# Patient Record
Sex: MALE | Race: NATIVE HAWAIIAN OR OTHER PACIFIC ISLANDER | NOT HISPANIC OR LATINO | Employment: FULL TIME | URBAN - METROPOLITAN AREA
[De-identification: names, ages, dates, MRNs, and addresses within clinical notes are randomized per-mention and may not be internally consistent; named-entity substitution may affect disease eponyms.]

---

## 2019-03-11 ENCOUNTER — HOSPITAL ENCOUNTER (OUTPATIENT)
Facility: CLINIC | Age: 64
End: 2019-03-11

## 2019-03-11 ENCOUNTER — APPOINTMENT (OUTPATIENT)
Dept: GENERAL RADIOLOGY | Facility: CLINIC | Age: 64
DRG: 233 | End: 2019-03-11
Attending: EMERGENCY MEDICINE
Payer: COMMERCIAL

## 2019-03-11 ENCOUNTER — HOSPITAL ENCOUNTER (INPATIENT)
Facility: CLINIC | Age: 64
LOS: 13 days | Discharge: HOME | DRG: 233 | End: 2019-03-24
Attending: EMERGENCY MEDICINE | Admitting: HOSPITALIST
Payer: COMMERCIAL

## 2019-03-11 DIAGNOSIS — Z95.1 STATUS POST CORONARY ARTERY BYPASS GRAFT: ICD-10-CM

## 2019-03-11 DIAGNOSIS — I25.10 CAD IN NATIVE ARTERY: ICD-10-CM

## 2019-03-11 DIAGNOSIS — J81.1 PULMONARY EDEMA: ICD-10-CM

## 2019-03-11 DIAGNOSIS — I21.4 NSTEMI (NON-ST ELEVATED MYOCARDIAL INFARCTION) (H): ICD-10-CM

## 2019-03-11 DIAGNOSIS — Z95.1 S/P CABG (CORONARY ARTERY BYPASS GRAFT): Primary | ICD-10-CM

## 2019-03-11 PROBLEM — E11.9 TYPE 2 DIABETES MELLITUS (H): Status: ACTIVE | Noted: 2019-03-11

## 2019-03-11 LAB
ALBUMIN SERPL-MCNC: 2.7 G/DL (ref 3.4–5)
ALP SERPL-CCNC: 151 U/L (ref 40–150)
ALT SERPL W P-5'-P-CCNC: 29 U/L (ref 0–70)
ANION GAP SERPL CALCULATED.3IONS-SCNC: 10 MMOL/L (ref 3–14)
ANION GAP SERPL CALCULATED.3IONS-SCNC: 7 MMOL/L (ref 3–14)
AST SERPL W P-5'-P-CCNC: 27 U/L (ref 0–45)
BASE DEFICIT BLDA-SCNC: 7.2 MMOL/L
BASE EXCESS BLDA CALC-SCNC: 4 MMOL/L
BASOPHILS # BLD AUTO: 0 10E9/L (ref 0–0.2)
BASOPHILS NFR BLD AUTO: 0.3 %
BILIRUB SERPL-MCNC: 0.3 MG/DL (ref 0.2–1.3)
BUN SERPL-MCNC: 26 MG/DL (ref 7–30)
BUN SERPL-MCNC: 27 MG/DL (ref 7–30)
CALCIUM SERPL-MCNC: 8.4 MG/DL (ref 8.5–10.1)
CALCIUM SERPL-MCNC: 8.6 MG/DL (ref 8.5–10.1)
CHLORIDE SERPL-SCNC: 106 MMOL/L (ref 94–109)
CHLORIDE SERPL-SCNC: 107 MMOL/L (ref 94–109)
CO2 SERPL-SCNC: 23 MMOL/L (ref 20–32)
CO2 SERPL-SCNC: 25 MMOL/L (ref 20–32)
CREAT SERPL-MCNC: 1.44 MG/DL (ref 0.66–1.25)
CREAT SERPL-MCNC: 1.52 MG/DL (ref 0.66–1.25)
D DIMER PPP FEU-MCNC: 3.6 UG/ML FEU (ref 0–0.5)
DIFFERENTIAL METHOD BLD: ABNORMAL
EOSINOPHIL # BLD AUTO: 0.4 10E9/L (ref 0–0.7)
EOSINOPHIL NFR BLD AUTO: 2.4 %
ERYTHROCYTE [DISTWIDTH] IN BLOOD BY AUTOMATED COUNT: 14.4 % (ref 10–15)
GFR SERPL CREATININE-BSD FRML MDRD: 49 ML/MIN/{1.73_M2}
GFR SERPL CREATININE-BSD FRML MDRD: 52 ML/MIN/{1.73_M2}
GLUCOSE BLDC GLUCOMTR-MCNC: 252 MG/DL (ref 70–99)
GLUCOSE SERPL-MCNC: 253 MG/DL (ref 70–99)
GLUCOSE SERPL-MCNC: 350 MG/DL (ref 70–99)
HBA1C MFR BLD: 7.9 % (ref 0–5.6)
HCO3 BLD-SCNC: 23 MMOL/L (ref 21–28)
HCO3 BLD-SCNC: 29 MMOL/L (ref 21–28)
HCT VFR BLD AUTO: 31.1 % (ref 40–53)
HCT VFR BLD CALC: 33 %PCV (ref 40–53)
HGB BLD CALC-MCNC: 11.2 G/DL (ref 13.3–17.7)
HGB BLD-MCNC: 10 G/DL (ref 13.3–17.7)
IMM GRANULOCYTES # BLD: 0.1 10E9/L (ref 0–0.4)
IMM GRANULOCYTES NFR BLD: 0.4 %
LACTATE BLD-SCNC: 2.7 MMOL/L (ref 0.7–2)
LACTATE BLD-SCNC: 5 MMOL/L (ref 0.7–2)
LYMPHOCYTES # BLD AUTO: 4.1 10E9/L (ref 0.8–5.3)
LYMPHOCYTES NFR BLD AUTO: 28.3 %
MCH RBC QN AUTO: 25.3 PG (ref 26.5–33)
MCHC RBC AUTO-ENTMCNC: 32.2 G/DL (ref 31.5–36.5)
MCV RBC AUTO: 79 FL (ref 78–100)
MONOCYTES # BLD AUTO: 1.1 10E9/L (ref 0–1.3)
MONOCYTES NFR BLD AUTO: 7.3 %
NEUTROPHILS # BLD AUTO: 8.9 10E9/L (ref 1.6–8.3)
NEUTROPHILS NFR BLD AUTO: 61.3 %
NRBC # BLD AUTO: 0 10*3/UL
NRBC BLD AUTO-RTO: 0 /100
NT-PROBNP SERPL-MCNC: 2131 PG/ML (ref 0–900)
OXYHGB MFR BLD: 93 % (ref 92–100)
OXYHGB MFR BLD: 99 % (ref 92–100)
PCO2 BLD: 44 MM HG (ref 35–45)
PCO2 BLD: 69 MM HG (ref 35–45)
PH BLD: 7.13 PH (ref 7.35–7.45)
PH BLD: 7.43 PH (ref 7.35–7.45)
PLATELET # BLD AUTO: 368 10E9/L (ref 150–450)
PO2 BLD: 262 MM HG (ref 80–105)
PO2 BLD: 94 MM HG (ref 80–105)
POTASSIUM BLD-SCNC: 3.4 MMOL/L (ref 3.4–5.3)
POTASSIUM SERPL-SCNC: 3.4 MMOL/L (ref 3.4–5.3)
POTASSIUM SERPL-SCNC: 4.1 MMOL/L (ref 3.4–5.3)
PROCALCITONIN SERPL-MCNC: 0.07 NG/ML
PROT SERPL-MCNC: 8.1 G/DL (ref 6.8–8.8)
RBC # BLD AUTO: 3.96 10E12/L (ref 4.4–5.9)
SODIUM BLD-SCNC: 141 MMOL/L (ref 133–144)
SODIUM SERPL-SCNC: 139 MMOL/L (ref 133–144)
SODIUM SERPL-SCNC: 139 MMOL/L (ref 133–144)
TROPONIN I BLD-MCNC: 0.36 UG/L (ref 0–0.08)
TROPONIN I SERPL-MCNC: 2.1 UG/L (ref 0–0.04)
WBC # BLD AUTO: 14.4 10E9/L (ref 4–11)

## 2019-03-11 PROCEDURE — 96375 TX/PRO/DX INJ NEW DRUG ADDON: CPT

## 2019-03-11 PROCEDURE — 71045 X-RAY EXAM CHEST 1 VIEW: CPT

## 2019-03-11 PROCEDURE — 83605 ASSAY OF LACTIC ACID: CPT | Performed by: EMERGENCY MEDICINE

## 2019-03-11 PROCEDURE — 84484 ASSAY OF TROPONIN QUANT: CPT

## 2019-03-11 PROCEDURE — 40000275 ZZH STATISTIC RCP TIME EA 10 MIN

## 2019-03-11 PROCEDURE — 82805 BLOOD GASES W/O2 SATURATION: CPT | Performed by: EMERGENCY MEDICINE

## 2019-03-11 PROCEDURE — 40000497 ZZHCL STATISTIC SODIUM ED POCT

## 2019-03-11 PROCEDURE — 25000128 H RX IP 250 OP 636

## 2019-03-11 PROCEDURE — 40000498 ZZHCL STATISTIC POTASSIUM ED POCT

## 2019-03-11 PROCEDURE — 86901 BLOOD TYPING SEROLOGIC RH(D): CPT | Performed by: SURGERY

## 2019-03-11 PROCEDURE — 80048 BASIC METABOLIC PNL TOTAL CA: CPT | Performed by: HOSPITALIST

## 2019-03-11 PROCEDURE — 87040 BLOOD CULTURE FOR BACTERIA: CPT | Performed by: HOSPITALIST

## 2019-03-11 PROCEDURE — 85025 COMPLETE CBC W/AUTO DIFF WBC: CPT | Performed by: EMERGENCY MEDICINE

## 2019-03-11 PROCEDURE — 82805 BLOOD GASES W/O2 SATURATION: CPT | Performed by: HOSPITALIST

## 2019-03-11 PROCEDURE — 85379 FIBRIN DEGRADATION QUANT: CPT | Performed by: EMERGENCY MEDICINE

## 2019-03-11 PROCEDURE — 40000501 ZZHCL STATISTIC HEMATOCRIT ED POCT

## 2019-03-11 PROCEDURE — 99291 CRITICAL CARE FIRST HOUR: CPT | Mod: 25

## 2019-03-11 PROCEDURE — 86900 BLOOD TYPING SEROLOGIC ABO: CPT | Performed by: SURGERY

## 2019-03-11 PROCEDURE — 99223 1ST HOSP IP/OBS HIGH 75: CPT | Mod: AI | Performed by: HOSPITALIST

## 2019-03-11 PROCEDURE — 86850 RBC ANTIBODY SCREEN: CPT | Performed by: SURGERY

## 2019-03-11 PROCEDURE — 96376 TX/PRO/DX INJ SAME DRUG ADON: CPT

## 2019-03-11 PROCEDURE — 80053 COMPREHEN METABOLIC PANEL: CPT | Performed by: EMERGENCY MEDICINE

## 2019-03-11 PROCEDURE — 25000131 ZZH RX MED GY IP 250 OP 636 PS 637: Performed by: HOSPITALIST

## 2019-03-11 PROCEDURE — 84484 ASSAY OF TROPONIN QUANT: CPT | Performed by: EMERGENCY MEDICINE

## 2019-03-11 PROCEDURE — 83880 ASSAY OF NATRIURETIC PEPTIDE: CPT | Performed by: EMERGENCY MEDICINE

## 2019-03-11 PROCEDURE — 20000003 ZZH R&B ICU

## 2019-03-11 PROCEDURE — 83036 HEMOGLOBIN GLYCOSYLATED A1C: CPT | Performed by: EMERGENCY MEDICINE

## 2019-03-11 PROCEDURE — 84484 ASSAY OF TROPONIN QUANT: CPT | Performed by: HOSPITALIST

## 2019-03-11 PROCEDURE — 96365 THER/PROPH/DIAG IV INF INIT: CPT

## 2019-03-11 PROCEDURE — 36415 COLL VENOUS BLD VENIPUNCTURE: CPT | Performed by: HOSPITALIST

## 2019-03-11 PROCEDURE — 84145 PROCALCITONIN (PCT): CPT | Performed by: EMERGENCY MEDICINE

## 2019-03-11 PROCEDURE — 36600 WITHDRAWAL OF ARTERIAL BLOOD: CPT

## 2019-03-11 PROCEDURE — 25000128 H RX IP 250 OP 636: Performed by: HOSPITALIST

## 2019-03-11 PROCEDURE — 25000128 H RX IP 250 OP 636: Performed by: EMERGENCY MEDICINE

## 2019-03-11 PROCEDURE — 99291 CRITICAL CARE FIRST HOUR: CPT | Mod: 25 | Performed by: INTERNAL MEDICINE

## 2019-03-11 PROCEDURE — 93005 ELECTROCARDIOGRAM TRACING: CPT

## 2019-03-11 PROCEDURE — 83605 ASSAY OF LACTIC ACID: CPT | Performed by: HOSPITALIST

## 2019-03-11 PROCEDURE — 00000146 ZZHCL STATISTIC GLUCOSE BY METER IP

## 2019-03-11 PROCEDURE — 81001 URINALYSIS AUTO W/SCOPE: CPT | Performed by: HOSPITALIST

## 2019-03-11 PROCEDURE — 25000132 ZZH RX MED GY IP 250 OP 250 PS 637: Performed by: EMERGENCY MEDICINE

## 2019-03-11 RX ORDER — ONDANSETRON 2 MG/ML
4 INJECTION INTRAMUSCULAR; INTRAVENOUS EVERY 6 HOURS PRN
Status: DISCONTINUED | OUTPATIENT
Start: 2019-03-11 | End: 2019-03-18

## 2019-03-11 RX ORDER — DEXTROSE MONOHYDRATE 25 G/50ML
25-50 INJECTION, SOLUTION INTRAVENOUS
Status: DISCONTINUED | OUTPATIENT
Start: 2019-03-11 | End: 2019-03-11 | Stop reason: ALTCHOICE

## 2019-03-11 RX ORDER — FUROSEMIDE 10 MG/ML
40 INJECTION INTRAMUSCULAR; INTRAVENOUS
Status: DISCONTINUED | OUTPATIENT
Start: 2019-03-12 | End: 2019-03-13 | Stop reason: DRUGHIGH

## 2019-03-11 RX ORDER — ASPIRIN 300 MG/1
300 SUPPOSITORY RECTAL ONCE
Status: COMPLETED | OUTPATIENT
Start: 2019-03-11 | End: 2019-03-11

## 2019-03-11 RX ORDER — ONDANSETRON 4 MG/1
4 TABLET, ORALLY DISINTEGRATING ORAL EVERY 6 HOURS PRN
Status: DISCONTINUED | OUTPATIENT
Start: 2019-03-11 | End: 2019-03-18

## 2019-03-11 RX ORDER — FUROSEMIDE 10 MG/ML
20 INJECTION INTRAMUSCULAR; INTRAVENOUS ONCE
Status: COMPLETED | OUTPATIENT
Start: 2019-03-11 | End: 2019-03-11

## 2019-03-11 RX ORDER — NICOTINE POLACRILEX 4 MG
15-30 LOZENGE BUCCAL
Status: DISCONTINUED | OUTPATIENT
Start: 2019-03-11 | End: 2019-03-18

## 2019-03-11 RX ORDER — ASPIRIN 300 MG/1
300 SUPPOSITORY RECTAL DAILY
Status: DISCONTINUED | OUTPATIENT
Start: 2019-03-12 | End: 2019-03-12

## 2019-03-11 RX ORDER — CEFTRIAXONE 1 G/1
1 INJECTION, POWDER, FOR SOLUTION INTRAMUSCULAR; INTRAVENOUS ONCE
Status: COMPLETED | OUTPATIENT
Start: 2019-03-11 | End: 2019-03-11

## 2019-03-11 RX ORDER — SODIUM CHLORIDE 9 MG/ML
INJECTION, SOLUTION INTRAVENOUS CONTINUOUS
Status: DISCONTINUED | OUTPATIENT
Start: 2019-03-11 | End: 2019-03-18

## 2019-03-11 RX ORDER — NICOTINE POLACRILEX 4 MG
15-30 LOZENGE BUCCAL
Status: DISCONTINUED | OUTPATIENT
Start: 2019-03-11 | End: 2019-03-11 | Stop reason: ALTCHOICE

## 2019-03-11 RX ORDER — NITROGLYCERIN 20 MG/100ML
0.07-2 INJECTION INTRAVENOUS CONTINUOUS
Status: DISCONTINUED | OUTPATIENT
Start: 2019-03-11 | End: 2019-03-13 | Stop reason: ALTCHOICE

## 2019-03-11 RX ORDER — NALOXONE HYDROCHLORIDE 0.4 MG/ML
.1-.4 INJECTION, SOLUTION INTRAMUSCULAR; INTRAVENOUS; SUBCUTANEOUS
Status: DISCONTINUED | OUTPATIENT
Start: 2019-03-11 | End: 2019-03-18

## 2019-03-11 RX ORDER — DEXTROSE MONOHYDRATE 25 G/50ML
25-50 INJECTION, SOLUTION INTRAVENOUS
Status: DISCONTINUED | OUTPATIENT
Start: 2019-03-11 | End: 2019-03-18

## 2019-03-11 RX ADMIN — HEPARIN SODIUM 750 UNITS/HR: 10000 INJECTION, SOLUTION INTRAVENOUS at 18:40

## 2019-03-11 RX ADMIN — CEFTRIAXONE SODIUM 1 G: 1 INJECTION, POWDER, FOR SOLUTION INTRAMUSCULAR; INTRAVENOUS at 21:21

## 2019-03-11 RX ADMIN — Medication 3800 UNITS: at 18:42

## 2019-03-11 RX ADMIN — FUROSEMIDE 20 MG: 10 INJECTION, SOLUTION INTRAVENOUS at 21:03

## 2019-03-11 RX ADMIN — FUROSEMIDE 20 MG: 10 INJECTION, SOLUTION INTRAVENOUS at 18:42

## 2019-03-11 RX ADMIN — ASPIRIN 300 MG: 300 SUPPOSITORY RECTAL at 18:43

## 2019-03-11 RX ADMIN — INSULIN ASPART 3 UNITS: 100 INJECTION, SOLUTION INTRAVENOUS; SUBCUTANEOUS at 20:59

## 2019-03-11 RX ADMIN — NITROGLYCERIN 0.07 MCG/KG/MIN: 20 INJECTION INTRAVENOUS at 20:13

## 2019-03-11 SDOH — HEALTH STABILITY: MENTAL HEALTH: HOW OFTEN DO YOU HAVE A DRINK CONTAINING ALCOHOL?: NEVER

## 2019-03-11 ASSESSMENT — MIFFLIN-ST. JEOR: SCORE: 1430.13

## 2019-03-11 ASSESSMENT — ACTIVITIES OF DAILY LIVING (ADL): ADLS_ACUITY_SCORE: 16

## 2019-03-11 NOTE — H&P
[facility]    History and Physical - Hospitalist Service       Date of Admission:  3/11/2019    Assessment & Plan   Khang Mcelroy is a 63 year old male admitted on 3/11/2019 with acute hypoxemic and hypercapnic respiratory failure presumed secondary to NSTEMI    Acute hypoxemic/hypercapnic respiratory failure secondary to acute systolic congestive heart failure from NSTEMI  Patient with acute onset of shortness of breath after an episode of chest pain the day prior  Initial EKG was concerning for STEMI, but recurrent EKG did not show DWIGHT, but nonspecific t wave changes  Cardiology evaluated in the emergency department, recommended serial troponins, IV diuretics, nitroglycerin infusion, anticoagulation, aspirin, and full echocardiogram  He was started on heparin and given a dose of IV lasix 20 mg in the ED  Initial pH of 7.13 with respiratory acidosis  CXR with bilateral infiltrates concerning for pulmonary edema  Initial troponin of 2.1  Bedside echo in the ED is reported to me as having an estimated LVEF of 20%, and the chart review indicates he has a previous echo in care everywhere in 1/2019 that had a normal EF.  Extremities are cool, avoid additional antihypertensives at this moment other than the nitroglycerin  Plan  - admit to the ICU  - stat recheck of ABG, repeat prn after  - continue bipap for now  - continue heparin infusion, cardiac protocol  - bid lasix IV  - strict input and output  - serial troponins  - angiogram being considered in the AM  - goal electrolytes with k of 4 and mg of 2  - lipid panel in the AM  - hold BB, could precipitate shock in this critically ill patient  - cardiology consulted, seen in the ED by Dr. Osborn and Dr. Rodriguez  - nitroglycerin infusion with goal of 120 SBP  - AM labs    Lactic acidosis  5 on admission  Presumed to be from poor tissue perfusion from MI  Blood pressure 140/70 at the moment  - lasix as above  - serial lactic acids      SIRS  criteria  Likely secondary to MI, but cannot rule out infection  Denies abdominal complaints  Plan  - blood culture x 2  - UA/UC  - procalcitonin  - monitor fever curve  - 1 dose of ceftriaxone will be ordered, can be resumed if clinical picture warrants    DM2  - hold home agents  - q 4 glucose checks  - check A1c  - sliding scale insulin    HTN and HLD  - hold on home medications at this time       Diet: npo  DVT Prophylaxis: Heparin SQ  Dejesus Catheter: not present  Code Status: full code    Disposition Plan   Expected discharge: 4 - 7 days, recommended to prior living arrangement once improved.  Entered: Misha Santos DO 03/11/2019, 6:48 PM     The patient's care was discussed with the Patient and Patient's Family.    Misha Santos DO  [facility]    ______________________________________________________________________    Chief Complaint   Shortness of breath    History is obtained from the patient and patient's son Oneal    History of Present Illness   Khang Mcelroy is a 63 year old male who was brought in by EMS after having an acute attack of shortness of breath. The patient is on bipap and the English  is unavailable because of device failure, and so must of the history is obtained from the patient's son oneal, who helps translate. The patient has been working a lot washing dishes over the weekend, but had an episode of chest pain after shoveling snow on the day prior to admission. He did not seek medical attention. Today, he very suddenly had acute onset of shortness of breath and so EMS was called. Initial EKG did read as STEMI. He was placed on bipap.     When he arrived in the ED he was seen by the attending Dr. Romero. He was very somnolent and his pH was initially 7.1 by ABG. He slowly improved on bipap, and cardiology saw the patient at the bedside. Repeat EKG did not reveal any DWIGHT, but bedside echo reportedly revealed a reduction in LV function to around  20%.    His blood pressure and level of alertness slowly improved, so admission to the hospital was advised.    Review of Systems    Review of systems not obtained due to patient factors - bipap    Past Medical History    I have reviewed this patient's medical history and updated it with pertinent information if needed.   Past Medical History:   Diagnosis Date     Hyperlipidemia      Hypertension    DM2    Past Surgical History   I have reviewed this patient's surgical history and updated it with pertinent information if needed.  ESWL for stones    Social History   I have reviewed this patient's social history and updated it with pertinent information if needed.  Social History     Tobacco Use     Smoking status: Never Smoker   Substance Use Topics     Alcohol use: No     Frequency: Never     Drug use: No       Family History   I have reviewed this patient's family history and updated it with pertinent information if needed.   No family history of MI per the son    Prior to Admission Medications   None   pharmacy med rec in progress    Allergies   No Known Allergies    Physical Exam   Vital Signs: Temp: 97.9  F (36.6  C) Temp src: Rectal BP: 148/88 Pulse: 93 Heart Rate: 93 Resp: (!) 41 SpO2: 99 % O2 Device: None (Room air)    Weight: 170 lbs 0 oz    Physical Exam   Constitutional: He is oriented to person, place, and time. He appears well-developed and well-nourished. No distress.   Utilizing bipap     HENT:   Head: Normocephalic and atraumatic.   Eyes: EOM are normal. Pupils are equal, round, and reactive to light. No scleral icterus.   Neck: Normal range of motion. Neck supple.   Cardiovascular: Normal rate, regular rhythm, normal heart sounds and intact distal pulses.   S3 present. I appreciate no mumurs  Cold lower extremities       Pulmonary/Chest: He is in respiratory distress. He has rales. He exhibits no tenderness.   Increased work of breathing   Rales and rhonchi througout     Abdominal: Soft. Bowel sounds  are normal. He exhibits no distension. There is no tenderness.   Neurological: He is alert and oriented to person, place, and time.   Moving all extremities on command     Skin: Capillary refill takes 2 to 3 seconds.   Cool lower extremities. No obvious edema. DP pulses 2+ bilaterally     Psychiatric: He has a normal mood and affect. His behavior is normal. Judgment and thought content normal.         Data   Data reviewed today: I reviewed all medications, new labs and imaging results over the last 24 hours. I personally reviewed   CXR: bilateral patchy infiltrates concerning for pulmonary edema  EKG: NSR, inferior nonspecific ST changes      Recent Labs   Lab 03/11/19 1756 03/11/19 1754   WBC  --  14.4*   HGB 11.2* 10.0*   MCV  --  79   PLT  --  368    139   POTASSIUM 3.4 3.4   CHLORIDE  --  106   CO2  --  23   BUN  --  26   CR  --  1.52*   ANIONGAP  --  10   MYKE  --  8.4*   GLC  --  350*   ALBUMIN  --  2.7*   PROTTOTAL  --  8.1   BILITOTAL  --  0.3   ALKPHOS  --  151*   ALT  --  29   AST  --  27   TROPI  --  2.102*   TROPONIN  --  0.36*     Most Recent 3 CBC's:  Recent Labs   Lab Test 03/11/19 1756 03/11/19 1754   WBC  --  14.4*   HGB 11.2* 10.0*   MCV  --  79   PLT  --  368     Most Recent 3 BMP's:  Recent Labs   Lab Test 03/11/19 1756 03/11/19  1754    139   POTASSIUM 3.4 3.4   CHLORIDE  --  106   CO2  --  23   BUN  --  26   CR  --  1.52*   ANIONGAP  --  10   MYKE  --  8.4*   GLC  --  350*     Most Recent 2 LFT's:  Recent Labs   Lab Test 03/11/19  1754   AST 27   ALT 29   ALKPHOS 151*   BILITOTAL 0.3     Most Recent 3 INR's:No lab results found.  Most Recent ABG:  Recent Labs   Lab Test 03/11/19 1755   PH 7.13*   PO2 94   PCO2 69*   HCO3 23     Recent Results (from the past 24 hour(s))   Chest  XR, 1 view PORTABLE    Narrative    XR PORTABLE CHEST ONE VIEW   3/11/2019 6:05 PM     HISTORY: Respiratory distress.    COMPARISON: None.      Impression    IMPRESSION: Lungs are slightly hypoinflated.  Patchy bilateral  pulmonary opacities are present suspicious for pulmonary edema.  Superimposed infection, aspiration, or hemorrhage cannot be excluded.  Probable small bilateral pleural effusions. Heart size is at upper  limits of normal.    RICCI LEE MD

## 2019-03-11 NOTE — ED PROVIDER NOTES
History     Chief Complaint:  Chest Pain    Hx limited by the acuity of hte patient's condition    HPI   Khang Mcelroy is a 63 year old male with a history of HTN, HLD, diabetes who presents to the emergency department today for evaluation of chest pain and shortness of breath. Per the patient's son, the patient was shoveling last night when he began feeling very fatigued and with chest pain. His family wanted him to come in to the hospital then but he declined. Today his condition has been worsening, and while sitting on the couch he had sudden onset of shortness of breath, prompting the call to EMS.     Once EMS arrived, the patient was interactive but in respiratory distress. On RA, he was in the 60s. They put him on CPAP and his sats went down to 50s before tolerating and rising back to the low 70s. En route to the Emergency Department, the patient had worsening somnolence. He had an HR in the 110's, 's. 3 nasal sprays of nitroglycerin brought his BP down somewhat. Blood glucose 235. EMS 12-lead ECG revealed a STEMI.     He has had this pain once before in the past but never had it evaluated. No reported history of heart failure. No diuretics.     Allergies:  No Known Drug Allergies    Medications:    Medications reviewed. No pertinent medications.     Past Medical History:    HTN  HLD    Past Surgical History:    History reviewed. No pertinent surgical history.    Family History:    History reviewed. No pertinent family history.    Social History:  The patient was accompanied to the ED by EMS, family.  Smoking Status: Never Smoker  Smokeless Tobacco: Never Used  Alcohol Use: Positive   Marital Status:      Review of Systems   Unable to perform ROS: Acuity of condition     Physical Exam     Patient Vitals for the past 24 hrs:   BP Temp Temp src Pulse Heart Rate Resp SpO2 Height Weight   03/11/19 2245 115/70 97.9  F (36.6  C) -- 74 74 22 95 % -- --   03/11/19 2230 112/65 97.7  F (36.5  C) -- 71  "72 21 93 % -- --   03/11/19 2215 102/64 97.7  F (36.5  C) -- 71 71 22 92 % -- --   03/11/19 2200 108/63 97.7  F (36.5  C) Bladder 73 73 20 94 % -- --   03/11/19 2145 102/64 97.7  F (36.5  C) -- 75 73 21 91 % -- --   03/11/19 2130 130/83 97.7  F (36.5  C) -- 81 80 27 95 % -- --   03/11/19 2115 130/81 97.5  F (36.4  C) -- 81 82 23 100 % -- --   03/11/19 2100 148/87 97  F (36.1  C) Bladder 85 85 25 100 % -- --   03/11/19 2053 -- -- -- -- -- -- -- -- 73 kg (160 lb 15 oz)   03/11/19 2045 (!) 143/96 -- -- 88 81 18 100 % -- --   03/11/19 2030 145/83 -- -- 82 83 25 100 % -- --   03/11/19 2015 154/72 -- -- 85 84 26 100 % -- --   03/11/19 2000 152/82 96.6  F (35.9  C) Axillary 85 82 27 -- -- --   03/11/19 1945 (!) 145/91 -- -- 82 85 30 -- -- --   03/11/19 1930 150/89 -- -- 86 86 (!) 31 -- -- --   03/11/19 1915 144/82 96.3  F (35.7  C) Axillary 87 88 (!) 31 -- -- --   03/11/19 1914 -- -- -- -- 88 (!) 31 -- -- --   03/11/19 1913 -- -- -- -- 89 (!) 31 -- -- --   03/11/19 1912 (!) 160/93 -- -- -- 89 19 -- -- --   03/11/19 1858 146/90 -- -- 90 -- 18 100 % -- --   03/11/19 1847 -- 97.9  F (36.6  C) Rectal -- -- -- -- -- --   03/11/19 1845 148/88 -- -- 93 93 (!) 41 99 % -- --   03/11/19 1827 -- -- -- -- -- -- -- 1.6 m (5' 3\") --   03/11/19 1820 137/80 -- -- 95 94 (!) 34 99 % 1.575 m (5' 2\") --   03/11/19 1815 147/90 -- -- 97 93 (!) 34 99 % -- --   03/11/19 1810 151/88 -- -- 98 98 (!) 45 99 % -- --   03/11/19 1805 (!) 147/94 -- -- 100 101 (!) 36 98 % -- --   03/11/19 1800 (!) 155/97 -- -- 103 104 (!) 46 99 % -- --   03/11/19 1750 (!) 165/97 -- -- 109 112 (!) 40 94 % -- 77.1 kg (170 lb)      Physical Exam  General: Resting on the gurney, in obvious distress  Head:  The scalp, face, and head appear normal  Mouth/Throat: Mucus membranes are moist  CV:  Heart is difficult to auscultate secondary to competing lung sounds  Resp:  Lungs with crackles in all fields    Respiratory rate in the 40s.    Accessory muscle use present. Obvious " respiratory distress.   GI:  Abdomen is soft, no rigidity    No tenderness to palpation  MS:  Normal motor assessment of all extremities.    Good capillary refill noted.    bilateral lower extremity edema.   Skin:  No rash or lesions noted. No wounds.  Neuro:  Moves all extremities equally.  No apparent focal deficit.  Psych:  Somnolent    Emergency Department Course     ECG:  ECG taken at 1759, ECG read at 1800  Sinus tach w/ PACs w/ aberrant conduction  Nonspecific ST and T wave abnormality  Rate 104 bpm. UT interval 206 ms. QRS duration 104 ms. QT/QTc 348/457 ms. P-R-T axes 93 83 220.    Imaging:  Radiology findings were communicated with the patient's family who voiced understanding of the findings.    Chest  XR, 1 view PORTABLE  Lungs are slightly hypoinflated. Patchy bilateral  pulmonary opacities are present suspicious for pulmonary edema.  Superimposed infection, aspiration, or hemorrhage cannot be excluded.  Probable small bilateral pleural effusions. Heart size is at upper  limits of normal.  Reading per radiology    Laboratory:  Laboratory findings were communicated with the patient's family who voiced understanding of the findings.    ISTAT Electrolytes: HGB 11.2, hematocrit 33, , K 3.4  Blood gas arterial: pH 7.13, pCO2 69  Troponin I POCT: 0.36  Lactic acid: 5.0  CBC: WBC 14.4, HGB 10.0,   CMP: Glc 350, Creatinine 1.52, GFR 49, Ca 8.4, Albumin 2.7, AlkPhos 151, o/w WNL  Nt ProBNP: 2131  D dimer: 3.6  Heparin 10a level: Pending    Interventions:  1840 Heparin infusion 750 units/hr IV  1842 Heparin loading dose 3800 units IV  1842 Lasix 20 mg IV  1843 Aspiring 300 mg Rectally    Emergency Department Course:    1743  Patient arrives in ST1 with EMS. On CPAP.    1745  Patient transferred to ed bed.  EKG reviewed;.    1746  Put on Emergency Department BiPAP, 100%.    1747  Talked with pt wife.     1748  Portable XR ordered.     1748  61% on BiPAP.    1749  ABG ordered.    1749  68% on  BiPAP.    1750  88% on BiPAP.    1751  IV was inserted and blood was drawn for laboratory testing, results above.     1751 ISTAT Troponin and electrolytes ordered.     1753 96% on BiPAP. BP: 165/97. HR: 107. RR 38.    1756 IV was inserted and blood was drawn for laboratory testing, results above.      1758 Talked with patient's son.    1800 ECG.    1804 Portable CXR taken.     1804 I spoke with Dr. Osborn of the Cardiology service regarding patient's presentation, findings, and plan of care.     1808 Dr. Osborn from Cardiology here to see the patient.    1810 99% on BiPAP. HR: 98. RR: 31. BP: 151/88.    1813 Portable US.     1820 I spoke with Dr. Santos of the Hospitalist service regarding patient's presentation, findings, and plan of care.     1910 I personally reviewed the imaging and lab results with the patient and his family and answered all related questions prior to admission.    Impression & Plan      Medical Decision Making:  Khang Mcelroy is a 63 year old male who presents to the emergency department today for evaluation of shortness of breath, weakness, chest pain.  He had markedly increased work of breathing and hypoxia.  He was placed on BiPAP with some improvement in his symptoms.  There is concern for cardiac etiology given the EMS EKG which did show ST elevation, however our EKG had fully normalized.  Patient was discussed with interventional cardiologist who came to the bedside and examined the patient.  His chest x-ray showed pulmonary edema and we think that he likely had a cardiac event yesterday when his symptoms started and now has pulmonary edema and heart failure.  Patient did have an elevated troponin but no longer has ST elevation per cardiology recommendations will be heparinized, diuresed, given aspirin, and admitted to the ICU.  Blood pressure control with goals of a systolic blood pressure of 120 as well.  Patient was discussed with the hospitalist and will be admitted to the  ICU in critical but stabilized condition.    Diagnosis:  (J81.1) Pulmonary edema    Myocardial infarction      Disposition:   Admission    Critical Care Time for this patient, exclusive of procedures, is 90 minutes.     Scribe Disclosure:  I, Onel Zelaya, am serving as a scribe at 5:48 PM on 3/11/2019 to document services personally performed by Krystin Reddy MD based on my observations and the provider's statements to me.     EMERGENCY DEPARTMENT       Krystin Reddy MD  03/12/19 0106

## 2019-03-11 NOTE — CONSULTS
[facility]    Cardiology Consultation     Date of Admission:  3/11/2019  Date of Consult (When I saw the patient): 03/11/19    Assessment & Plan   Khang Mcelroy is a 63 year old male who was admitted on 3/11/2019.    1-acute pulmonary edema.  Patient hypoxic and hypercarbic.  Has evidence of global cardiomyopathy at this point time which will need further evaluation.  Currently does not have ischemic symptoms, no ischemic EKG changes, troponin I is very slightly elevated at 0.36.  No notable regional  wall motion normality.  Elevated NT proBNP at 2100.    2-history of resistant hypertension.  By outside records it has not been adequately controlled.  Very hypertensive on presentation here.  May be cause of cardiomyopathy but seems less likely given reported normal ejection fraction several months ago.    3-history of stage III chronic renal insufficiency, current creatinine 1.5 to is only mildly above his previous recent 1 of 1.4.    4.-Diabetes mellitus on oral medications    Recommendations-  -Admit to CCU  -IV diuretics  -Blood pressure control, prefer to start with IV nitroglycerin with goal blood pressure of around 120 systolic or less  -Serial troponins  -Anticoagulation with heparin and antiplatelet therapy with aspirin   -Full cardiac echo tomorrow  -Depending the results will need to decide whether to proceed with early catheterization or other evaluation such as MRI.      Facundo Osborn M.D.    Primary Care Physician   No primary care provider on file.    Reason for Consult   Reason for consult: I was asked by Dr. Reddy to evaluate this patient for acute congestive heart failure with pulmonary edema, possible acute ischemia.    History of Present Illness   Khang Mcelroy is a 63 year old male who presents with worsening dyspnea, hypoxia and hypercarbia, and some chest discomfort.  History is from the family who interprets for him and from the paramedics.  He has a prior history of  difficult control hypertension, diabetes, stage III chronic renal insufficiency.  He was shoveling snow yesterday and started to feel poorly weak and tired.  He developed progressive shortness of breath all night long and all day during the day today until he was finally pretty desperate and paramedics were called.  The report in addition to being in respiratory distress and having complaints of shortness of breath he initially mentioned some chest discomfort.  He was treated with sublingual nitroglycerin and on arrival in the emergency room with BiPAP support.  He was very hypertensive exceeding 180 systolic when the paramedics came.  With initial interventions of blood pressures improved partially, any chest discomfort has resolved, and he is no longer hypoxic.  Family does not report complaints of chest discomfort yesterday when he started to become ill.  The report poorly controlled blood pressures.  Initial EKG in the field was concerning for some possible hyper acute ST T changes in the anterior leads.  However EKGs repeatedly in the emergency room showed no Q waves, no ST elevation, and no significant other ST deviation.  Initial exam shows diffuse coarse crackles throughout all lung fields anterior and posterior.  BiPAP is in place and JVD is difficult to assess.  Blood pressure still remains 140 systolic.  Pulse contour does not suggest significant aortic stenosis.  I cannot actually hear heart sounds are evaluate for murmur because there is such a prominent crackles and respiratory sounds and tachypnea present.  Point-of-care troponin is slightly elevated at 0.36.  NT proBNP is elevated above 2000.  Creatinine is 1.52, with outside base lines recently around 1.4.  Glucose 350, hemoglobin 10 and white count 14.4.  Lactate is elevated at 5.0.    Emergency department machine we did a quick bedside echo.  Aortic valve was imaged adequately and there is not aortic stenosis, mitral valve was viewed with on  limited images but no apparent flail.  Left ventricular ejection fraction was probably 30% at best and may be less.  There is generally global moderately to severe hypokinesis without marked regional abnormality.  RV not well seen but not severely decreased function.    Chest x-ray shows diffuse patchy alveolar infiltrates bilaterally particularly in the lower lobes.  Radiology reports possible pleural effusions.  There is some peribronchial cuffing.    There is no family history that they are aware of of cardiovascular disease.  Review of care everywhere notes no other significant family history.        Past Medical History   I have reviewed this patient's medical history and updated it with pertinent information if needed.   Past Medical History:   Diagnosis Date     Hyperlipidemia      Hypertension      Nephrolithiasis        Past Surgical History   I have reviewed this patient's surgical history and updated it with pertinent information if needed.  Past Surgical History:   Procedure Laterality Date     LITHOTRIPSY         Prior to Admission Medications     Care everywhere notes recent medications were metformin 1000 mgDaily, atorvastatin 40 mg daily, glipizide 2.5 mg daily, hydrochlorothiazide/lisinopril 20/25 daily, fish oil 1000 mg daily, vitamin B12 1000 mcg daily, aspirin 81 mg daily    Current Facility-Administered Medications   Medication Dose Route Frequency     Current Facility-Administered Medications   Medication Last Rate     HEParin 750 Units/hr (03/11/19 1840)     Allergies   No Known Allergies    Social History    reports that  has never smoked. He does not have any smokeless tobacco history on file. He reports that he does not drink alcohol or use drugs.    Family History   No family history on file.    Review of Systems   The 10 point Review of Systems is negative other than noted in the HPI or here.  Up until recently has not complained of shortness of breath orthopnea PND.  Or chest pain.   "Unclear what his baseline weights are.  Has had pedal edema but reportedly this was gone a couple of months ago.  He currently has significant pedal edema.    Physical Exam   Vital Signs with Ranges  Temp:  [97.9  F (36.6  C)] 97.9  F (36.6  C)  Pulse:  [] 93  Heart Rate:  [] 93  Resp:  [34-46] 41  BP: (137-165)/(80-97) 148/88  SpO2:  [94 %-99 %] 99 %  Vitals:    03/11/19 1750   Weight: 77.1 kg (170 lb)     No intake/output data recorded.    Vitals: /88   Pulse 93   Temp 97.9  F (36.6  C) (Rectal)   Resp (!) 41   Ht 1.6 m (5' 3\")   Wt 77.1 kg (170 lb)   SpO2 99%   BMI 30.11 kg/m      Constitutional: Normally developed gentleman continues to be in significant respiratory distress but now oxygenating okay.  Denies chest discomfort.    Skin: There is some chronic stasis changes pretibially without ulceration or erythema.  Otherwise grossly clear    Head/Eyes/ENT: No cyanosis or pallor.  BiPAP in place.    Neck:   Supple without gross mass.  Difficult to examine due to BiPAP    Chest:   Marked diffuse crackles anteriorly and posteriorly essentially all lung fields with diminished breath sounds.  No wheezing.    Cardiac: Very distant heart sounds obscured pretty much by respiratory sounds.  No obvious murmur.  Cannot assess JVD due to his BiPAP.  Peripheral pulses are intact with normal pulse volume    Abdomen:   Somewhat obese but nondistended and nontender without bruit    Vascular: Radial brachial carotid and posterior tibial pulses -2+ cintact    Edema 1hronic pitting edema more than senior living up pretibially bilaterally    Neurological: Follows commands reasonably well.  Grossly intact upper and lower motor strength.  Extraocular motions intact.    Recent Labs   Lab 03/11/19 1754   TROPI 2.102*       Recent Labs   Lab 03/11/19 1756 03/11/19 1754   WBC  --  14.4*   HGB 11.2* 10.0*   MCV  --  79   PLT  --  368    139   POTASSIUM 3.4 3.4   CHLORIDE  --  106   CO2  --  23   BUN  --  26 "   CR  --  1.52*   GFRESTIMATED  --  49*   GFRESTBLACK  --  56*   ANIONGAP  --  10   MYKE  --  8.4*   GLC  --  350*   ALBUMIN  --  2.7*   PROTTOTAL  --  8.1   BILITOTAL  --  0.3   ALKPHOS  --  151*   ALT  --  29   AST  --  27   TROPI  --  2.102*       Imaging:  Recent Results (from the past 48 hour(s))   Chest  XR, 1 view PORTABLE    Narrative    XR PORTABLE CHEST ONE VIEW   3/11/2019 6:05 PM     HISTORY: Respiratory distress.    COMPARISON: None.      Impression    IMPRESSION: Lungs are slightly hypoinflated. Patchy bilateral  pulmonary opacities are present suspicious for pulmonary edema.  Superimposed infection, aspiration, or hemorrhage cannot be excluded.  Probable small bilateral pleural effusions. Heart size is at upper  limits of normal.    RICCI LEE MD       35 minutes of critical care time tonight.

## 2019-03-12 ENCOUNTER — APPOINTMENT (OUTPATIENT)
Dept: ULTRASOUND IMAGING | Facility: CLINIC | Age: 64
DRG: 233 | End: 2019-03-12
Attending: SURGERY
Payer: COMMERCIAL

## 2019-03-12 ENCOUNTER — APPOINTMENT (OUTPATIENT)
Dept: CARDIOLOGY | Facility: CLINIC | Age: 64
DRG: 233 | End: 2019-03-12
Attending: HOSPITALIST
Payer: COMMERCIAL

## 2019-03-12 ENCOUNTER — SURGERY (OUTPATIENT)
Age: 64
End: 2019-03-12
Payer: COMMERCIAL

## 2019-03-12 LAB
ALBUMIN SERPL-MCNC: 2.5 G/DL (ref 3.4–5)
ALBUMIN UR-MCNC: 30 MG/DL
ALBUMIN UR-MCNC: 30 MG/DL
ALP SERPL-CCNC: 115 U/L (ref 40–150)
ALT SERPL W P-5'-P-CCNC: 28 U/L (ref 0–70)
ANION GAP SERPL CALCULATED.3IONS-SCNC: 6 MMOL/L (ref 3–14)
APPEARANCE UR: ABNORMAL
APPEARANCE UR: CLEAR
AST SERPL W P-5'-P-CCNC: 57 U/L (ref 0–45)
BACTERIA #/AREA URNS HPF: ABNORMAL /HPF
BILIRUB SERPL-MCNC: 0.3 MG/DL (ref 0.2–1.3)
BILIRUB UR QL STRIP: NEGATIVE
BILIRUB UR QL STRIP: NEGATIVE
BUN SERPL-MCNC: 25 MG/DL (ref 7–30)
CALCIUM SERPL-MCNC: 8.6 MG/DL (ref 8.5–10.1)
CATH EF QUANTITATIVE: 35 %
CHLORIDE SERPL-SCNC: 111 MMOL/L (ref 94–109)
CO2 SERPL-SCNC: 27 MMOL/L (ref 20–32)
COLOR UR AUTO: YELLOW
COLOR UR AUTO: YELLOW
CREAT SERPL-MCNC: 1.55 MG/DL (ref 0.66–1.25)
GFR SERPL CREATININE-BSD FRML MDRD: 48 ML/MIN/{1.73_M2}
GLUCOSE BLDC GLUCOMTR-MCNC: 109 MG/DL (ref 70–99)
GLUCOSE BLDC GLUCOMTR-MCNC: 115 MG/DL (ref 70–99)
GLUCOSE BLDC GLUCOMTR-MCNC: 120 MG/DL (ref 70–99)
GLUCOSE BLDC GLUCOMTR-MCNC: 125 MG/DL (ref 70–99)
GLUCOSE BLDC GLUCOMTR-MCNC: 151 MG/DL (ref 70–99)
GLUCOSE BLDC GLUCOMTR-MCNC: 157 MG/DL (ref 70–99)
GLUCOSE SERPL-MCNC: 117 MG/DL (ref 70–99)
GLUCOSE UR STRIP-MCNC: NEGATIVE MG/DL
GLUCOSE UR STRIP-MCNC: NEGATIVE MG/DL
HGB UR QL STRIP: ABNORMAL
HGB UR QL STRIP: ABNORMAL
HYALINE CASTS #/AREA URNS LPF: 20 /LPF (ref 0–2)
KETONES UR STRIP-MCNC: NEGATIVE MG/DL
KETONES UR STRIP-MCNC: NEGATIVE MG/DL
LEUKOCYTE ESTERASE UR QL STRIP: ABNORMAL
LEUKOCYTE ESTERASE UR QL STRIP: ABNORMAL
LMWH PPP CHRO-ACNC: 0.2 IU/ML
LMWH PPP CHRO-ACNC: 0.38 IU/ML
MRSA DNA SPEC QL NAA+PROBE: NEGATIVE
MUCOUS THREADS #/AREA URNS LPF: PRESENT /LPF
MUCOUS THREADS #/AREA URNS LPF: PRESENT /LPF
NITRATE UR QL: NEGATIVE
NITRATE UR QL: NEGATIVE
PH UR STRIP: 5 PH (ref 5–7)
PH UR STRIP: 5.5 PH (ref 5–7)
POTASSIUM SERPL-SCNC: 3.8 MMOL/L (ref 3.4–5.3)
PROT SERPL-MCNC: 7 G/DL (ref 6.8–8.8)
RBC #/AREA URNS AUTO: 30 /HPF (ref 0–2)
RBC #/AREA URNS AUTO: 6 /HPF (ref 0–2)
SODIUM SERPL-SCNC: 144 MMOL/L (ref 133–144)
SOURCE: ABNORMAL
SOURCE: ABNORMAL
SP GR UR STRIP: 1.01 (ref 1–1.03)
SP GR UR STRIP: 1.03 (ref 1–1.03)
SPECIMEN SOURCE: NORMAL
SQUAMOUS #/AREA URNS AUTO: 2 /HPF (ref 0–1)
TROPONIN I SERPL-MCNC: 53.25 UG/L (ref 0–0.04)
TROPONIN I SERPL-MCNC: 53.86 UG/L (ref 0–0.04)
TROPONIN I SERPL-MCNC: 6.62 UG/L (ref 0–0.04)
URATE CRY #/AREA URNS HPF: ABNORMAL /HPF
UROBILINOGEN UR STRIP-MCNC: NORMAL MG/DL (ref 0–2)
UROBILINOGEN UR STRIP-MCNC: NORMAL MG/DL (ref 0–2)
WBC #/AREA URNS AUTO: 22 /HPF (ref 0–5)
WBC #/AREA URNS AUTO: 37 /HPF (ref 0–5)

## 2019-03-12 PROCEDURE — 00000146 ZZHCL STATISTIC GLUCOSE BY METER IP

## 2019-03-12 PROCEDURE — 36415 COLL VENOUS BLD VENIPUNCTURE: CPT | Performed by: INTERNAL MEDICINE

## 2019-03-12 PROCEDURE — 99233 SBSQ HOSP IP/OBS HIGH 50: CPT | Mod: 25 | Performed by: INTERNAL MEDICINE

## 2019-03-12 PROCEDURE — 81001 URINALYSIS AUTO W/SCOPE: CPT | Performed by: SURGERY

## 2019-03-12 PROCEDURE — C1894 INTRO/SHEATH, NON-LASER: HCPCS | Performed by: INTERNAL MEDICINE

## 2019-03-12 PROCEDURE — 87086 URINE CULTURE/COLONY COUNT: CPT | Performed by: HOSPITALIST

## 2019-03-12 PROCEDURE — 25000128 H RX IP 250 OP 636: Performed by: HOSPITALIST

## 2019-03-12 PROCEDURE — 84484 ASSAY OF TROPONIN QUANT: CPT | Performed by: INTERNAL MEDICINE

## 2019-03-12 PROCEDURE — 99233 SBSQ HOSP IP/OBS HIGH 50: CPT | Performed by: INTERNAL MEDICINE

## 2019-03-12 PROCEDURE — 85520 HEPARIN ASSAY: CPT | Performed by: HOSPITALIST

## 2019-03-12 PROCEDURE — 93005 ELECTROCARDIOGRAM TRACING: CPT

## 2019-03-12 PROCEDURE — 93306 TTE W/DOPPLER COMPLETE: CPT | Mod: 26 | Performed by: INTERNAL MEDICINE

## 2019-03-12 PROCEDURE — 40000264 ECHOCARDIOGRAM COMPLETE

## 2019-03-12 PROCEDURE — 36415 COLL VENOUS BLD VENIPUNCTURE: CPT | Performed by: SURGERY

## 2019-03-12 PROCEDURE — 27210794 ZZH OR GENERAL SUPPLY STERILE: Performed by: INTERNAL MEDICINE

## 2019-03-12 PROCEDURE — 99153 MOD SED SAME PHYS/QHP EA: CPT | Performed by: INTERNAL MEDICINE

## 2019-03-12 PROCEDURE — 25000132 ZZH RX MED GY IP 250 OP 250 PS 637: Performed by: INTERNAL MEDICINE

## 2019-03-12 PROCEDURE — 93458 L HRT ARTERY/VENTRICLE ANGIO: CPT | Mod: 26 | Performed by: INTERNAL MEDICINE

## 2019-03-12 PROCEDURE — 80053 COMPREHEN METABOLIC PANEL: CPT | Performed by: HOSPITALIST

## 2019-03-12 PROCEDURE — 99152 MOD SED SAME PHYS/QHP 5/>YRS: CPT | Performed by: INTERNAL MEDICINE

## 2019-03-12 PROCEDURE — B2111ZZ FLUOROSCOPY OF MULTIPLE CORONARY ARTERIES USING LOW OSMOLAR CONTRAST: ICD-10-PCS | Performed by: INTERNAL MEDICINE

## 2019-03-12 PROCEDURE — 93458 L HRT ARTERY/VENTRICLE ANGIO: CPT | Performed by: INTERNAL MEDICINE

## 2019-03-12 PROCEDURE — 25000132 ZZH RX MED GY IP 250 OP 250 PS 637: Performed by: HOSPITALIST

## 2019-03-12 PROCEDURE — 25500064 ZZH RX 255 OP 636: Performed by: HOSPITALIST

## 2019-03-12 PROCEDURE — 36415 COLL VENOUS BLD VENIPUNCTURE: CPT | Performed by: HOSPITALIST

## 2019-03-12 PROCEDURE — B2151ZZ FLUOROSCOPY OF LEFT HEART USING LOW OSMOLAR CONTRAST: ICD-10-PCS | Performed by: INTERNAL MEDICINE

## 2019-03-12 PROCEDURE — 40000275 ZZH STATISTIC RCP TIME EA 10 MIN

## 2019-03-12 PROCEDURE — 84484 ASSAY OF TROPONIN QUANT: CPT | Performed by: SURGERY

## 2019-03-12 PROCEDURE — 87641 MR-STAPH DNA AMP PROBE: CPT | Performed by: HOSPITALIST

## 2019-03-12 PROCEDURE — 25000128 H RX IP 250 OP 636: Performed by: INTERNAL MEDICINE

## 2019-03-12 PROCEDURE — 93970 EXTREMITY STUDY: CPT

## 2019-03-12 PROCEDURE — 94660 CPAP INITIATION&MGMT: CPT

## 2019-03-12 PROCEDURE — 4A023N7 MEASUREMENT OF CARDIAC SAMPLING AND PRESSURE, LEFT HEART, PERCUTANEOUS APPROACH: ICD-10-PCS | Performed by: INTERNAL MEDICINE

## 2019-03-12 PROCEDURE — 93880 EXTRACRANIAL BILAT STUDY: CPT

## 2019-03-12 PROCEDURE — 25000125 ZZHC RX 250: Performed by: INTERNAL MEDICINE

## 2019-03-12 PROCEDURE — 85520 HEPARIN ASSAY: CPT | Performed by: INTERNAL MEDICINE

## 2019-03-12 PROCEDURE — 20000003 ZZH R&B ICU

## 2019-03-12 PROCEDURE — 87640 STAPH A DNA AMP PROBE: CPT | Performed by: HOSPITALIST

## 2019-03-12 PROCEDURE — 93010 ELECTROCARDIOGRAM REPORT: CPT | Performed by: INTERNAL MEDICINE

## 2019-03-12 PROCEDURE — 84484 ASSAY OF TROPONIN QUANT: CPT | Performed by: HOSPITALIST

## 2019-03-12 RX ORDER — NALOXONE HYDROCHLORIDE 0.4 MG/ML
.2-.4 INJECTION, SOLUTION INTRAMUSCULAR; INTRAVENOUS; SUBCUTANEOUS
Status: DISCONTINUED | OUTPATIENT
Start: 2019-03-12 | End: 2019-03-12

## 2019-03-12 RX ORDER — VERAPAMIL HYDROCHLORIDE 2.5 MG/ML
INJECTION, SOLUTION INTRAVENOUS
Status: DISCONTINUED | OUTPATIENT
Start: 2019-03-12 | End: 2019-03-12 | Stop reason: HOSPADM

## 2019-03-12 RX ORDER — POTASSIUM CHLORIDE 7.45 MG/ML
10 INJECTION INTRAVENOUS
Status: DISCONTINUED | OUTPATIENT
Start: 2019-03-12 | End: 2019-03-18

## 2019-03-12 RX ORDER — ATROPINE SULFATE 0.1 MG/ML
0.5 INJECTION INTRAVENOUS EVERY 5 MIN PRN
Status: ACTIVE | OUTPATIENT
Start: 2019-03-12 | End: 2019-03-13

## 2019-03-12 RX ORDER — METOPROLOL SUCCINATE 50 MG/1
50 TABLET, EXTENDED RELEASE ORAL DAILY
Status: ON HOLD | COMMUNITY
End: 2019-03-24

## 2019-03-12 RX ORDER — MAGNESIUM SULFATE HEPTAHYDRATE 40 MG/ML
2 INJECTION, SOLUTION INTRAVENOUS DAILY PRN
Status: DISCONTINUED | OUTPATIENT
Start: 2019-03-12 | End: 2019-03-18

## 2019-03-12 RX ORDER — SODIUM CHLORIDE 9 MG/ML
INJECTION, SOLUTION INTRAVENOUS CONTINUOUS
Status: DISCONTINUED | OUTPATIENT
Start: 2019-03-12 | End: 2019-03-13 | Stop reason: ALTCHOICE

## 2019-03-12 RX ORDER — LISINOPRIL AND HYDROCHLOROTHIAZIDE 20; 25 MG/1; MG/1
1 TABLET ORAL DAILY
Status: ON HOLD | COMMUNITY
End: 2019-03-24

## 2019-03-12 RX ORDER — LOSARTAN POTASSIUM 100 MG/1
100 TABLET ORAL DAILY
Status: ON HOLD | COMMUNITY
End: 2019-03-24

## 2019-03-12 RX ORDER — FENTANYL CITRATE 50 UG/ML
INJECTION, SOLUTION INTRAMUSCULAR; INTRAVENOUS
Status: DISCONTINUED | OUTPATIENT
Start: 2019-03-12 | End: 2019-03-12 | Stop reason: HOSPADM

## 2019-03-12 RX ORDER — ATORVASTATIN CALCIUM 40 MG/1
40 TABLET, FILM COATED ORAL EVERY EVENING
Status: DISCONTINUED | OUTPATIENT
Start: 2019-03-12 | End: 2019-03-13

## 2019-03-12 RX ORDER — GLIPIZIDE 5 MG/1
5 TABLET, FILM COATED, EXTENDED RELEASE ORAL DAILY
COMMUNITY
End: 2020-03-02 | Stop reason: DRUGHIGH

## 2019-03-12 RX ORDER — FLUMAZENIL 0.1 MG/ML
0.2 INJECTION, SOLUTION INTRAVENOUS
Status: ACTIVE | OUTPATIENT
Start: 2019-03-12 | End: 2019-03-13

## 2019-03-12 RX ORDER — FENTANYL CITRATE 50 UG/ML
25-50 INJECTION, SOLUTION INTRAMUSCULAR; INTRAVENOUS
Status: ACTIVE | OUTPATIENT
Start: 2019-03-12 | End: 2019-03-13

## 2019-03-12 RX ORDER — POTASSIUM CHLORIDE 1500 MG/1
20-40 TABLET, EXTENDED RELEASE ORAL
Status: DISCONTINUED | OUTPATIENT
Start: 2019-03-12 | End: 2019-03-18

## 2019-03-12 RX ORDER — ASPIRIN 81 MG/1
81 TABLET ORAL DAILY
Status: DISCONTINUED | OUTPATIENT
Start: 2019-03-13 | End: 2019-03-13

## 2019-03-12 RX ORDER — LORAZEPAM 0.5 MG/1
0.5 TABLET ORAL
Status: DISCONTINUED | OUTPATIENT
Start: 2019-03-12 | End: 2019-03-12

## 2019-03-12 RX ORDER — POTASSIUM CHLORIDE 1.5 G/1.58G
20-40 POWDER, FOR SOLUTION ORAL
Status: DISCONTINUED | OUTPATIENT
Start: 2019-03-12 | End: 2019-03-18

## 2019-03-12 RX ORDER — HYDROCODONE BITARTRATE AND ACETAMINOPHEN 5; 325 MG/1; MG/1
1-2 TABLET ORAL EVERY 4 HOURS PRN
Status: DISCONTINUED | OUTPATIENT
Start: 2019-03-12 | End: 2019-03-18

## 2019-03-12 RX ORDER — POTASSIUM CHLORIDE 29.8 MG/ML
20 INJECTION INTRAVENOUS
Status: DISCONTINUED | OUTPATIENT
Start: 2019-03-12 | End: 2019-03-18

## 2019-03-12 RX ORDER — POTASSIUM CHLORIDE 1500 MG/1
20 TABLET, EXTENDED RELEASE ORAL
Status: DISCONTINUED | OUTPATIENT
Start: 2019-03-12 | End: 2019-03-12

## 2019-03-12 RX ORDER — NITROGLYCERIN 5 MG/ML
VIAL (ML) INTRAVENOUS
Status: DISCONTINUED | OUTPATIENT
Start: 2019-03-12 | End: 2019-03-12 | Stop reason: HOSPADM

## 2019-03-12 RX ORDER — AMLODIPINE BESYLATE 10 MG/1
5 TABLET ORAL DAILY
Status: ON HOLD | COMMUNITY
End: 2019-03-24

## 2019-03-12 RX ORDER — LORAZEPAM 2 MG/ML
0.5 INJECTION INTRAMUSCULAR
Status: DISCONTINUED | OUTPATIENT
Start: 2019-03-12 | End: 2019-03-12

## 2019-03-12 RX ORDER — SODIUM CHLORIDE 9 MG/ML
INJECTION, SOLUTION INTRAVENOUS CONTINUOUS
Status: DISCONTINUED | OUTPATIENT
Start: 2019-03-12 | End: 2019-03-12

## 2019-03-12 RX ORDER — NALOXONE HYDROCHLORIDE 0.4 MG/ML
.1-.4 INJECTION, SOLUTION INTRAMUSCULAR; INTRAVENOUS; SUBCUTANEOUS
Status: DISCONTINUED | OUTPATIENT
Start: 2019-03-12 | End: 2019-03-12

## 2019-03-12 RX ORDER — POTASSIUM CL/LIDO/0.9 % NACL 10MEQ/0.1L
10 INTRAVENOUS SOLUTION, PIGGYBACK (ML) INTRAVENOUS
Status: DISCONTINUED | OUTPATIENT
Start: 2019-03-12 | End: 2019-03-18

## 2019-03-12 RX ORDER — LIDOCAINE HYDROCHLORIDE AND EPINEPHRINE 10; 10 MG/ML; UG/ML
INJECTION, SOLUTION INFILTRATION; PERINEURAL
Status: DISCONTINUED | OUTPATIENT
Start: 2019-03-12 | End: 2019-03-12 | Stop reason: HOSPADM

## 2019-03-12 RX ORDER — ACETAMINOPHEN 325 MG/1
325-650 TABLET ORAL EVERY 4 HOURS PRN
Status: DISCONTINUED | OUTPATIENT
Start: 2019-03-12 | End: 2019-03-18

## 2019-03-12 RX ORDER — MAGNESIUM SULFATE HEPTAHYDRATE 40 MG/ML
4 INJECTION, SOLUTION INTRAVENOUS EVERY 4 HOURS PRN
Status: DISCONTINUED | OUTPATIENT
Start: 2019-03-12 | End: 2019-03-18

## 2019-03-12 RX ORDER — LIDOCAINE 40 MG/G
CREAM TOPICAL
Status: DISCONTINUED | OUTPATIENT
Start: 2019-03-12 | End: 2019-03-12

## 2019-03-12 RX ORDER — ATORVASTATIN CALCIUM 40 MG/1
40 TABLET, FILM COATED ORAL DAILY
Status: ON HOLD | COMMUNITY
End: 2019-03-24

## 2019-03-12 RX ADMIN — FENTANYL CITRATE 50 MCG: 50 INJECTION, SOLUTION INTRAMUSCULAR; INTRAVENOUS at 13:30

## 2019-03-12 RX ADMIN — INSULIN ASPART 1 UNITS: 100 INJECTION, SOLUTION INTRAVENOUS; SUBCUTANEOUS at 20:56

## 2019-03-12 RX ADMIN — MIDAZOLAM 1 MG: 1 INJECTION INTRAMUSCULAR; INTRAVENOUS at 13:30

## 2019-03-12 RX ADMIN — FENTANYL CITRATE 50 MCG: 50 INJECTION, SOLUTION INTRAMUSCULAR; INTRAVENOUS at 13:40

## 2019-03-12 RX ADMIN — ASPIRIN 300 MG: 300 SUPPOSITORY RECTAL at 08:07

## 2019-03-12 RX ADMIN — VERAPAMIL HYDROCHLORIDE 2.5 MG: 2.5 INJECTION, SOLUTION INTRAVENOUS at 13:39

## 2019-03-12 RX ADMIN — POTASSIUM CHLORIDE 20 MEQ: 1.5 POWDER, FOR SOLUTION ORAL at 18:56

## 2019-03-12 RX ADMIN — NITROGLYCERIN 200 MCG: 5 INJECTION, SOLUTION INTRAVENOUS at 13:39

## 2019-03-12 RX ADMIN — INSULIN ASPART 1 UNITS: 100 INJECTION, SOLUTION INTRAVENOUS; SUBCUTANEOUS at 00:06

## 2019-03-12 RX ADMIN — MIDAZOLAM 1 MG: 1 INJECTION INTRAMUSCULAR; INTRAVENOUS at 13:40

## 2019-03-12 RX ADMIN — ATORVASTATIN CALCIUM 40 MG: 40 TABLET, FILM COATED ORAL at 20:57

## 2019-03-12 RX ADMIN — NITROGLYCERIN 1 MCG/KG/MIN: 20 INJECTION INTRAVENOUS at 17:02

## 2019-03-12 RX ADMIN — LIDOCAINE HYDROCHLORIDE,EPINEPHRINE BITARTRATE 3 ML: 10; .01 INJECTION, SOLUTION INFILTRATION; PERINEURAL at 13:37

## 2019-03-12 RX ADMIN — HEPARIN SODIUM 750 UNITS/HR: 10000 INJECTION, SOLUTION INTRAVENOUS at 22:16

## 2019-03-12 RX ADMIN — HUMAN ALBUMIN MICROSPHERES AND PERFLUTREN 5 ML: 10; .22 INJECTION, SOLUTION INTRAVENOUS at 10:45

## 2019-03-12 RX ADMIN — FUROSEMIDE 40 MG: 10 INJECTION, SOLUTION INTRAVENOUS at 08:04

## 2019-03-12 ASSESSMENT — ACTIVITIES OF DAILY LIVING (ADL)
BATHING: 0-->INDEPENDENT
RETIRED_EATING: 0-->INDEPENDENT
ADLS_ACUITY_SCORE: 10
AMBULATION: 0-->INDEPENDENT
ADLS_ACUITY_SCORE: 16
TOILETING: 0-->INDEPENDENT
ADLS_ACUITY_SCORE: 10
TRANSFERRING: 0-->INDEPENDENT
ADLS_ACUITY_SCORE: 10
DRESS: 0-->INDEPENDENT
ADLS_ACUITY_SCORE: 10
ADLS_ACUITY_SCORE: 10
COGNITION: 0 - NO COGNITION ISSUES REPORTED
FALL_HISTORY_WITHIN_LAST_SIX_MONTHS: NO
SWALLOWING: 0-->SWALLOWS FOODS/LIQUIDS WITHOUT DIFFICULTY
RETIRED_COMMUNICATION: 0-->UNDERSTANDS/COMMUNICATES WITHOUT DIFFICULTY

## 2019-03-12 NOTE — PROGRESS NOTES
Patient arrives from ED by cart with Wu edwards at 1905. Handoff report received from grace Washburn at 1925. Cares assumed at 1930.

## 2019-03-12 NOTE — PROGRESS NOTES
Patient was placed on BiPAP 12/5 Oxygen   100 %; Alarm Volume 10. Skin protective barriers were applied.   Skin Assessment: skin intact      Patient tolerating well.  RT will continue to monitor.     Sena Garcia, ANTHONY  3/11/2019 05:50PM

## 2019-03-12 NOTE — PHARMACY-ADMISSION MEDICATION HISTORY
Admission medication history interview status for the 3/11/2019  admission is complete. See EPIC admission navigator for prior to admission medications     Medication history source reliability:Good    Actions taken by pharmacist (provider contacted, etc): Contacted [facility] pharmacy to confirm amlodipine dose.      Additional medication history information not noted on PTA med list :None    Medication reconciliation/reorder completed by provider prior to medication history? No    Time spent in this activity: 15    Prior to Admission medications    Medication Sig Last Dose Taking? Auth Provider   amLODIPine (NORVASC) 10 MG tablet Take 5 mg by mouth daily  Past Week at na Yes Unknown, Entered By History   atorvastatin (LIPITOR) 40 MG tablet Take 40 mg by mouth daily Past Week at na Yes Unknown, Entered By History   glipiZIDE (GLUCOTROL XL) 5 MG 24 hr tablet Take 5 mg by mouth daily Past Week at na Yes Unknown, Entered By History   lisinopril-hydrochlorothiazide (PRINZIDE/ZESTORETIC) 20-25 MG tablet Take 1 tablet by mouth daily Past Week at na Yes Unknown, Entered By History   losartan (COZAAR) 100 MG tablet Take 100 mg by mouth daily Past Week at na Yes Unknown, Entered By History   metFORMIN (GLUCOPHAGE) 1000 MG tablet Take 1,000 mg by mouth 2 times daily (with meals) Past Week at na Yes Unknown, Entered By History   metoprolol succinate ER (TOPROL-XL) 50 MG 24 hr tablet Take 50 mg by mouth daily Past Week at na Yes Unknown, Entered By History

## 2019-03-12 NOTE — PROGRESS NOTES
[facility]    Cardiology Progress Note     Assessment & Plan     1.  Non-ST elevation myocardial infarction  Patient had significant increase in troponin from 0.36 to 53.  He also has wall motion normalities in the anterior wall and septum consistent with myocardial infarction. Given the significant rise in troponin, wall motion abnormalities on the echocardiogram I believe he is having active NSTEMI and needs urgent cardiac catheterization due to high DOLORES risk score.  I discussed LHC /PCI with the patient and his daughter who acted as an .  I explained the risks and benefits of the procedure I told the patient and his daughter that the risk of coronary angiogram R but not limited to bleeding, contrast-induced nephropathy, stroke, emergent CABG due to failed PCI and death.  I also told her questions and the patient agreed to undergo coronary angiogram with possible PCI.  He will continue on heparin drip until the procedure.   High intensity statin.     2.  Pulmonary edema  Physical exam and CXR are consistent with pulm edema.   I will continue him on diuretics with close monitoring.  He is to receive contrast for left heart catheterization which puts him at a risk for KODAK, more so because he has baseline CKD to being with. I be cautious to avoid over diuresis. I have discussed this with the pt and his daughter as well.     3. HTN  consider NITROGLYCERIN drip short term to control BP.   Slow could be started on BB as pulm edema resolves and ACE inhi if renal function remains stable     Bbi Hernández MD  Text Page (7am - 5pm, M-F)    History/interval history    Mr Khang Mcelroy is a 63 year old male past medical history of diabetes, hyper tension, hyperlipidemia and stage III CKD, Who was brought into the hospital on 3/11/2019 by EMS for acute onset shortness of breath.  The patients symptoms started on Sunday when he was shoveling snow.  He noticed an episode of substernal chest  discomfort which is 4 out of 10 in intensity.  The pain stayed on for 3-4 hours and subsided spontaneously.  The patient did not decide to go to the ER at that point.  However this was accompanied by shortness of breath which progressed over the next 12 hours.  When patient's daughter got that from work yesterday evening she noticed her father to be very dyspneic and called 911.      The EKG performed by the EMS was read as anterior myocardial infarction and STEMI alert was activated.  In the ER patient was evaluated by my colleague Dr. Osborn.  The second EKG done in the ED which was reviewed by Dr. Osborn did not show evidence of ST elevations in the anterior leads.  Troponin was only 0.36. Cath alert was canceled.  Complex also performed bedside limited transthoracic echocardiogram which was technically difficult due to patient being uncooperative due to severe respiratory distress but overall showed moderate LV systolic with without segmental wall motion abnormalities.  Left ventricular ejection fraction was ~ 35%.  The other significant findings in the ED where elevated blood pressure with systolics in the 180s., creatinine of 1.5, proBNP of over 2000, Glucose 350, hemoglobin 10 and white count 14.4.  Lactate is elevated at 5.0.    The patient was very tachypneic and dyspneic and was put on BiPAP.  He was also started on heparin drip and was given IV Lasix.  This has led to significant improvement in his symptoms and at the time of this encounter the patient is saturating 96% on 3 L nasal cannula.  In his dyspnea.  However, troponins have been trending up 0.36 --> 2 --> 53.  He also had a formal echocardiogram done which showed wall motion normalities in the inferior and inferolateral wall.  The LV ejection fraction 35-40%.  There is grade 3/4 and evidence of pulmonary hypertension..    Physical Exam   Temp: 98.4  F (36.9  C) Temp src: Bladder BP: 124/73 Pulse: 78 Heart Rate: 76 Resp: 18 SpO2: 97 % O2 Device:  Nasal cannula Oxygen Delivery: 3 LPM  Vitals:    03/11/19 1750 03/11/19 2053   Weight: 77.1 kg (170 lb) 73 kg (160 lb 15 oz)     Vital Signs with Ranges  Temp:  [96.3  F (35.7  C)-98.6  F (37  C)] 98.4  F (36.9  C)  Pulse:  [] 78  Heart Rate:  [] 76  Resp:  [15-46] 18  BP: (102-165)/() 124/73  FiO2 (%):  [80 %] 80 %  SpO2:  [91 %-100 %] 97 %  I/O last 3 completed shifts:  In: 78.95 [I.V.:78.95]  Out: 1110 [Urine:1110]  Patient Active Problem List   Diagnosis     Type 2 diabetes mellitus (H)     NSTEMI (non-ST elevated myocardial infarction) (H)       Constitutional: Lying in bed, no distress  Eyes: Normal sclerae  ENT: Normal nose and ears  Respiratory: Saturating 90% on 3 L nasal cannula, no use of accessory muscles of respiration, bilateral crackles up to the interscapular area   Cardiovascular: Regular rate and rhythm, normal S1-S2, no murmurs, no JVD  GI: Soft nontender nondistended  Lymph/Hematologic: Deferred  Genitourinary:  deferred  Skin: No ulcers or bruises  Musculoskeletal: Normal strength bilaterally in all 4 extremities  Neurologic: Normal sensations bilaterally, cranial nerves II through XII intact  Neuropsychiatric: Alert and oriented x3    Medications     HEParin 750 Units/hr (03/12/19 0715)     nitroGLYcerin 0.9 mcg/kg/min (03/12/19 0745)     sodium chloride Stopped (03/11/19 2101)       aspirin  300 mg Rectal Daily     furosemide  40 mg Intravenous BID     insulin aspart  1-6 Units Subcutaneous Q4H       Data   Results for orders placed or performed during the hospital encounter of 03/11/19 (from the past 24 hour(s))   CBC with platelets differential   Result Value Ref Range    WBC 14.4 (H) 4.0 - 11.0 10e9/L    RBC Count 3.96 (L) 4.4 - 5.9 10e12/L    Hemoglobin 10.0 (L) 13.3 - 17.7 g/dL    Hematocrit 31.1 (L) 40.0 - 53.0 %    MCV 79 78 - 100 fl    MCH 25.3 (L) 26.5 - 33.0 pg    MCHC 32.2 31.5 - 36.5 g/dL    RDW 14.4 10.0 - 15.0 %    Platelet Count 368 150 - 450 10e9/L    Diff  Method Automated Method     % Neutrophils 61.3 %    % Lymphocytes 28.3 %    % Monocytes 7.3 %    % Eosinophils 2.4 %    % Basophils 0.3 %    % Immature Granulocytes 0.4 %    Nucleated RBCs 0 0 /100    Absolute Neutrophil 8.9 (H) 1.6 - 8.3 10e9/L    Absolute Lymphocytes 4.1 0.8 - 5.3 10e9/L    Absolute Monocytes 1.1 0.0 - 1.3 10e9/L    Absolute Eosinophils 0.4 0.0 - 0.7 10e9/L    Absolute Basophils 0.0 0.0 - 0.2 10e9/L    Abs Immature Granulocytes 0.1 0 - 0.4 10e9/L    Absolute Nucleated RBC 0.0    D dimer quantitative   Result Value Ref Range    D Dimer 3.6 (H) 0.0 - 0.50 ug/ml FEU   Comprehensive metabolic panel   Result Value Ref Range    Sodium 139 133 - 144 mmol/L    Potassium 3.4 3.4 - 5.3 mmol/L    Chloride 106 94 - 109 mmol/L    Carbon Dioxide 23 20 - 32 mmol/L    Anion Gap 10 3 - 14 mmol/L    Glucose 350 (H) 70 - 99 mg/dL    Urea Nitrogen 26 7 - 30 mg/dL    Creatinine 1.52 (H) 0.66 - 1.25 mg/dL    GFR Estimate 49 (L) >60 mL/min/[1.73_m2]    GFR Estimate If Black 56 (L) >60 mL/min/[1.73_m2]    Calcium 8.4 (L) 8.5 - 10.1 mg/dL    Bilirubin Total 0.3 0.2 - 1.3 mg/dL    Albumin 2.7 (L) 3.4 - 5.0 g/dL    Protein Total 8.1 6.8 - 8.8 g/dL    Alkaline Phosphatase 151 (H) 40 - 150 U/L    ALT 29 0 - 70 U/L    AST 27 0 - 45 U/L   Troponin I   Result Value Ref Range    Troponin I ES 2.102 (HH) 0.000 - 0.045 ug/L   Nt probnp inpatient (BNP)   Result Value Ref Range    N-Terminal Pro BNP Inpatient 2,131 (H) 0 - 900 pg/mL   Troponin POCT   Result Value Ref Range    Troponin I 0.36 (HH) 0.00 - 0.08 ug/L   Hemoglobin A1c   Result Value Ref Range    Hemoglobin A1C 7.9 (H) 0 - 5.6 %   Procalcitonin   Result Value Ref Range    Procalcitonin 0.07 ng/ml   Blood gas arterial and oxyhgb   Result Value Ref Range    pH Arterial 7.13 (LL) 7.35 - 7.45 pH    pCO2 Arterial 69 (H) 35 - 45 mm Hg    pO2 Arterial 94 80 - 105 mm Hg    Bicarbonate Arterial 23 21 - 28 mmol/L    Oxyhemoglobin Arterial 93 92 - 100 %    Base Deficit Art 7.2 mmol/L    Lactic acid whole blood   Result Value Ref Range    Lactic Acid 5.0 (HH) 0.7 - 2.0 mmol/L   ISTAT electrolytes POCT   Result Value Ref Range    Sodium 141 133 - 144 mmol/L    Potassium 3.4 3.4 - 5.3 mmol/L    Hemoglobin 11.2 (L) 13.3 - 17.7 g/dL    Hematocrit - POCT 33 (L) 40.0 - 53.0 %PCV   Chest  XR, 1 view PORTABLE    Narrative    XR PORTABLE CHEST ONE VIEW   3/11/2019 6:05 PM     HISTORY: Respiratory distress.    COMPARISON: None.      Impression    IMPRESSION: Lungs are slightly hypoinflated. Patchy bilateral  pulmonary opacities are present suspicious for pulmonary edema.  Superimposed infection, aspiration, or hemorrhage cannot be excluded.  Probable small bilateral pleural effusions. Heart size is at upper  limits of normal.    RICCI LEE MD   Glucose by meter   Result Value Ref Range    Glucose 252 (H) 70 - 99 mg/dL   Blood culture   Result Value Ref Range    Specimen Description Blood Left Arm     Special Requests Aerobic and anaerobic bottles received     Culture Micro No growth after 9 hours    Lactic acid whole blood   Result Value Ref Range    Lactic Acid 2.7 (H) 0.7 - 2.0 mmol/L   Blood culture   Result Value Ref Range    Specimen Description Blood Right Arm     Special Requests Received in aerobic bottle only     Culture Micro No growth after 9 hours    Basic metabolic panel   Result Value Ref Range    Sodium 139 133 - 144 mmol/L    Potassium 4.1 3.4 - 5.3 mmol/L    Chloride 107 94 - 109 mmol/L    Carbon Dioxide 25 20 - 32 mmol/L    Anion Gap 7 3 - 14 mmol/L    Glucose 253 (H) 70 - 99 mg/dL    Urea Nitrogen 27 7 - 30 mg/dL    Creatinine 1.44 (H) 0.66 - 1.25 mg/dL    GFR Estimate 52 (L) >60 mL/min/[1.73_m2]    GFR Estimate If Black 60 (L) >60 mL/min/[1.73_m2]    Calcium 8.6 8.5 - 10.1 mg/dL   Blood gas arterial with oxyhemoglobin   Result Value Ref Range    pH Arterial 7.43 7.35 - 7.45 pH    pCO2 Arterial 44 35 - 45 mm Hg    pO2 Arterial 262 (H) 80 - 105 mm Hg    Bicarbonate Arterial 29 (H) 21  - 28 mmol/L    Oxyhemoglobin Arterial 99 92 - 100 %    Base Excess Art 4.0 mmol/L   Glucose by meter   Result Value Ref Range    Glucose 151 (H) 70 - 99 mg/dL   Glucose by meter   Result Value Ref Range    Glucose 109 (H) 70 - 99 mg/dL   Comprehensive metabolic panel   Result Value Ref Range    Sodium 144 133 - 144 mmol/L    Potassium 3.8 3.4 - 5.3 mmol/L    Chloride 111 (H) 94 - 109 mmol/L    Carbon Dioxide 27 20 - 32 mmol/L    Anion Gap 6 3 - 14 mmol/L    Glucose 117 (H) 70 - 99 mg/dL    Urea Nitrogen 25 7 - 30 mg/dL    Creatinine 1.55 (H) 0.66 - 1.25 mg/dL    GFR Estimate 48 (L) >60 mL/min/[1.73_m2]    GFR Estimate If Black 55 (L) >60 mL/min/[1.73_m2]    Calcium 8.6 8.5 - 10.1 mg/dL    Bilirubin Total 0.3 0.2 - 1.3 mg/dL    Albumin 2.5 (L) 3.4 - 5.0 g/dL    Protein Total 7.0 6.8 - 8.8 g/dL    Alkaline Phosphatase 115 40 - 150 U/L    ALT 28 0 - 70 U/L    AST 57 (H) 0 - 45 U/L   Heparin Xa level (AM Draw)   Result Value Ref Range    Heparin 10A Level 0.20 IU/mL   Troponin I   Result Value Ref Range    Troponin I ES 53.862 (HH) 0.000 - 0.045 ug/L   Glucose by meter   Result Value Ref Range    Glucose 115 (H) 70 - 99 mg/dL   EKG 12-lead, tracing only   Result Value Ref Range    Interpretation ECG Click View Image link to view waveform and result    Echocardiogram Complete    Narrative    [id number]  [id number]  [id number]  [id number]^ANDREWS^RICCI^[facility]  Echocardiography Laboratory  [address] [place] [id number]        Name: DESTINEY HARRIS  MRN: [id number]  : 1955  Study Date: 2019 10:02 AM  Age: 63 yrs  Gender: Male  Patient Location: [facility]  Reason For Study: CHF  Ordering Physician: RICCI SPARROW  Referring Physician: RICCI SPARROW  Performed By: Rodney Rich, RDCS     BSA: 1.8 m2  Height: 63 in  Weight: 170 lb  HR: 82  BP: 124/75 mmHg  _____________________________________________________________________________  __        Procedure  Complete  Portable Echo Adult. Contrast Optison.  _____________________________________________________________________________  __        Interpretation Summary     The visual ejection fraction is estimated at 35-40%.  Left ventricular systolic function is mild to moderately reduced.  There are regional wall motion abnormalities as specified.  Right ventricular systolic pressure is elevated, consistent with mild to  moderate pulmonary hypertension.  The ascending aorta is Mildly dilated.  Moderate left pleural effusion  The study was technically difficult.  _____________________________________________________________________________  __        Left Ventricle  The left ventricle is normal in size. There is normal left ventricular wall  thickness. Diastolic Doppler findings (E/E' ratio and/or other parameters)  suggest left ventricular filling pressures are increased. The visual ejection  fraction is estimated at 35-40%. Left ventricular systolic function is mild to  moderately reduced. Grade III or advanced diastolic dysfunction. There is  inferolateral wall akinesis. Mid to basal anterolateral akinesis (with the  exception of the very basal portion of the anterolateral wall). basal inferior  akinesis. There is apical akinesis. There are regional wall motion  abnormalities as specified.     Right Ventricle  The right ventricle is normal in size and function.     Atria  The left atrium is moderately dilated. The right atrium is mildly dilated.  There is no color Doppler evidence of an atrial shunt.     Mitral Valve  There is mild (1+) mitral regurgitation.        Tricuspid Valve  There is mild (1+) tricuspid regurgitation. The right ventricular systolic  pressure is approximated at 37.7 mmHg plus the right atrial pressure. Right  ventricular systolic pressure is elevated, consistent with mild to moderate  pulmonary hypertension.     Aortic Valve  The aortic valve is trileaflet. There is mild trileaflet aortic sclerosis.  No  aortic regurgitation is present. No hemodynamically significant valvular  aortic stenosis.     Pulmonic Valve  There is no pulmonic valvular regurgitation.     Vessels  The aortic root is normal size. The ascending aorta is Mildly dilated. The  inferior vena cava is not dilated.     Pericardium  There is no pericardial effusion. Moderate left pleural effusion.        Rhythm  Sinus rhythm was noted.  _____________________________________________________________________________  __  MMode/2D Measurements & Calculations  IVSd: 1.0 cm     LVIDd: 4.8 cm  LVIDs: 4.3 cm  LVPWd: 0.88 cm  FS: 10.7 %  LV mass(C)d: 156.1 grams  LV mass(C)dI: 86.5 grams/m2  Ao root diam: 3.2 cm  LA dimension: 3.7 cm  asc Aorta Diam: 3.6 cm  LA/Ao: 1.1  LVOT diam: 2.0 cm  LVOT area: 3.0 cm2  LA Volume (BP): 76.0 ml  LA Volume Index (BP): 42.2 ml/m2  RWT: 0.37           Doppler Measurements & Calculations  MV E max freedom: 104.7 cm/sec  MV A max freedom: 75.3 cm/sec  MV E/A: 1.4  MV dec slope: 685.5 cm/sec2  MV dec time: 0.14 sec  Ao V2 max: 140.6 cm/sec  Ao max P.0 mmHg  Ao V2 mean: 98.2 cm/sec  Ao mean P.4 mmHg  Ao V2 VTI: 29.0 cm  ORA(I,D): 2.3 cm2  ORA(V,D): 2.2 cm2  LV V1 max P.3 mmHg  LV V1 max: 103.9 cm/sec  LV V1 VTI: 22.2 cm  SV(LVOT): 66.4 ml  SI(LVOT): 36.8 ml/m2  PA acc time: 0.08 sec  TR max freedom: 306.9 cm/sec  TR max P.7 mmHg  Pulm Sys Freedom: 38.6 cm/sec  Pulm Mchugh Freedom: 60.2 cm/sec  Pulm S/D: 0.64  AV Freedom Ratio (DI): 0.74  ORA Index (cm2/m2): 1.3  E/E' av.5  Lateral E/e': 15.0  Medial E/e': 30.1              _____________________________________________________________________________  __        Report approved by: Maryana Hernandez 2019 12:39 PM      Glucose by meter   Result Value Ref Range    Glucose 125 (H) 70 - 99 mg/dL     Critical care billing:   I spent 45 minutes with the patient and his daughter.  100% of my time was spent reviewing the echocardiographic films, reviewing the lab results and  going over the diagnosis and formulating a treatment plan with the pt and his daughter.  I emergently recommended the patient to undergo coronary angiogram with I emergently sent the patient for coronary angiogram with and without PCI for acute rise in his condominium is troponin.

## 2019-03-12 NOTE — ED NOTES
DATE:  3/11/2019   TIME OF RECEIPT FROM LAB:  1827  LAB TEST:  Troponin  LAB VALUE:  2.102  RESULTS GIVEN WITH READ-BACK TO (PROVIDER):  Krystin Reddy MD  TIME LAB VALUE REPORTED TO PROVIDER:   7314

## 2019-03-12 NOTE — CONSULTS
CARDIAC SURGERY CONSULT NOTE    Consult Reason: coronary artery disease    HPI: 63 M who developed chest discomfort with exertion on Sunday, which subsided with rest. He subsequently developed progressive dyspnea yesterday for which he did not seek medical attention. He was found to be severely short of breath by his daughter yesterday evening, who called EMS.    He was admitted overnight. His CXR at that time showed pulmonary edema, limited ECHO showed reduced EF, and EKG showed nonspecific ST changes. His BNP and lactate additionally were elevated.    Overnight his troponin jeannette from 0.3 to 6 to 53. He was taken for urgent cor angio earlier today, noted below, with severe multivessel disease.    He affirms the history as noted. He denies any chest pain at present. He reports his breathing is substantially improved.    He was seen with family present, and interview conducted with aid of an .      A/P: Patient is a 63 year old male with severe multivessel coronary disease, NSTEMI, reduced EF, pulmonary edema, diabetes, and chronic kidney disease.    Given his medical history and distribution of disease, I think CABG is his best treatment option.     Ideally if he remains asymptomatic and stable, would be able to allow his to recover from his NSTEMI somewhat and otherwise medically optimize him, particularly his lungs.    His LAD has quite severe diffuse disease, especially distally, but appears graftable in the mid to distal portion, which would fill his septals. His other targets appear adequate.    Will obtain vein mapping and carotids and continue to follow him closely. Anticipate CABG this admission.    Thank you for the opportunity to participate in the care of this patient.      Adarsh Sanchez MD        PMH:  Past Medical History:   Diagnosis Date     Diabetes mellitus with proliferative retinopathy (H)      Hyperlipidemia      Hypertension      Nephrolithiasis          PSH:  Past Surgical  History:   Procedure Laterality Date     LITHOTRIPSY           FH:  family history is not on file.      SH:  Denies Tobacco use      Allergies:  No Known Allergies    Home Meds:  Medications Prior to Admission   Medication Sig Dispense Refill Last Dose     amLODIPine (NORVASC) 10 MG tablet Take 5 mg by mouth daily    Past Week at na     atorvastatin (LIPITOR) 40 MG tablet Take 40 mg by mouth daily   Past Week at      glipiZIDE (GLUCOTROL XL) 5 MG 24 hr tablet Take 5 mg by mouth daily   Past Week at      lisinopril-hydrochlorothiazide (PRINZIDE/ZESTORETIC) 20-25 MG tablet Take 1 tablet by mouth daily   Past Week at      losartan (COZAAR) 100 MG tablet Take 100 mg by mouth daily   Past Week at      metFORMIN (GLUCOPHAGE) 1000 MG tablet Take 1,000 mg by mouth 2 times daily (with meals)   Past Week at      metoprolol succinate ER (TOPROL-XL) 50 MG 24 hr tablet Take 50 mg by mouth daily   Past Week at        ROS: + as noted above    Physical Exam:  Temp:  [96.3  F (35.7  C)-99  F (37.2  C)] 98.8  F (37.1  C)  Pulse:  [] 82  Heart Rate:  [] 82  Resp:  [9-46] 12  BP: (102-165)/() 130/73  FiO2 (%):  [80 %] 80 %  SpO2:  [91 %-100 %] 99 %    Gen: NAD in bed  Lungs: non-labored breathing  CV: regular rhythm, normal rate on tele  Abd: non distended  Ext: cool  Neuro: AOx3    Labs:  ABG   Recent Labs   Lab 03/11/19 2105 03/11/19  1755   PH 7.43 7.13*   PCO2 44 69*   PO2 262* 94   HCO3 29* 23     CBC  Recent Labs   Lab 03/11/19  1756 03/11/19  1754   WBC  --  14.4*   HGB 11.2* 10.0*   PLT  --  368     BMP  Recent Labs   Lab 03/12/19  0437 03/11/19  2041 03/11/19  1756 03/11/19  1754    139 141 139   POTASSIUM 3.8 4.1 3.4 3.4   CHLORIDE 111* 107  --  106   CO2 27 25  --  23   BUN 25 27  --  26   CR 1.55* 1.44*  --  1.52*   * 253*  --  350*     LFT  Recent Labs   Lab 03/12/19  0437 03/11/19  1754   AST 57* 27   ALT 28 29   ALKPHOS 115 151*   BILITOTAL 0.3 0.3   ALBUMIN 2.5* 2.7*      PancreasNo lab results found in last 7 days.    Imaging:    ECHO    Interpretation Summary     The visual ejection fraction is estimated at 35-40%.  Left ventricular systolic function is mild to moderately reduced.  There are regional wall motion abnormalities as specified.  Right ventricular systolic pressure is elevated, consistent with mild to  moderate pulmonary hypertension.  The ascending aorta is Mildly dilated.  Moderate left pleural effusion  The study was technically difficult.  _____________________________________________________________________________  __        Left Ventricle  The left ventricle is normal in size. There is normal left ventricular wall  thickness. Diastolic Doppler findings (E/E' ratio and/or other parameters)  suggest left ventricular filling pressures are increased. The visual ejection  fraction is estimated at 35-40%. Left ventricular systolic function is mild to  moderately reduced. Grade III or advanced diastolic dysfunction. There is  inferolateral wall akinesis. Mid to basal anterolateral akinesis (with the  exception of the very basal portion of the anterolateral wall). basal inferior  akinesis. There is apical akinesis. There are regional wall motion  abnormalities as specified.     Right Ventricle  The right ventricle is normal in size and function.     Atria  The left atrium is moderately dilated. The right atrium is mildly dilated.  There is no color Doppler evidence of an atrial shunt.     Mitral Valve  There is mild (1+) mitral regurgitation.        Tricuspid Valve  There is mild (1+) tricuspid regurgitation. The right ventricular systolic  pressure is approximated at 37.7 mmHg plus the right atrial pressure. Right  ventricular systolic pressure is elevated, consistent with mild to moderate  pulmonary hypertension.     Aortic Valve  The aortic valve is trileaflet. There is mild trileaflet aortic sclerosis. No  aortic regurgitation is present. No hemodynamically  significant valvular  aortic stenosis.     Pulmonic Valve  There is no pulmonic valvular regurgitation.     Vessels  The aortic root is normal size. The ascending aorta is Mildly dilated. The  inferior vena cava is not dilated.     Pericardium  There is no pericardial effusion. Moderate left pleural effusion.        Rhythm  Sinus rhythm was noted.        COR ANGIO    1. Patient with multiple sausage links like stenoses through the mid and distal right coronary artery. There is a 70-80% stenosis in the continuation of the right coronary artery just beyond the posterior descending artery.   2. Mid LAD to Dist LAD lesion is 90% stenosed. The lesion is segmental and serial. There is a long distal LAD stenosis extending all the way through the apex.   3. Prox LAD to Mid LAD lesion is 45% stenosed.   4. Prox Cx lesion is 85% stenosed.   5. The ejection fraction is calculated to be 35%. LVEDP 31mmHg.  6. Mid LM lesion is 40% stenosed.

## 2019-03-12 NOTE — PROGRESS NOTES
[facility]    Medicine Progress Note - Hospitalist Service        Date of Admission:  3/11/2019  5:46 PM    Assessment & Plan:   Khang Mcelroy is a 63 year old male admitted on 3/11/2019 with acute hypoxemic and hypercapnic respiratory failure presumed secondary to NSTEMI     Acute hypoxemic and hypercapnic respiratory failure secondary to acute systolic congestive heart failure,  NSTEMI  -Patient with acute onset of shortness of breath after an episode of chest pain the day prior  -Initial EKG was concerning for STEMI, but repeat EKG did not show DWIGHT, but nonspecific t wave changes  -Cardiology evaluated in the emergency department, recommended serial troponins, IV diuretics, nitroglycerin infusion, anticoagulation, aspirin, and full echocardiogram  -Continue heparin drip and nitroglycerin drip  -Lasix 40 mg IV twice daily  -CXR with bilateral infiltrates concerning for pulmonary edema  -Initial troponin of 2.1, now rising up to 53  -Echo from this morning pending   -Cardiology evaluation this morning pending  -Initially placed on BiPAP; doing better, weaned off BiPAP, currently on oxygen at 3 L/min via nasal cannula.    Lactic acidosis  -LA 5 on admission, improved to 2.7  -Most likely due to poor tissue perfusion from MI  -lasix as above  -Await echocardiogram to evaluate EF.     DM2  -Appears like prior to admission he is on glipizide and metformin, medications not reconciled by the pharmacy yet  -Continue sliding scale insulin for now     HTN   HLD  -Normotensive at the moment  -Resume prior to admission antihypertensives once verified by the pharmacy.    CKD stage III  -It appears like baseline creatinine is between 1.3-1.6  -Likely at baseline, monitor closely while on IV diuretics.        Diet: NPO for Medical/Clinical Reasons Except for: Meds, Ice Chips     DVT Prophylaxis: Heparin gtt  Dejesus Catheter: in place, indication: Strict 1-2 Hour I&O  Code Status: Full Code     Disposition  "Plan    Expected discharge: 2 - 3 days, recommended to prior living arrangement   Entered: Ayaan Albrecht MD 03/12/2019, 12:28 PM        The patient's care was discussed with the Bedside Nurse, Patient and Patient's Family.  Patient's daughter help with the interpretation at the bedside.    Ayaan Albrecht MD  Hospitalist Service  [facility]    ______________________________________________________________________    Interval History   Of BiPAP, doing better.  Oxygenating adequately on 3 L via nasal cannula.  Denies chest pain at the moment.  He does endorse that he had chest pain prior to presentation.  Denies abdominal pain.  No nausea or vomiting.    Data reviewed today: I reviewed all medications, new labs and imaging results over the last 24 hours. I personally reviewed the EKG tracing showing nsr.    Physical Exam   Vital signs:  Temp: 98.4  F (36.9  C) Temp src: Bladder BP: 124/73 Pulse: 78 Heart Rate: 76 Resp: 18 SpO2: 97 % O2 Device: Nasal cannula Oxygen Delivery: 3 LPM Height: 160 cm (5' 3\") Weight: 73 kg (160 lb 15 oz)  Estimated body mass index is 28.51 kg/m  as calculated from the following:    Height as of this encounter: 1.6 m (5' 3\").    Weight as of this encounter: 73 kg (160 lb 15 oz).      Wt Readings from Last 2 Encounters:   03/11/19 73 kg (160 lb 15 oz)       Gen: AAOX3, NAD  HEENT: Supple neck, moist oral mucosa, no pallor  Resp: Few crackles bilaterally, normal effort of breathing  CVS: RRR, no murmur  Abd/GI: Soft, non-tender. BS- normoactive.    Skin: Warm, dry no rashes  MSK: No joint deformities, 1+ pedal edema  Neuro- CN- intact. No focal deficits.  Spontaneously moving all 4 extremities      Data   Recent Labs   Lab 03/12/19  0437 03/11/19  2041 03/11/19  1756 03/11/19  1754   WBC  --   --   --  14.4*   HGB  --   --  11.2* 10.0*   MCV  --   --   --  79   PLT  --   --   --  368    139 141 139   POTASSIUM 3.8 4.1 3.4 3.4   CHLORIDE 111* 107  --  106   CO2 27 25 "  --  23   BUN 25 27  --  26   CR 1.55* 1.44*  --  1.52*   ANIONGAP 6 7  --  10   MYKE 8.6 8.6  --  8.4*   * 253*  --  350*   ALBUMIN 2.5*  --   --  2.7*   PROTTOTAL 7.0  --   --  8.1   BILITOTAL 0.3  --   --  0.3   ALKPHOS 115  --   --  151*   ALT 28  --   --  29   AST 57*  --   --  27   TROPI 53.862*  --   --  2.102*   TROPONIN  --   --   --  0.36*       Recent Results (from the past 24 hour(s))   Chest  XR, 1 view PORTABLE    Narrative    XR PORTABLE CHEST ONE VIEW   3/11/2019 6:05 PM     HISTORY: Respiratory distress.    COMPARISON: None.      Impression    IMPRESSION: Lungs are slightly hypoinflated. Patchy bilateral  pulmonary opacities are present suspicious for pulmonary edema.  Superimposed infection, aspiration, or hemorrhage cannot be excluded.  Probable small bilateral pleural effusions. Heart size is at upper  limits of normal.    RICCI LEE MD     Medications     HEParin 750 Units/hr (03/12/19 0715)     nitroGLYcerin 0.9 mcg/kg/min (03/12/19 0701)     sodium chloride Stopped (03/11/19 9539)       aspirin  300 mg Rectal Daily     furosemide  40 mg Intravenous BID     insulin aspart  1-6 Units Subcutaneous Q4H

## 2019-03-12 NOTE — PROGRESS NOTES
AOx4, neuro intact.  SR on heparin and nitroglycerin gtt for SBP<120.   On Bipap, overnight - plan to wean in AM.   Good UO - lasix overnight.   Plan for angio in AM.  Patient's son Tani to serve as .

## 2019-03-13 ENCOUNTER — APPOINTMENT (OUTPATIENT)
Dept: GENERAL RADIOLOGY | Facility: CLINIC | Age: 64
DRG: 233 | End: 2019-03-13
Attending: THORACIC SURGERY (CARDIOTHORACIC VASCULAR SURGERY)
Payer: COMMERCIAL

## 2019-03-13 LAB
ALBUMIN SERPL-MCNC: 2.4 G/DL (ref 3.4–5)
ALP SERPL-CCNC: 97 U/L (ref 40–150)
ALT SERPL W P-5'-P-CCNC: 25 U/L (ref 0–70)
ANION GAP SERPL CALCULATED.3IONS-SCNC: 5 MMOL/L (ref 3–14)
AST SERPL W P-5'-P-CCNC: 40 U/L (ref 0–45)
BACTERIA SPEC CULT: NO GROWTH
BACTERIA SPEC CULT: NO GROWTH
BILIRUB SERPL-MCNC: 0.3 MG/DL (ref 0.2–1.3)
BUN SERPL-MCNC: 22 MG/DL (ref 7–30)
CALCIUM SERPL-MCNC: 8.5 MG/DL (ref 8.5–10.1)
CHLORIDE SERPL-SCNC: 108 MMOL/L (ref 94–109)
CO2 SERPL-SCNC: 28 MMOL/L (ref 20–32)
CREAT SERPL-MCNC: 1.41 MG/DL (ref 0.66–1.25)
ERYTHROCYTE [DISTWIDTH] IN BLOOD BY AUTOMATED COUNT: 14.3 % (ref 10–15)
FERRITIN SERPL-MCNC: 528 NG/ML (ref 26–388)
FOLATE SERPL-MCNC: 8.2 NG/ML
GFR SERPL CREATININE-BSD FRML MDRD: 53 ML/MIN/{1.73_M2}
GLUCOSE BLDC GLUCOMTR-MCNC: 115 MG/DL (ref 70–99)
GLUCOSE BLDC GLUCOMTR-MCNC: 126 MG/DL (ref 70–99)
GLUCOSE BLDC GLUCOMTR-MCNC: 135 MG/DL (ref 70–99)
GLUCOSE BLDC GLUCOMTR-MCNC: 150 MG/DL (ref 70–99)
GLUCOSE BLDC GLUCOMTR-MCNC: 209 MG/DL (ref 70–99)
GLUCOSE BLDC GLUCOMTR-MCNC: 222 MG/DL (ref 70–99)
GLUCOSE SERPL-MCNC: 141 MG/DL (ref 70–99)
HCT VFR BLD AUTO: 24.1 % (ref 40–53)
HGB BLD-MCNC: 8 G/DL (ref 13.3–17.7)
IRON SATN MFR SERPL: 10 % (ref 15–46)
IRON SERPL-MCNC: 20 UG/DL (ref 35–180)
LMWH PPP CHRO-ACNC: 0.12 IU/ML
LMWH PPP CHRO-ACNC: <0.1 IU/ML
MCH RBC QN AUTO: 25.6 PG (ref 26.5–33)
MCHC RBC AUTO-ENTMCNC: 33.2 G/DL (ref 31.5–36.5)
MCV RBC AUTO: 77 FL (ref 78–100)
PLATELET # BLD AUTO: 295 10E9/L (ref 150–450)
POTASSIUM SERPL-SCNC: 4 MMOL/L (ref 3.4–5.3)
PROT SERPL-MCNC: 6.6 G/DL (ref 6.8–8.8)
RBC # BLD AUTO: 3.13 10E12/L (ref 4.4–5.9)
SODIUM SERPL-SCNC: 141 MMOL/L (ref 133–144)
SPECIMEN SOURCE: NORMAL
SPECIMEN SOURCE: NORMAL
TIBC SERPL-MCNC: 195 UG/DL (ref 240–430)
TRANSFERRIN SERPL-MCNC: 168 MG/DL (ref 210–360)
TROPONIN I SERPL-MCNC: 39.58 UG/L (ref 0–0.04)
VIT B12 SERPL-MCNC: 557 PG/ML (ref 193–986)
WBC # BLD AUTO: 11.4 10E9/L (ref 4–11)

## 2019-03-13 PROCEDURE — 20000003 ZZH R&B ICU

## 2019-03-13 PROCEDURE — 82728 ASSAY OF FERRITIN: CPT | Performed by: INTERNAL MEDICINE

## 2019-03-13 PROCEDURE — 25000128 H RX IP 250 OP 636: Performed by: PHYSICIAN ASSISTANT

## 2019-03-13 PROCEDURE — 25000132 ZZH RX MED GY IP 250 OP 250 PS 637: Performed by: HOSPITALIST

## 2019-03-13 PROCEDURE — 84484 ASSAY OF TROPONIN QUANT: CPT | Performed by: INTERNAL MEDICINE

## 2019-03-13 PROCEDURE — 82607 VITAMIN B-12: CPT | Performed by: INTERNAL MEDICINE

## 2019-03-13 PROCEDURE — 25000128 H RX IP 250 OP 636: Performed by: HOSPITALIST

## 2019-03-13 PROCEDURE — 25000128 H RX IP 250 OP 636

## 2019-03-13 PROCEDURE — 83550 IRON BINDING TEST: CPT | Performed by: INTERNAL MEDICINE

## 2019-03-13 PROCEDURE — 85520 HEPARIN ASSAY: CPT | Performed by: HOSPITALIST

## 2019-03-13 PROCEDURE — 71046 X-RAY EXAM CHEST 2 VIEWS: CPT

## 2019-03-13 PROCEDURE — 25800030 ZZH RX IP 258 OP 636: Performed by: PHYSICIAN ASSISTANT

## 2019-03-13 PROCEDURE — 80053 COMPREHEN METABOLIC PANEL: CPT | Performed by: INTERNAL MEDICINE

## 2019-03-13 PROCEDURE — 85027 COMPLETE CBC AUTOMATED: CPT | Performed by: INTERNAL MEDICINE

## 2019-03-13 PROCEDURE — 84466 ASSAY OF TRANSFERRIN: CPT | Performed by: INTERNAL MEDICINE

## 2019-03-13 PROCEDURE — 86850 RBC ANTIBODY SCREEN: CPT | Performed by: SURGERY

## 2019-03-13 PROCEDURE — 83540 ASSAY OF IRON: CPT | Performed by: INTERNAL MEDICINE

## 2019-03-13 PROCEDURE — 99291 CRITICAL CARE FIRST HOUR: CPT | Performed by: INTERNAL MEDICINE

## 2019-03-13 PROCEDURE — 25000132 ZZH RX MED GY IP 250 OP 250 PS 637: Performed by: INTERNAL MEDICINE

## 2019-03-13 PROCEDURE — 00000146 ZZHCL STATISTIC GLUCOSE BY METER IP

## 2019-03-13 PROCEDURE — 36415 COLL VENOUS BLD VENIPUNCTURE: CPT | Performed by: INTERNAL MEDICINE

## 2019-03-13 PROCEDURE — 85520 HEPARIN ASSAY: CPT | Performed by: INTERNAL MEDICINE

## 2019-03-13 PROCEDURE — 36415 COLL VENOUS BLD VENIPUNCTURE: CPT | Performed by: HOSPITALIST

## 2019-03-13 PROCEDURE — 82746 ASSAY OF FOLIC ACID SERUM: CPT | Performed by: INTERNAL MEDICINE

## 2019-03-13 PROCEDURE — 99233 SBSQ HOSP IP/OBS HIGH 50: CPT | Performed by: INTERNAL MEDICINE

## 2019-03-13 PROCEDURE — 25000128 H RX IP 250 OP 636: Performed by: INTERNAL MEDICINE

## 2019-03-13 RX ORDER — FERROUS GLUCONATE 324(38)MG
324 TABLET ORAL
Status: DISCONTINUED | OUTPATIENT
Start: 2019-03-13 | End: 2019-03-13

## 2019-03-13 RX ORDER — LISINOPRIL 2.5 MG/1
2.5 TABLET ORAL DAILY
Status: DISCONTINUED | OUTPATIENT
Start: 2019-03-15 | End: 2019-03-14

## 2019-03-13 RX ORDER — NITROGLYCERIN 20 MG/100ML
INJECTION INTRAVENOUS
Status: COMPLETED
Start: 2019-03-13 | End: 2019-03-13

## 2019-03-13 RX ORDER — METOPROLOL SUCCINATE 25 MG/1
25 TABLET, EXTENDED RELEASE ORAL DAILY
Status: DISCONTINUED | OUTPATIENT
Start: 2019-03-13 | End: 2019-03-14

## 2019-03-13 RX ORDER — DIPHENHYDRAMINE HYDROCHLORIDE 50 MG/ML
50 INJECTION INTRAMUSCULAR; INTRAVENOUS
Status: DISCONTINUED | OUTPATIENT
Start: 2019-03-13 | End: 2019-03-17

## 2019-03-13 RX ORDER — METHYLPREDNISOLONE SODIUM SUCCINATE 125 MG/2ML
125 INJECTION, POWDER, LYOPHILIZED, FOR SOLUTION INTRAMUSCULAR; INTRAVENOUS
Status: DISCONTINUED | OUTPATIENT
Start: 2019-03-13 | End: 2019-03-17

## 2019-03-13 RX ORDER — ATORVASTATIN CALCIUM 40 MG/1
80 TABLET, FILM COATED ORAL EVERY EVENING
Status: DISCONTINUED | OUTPATIENT
Start: 2019-03-13 | End: 2019-03-24 | Stop reason: HOSPADM

## 2019-03-13 RX ORDER — HYDRALAZINE HYDROCHLORIDE 20 MG/ML
5 INJECTION INTRAMUSCULAR; INTRAVENOUS EVERY 4 HOURS PRN
Status: DISCONTINUED | OUTPATIENT
Start: 2019-03-13 | End: 2019-03-18

## 2019-03-13 RX ORDER — ASPIRIN 81 MG/1
243 TABLET, CHEWABLE ORAL ONCE
Status: COMPLETED | OUTPATIENT
Start: 2019-03-13 | End: 2019-03-13

## 2019-03-13 RX ORDER — FUROSEMIDE 10 MG/ML
20 INJECTION INTRAMUSCULAR; INTRAVENOUS EVERY 12 HOURS
Status: DISCONTINUED | OUTPATIENT
Start: 2019-03-13 | End: 2019-03-15

## 2019-03-13 RX ADMIN — INSULIN ASPART 2 UNITS: 100 INJECTION, SOLUTION INTRAVENOUS; SUBCUTANEOUS at 13:49

## 2019-03-13 RX ADMIN — HYDRALAZINE HYDROCHLORIDE 5 MG: 20 INJECTION INTRAMUSCULAR; INTRAVENOUS at 18:22

## 2019-03-13 RX ADMIN — IRON SUCROSE 200 MG: 20 INJECTION, SOLUTION INTRAVENOUS at 16:28

## 2019-03-13 RX ADMIN — NITROGLYCERIN 1.5 MCG/KG/MIN: 20 INJECTION INTRAVENOUS at 12:30

## 2019-03-13 RX ADMIN — HYDRALAZINE HYDROCHLORIDE 5 MG: 20 INJECTION INTRAMUSCULAR; INTRAVENOUS at 22:38

## 2019-03-13 RX ADMIN — Medication 1900 UNITS: at 09:56

## 2019-03-13 RX ADMIN — ASPIRIN 81 MG 243 MG: 81 TABLET ORAL at 11:18

## 2019-03-13 RX ADMIN — INSULIN ASPART 2 UNITS: 100 INJECTION, SOLUTION INTRAVENOUS; SUBCUTANEOUS at 20:31

## 2019-03-13 RX ADMIN — NITROGLYCERIN 1.8 MCG/KG/MIN: 20 INJECTION INTRAVENOUS at 01:08

## 2019-03-13 RX ADMIN — NITROGLYCERIN 2 MCG/KG/MIN: 20 INJECTION INTRAVENOUS at 06:24

## 2019-03-13 RX ADMIN — METOPROLOL SUCCINATE 25 MG: 25 TABLET, EXTENDED RELEASE ORAL at 09:58

## 2019-03-13 RX ADMIN — Medication 3000 UNITS: at 17:46

## 2019-03-13 RX ADMIN — ATORVASTATIN CALCIUM 80 MG: 80 TABLET, FILM COATED ORAL at 20:31

## 2019-03-13 RX ADMIN — FUROSEMIDE 20 MG: 10 INJECTION, SOLUTION INTRAVENOUS at 10:02

## 2019-03-13 RX ADMIN — ASPIRIN 81 MG: 81 TABLET, COATED ORAL at 09:58

## 2019-03-13 RX ADMIN — FUROSEMIDE 20 MG: 10 INJECTION, SOLUTION INTRAVENOUS at 21:52

## 2019-03-13 RX ADMIN — INSULIN ASPART 1 UNITS: 100 INJECTION, SOLUTION INTRAVENOUS; SUBCUTANEOUS at 08:26

## 2019-03-13 ASSESSMENT — ACTIVITIES OF DAILY LIVING (ADL)
ADLS_ACUITY_SCORE: 10

## 2019-03-13 ASSESSMENT — MIFFLIN-ST. JEOR: SCORE: 1423.13

## 2019-03-13 NOTE — PROGRESS NOTES
[facility]    Medicine Progress Note - Hospitalist Service        Date of Admission:  3/11/2019  5:46 PM    Assessment & Plan:   Khang Mcelroy is a 63 year old male admitted on 3/11/2019 with acute hypoxemic and hypercapnic respiratory failure presumed secondary to NSTEMI     Acute hypoxemic and hypercapnic respiratory failure secondary to acute systolic congestive heart failure,  NSTEMI  Multi--vessel coronary artery disease  -Patient with acute onset of shortness of breath after an episode of chest pain the day prior  -Initial EKG was concerning for STEMI, but repeat EKG did not show DWIGHT, but nonspecific t wave changes  -CXR with bilateral infiltrates concerning for pulmonary edema  -Initial troponin of 2.1, peaked at 53  -Patient was evaluated by cardiology yesterday, underwent coronary angiogram which revealed three-vessel coronary artery disease   -CT surgery evaluating the patient for potential CABG, timing undetermined at this time.  -Echocardiogram revealed an EF of 35-40%, with multiple wall motion abnormalities.  -Lasix 20 mg IV twice daily  -Continue aspirin 325 mg daily  -Lipitor 80 mg daily  -Metoprolol 25 mg twice a day.  -Continues on heparin drip  -Oxygenation stable at 3 L/min via nasal cannula.    Lactic acidosis  -LA 5 on admission, improved to 2.7  -Most likely due to poor tissue perfusion from MI  -lasix as above     DM2  -Hemoglobin A1c 7.9  -prior to admission on glipizide and metformin  -Hold glipizide and metformin at this time  -Continue sliding scale insulin for now     HTN   HLD  -Prior to admission on amlodipine, lisinopril/hydrochlorothiazide, losartan  -Hold prior to admission antihypertensives next on-currently on low-dose metoprolol and lisinopril  -Statin as above    CKD stage III  -baseline creatinine is between 1.3-1.6  -at baseline, monitor closely while on IV diuretics.    Acute on chronic anemia:  -Baseline hemoglobin per care everywhere appears to be  "between 9-10.5  -Presenting hemoglobin was 11.2, dropped to 8.0 this morning  -No obvious bleeding  -Check iron studies, recheck CBC in the morning        Diet: Advance Diet as Tolerated: Regular Diet Adult  Room Service     DVT Prophylaxis: Heparin gtt  Dejesus Catheter: in place, indication: Strict 1-2 Hour I&O  Code Status: Full Code     Disposition Plan    Expected discharge: 3-6 days pending decision about the timing of CABG  Entered: Ayaan Albrecht MD 03/13/2019, 10:56 AM        The patient's care was discussed with the Bedside Nurse, Patient and Patient's Family.  Patient's daughter help with the interpretation at the bedside.    Ayaan Albrecht MD  Hospitalist Service  [facility]    ______________________________________________________________________    Interval History   Oxygenation stable.  Denies chest pain or dyspnea.  Underwent coronary angiogram yesterday which revealed three-vessel coronary artery disease.  Being worked up for potential CABG.    Data reviewed today: I reviewed all medications, new labs and imaging results over the last 24 hours. I personally reviewed the EKG tracing showing nsr.    Physical Exam   Vital signs:  Temp: 98.4  F (36.9  C) Temp src: Oral BP: 134/73 Pulse: 79 Heart Rate: 81 Resp: 21 SpO2: 100 % O2 Device: Nasal cannula Oxygen Delivery: 5 LPM Height: 160 cm (5' 3\") Weight: 72.3 kg (159 lb 6.3 oz)  Estimated body mass index is 28.24 kg/m  as calculated from the following:    Height as of this encounter: 1.6 m (5' 3\").    Weight as of this encounter: 72.3 kg (159 lb 6.3 oz).      Wt Readings from Last 2 Encounters:   03/13/19 72.3 kg (159 lb 6.3 oz)       Gen: AAOX3, NAD  HEENT: Supple neck, moist oral mucosa, no pallor  Resp: Few crackles bilaterally, normal effort of breathing  CVS: RRR, no murmur  Abd/GI: Soft, non-tender. BS- normoactive.    Skin: Warm, dry no rashes  MSK: No joint deformities, 1+ pedal edema  Neuro- CN- intact. No focal deficits. " Spontaneously moving all 4 extremities      Data   Recent Labs   Lab 03/13/19  0532 03/12/19  1837 03/12/19  0437 03/11/19  2041 03/11/19  1756 03/11/19  1754   WBC 11.4*  --   --   --   --  14.4*   HGB 8.0*  --   --   --  11.2* 10.0*   MCV 77*  --   --   --   --  79     --   --   --   --  368     --  144 139 141 139   POTASSIUM 4.0  --  3.8 4.1 3.4 3.4   CHLORIDE 108  --  111* 107  --  106   CO2 28  --  27 25  --  23   BUN 22  --  25 27  --  26   CR 1.41*  --  1.55* 1.44*  --  1.52*   ANIONGAP 5  --  6 7  --  10   MYKE 8.5  --  8.6 8.6  --  8.4*   *  --  117* 253*  --  350*   ALBUMIN 2.4*  --  2.5*  --   --  2.7*   PROTTOTAL 6.6*  --  7.0  --   --  8.1   BILITOTAL 0.3  --  0.3  --   --  0.3   ALKPHOS 97  --  115  --   --  151*   ALT 25  --  28  --   --  29   AST 40  --  57*  --   --  27   TROPI  --  53.250* 53.862* 6.616*  --  2.102*   TROPONIN  --   --   --   --   --  0.36*       Recent Results (from the past 24 hour(s))   US Lower Extremity Venous Mapping Bilateral    Narrative    ULTRASOUND BILATERAL LOWER EXTREMITY VENOUS MAPPING March 12, 2019 at  2027 hours    HISTORY: 63-year-old patient with history of coronary arterial disease  with preoperative evaluation prior to coronary artery bypass graft.    COMPARISON: None.    FINDINGS: The right greater saphenous vein ranges from 2.5 to 6.5 mm  throughout the thigh and 2.3 to 3.2 mm at and below the level of the  knee. The left greater saphenous vein ranges from 2.2 to 3.8 mm  throughout the thigh and 2 to 2.9 mm at and below the level of the  knee.      Impression    IMPRESSION: Bilateral greater saphenous vein measurements as  described.    SABRA OSORIO MD   US Carotid Bilateral    Narrative    BILATERAL CAROTID ULTRASOUND   3/12/2019 8:27 PM     HISTORY:  pre op    COMPARISON: None.    RIGHT CAROTID FINDINGS:  There is no significant calcified and  noncalcified atherosclerotic plaque in the carotid bifurcation.   Right ICA PSV:  79   cm/sec.  Right ICA EDV:  32 cm/sec.  Right ICA/CCA PSV Ratio:  These indicate less than 50% diameter stenosis of the right ICA.    Right Vertebral: Antegrade flow.   Right ECA: Antegrade flow.     LEFT CAROTID FINDINGS:  There is no significant calcified and  noncalcified atherosclerotic plaque in the carotid bifurcation.   Left ICA PSV:  118  cm/sec.  Left ICA EDV:  37 cm/sec.  Left ICA/CCA PSV Ratio:  These indicate less than 50% diameter stenosis of the left ICA.    Left Vertebral: Antegrade flow.   Left ECA: Antegrade flow.     Causes of Decreased Accuracy:   None.       Impression    IMPRESSION:    1. Less than 50% diameter stenosis of the right ICA relative to the  distal ICA diameter.  2. Less than 50% diameter stenosis of the left ICA relative to the  distal ICA diameter.      Consensus Panel Gray-Scale And Doppler Us Criteria For Diagnosis of  ICA Stenosis (Radiology 11/2003)    Normal   ICA PSV < 125 cm/sec  Plaque Estimate None  ICA/CCA PSV Ratio < 2.0  ICA EDV < 40 cm/sec    < 50%   ICA PSV < 125 cm/sec  Plaque Estimate < 50%  ICA/CCA PSV Ratio < 2.0  ICA EDV < 40 cm/sec    50- 69%  ICA -230 cm/sec  Plaque Estimate > or = 50%  ICA/CCA PSV Ratio 2.0-4.0  ICA EDV  cm/sec     > or = 70%, less than near occlusion  ICA PSV > 230 cm/sec  Plaque Estimate > or = 50%  ICA/CCA Ratio > 4.0  ICA EDV > 100 cm/sec       TASNEEM TRACY MD     Medications     HEParin 700 Units/hr (03/13/19 0956)     sodium chloride 20 mL/hr at 03/12/19 1915       [START ON 3/14/2019] aspirin  325 mg Oral Daily     atorvastatin  80 mg Oral QPM     furosemide  20 mg Intravenous Q12H     insulin aspart  1-6 Units Subcutaneous Q4H     [START ON 3/15/2019] lisinopril  2.5 mg Oral Daily     metoprolol succinate ER  25 mg Oral Daily     sodium chloride (PF)  3 mL Intracatheter Q8H

## 2019-03-13 NOTE — PROGRESS NOTES
Dr. Grissom paged - patient is at maximum dosage for NItrogtt at 2.0 and is just meeting parameters now for SBP <120. No PRN meds available.   Plan to watch patient now as he is not symptomatic -- will page cardiology if it becomes an issue.

## 2019-03-13 NOTE — PROGRESS NOTES
[facility]    Cardiology Progress Note     Assessment & Plan     1.  Non-ST elevation myocardial infarction  Patient had significant increase in troponin from 0.36 to 53.  Left heart catheterization with coronary angiogram yesterday showed significant three-vessel coronary artery disease.  A cardiovascular surgery consult was put in and recommend are awaiting their recommendations.    He will continue on heparin drip until the procedure.   High intensity statin.   Metoprolol 25 every day    2.  Pulmonary edema  Physical exam and CXR are consistent with pulm edema.   LVEDP of >30 on cath   I will continue him on diuretics with close monitoring.  He received 65 ml of contrast for left heart catheterization which puts him at a risk for KODAK, more so because he has baseline CKD to being with. I be cautious to avoid over diuresis.     3. HTN  D/c NITROGLYCERIN drip. Start BB today.  ACE inhi if renal function remains stable    CATH 3/12/2019:  1. Patient with multiple sausage links like stenoses through the mid and distal right coronary artery. There is a 70-80% stenosis in the continuation of the right coronary artery just beyond the posterior descending artery.   2. Mid LAD to Dist LAD lesion is 90% stenosed. The lesion is segmental and serial. There is a long distal LAD stenosis extending all the way through the apex.   3. Prox LAD to Mid LAD lesion is 45% stenosed.   4. Prox Cx lesion is 85% stenosed.   5. The ejection fraction is calculated to be 35%. LVEDP 31mmHg.  6. Mid LM lesion is 40% stenosed.    Bib Hernández MD  Text Page (7am - 5pm, M-F)    History/interval history  Underwent cardiac catheterization yesterday evening.  He was found to have significant three-vessel coronary artery disease pending cardiac vascular surgery evaluation.  No complaints.  Catheterization site looks normal without evidence of bruise or hematoma distal pulses 2+.    Physical Exam   Temp: 98.4  F (36.9  C) Temp src:  Oral BP: 120/72 Pulse: 81 Heart Rate: 82 Resp: 19 SpO2: 98 % O2 Device: Nasal cannula Oxygen Delivery: 5 LPM  Vitals:    03/11/19 1750 03/11/19 2053 03/13/19 0325   Weight: 77.1 kg (170 lb) 73 kg (160 lb 15 oz) 72.3 kg (159 lb 6.3 oz)     Vital Signs with Ranges  Temp:  [97.5  F (36.4  C)-99.1  F (37.3  C)] 98.4  F (36.9  C)  Pulse:  [71-90] 81  Heart Rate:  [72-89] 82  Resp:  [9-31] 19  BP: (110-144)/() 120/72  SpO2:  [93 %-100 %] 98 %  I/O last 3 completed shifts:  In: 1154.51 [I.V.:1154.51]  Out: 2040 [Urine:2040]  Patient Active Problem List   Diagnosis     Type 2 diabetes mellitus (H)     NSTEMI (non-ST elevated myocardial infarction) (H)       Constitutional: Lying in bed, no distress  Eyes: Normal sclerae  ENT: Normal nose and ears  Respiratory: Saturating 90% on 3 L nasal cannula, no use of accessory muscles of respiration, bilateral crackles up to the interscapular area   Cardiovascular: Regular rate and rhythm, normal S1-S2, no murmurs, no JVD  GI: Soft nontender nondistended  Lymph/Hematologic: Deferred  Genitourinary:  deferred  Skin: No ulcers or bruises  Musculoskeletal: Normal strength bilaterally in all 4 extremities  Neurologic: Normal sensations bilaterally, cranial nerves II through XII intact  Neuropsychiatric: Alert and oriented x3    Medications     HEParin 650 Units/hr (03/12/19 2232)     nitroGLYcerin 2 mcg/kg/min (03/13/19 0624)     sodium chloride       sodium chloride 20 mL/hr at 03/12/19 1915       aspirin  81 mg Oral Daily     atorvastatin  40 mg Oral QPM     furosemide  40 mg Intravenous BID     heparin Loading Dose  1,900 Units Intravenous Once     insulin aspart  1-6 Units Subcutaneous Q4H     sodium chloride (PF)  3 mL Intracatheter Q8H       Data   Results for orders placed or performed during the hospital encounter of 03/11/19 (from the past 24 hour(s))   EKG 12-lead, tracing only   Result Value Ref Range    Interpretation ECG Click View Image link to view waveform and result     Echocardiogram Complete    Narrative    [id number]  [id number]  [id number]  [id number]^ANDREWS^RICCI^NOREEN           [facility]  Echocardiography Laboratory  [address] [place] [address]        Name: DESTINEY HARRIS  MRN: [id number]  : 1955  Study Date: 2019 10:02 AM  Age: 63 yrs  Gender: Male  Patient Location: [facility]  Reason For Study: CHF  Ordering Physician: RICCI SPARROW  Referring Physician: RICCI SPARROW  Performed By: Rodney Pacheco RDCS     BSA: 1.8 m2  Height: 63 in  Weight: 170 lb  HR: 82  BP: 124/75 mmHg  _____________________________________________________________________________  __        Procedure  Complete Portable Echo Adult. Contrast Optison.  _____________________________________________________________________________  __        Interpretation Summary     The visual ejection fraction is estimated at 35-40%.  Left ventricular systolic function is mild to moderately reduced.  There are regional wall motion abnormalities as specified.  Right ventricular systolic pressure is elevated, consistent with mild to  moderate pulmonary hypertension.  The ascending aorta is Mildly dilated.  Moderate left pleural effusion  The study was technically difficult.  _____________________________________________________________________________  __        Left Ventricle  The left ventricle is normal in size. There is normal left ventricular wall  thickness. Diastolic Doppler findings (E/E' ratio and/or other parameters)  suggest left ventricular filling pressures are increased. The visual ejection  fraction is estimated at 35-40%. Left ventricular systolic function is mild to  moderately reduced. Grade III or advanced diastolic dysfunction. There is  inferolateral wall akinesis. Mid to basal anterolateral akinesis (with the  exception of the very basal portion of the anterolateral wall). basal inferior  akinesis. There is apical akinesis. There are regional wall  motion  abnormalities as specified.     Right Ventricle  The right ventricle is normal in size and function.     Atria  The left atrium is moderately dilated. The right atrium is mildly dilated.  There is no color Doppler evidence of an atrial shunt.     Mitral Valve  There is mild (1+) mitral regurgitation.        Tricuspid Valve  There is mild (1+) tricuspid regurgitation. The right ventricular systolic  pressure is approximated at 37.7 mmHg plus the right atrial pressure. Right  ventricular systolic pressure is elevated, consistent with mild to moderate  pulmonary hypertension.     Aortic Valve  The aortic valve is trileaflet. There is mild trileaflet aortic sclerosis. No  aortic regurgitation is present. No hemodynamically significant valvular  aortic stenosis.     Pulmonic Valve  There is no pulmonic valvular regurgitation.     Vessels  The aortic root is normal size. The ascending aorta is Mildly dilated. The  inferior vena cava is not dilated.     Pericardium  There is no pericardial effusion. Moderate left pleural effusion.        Rhythm  Sinus rhythm was noted.  _____________________________________________________________________________  __  MMode/2D Measurements & Calculations  IVSd: 1.0 cm     LVIDd: 4.8 cm  LVIDs: 4.3 cm  LVPWd: 0.88 cm  FS: 10.7 %  LV mass(C)d: 156.1 grams  LV mass(C)dI: 86.5 grams/m2  Ao root diam: 3.2 cm  LA dimension: 3.7 cm  asc Aorta Diam: 3.6 cm  LA/Ao: 1.1  LVOT diam: 2.0 cm  LVOT area: 3.0 cm2  LA Volume (BP): 76.0 ml  LA Volume Index (BP): 42.2 ml/m2  RWT: 0.37           Doppler Measurements & Calculations  MV E max anuj: 104.7 cm/sec  MV A max anuj: 75.3 cm/sec  MV E/A: 1.4  MV dec slope: 685.5 cm/sec2  MV dec time: 0.14 sec  Ao V2 max: 140.6 cm/sec  Ao max P.0 mmHg  Ao V2 mean: 98.2 cm/sec  Ao mean P.4 mmHg  Ao V2 VTI: 29.0 cm  ORA(I,D): 2.3 cm2  ORA(V,D): 2.2 cm2  LV V1 max P.3 mmHg  LV V1 max: 103.9 cm/sec  LV V1 VTI: 22.2 cm  SV(LVOT): 66.4 ml  SI(LVOT): 36.8  ml/m2  PA acc time: 0.08 sec  TR max freedom: 306.9 cm/sec  TR max P.7 mmHg  Pulm Sys Freedom: 38.6 cm/sec  Pulm Mchugh Freedom: 60.2 cm/sec  Pulm S/D: 0.64  AV Freedom Ratio (DI): 0.74  ORA Index (cm2/m2): 1.3  E/E' av.5  Lateral E/e': 15.0  Medial E/e': 30.1              _____________________________________________________________________________  __        Report approved by: Maryana Hernandez 2019 12:39 PM      Methicillin Resist/Sens S. aureus PCR   Result Value Ref Range    Specimen Description Nares     Methicillin Resist/Sens S. aureus PCR Negative NEG^Negative   Glucose by meter   Result Value Ref Range    Glucose 125 (H) 70 - 99 mg/dL   Cardiac Catheterization   Result Value Ref Range    Cath EF Quantitative 35 %    Narrative    1. Patient with multiple sausage links like stenoses through the mid and   distal right coronary artery. There is a 70-80% stenosis in the   continuation of the right coronary artery just beyond the posterior   descending artery.   2. Mid LAD to Dist LAD lesion is 90% stenosed. The lesion is segmental and   serial. There is a long distal LAD stenosis extending all the way through   the apex.   3. Prox LAD to Mid LAD lesion is 45% stenosed.   4. Prox Cx lesion is 85% stenosed.   5. The ejection fraction is calculated to be 35%. LVEDP 31mmHg.  6. Mid LM lesion is 40% stenosed.   Glucose by meter   Result Value Ref Range    Glucose 120 (H) 70 - 99 mg/dL   Cardiovascular Surgery IP Consult: Patient to be seen: Routine - within 24 hours; Multi Vessel Disease; Consultant may enter orders: Yes; Requesting provider? Attending physician; Name: Bib Hernández Matthew Richard, MD     3/12/2019  5:28 PM  CARDIAC SURGERY CONSULT NOTE    Consult Reason: coronary artery disease    HPI: 63 M who developed chest discomfort with exertion on ,   which subsided with rest. He subsequently developed progressive   dyspnea yesterday for which he did not seek medical  attention. He   was found to be severely short of breath by his daughter   yesterday evening, who called EMS.    He was admitted overnight. His CXR at that time showed pulmonary   edema, limited ECHO showed reduced EF, and EKG showed nonspecific   ST changes. His BNP and lactate additionally were elevated.    Overnight his troponin jeannette from 0.3 to 6 to 53. He was taken for   urgent cor angio earlier today, noted below, with severe   multivessel disease.    He affirms the history as noted. He denies any chest pain at   present. He reports his breathing is substantially improved.    He was seen with family present, and interview conducted with aid   of an .      A/P: Patient is a 63 year old male with severe multivessel   coronary disease, NSTEMI, reduced EF, pulmonary edema, diabetes,   and chronic kidney disease.    Given his medical history and distribution of disease, I think   CABG is his best treatment option.     Ideally if he remains asymptomatic and stable, would be able to   allow his to recover from his NSTEMI somewhat and otherwise   medically optimize him, particularly his lungs.    His LAD has quite severe diffuse disease, especially distally,   but appears graftable in the mid to distal portion, which would   fill his septals. His other targets appear adequate.    Will obtain vein mapping and carotids and continue to follow him   closely. Anticipate CABG this admission.    Thank you for the opportunity to participate in the care of this   patient.      Adarsh Sanchez MD        PMH:  Past Medical History:   Diagnosis Date     Diabetes mellitus with proliferative retinopathy (H)      Hyperlipidemia      Hypertension      Nephrolithiasis          PSH:  Past Surgical History:   Procedure Laterality Date     LITHOTRIPSY           FH:  family history is not on file.      SH:  Denies Tobacco use      Allergies:  No Known Allergies    Home Meds:  Medications Prior to Admission   Medication Sig  Dispense Refill Last Dose     amLODIPine (NORVASC) 10 MG tablet Take 5 mg by mouth daily      Past Week at na     atorvastatin (LIPITOR) 40 MG tablet Take 40 mg by mouth daily     Past Week at      glipiZIDE (GLUCOTROL XL) 5 MG 24 hr tablet Take 5 mg by mouth   daily   Past Week at      lisinopril-hydrochlorothiazide (PRINZIDE/ZESTORETIC) 20-25 MG   tablet Take 1 tablet by mouth daily   Past Week at      losartan (COZAAR) 100 MG tablet Take 100 mg by mouth daily     Past Week at      metFORMIN (GLUCOPHAGE) 1000 MG tablet Take 1,000 mg by mouth 2   times daily (with meals)   Past Week at      metoprolol succinate ER (TOPROL-XL) 50 MG 24 hr tablet Take 50   mg by mouth daily   Past Week at        ROS: + as noted above    Physical Exam:  Temp:  [96.3  F (35.7  C)-99  F (37.2  C)] 98.8  F (37.1  C)  Pulse:  [] 82  Heart Rate:  [] 82  Resp:  [9-46] 12  BP: (102-165)/() 130/73  FiO2 (%):  [80 %] 80 %  SpO2:  [91 %-100 %] 99 %    Gen: NAD in bed  Lungs: non-labored breathing  CV: regular rhythm, normal rate on tele  Abd: non distended  Ext: cool  Neuro: AOx3    Labs:  ABG   Recent Labs   Lab 03/11/19 2105 03/11/19  1755   PH 7.43 7.13*   PCO2 44 69*   PO2 262* 94   HCO3 29* 23     CBC  Recent Labs   Lab 03/11/19 1756 03/11/19  1754   WBC  --  14.4*   HGB 11.2* 10.0*   PLT  --  368     BMP  Recent Labs   Lab 03/12/19 0437 03/11/19  2041 03/11/19  1756 03/11/19  1754    139 141 139   POTASSIUM 3.8 4.1 3.4 3.4   CHLORIDE 111* 107  --  106   CO2 27 25  --  23   BUN 25 27  --  26   CR 1.55* 1.44*  --  1.52*   * 253*  --  350*     LFT  Recent Labs   Lab 03/12/19 0437 03/11/19  1754   AST 57* 27   ALT 28 29   ALKPHOS 115 151*   BILITOTAL 0.3 0.3   ALBUMIN 2.5* 2.7*     PancreasNo lab results found in last 7 days.    Imaging:    ECHO    Interpretation Summary     The visual ejection fraction is estimated at 35-40%.  Left ventricular systolic function is mild to moderately  reduced.  There are regional wall motion abnormalities as specified.  Right ventricular systolic pressure is elevated, consistent with   mild to  moderate pulmonary hypertension.  The ascending aorta is Mildly dilated.  Moderate left pleural effusion  The study was technically difficult.  __________________________________________________________________  ___________  __        Left Ventricle  The left ventricle is normal in size. There is normal left   ventricular wall  thickness. Diastolic Doppler findings (E/E' ratio and/or other   parameters)  suggest left ventricular filling pressures are increased. The   visual ejection  fraction is estimated at 35-40%. Left ventricular systolic   function is mild to  moderately reduced. Grade III or advanced diastolic dysfunction.   There is  inferolateral wall akinesis. Mid to basal anterolateral akinesis   (with the  exception of the very basal portion of the anterolateral wall).   basal inferior  akinesis. There is apical akinesis. There are regional wall   motion  abnormalities as specified.     Right Ventricle  The right ventricle is normal in size and function.     Atria  The left atrium is moderately dilated. The right atrium is mildly   dilated.  There is no color Doppler evidence of an atrial shunt.     Mitral Valve  There is mild (1+) mitral regurgitation.        Tricuspid Valve  There is mild (1+) tricuspid regurgitation. The right ventricular   systolic  pressure is approximated at 37.7 mmHg plus the right atrial   pressure. Right  ventricular systolic pressure is elevated, consistent with mild   to moderate  pulmonary hypertension.     Aortic Valve  The aortic valve is trileaflet. There is mild trileaflet aortic   sclerosis. No  aortic regurgitation is present. No hemodynamically significant   valvular  aortic stenosis.     Pulmonic Valve  There is no pulmonic valvular regurgitation.     Vessels  The aortic root is normal size. The ascending aorta is Mildly    dilated. The  inferior vena cava is not dilated.     Pericardium  There is no pericardial effusion. Moderate left pleural effusion.        Rhythm  Sinus rhythm was noted.        COR ANGIO    1. Patient with multiple sausage links like stenoses through the   mid and distal right coronary artery. There is a 70-80% stenosis   in the continuation of the right coronary artery just beyond the   posterior descending artery.   2. Mid LAD to Dist LAD lesion is 90% stenosed. The lesion is   segmental and serial. There is a long distal LAD stenosis   extending all the way through the apex.   3. Prox LAD to Mid LAD lesion is 45% stenosed.   4. Prox Cx lesion is 85% stenosed.   5. The ejection fraction is calculated to be 35%. LVEDP 31mmHg.  6. Mid LM lesion is 40% stenosed.                   Troponin I   Result Value Ref Range    Troponin I ES 53.250 (HH) 0.000 - 0.045 ug/L   US Lower Extremity Venous Mapping Bilateral    Narrative    ULTRASOUND BILATERAL LOWER EXTREMITY VENOUS MAPPING March 12, 2019 at  2027 hours    HISTORY: 63-year-old patient with history of coronary arterial disease  with preoperative evaluation prior to coronary artery bypass graft.    COMPARISON: None.    FINDINGS: The right greater saphenous vein ranges from 2.5 to 6.5 mm  throughout the thigh and 2.3 to 3.2 mm at and below the level of the  knee. The left greater saphenous vein ranges from 2.2 to 3.8 mm  throughout the thigh and 2 to 2.9 mm at and below the level of the  knee.      Impression    IMPRESSION: Bilateral greater saphenous vein measurements as  described.    SABRA OSORIO MD   US Carotid Bilateral    Narrative    BILATERAL CAROTID ULTRASOUND   3/12/2019 8:27 PM     HISTORY:  pre op    COMPARISON: None.    RIGHT CAROTID FINDINGS:  There is no significant calcified and  noncalcified atherosclerotic plaque in the carotid bifurcation.   Right ICA PSV:  79  cm/sec.  Right ICA EDV:  32 cm/sec.  Right ICA/CCA PSV Ratio:  These indicate less  than 50% diameter stenosis of the right ICA.    Right Vertebral: Antegrade flow.   Right ECA: Antegrade flow.     LEFT CAROTID FINDINGS:  There is no significant calcified and  noncalcified atherosclerotic plaque in the carotid bifurcation.   Left ICA PSV:  118  cm/sec.  Left ICA EDV:  37 cm/sec.  Left ICA/CCA PSV Ratio:  These indicate less than 50% diameter stenosis of the left ICA.    Left Vertebral: Antegrade flow.   Left ECA: Antegrade flow.     Causes of Decreased Accuracy:   None.       Impression    IMPRESSION:    1. Less than 50% diameter stenosis of the right ICA relative to the  distal ICA diameter.  2. Less than 50% diameter stenosis of the left ICA relative to the  distal ICA diameter.      Consensus Panel Gray-Scale And Doppler Us Criteria For Diagnosis of  ICA Stenosis (Radiology 11/2003)    Normal   ICA PSV < 125 cm/sec  Plaque Estimate None  ICA/CCA PSV Ratio < 2.0  ICA EDV < 40 cm/sec    < 50%   ICA PSV < 125 cm/sec  Plaque Estimate < 50%  ICA/CCA PSV Ratio < 2.0  ICA EDV < 40 cm/sec    50- 69%  ICA -230 cm/sec  Plaque Estimate > or = 50%  ICA/CCA PSV Ratio 2.0-4.0  ICA EDV  cm/sec     > or = 70%, less than near occlusion  ICA PSV > 230 cm/sec  Plaque Estimate > or = 50%  ICA/CCA Ratio > 4.0  ICA EDV > 100 cm/sec       TASNEEM TRACY MD   Glucose by meter   Result Value Ref Range    Glucose 157 (H) 70 - 99 mg/dL   UA with Microscopic reflex to Culture   Result Value Ref Range    Color Urine Yellow     Appearance Urine Clear     Glucose Urine Negative NEG^Negative mg/dL    Bilirubin Urine Negative NEG^Negative    Ketones Urine Negative NEG^Negative mg/dL    Specific Gravity Urine 1.033 1.003 - 1.035    Blood Urine Small (A) NEG^Negative    pH Urine 5.5 5.0 - 7.0 pH    Protein Albumin Urine 30 (A) NEG^Negative mg/dL    Urobilinogen mg/dL Normal 0.0 - 2.0 mg/dL    Nitrite Urine Negative NEG^Negative    Leukocyte Esterase Urine Large (A) NEG^Negative    Source Catheterized Urine     WBC Urine  37 (H) 0 - 5 /HPF    RBC Urine 6 (H) 0 - 2 /HPF    Bacteria Urine Few (A) NEG^Negative /HPF    Mucous Urine Present (A) NEG^Negative /LPF    Uric Acid Crystals Few (A) NEG^Negative /HPF   Heparin Xa level (AM Draw)   Result Value Ref Range    Heparin 10A Level 0.38 IU/mL   Glucose by meter   Result Value Ref Range    Glucose 135 (H) 70 - 99 mg/dL   Glucose by meter   Result Value Ref Range    Glucose 115 (H) 70 - 99 mg/dL   Comprehensive metabolic panel   Result Value Ref Range    Sodium 141 133 - 144 mmol/L    Potassium 4.0 3.4 - 5.3 mmol/L    Chloride 108 94 - 109 mmol/L    Carbon Dioxide 28 20 - 32 mmol/L    Anion Gap 5 3 - 14 mmol/L    Glucose 141 (H) 70 - 99 mg/dL    Urea Nitrogen 22 7 - 30 mg/dL    Creatinine 1.41 (H) 0.66 - 1.25 mg/dL    GFR Estimate 53 (L) >60 mL/min/[1.73_m2]    GFR Estimate If Black 62 >60 mL/min/[1.73_m2]    Calcium 8.5 8.5 - 10.1 mg/dL    Bilirubin Total 0.3 0.2 - 1.3 mg/dL    Albumin 2.4 (L) 3.4 - 5.0 g/dL    Protein Total 6.6 (L) 6.8 - 8.8 g/dL    Alkaline Phosphatase 97 40 - 150 U/L    ALT 25 0 - 70 U/L    AST 40 0 - 45 U/L   CBC with platelets   Result Value Ref Range    WBC 11.4 (H) 4.0 - 11.0 10e9/L    RBC Count 3.13 (L) 4.4 - 5.9 10e12/L    Hemoglobin 8.0 (L) 13.3 - 17.7 g/dL    Hematocrit 24.1 (L) 40.0 - 53.0 %    MCV 77 (L) 78 - 100 fl    MCH 25.6 (L) 26.5 - 33.0 pg    MCHC 33.2 31.5 - 36.5 g/dL    RDW 14.3 10.0 - 15.0 %    Platelet Count 295 150 - 450 10e9/L   Heparin Xa level (AM Draw)   Result Value Ref Range    Heparin 10A Level 0.12 IU/mL   Glucose by meter   Result Value Ref Range    Glucose 150 (H) 70 - 99 mg/dL     Critical care billing:   I spent 35 minutes with the patient and his daughter.  100% of my time was spent reviewing the cath films, reviewing the lab results and going over the diagnosis and formulating a treatment plan with the pt and his daughter.

## 2019-03-13 NOTE — PLAN OF CARE
[phone]  Neuro: Understands some English.   here [phone]. CV surg, Cardiologist and Hospitalist here. Wife present with above physicians. Neurochecks intact.  CV: no chest pain today.  Discussed with Dr Hernández re blood pressure.  Given Metoprolol, Weaned off NTG gtt. Shown CXR. No further Lasix today due to dye yesterday. SB/P >160-170 I: Dr Hernández notified and Hydralazine given.  E: SB/P decreased to 150-140's.  Lungs: Crackles ~ 1/2 up bilaterally.  Using IS frequently to 1000cc.  Loose cough.  GI Ate well  U/O: Diuresed post Lasix.

## 2019-03-13 NOTE — PROGRESS NOTES
ICU Multi-Disciplinary Note  Patient condition reviewed and discussed while on multidisciplinary rounds today. Mr. Mcelroy is a 63 year old gentleman admitted with acute hypoxic and hypercarbic respiratory failure, likely secondary to NSTEMI. He underwent cardiac cath yesterday and was found to have multivessel disease. CV surgery was consulted and surgery planned for later this week or next Monday. He is currently hemodynamically stable and oxygenating at 100% on 5L nasal cannula. No interventions were implemented by the ICU team.   The Critical Care service will continue to follow peripherally while the patient is within the ICU. We are readily available should issues arise. Please feel free to contact us for critical care issues with which we may be of assistance. For all other concerns, please contact primary service first.   DINO Casillas

## 2019-03-13 NOTE — PROGRESS NOTES
CV Surgery    Patient seen and examined with  present. Increase  mg, added ferrous gluconate as Hgb dropped 3 grams in past 2 days, uncertain source? Down 5 kg below pre-op weight, on lasix. Will optimize more, surgery later this week vs. Monday. Will obtain trop to continue close monitoring as previous 2 were elevated.     Will continue to follow.    Emmy Lindo PA-C  CV Surgery

## 2019-03-13 NOTE — PLAN OF CARE
"AOx4 on nitro&heparin gtt awaiting CAB \"sometime this week\" per Dr. Sanchez's note. No c/o SOB or chest pain overnight. SBP>120 on maximum nitroglycerine. Hospitalist to defer to cardiology -- cardiology does not want to change course of treatment at this time and verbalizes to RN that SBP in the 140's is acceptable. Patient wondering if he will go to surgery today - nothing scheduled as of yet.  "

## 2019-03-13 NOTE — PLAN OF CARE
Remains on Heparin gtt  Nitroglycerin gtt to keep SBP below 120  Denies pain  A & O x4  Echo done at the bedside  Troponin 53.8 Cardiologist was notified - pt went to the cath lab, multi vessel disease - Cardiovascular Surgery was consulted    TR band on left wrist - keeps oozing so slowly removing the air   Replaced Potassium   Family at the bedside

## 2019-03-14 ENCOUNTER — ANESTHESIA EVENT (OUTPATIENT)
Dept: INTENSIVE CARE | Facility: CLINIC | Age: 64
DRG: 233 | End: 2019-03-14
Payer: COMMERCIAL

## 2019-03-14 ENCOUNTER — ANESTHESIA (OUTPATIENT)
Dept: INTENSIVE CARE | Facility: CLINIC | Age: 64
DRG: 233 | End: 2019-03-14
Payer: COMMERCIAL

## 2019-03-14 LAB
ABO + RH BLD: NORMAL
ABO + RH BLD: NORMAL
ALBUMIN SERPL-MCNC: 2.5 G/DL (ref 3.4–5)
ALP SERPL-CCNC: 101 U/L (ref 40–150)
ALT SERPL W P-5'-P-CCNC: 25 U/L (ref 0–70)
ANION GAP SERPL CALCULATED.3IONS-SCNC: 6 MMOL/L (ref 3–14)
AST SERPL W P-5'-P-CCNC: 31 U/L (ref 0–45)
BILIRUB SERPL-MCNC: 0.4 MG/DL (ref 0.2–1.3)
BLD GP AB SCN SERPL QL: NORMAL
BLOOD BANK CMNT PATIENT-IMP: NORMAL
BLOOD BANK CMNT PATIENT-IMP: NORMAL
BUN SERPL-MCNC: 18 MG/DL (ref 7–30)
CALCIUM SERPL-MCNC: 8.8 MG/DL (ref 8.5–10.1)
CHLORIDE SERPL-SCNC: 105 MMOL/L (ref 94–109)
CO2 SERPL-SCNC: 27 MMOL/L (ref 20–32)
CREAT SERPL-MCNC: 1.25 MG/DL (ref 0.66–1.25)
ERYTHROCYTE [DISTWIDTH] IN BLOOD BY AUTOMATED COUNT: 14.2 % (ref 10–15)
GFR SERPL CREATININE-BSD FRML MDRD: 62 ML/MIN/{1.73_M2}
GLUCOSE BLDC GLUCOMTR-MCNC: 103 MG/DL (ref 70–99)
GLUCOSE BLDC GLUCOMTR-MCNC: 122 MG/DL (ref 70–99)
GLUCOSE BLDC GLUCOMTR-MCNC: 151 MG/DL (ref 70–99)
GLUCOSE BLDC GLUCOMTR-MCNC: 162 MG/DL (ref 70–99)
GLUCOSE BLDC GLUCOMTR-MCNC: 241 MG/DL (ref 70–99)
GLUCOSE BLDC GLUCOMTR-MCNC: 255 MG/DL (ref 70–99)
GLUCOSE SERPL-MCNC: 109 MG/DL (ref 70–99)
HCT VFR BLD AUTO: 27.1 % (ref 40–53)
HGB BLD-MCNC: 9 G/DL (ref 13.3–17.7)
LACTATE BLD-SCNC: 1.6 MMOL/L (ref 0.7–2)
LMWH PPP CHRO-ACNC: 0.14 IU/ML
LMWH PPP CHRO-ACNC: 0.19 IU/ML
LMWH PPP CHRO-ACNC: 0.28 IU/ML
LMWH PPP CHRO-ACNC: 0.4 IU/ML
MAGNESIUM SERPL-MCNC: 1.8 MG/DL (ref 1.6–2.3)
MCH RBC QN AUTO: 25.4 PG (ref 26.5–33)
MCHC RBC AUTO-ENTMCNC: 33.2 G/DL (ref 31.5–36.5)
MCV RBC AUTO: 76 FL (ref 78–100)
PLATELET # BLD AUTO: 311 10E9/L (ref 150–450)
POTASSIUM SERPL-SCNC: 3.3 MMOL/L (ref 3.4–5.3)
POTASSIUM SERPL-SCNC: 4 MMOL/L (ref 3.4–5.3)
PROT SERPL-MCNC: 7.4 G/DL (ref 6.8–8.8)
RBC # BLD AUTO: 3.55 10E12/L (ref 4.4–5.9)
SODIUM SERPL-SCNC: 138 MMOL/L (ref 133–144)
SPECIMEN EXP DATE BLD: NORMAL
WBC # BLD AUTO: 12.9 10E9/L (ref 4–11)

## 2019-03-14 PROCEDURE — 85027 COMPLETE CBC AUTOMATED: CPT | Performed by: INTERNAL MEDICINE

## 2019-03-14 PROCEDURE — 25800030 ZZH RX IP 258 OP 636: Performed by: PHYSICIAN ASSISTANT

## 2019-03-14 PROCEDURE — 25000131 ZZH RX MED GY IP 250 OP 636 PS 637: Performed by: INTERNAL MEDICINE

## 2019-03-14 PROCEDURE — 25000128 H RX IP 250 OP 636: Performed by: HOSPITALIST

## 2019-03-14 PROCEDURE — 85520 HEPARIN ASSAY: CPT | Performed by: INTERNAL MEDICINE

## 2019-03-14 PROCEDURE — 99232 SBSQ HOSP IP/OBS MODERATE 35: CPT | Performed by: INTERNAL MEDICINE

## 2019-03-14 PROCEDURE — 25000132 ZZH RX MED GY IP 250 OP 250 PS 637: Performed by: INTERNAL MEDICINE

## 2019-03-14 PROCEDURE — 21000001 ZZH R&B HEART CARE

## 2019-03-14 PROCEDURE — 25000128 H RX IP 250 OP 636: Performed by: INTERNAL MEDICINE

## 2019-03-14 PROCEDURE — 83605 ASSAY OF LACTIC ACID: CPT | Performed by: INTERNAL MEDICINE

## 2019-03-14 PROCEDURE — 84132 ASSAY OF SERUM POTASSIUM: CPT | Performed by: INTERNAL MEDICINE

## 2019-03-14 PROCEDURE — 40000257 ZZH STATISTIC CONSULT NO CHARGE VASC ACCESS

## 2019-03-14 PROCEDURE — 36415 COLL VENOUS BLD VENIPUNCTURE: CPT | Performed by: INTERNAL MEDICINE

## 2019-03-14 PROCEDURE — 00000146 ZZHCL STATISTIC GLUCOSE BY METER IP

## 2019-03-14 PROCEDURE — 80053 COMPREHEN METABOLIC PANEL: CPT | Performed by: INTERNAL MEDICINE

## 2019-03-14 PROCEDURE — 25000132 ZZH RX MED GY IP 250 OP 250 PS 637: Performed by: PHYSICIAN ASSISTANT

## 2019-03-14 PROCEDURE — 99291 CRITICAL CARE FIRST HOUR: CPT | Performed by: INTERNAL MEDICINE

## 2019-03-14 PROCEDURE — 40000671 ZZH STATISTIC ANESTHESIA CASE

## 2019-03-14 PROCEDURE — 85520 HEPARIN ASSAY: CPT | Performed by: HOSPITALIST

## 2019-03-14 PROCEDURE — 36415 COLL VENOUS BLD VENIPUNCTURE: CPT | Performed by: HOSPITALIST

## 2019-03-14 PROCEDURE — 37000011 ZZH ANESTHESIA WARD SERVICE: Performed by: NURSE ANESTHETIST, CERTIFIED REGISTERED

## 2019-03-14 PROCEDURE — 83735 ASSAY OF MAGNESIUM: CPT | Performed by: INTERNAL MEDICINE

## 2019-03-14 PROCEDURE — 25000128 H RX IP 250 OP 636: Performed by: PHYSICIAN ASSISTANT

## 2019-03-14 RX ORDER — POTASSIUM CL/LIDO/0.9 % NACL 10MEQ/0.1L
10 INTRAVENOUS SOLUTION, PIGGYBACK (ML) INTRAVENOUS
Status: DISCONTINUED | OUTPATIENT
Start: 2019-03-14 | End: 2019-03-14

## 2019-03-14 RX ORDER — NICOTINE POLACRILEX 4 MG
15-30 LOZENGE BUCCAL
Status: DISCONTINUED | OUTPATIENT
Start: 2019-03-14 | End: 2019-03-14

## 2019-03-14 RX ORDER — MAGNESIUM SULFATE HEPTAHYDRATE 40 MG/ML
2 INJECTION, SOLUTION INTRAVENOUS DAILY PRN
Status: DISCONTINUED | OUTPATIENT
Start: 2019-03-14 | End: 2019-03-14

## 2019-03-14 RX ORDER — POTASSIUM CHLORIDE 7.45 MG/ML
10 INJECTION INTRAVENOUS
Status: DISCONTINUED | OUTPATIENT
Start: 2019-03-14 | End: 2019-03-14

## 2019-03-14 RX ORDER — DEXTROSE MONOHYDRATE 25 G/50ML
25-50 INJECTION, SOLUTION INTRAVENOUS
Status: DISCONTINUED | OUTPATIENT
Start: 2019-03-14 | End: 2019-03-14

## 2019-03-14 RX ORDER — HYDRALAZINE HYDROCHLORIDE 10 MG/1
10 TABLET, FILM COATED ORAL 3 TIMES DAILY PRN
Status: DISCONTINUED | OUTPATIENT
Start: 2019-03-14 | End: 2019-03-18

## 2019-03-14 RX ORDER — POTASSIUM CHLORIDE 29.8 MG/ML
20 INJECTION INTRAVENOUS
Status: DISCONTINUED | OUTPATIENT
Start: 2019-03-14 | End: 2019-03-14

## 2019-03-14 RX ORDER — METOLAZONE 2.5 MG/1
2.5 TABLET ORAL ONCE
Status: COMPLETED | OUTPATIENT
Start: 2019-03-14 | End: 2019-03-14

## 2019-03-14 RX ORDER — MAGNESIUM SULFATE HEPTAHYDRATE 40 MG/ML
4 INJECTION, SOLUTION INTRAVENOUS EVERY 4 HOURS PRN
Status: DISCONTINUED | OUTPATIENT
Start: 2019-03-14 | End: 2019-03-14

## 2019-03-14 RX ORDER — LISINOPRIL 5 MG/1
5 TABLET ORAL DAILY
Status: DISCONTINUED | OUTPATIENT
Start: 2019-03-15 | End: 2019-03-18

## 2019-03-14 RX ORDER — POTASSIUM CHLORIDE 1500 MG/1
20-40 TABLET, EXTENDED RELEASE ORAL
Status: DISCONTINUED | OUTPATIENT
Start: 2019-03-14 | End: 2019-03-14

## 2019-03-14 RX ORDER — METOPROLOL SUCCINATE 50 MG/1
50 TABLET, EXTENDED RELEASE ORAL DAILY
Status: DISCONTINUED | OUTPATIENT
Start: 2019-03-15 | End: 2019-03-18

## 2019-03-14 RX ORDER — POTASSIUM CHLORIDE 1.5 G/1.58G
20-40 POWDER, FOR SOLUTION ORAL
Status: DISCONTINUED | OUTPATIENT
Start: 2019-03-14 | End: 2019-03-14

## 2019-03-14 RX ADMIN — HEPARIN SODIUM 900 UNITS/HR: 10000 INJECTION, SOLUTION INTRAVENOUS at 03:34

## 2019-03-14 RX ADMIN — ATORVASTATIN CALCIUM 80 MG: 80 TABLET, FILM COATED ORAL at 21:19

## 2019-03-14 RX ADMIN — METOLAZONE 2.5 MG: 2.5 TABLET ORAL at 11:53

## 2019-03-14 RX ADMIN — INSULIN GLARGINE 6 UNITS: 100 INJECTION, SOLUTION SUBCUTANEOUS at 21:27

## 2019-03-14 RX ADMIN — HYDRALAZINE HYDROCHLORIDE 5 MG: 20 INJECTION INTRAMUSCULAR; INTRAVENOUS at 06:06

## 2019-03-14 RX ADMIN — FUROSEMIDE 20 MG: 10 INJECTION, SOLUTION INTRAVENOUS at 10:51

## 2019-03-14 RX ADMIN — INSULIN ASPART 3 UNITS: 100 INJECTION, SOLUTION INTRAVENOUS; SUBCUTANEOUS at 11:57

## 2019-03-14 RX ADMIN — IRON SUCROSE 200 MG: 20 INJECTION, SOLUTION INTRAVENOUS at 13:07

## 2019-03-14 RX ADMIN — POTASSIUM CHLORIDE 20 MEQ: 1.5 POWDER, FOR SOLUTION ORAL at 10:47

## 2019-03-14 RX ADMIN — MAGNESIUM SULFATE HEPTAHYDRATE 2 G: 40 INJECTION, SOLUTION INTRAVENOUS at 13:47

## 2019-03-14 RX ADMIN — INSULIN ASPART 1 UNITS: 100 INJECTION, SOLUTION INTRAVENOUS; SUBCUTANEOUS at 00:04

## 2019-03-14 RX ADMIN — ASPIRIN 325 MG: 325 TABLET, DELAYED RELEASE ORAL at 08:54

## 2019-03-14 RX ADMIN — METOPROLOL SUCCINATE 25 MG: 25 TABLET, EXTENDED RELEASE ORAL at 08:55

## 2019-03-14 RX ADMIN — POTASSIUM CHLORIDE 40 MEQ: 1.5 POWDER, FOR SOLUTION ORAL at 08:53

## 2019-03-14 RX ADMIN — Medication 1900 UNITS: at 09:23

## 2019-03-14 RX ADMIN — FUROSEMIDE 20 MG: 10 INJECTION, SOLUTION INTRAVENOUS at 21:19

## 2019-03-14 ASSESSMENT — MIFFLIN-ST. JEOR: SCORE: 1425.13

## 2019-03-14 ASSESSMENT — ACTIVITIES OF DAILY LIVING (ADL)
ADLS_ACUITY_SCORE: 10

## 2019-03-14 NOTE — PROGRESS NOTES
[facility]    Medicine Progress Note - Hospitalist Service        Date of Admission:  3/11/2019  5:46 PM    Assessment & Plan:   Khang Mcelroy is a 63 year old male admitted on 3/11/2019 with acute hypoxemic and hypercapnic respiratory failure presumed secondary to NSTEMI     Acute hypoxemic and hypercapnic respiratory failure secondary to acute systolic congestive heart failure,  NSTEMI  Multi-vessel coronary artery disease  -Patient with acute onset of shortness of breath after an episode of chest pain the day prior  -Initial EKG was concerning for STEMI, but repeat EKG did not show DWIGHT, but nonspecific t wave changes  -CXR with bilateral infiltrates concerning for pulmonary edema  -Initial troponin of 2.1, peaked at 53  -coronary angiogram revealed three-vessel coronary artery disease   -Echocardiogram revealed an EF of 35-40%, with multiple wall motion abnormalities.  -switch Lasix to p.o.  -CT surgery following, planned for CABG on 3/18  -Continue aspirin 325 mg daily  -Lipitor 80 mg daily  -Metoprolol XL 25 mg daily  -Continues on heparin drip  -Oxygenating much better, now on room air.    Lactic acidosis  -LA 5 on admission, improved to 2.7  -Most likely due to poor tissue perfusion from MI     DM2  -Hemoglobin A1c 7.9  -prior to admission on glipizide and metformin  -Hold glipizide and metformin at this time  -High intensity sliding scale-Add Lantus 6 units at bedtime     HTN   HLD  -Prior to admission on amlodipine, lisinopril/hydrochlorothiazide, losartan  -Hold prior to admission antihypertensives   -currently on low-dose metoprolol and lisinopril  -Statin as above    CKD stage III  -baseline creatinine is between 1.3-1.6  -at baseline, monitor closely while on IV diuretics.    Acute on chronic anemia:  -Baseline hemoglobin per care everywhere appears to be between 9-10.5  -Presenting hemoglobin was 11.2, dropped to 8.0 yesterday morning, back up again at 9.0 today  -No obvious  "bleeding  -Iron studies with mixed picture, started on iron replacement    Diet: Advance Diet as Tolerated: Regular Diet Adult  Room Service     DVT Prophylaxis: Heparin gtt  Dejesus Catheter: in place, indication: Strict 1-2 Hour I&O  Code Status: Full Code     Disposition Plan    Expected discharge: 6-8 days pending CABG on 3/18  Entered: Ayaan Albrecht MD 03/14/2019, 9:45 AM        The patient's care was discussed with the Bedside Nurse, Patient and Patient's Family.  Patient's daughter help with the interpretation at the bedside.    Ayaan Albrecht MD  Hospitalist Service  [facility]    ______________________________________________________________________    Interval History   Off oxygen on room air.  Denies chest pain or dyspnea.  Renal function stable.  Denies hematochezia or melena.    Data reviewed today: I reviewed all medications, new labs and imaging results over the last 24 hours. I personally reviewed the EKG tracing showing nsr.    Physical Exam   Vital signs:  Temp: 98.5  F (36.9  C) Temp src: Oral BP: (!) 165/93 Pulse: 82 Heart Rate: 82 Resp: 16 SpO2: 98 % O2 Device: None (Room air) Oxygen Delivery: 3 LPM Height: 160 cm (5' 3\") Weight: 72.5 kg (159 lb 13.3 oz)  Estimated body mass index is 28.31 kg/m  as calculated from the following:    Height as of this encounter: 1.6 m (5' 3\").    Weight as of this encounter: 72.5 kg (159 lb 13.3 oz).      Wt Readings from Last 2 Encounters:   03/14/19 72.5 kg (159 lb 13.3 oz)       Gen: AAOX3, NAD  HEENT:  no pallor  Resp: Few crackles bilaterally, normal effort of breathing  CVS: RRR, no murmur  Abd/GI: Soft, non-tender. BS- normoactive.    Skin: Warm, dry no rashes  MSK: No joint deformities, trace pedal edema  Neuro- CN- intact. No focal deficits.      Data   Recent Labs   Lab 03/14/19  0623 03/13/19  0532 03/12/19  1837 03/12/19  0437  03/11/19  1756 03/11/19  1754   WBC 12.9* 11.4*  --   --   --   --  14.4*   HGB 9.0* 8.0*  --   --   --  " 11.2* 10.0*   MCV 76* 77*  --   --   --   --  79    295  --   --   --   --  368    141  --  144   < > 141 139   POTASSIUM 3.3* 4.0  --  3.8   < > 3.4 3.4   CHLORIDE 105 108  --  111*   < >  --  106   CO2 27 28  --  27   < >  --  23   BUN 18 22  --  25   < >  --  26   CR 1.25 1.41*  --  1.55*   < >  --  1.52*   ANIONGAP 6 5  --  6   < >  --  10   MYKE 8.8 8.5  --  8.6   < >  --  8.4*   * 141*  --  117*   < >  --  350*   ALBUMIN 2.5* 2.4*  --  2.5*  --   --  2.7*   PROTTOTAL 7.4 6.6*  --  7.0  --   --  8.1   BILITOTAL 0.4 0.3  --  0.3  --   --  0.3   ALKPHOS 101 97  --  115  --   --  151*   ALT 25 25  --  28  --   --  29   AST 31 40  --  57*  --   --  27   TROPI  --  39.579* 53.250* 53.862*   < >  --  2.102*   TROPONIN  --   --   --   --   --   --  0.36*    < > = values in this interval not displayed.       Recent Results (from the past 24 hour(s))   XR Chest 2 Views    Narrative    CHEST TWO VIEWS   3/13/2019 12:37 PM     HISTORY: Pulmonary edema    COMPARISON: 3/11/2019      Impression    IMPRESSION: Patchy bilateral groundglass opacities have slightly  improved compared to the prior exam in the left lung, but are similar  in the right lung. Superimposed basilar opacities likely represent  atelectasis. Small bilateral pleural effusions are present, likely  similar compared to the prior exam. Heart size is unchanged.    RICCI LEE MD     Medications     HEParin 1,050 Units/hr (03/14/19 0922)     sodium chloride 20 mL/hr at 03/12/19 1915       aspirin  325 mg Oral Daily     atorvastatin  80 mg Oral QPM     furosemide  20 mg Intravenous Q12H     insulin aspart  1-6 Units Subcutaneous Q4H     iron sucrose (VENOFER) intermittent infusion (200 mg)  200 mg Intravenous Q24H     [START ON 3/15/2019] lisinopril  2.5 mg Oral Daily     metoprolol succinate ER  25 mg Oral Daily     sodium chloride (PF)  3 mL Intracatheter Q8H

## 2019-03-14 NOTE — PLAN OF CARE
Late entry:  [id number]  Neuro: intact.   here [id number].    Lungs: IS uses independently frequently to 1250cc.   CV: no chest pain, SB/P > 150 X 1.  Dejesus Cath out at 0830.  Voided at 1400.  K+ replaced before Lasix given.  IV tender with bolus meds.  I: Iron to be given.  Flying [employer] attempted X 3, IV team X 1. Anesthesia started IV.  Report called to [facility].  Transferred with nurse to [facility].

## 2019-03-14 NOTE — PLAN OF CARE
A/O, can make needs known.  VSS--hydralazine given x 1 for BP >150.  Hep gtt continues at 900 units.  Blood sugars covered per sliding scale insulin.  Urine output 2000+ overnight.  Denies pain.  Tele is SR.  Plan is for 3-vessel CAB on Monday.

## 2019-03-14 NOTE — PROGRESS NOTES
[facility]    Cardiology Progress Note     Assessment & Plan     1.  Non-ST elevation myocardial infarction  Patient had significant increase in troponin from 0.36 to 53.  Left heart catheterization with coronary angiogram yesterday showed significant three-vessel coronary artery disease.  A cardiovascular surgery consult was put in and the pt is scheduled to undergo CABG.   He will continue on heparin drip until the procedure.   High intensity statin.   Metoprolol uptitrated to 50 today.    2.  Ischemic cardiomyopathy ejection fraction of less than 30%  Physical exam and CXR are consistent with pulm edema.   LVEDP of >30 on cath   Continue low-dose Lasix.  Also continue beta-blocker and ACE inhibitor.    3. HTN  D/c NITROGLYCERIN drip.  On beta-blocker and ACE inhibitor  Improved    CATH 3/12/2019:  1. Patient with multiple sausage links like stenoses through the mid and distal right coronary artery. There is a 70-80% stenosis in the continuation of the right coronary artery just beyond the posterior descending artery.   2. Mid LAD to Dist LAD lesion is 90% stenosed. The lesion is segmental and serial. There is a long distal LAD stenosis extending all the way through the apex.   3. Prox LAD to Mid LAD lesion is 45% stenosed.   4. Prox Cx lesion is 85% stenosed.   5. The ejection fraction is calculated to be 35%. LVEDP 31mmHg.  6. Mid LM lesion is 40% stenosed.    Bib Hernández MD  Text Page (7am - 5pm, M-F)    History/interval history  Underwent cardiac catheterization and was found to have significant three-vessel coronary artery disease pending cardiac vascular surgery evaluation.  No complaints.  Catheterization site looks normal without evidence of bruise or hematoma distal pulses 2+.    Physical Exam   Temp: 98.5  F (36.9  C) Temp src: Oral BP: (!) 138/95 Pulse: 80 Heart Rate: 76 Resp: 17 SpO2: 97 % O2 Device: None (Room air)    Vitals:    03/11/19 2053 03/13/19 0325 03/14/19 0420   Weight:  73 kg (160 lb 15 oz) 72.3 kg (159 lb 6.3 oz) 72.5 kg (159 lb 13.3 oz)     Vital Signs with Ranges  Temp:  [98.5  F (36.9  C)] 98.5  F (36.9  C)  Pulse:  [70-93] 80  Heart Rate:  [69-92] 76  Resp:  [11-32] 17  BP: (135-171)/(66-95) 138/95  SpO2:  [91 %-99 %] 97 %  I/O last 3 completed shifts:  In: 1334.95 [P.O.:900; I.V.:434.95]  Out: 3025 [Urine:3025]  Patient Active Problem List   Diagnosis     Type 2 diabetes mellitus (H)     NSTEMI (non-ST elevated myocardial infarction) (H)       Constitutional: Lying in bed, no distress  Eyes: Normal sclerae  ENT: Normal nose and ears  Respiratory: Saturating 90% on 3 L nasal cannula, no use of accessory muscles of respiration, bilateral crackles up to the interscapular area   Cardiovascular: Regular rate and rhythm, normal S1-S2, no murmurs, no JVD  GI: Soft nontender nondistended  Lymph/Hematologic: Deferred  Genitourinary:  deferred  Skin: No ulcers or bruises  Musculoskeletal: Normal strength bilaterally in all 4 extremities  Neurologic: Normal sensations bilaterally, cranial nerves II through XII intact  Neuropsychiatric: Alert and oriented x3    Medications     HEParin 1,000 Units/hr (03/14/19 1637)     sodium chloride 20 mL/hr at 03/12/19 1915       aspirin  325 mg Oral Daily     atorvastatin  80 mg Oral QPM     furosemide  20 mg Intravenous Q12H     insulin aspart  1-10 Units Subcutaneous TID AC     insulin aspart  1-7 Units Subcutaneous At Bedtime     insulin glargine  6 Units Subcutaneous At Bedtime     iron sucrose (VENOFER) intermittent infusion (200 mg)  200 mg Intravenous Q24H     [START ON 3/15/2019] lisinopril  2.5 mg Oral Daily     metoprolol succinate ER  25 mg Oral Daily     sodium chloride (PF)  3 mL Intracatheter Q8H       Data   Results for orders placed or performed during the hospital encounter of 03/11/19 (from the past 24 hour(s))   Glucose by meter   Result Value Ref Range    Glucose 222 (H) 70 - 99 mg/dL   Glucose by meter   Result Value Ref Range     Glucose 151 (H) 70 - 99 mg/dL   Heparin Xa level (AM Draw)   Result Value Ref Range    Heparin 10A Level 0.28 IU/mL   Glucose by meter   Result Value Ref Range    Glucose 103 (H) 70 - 99 mg/dL   Comprehensive metabolic panel   Result Value Ref Range    Sodium 138 133 - 144 mmol/L    Potassium 3.3 (L) 3.4 - 5.3 mmol/L    Chloride 105 94 - 109 mmol/L    Carbon Dioxide 27 20 - 32 mmol/L    Anion Gap 6 3 - 14 mmol/L    Glucose 109 (H) 70 - 99 mg/dL    Urea Nitrogen 18 7 - 30 mg/dL    Creatinine 1.25 0.66 - 1.25 mg/dL    GFR Estimate 62 >60 mL/min/[1.73_m2]    GFR Estimate If Black 71 >60 mL/min/[1.73_m2]    Calcium 8.8 8.5 - 10.1 mg/dL    Bilirubin Total 0.4 0.2 - 1.3 mg/dL    Albumin 2.5 (L) 3.4 - 5.0 g/dL    Protein Total 7.4 6.8 - 8.8 g/dL    Alkaline Phosphatase 101 40 - 150 U/L    ALT 25 0 - 70 U/L    AST 31 0 - 45 U/L   CBC with platelets   Result Value Ref Range    WBC 12.9 (H) 4.0 - 11.0 10e9/L    RBC Count 3.55 (L) 4.4 - 5.9 10e12/L    Hemoglobin 9.0 (L) 13.3 - 17.7 g/dL    Hematocrit 27.1 (L) 40.0 - 53.0 %    MCV 76 (L) 78 - 100 fl    MCH 25.4 (L) 26.5 - 33.0 pg    MCHC 33.2 31.5 - 36.5 g/dL    RDW 14.2 10.0 - 15.0 %    Platelet Count 311 150 - 450 10e9/L   Heparin Xa level (AM Draw)   Result Value Ref Range    Heparin 10A Level 0.14 IU/mL   Magnesium   Result Value Ref Range    Magnesium 1.8 1.6 - 2.3 mg/dL   Glucose by meter   Result Value Ref Range    Glucose 122 (H) 70 - 99 mg/dL   Glucose by meter   Result Value Ref Range    Glucose 255 (H) 70 - 99 mg/dL   Heparin Xa level (AM Draw)   Result Value Ref Range    Heparin 10A Level 0.40 IU/mL   Potassium   Result Value Ref Range    Potassium 4.0 3.4 - 5.3 mmol/L   Lactic acid level STAT for sepsis protocol   Result Value Ref Range    Lactate for Sepsis Protocol 1.6 0.7 - 2.0 mmol/L   Glucose by meter   Result Value Ref Range    Glucose 162 (H) 70 - 99 mg/dL     Critical care billing:   I spent 35 minutes with the patient and his daughter.  100% of my time was  spent reviewing the labs, and going over the diagnosis and formulating a treatment plan with the pt and his daughter.

## 2019-03-14 NOTE — PLAN OF CARE
ICU transfer, arrived around 1430. VSS on RA. Denies pain/SOB. Heparin gtt @ 10cc/hr. Hep 10A recheck at 2230. K+ and Mag replaced. Oleg removed this AM, voiding spont in adequate amounts. BG covered per sliding scale. PRN Hydralazine available. Up SBA, bedrest with bathroom privileges. Pt speaks minimal english.  scheduled for the AM. Plan for 3-vessel CABG on Monday at 0720.  scheduled at 0500 on Monday. Continue to monitor.

## 2019-03-14 NOTE — PROGRESS NOTES
CV Surgery    Patient seen and examined with  present. Encouraged ambulation, cardiac rehab ordered, remove Dejesus today, consume inc protein for diet. Hydralazine prn available for HTN. Will plan surgery 3/18/19 at 0720.    Emmy Lindo PA-C  CV Surgery

## 2019-03-14 NOTE — PROGRESS NOTES
Requested to start 2nd IV site because of incompatible medications.  Pt. Has very limited peripheral IV access.  Attempted Rt. Medial brachial v--blood return pulsing so removed (arterial puncture).  Pressure held to site 5 min.and pressure dressing applied.  Nurse notified.  Anesthesia call to start PIV.

## 2019-03-15 ENCOUNTER — APPOINTMENT (OUTPATIENT)
Dept: OCCUPATIONAL THERAPY | Facility: CLINIC | Age: 64
DRG: 233 | End: 2019-03-15
Attending: PHYSICIAN ASSISTANT
Payer: COMMERCIAL

## 2019-03-15 LAB
ANION GAP SERPL CALCULATED.3IONS-SCNC: 6 MMOL/L (ref 3–14)
BUN SERPL-MCNC: 21 MG/DL (ref 7–30)
CALCIUM SERPL-MCNC: 8.6 MG/DL (ref 8.5–10.1)
CHLORIDE SERPL-SCNC: 100 MMOL/L (ref 94–109)
CO2 SERPL-SCNC: 30 MMOL/L (ref 20–32)
CREAT SERPL-MCNC: 1.53 MG/DL (ref 0.66–1.25)
ERYTHROCYTE [DISTWIDTH] IN BLOOD BY AUTOMATED COUNT: 14.2 % (ref 10–15)
GFR SERPL CREATININE-BSD FRML MDRD: 48 ML/MIN/{1.73_M2}
GLUCOSE BLDC GLUCOMTR-MCNC: 106 MG/DL (ref 70–99)
GLUCOSE BLDC GLUCOMTR-MCNC: 146 MG/DL (ref 70–99)
GLUCOSE BLDC GLUCOMTR-MCNC: 229 MG/DL (ref 70–99)
GLUCOSE BLDC GLUCOMTR-MCNC: 83 MG/DL (ref 70–99)
GLUCOSE SERPL-MCNC: 79 MG/DL (ref 70–99)
HCT VFR BLD AUTO: 28.5 % (ref 40–53)
HGB BLD-MCNC: 9.5 G/DL (ref 13.3–17.7)
LMWH PPP CHRO-ACNC: 0.17 IU/ML
MCH RBC QN AUTO: 25.3 PG (ref 26.5–33)
MCHC RBC AUTO-ENTMCNC: 33.3 G/DL (ref 31.5–36.5)
MCV RBC AUTO: 76 FL (ref 78–100)
MRSA DNA SPEC QL NAA+PROBE: NEGATIVE
PLATELET # BLD AUTO: 344 10E9/L (ref 150–450)
POTASSIUM SERPL-SCNC: 3.4 MMOL/L (ref 3.4–5.3)
RBC # BLD AUTO: 3.75 10E12/L (ref 4.4–5.9)
SODIUM SERPL-SCNC: 136 MMOL/L (ref 133–144)
SPECIMEN SOURCE: NORMAL
WBC # BLD AUTO: 12.5 10E9/L (ref 4–11)

## 2019-03-15 PROCEDURE — 36415 COLL VENOUS BLD VENIPUNCTURE: CPT | Performed by: HOSPITALIST

## 2019-03-15 PROCEDURE — 00000146 ZZHCL STATISTIC GLUCOSE BY METER IP

## 2019-03-15 PROCEDURE — 25000128 H RX IP 250 OP 636: Performed by: PHYSICIAN ASSISTANT

## 2019-03-15 PROCEDURE — 25000132 ZZH RX MED GY IP 250 OP 250 PS 637: Performed by: INTERNAL MEDICINE

## 2019-03-15 PROCEDURE — 25800030 ZZH RX IP 258 OP 636: Performed by: PHYSICIAN ASSISTANT

## 2019-03-15 PROCEDURE — 87640 STAPH A DNA AMP PROBE: CPT | Performed by: THORACIC SURGERY (CARDIOTHORACIC VASCULAR SURGERY)

## 2019-03-15 PROCEDURE — 97166 OT EVAL MOD COMPLEX 45 MIN: CPT | Mod: GO | Performed by: OCCUPATIONAL THERAPIST

## 2019-03-15 PROCEDURE — 97110 THERAPEUTIC EXERCISES: CPT | Mod: GO | Performed by: OCCUPATIONAL THERAPIST

## 2019-03-15 PROCEDURE — 25000132 ZZH RX MED GY IP 250 OP 250 PS 637: Performed by: PHYSICIAN ASSISTANT

## 2019-03-15 PROCEDURE — 85520 HEPARIN ASSAY: CPT | Performed by: HOSPITALIST

## 2019-03-15 PROCEDURE — 99233 SBSQ HOSP IP/OBS HIGH 50: CPT | Performed by: INTERNAL MEDICINE

## 2019-03-15 PROCEDURE — 85027 COMPLETE CBC AUTOMATED: CPT | Performed by: INTERNAL MEDICINE

## 2019-03-15 PROCEDURE — 99232 SBSQ HOSP IP/OBS MODERATE 35: CPT | Performed by: INTERNAL MEDICINE

## 2019-03-15 PROCEDURE — 25000128 H RX IP 250 OP 636: Performed by: HOSPITALIST

## 2019-03-15 PROCEDURE — 21000001 ZZH R&B HEART CARE

## 2019-03-15 PROCEDURE — 25000131 ZZH RX MED GY IP 250 OP 636 PS 637: Performed by: INTERNAL MEDICINE

## 2019-03-15 PROCEDURE — 87641 MR-STAPH DNA AMP PROBE: CPT | Performed by: THORACIC SURGERY (CARDIOTHORACIC VASCULAR SURGERY)

## 2019-03-15 PROCEDURE — 97535 SELF CARE MNGMENT TRAINING: CPT | Mod: GO | Performed by: OCCUPATIONAL THERAPIST

## 2019-03-15 PROCEDURE — 36415 COLL VENOUS BLD VENIPUNCTURE: CPT | Performed by: INTERNAL MEDICINE

## 2019-03-15 PROCEDURE — 97530 THERAPEUTIC ACTIVITIES: CPT | Mod: GO | Performed by: OCCUPATIONAL THERAPIST

## 2019-03-15 PROCEDURE — 80048 BASIC METABOLIC PNL TOTAL CA: CPT | Performed by: INTERNAL MEDICINE

## 2019-03-15 RX ORDER — FUROSEMIDE 40 MG
40 TABLET ORAL DAILY
Status: DISCONTINUED | OUTPATIENT
Start: 2019-03-15 | End: 2019-03-18

## 2019-03-15 RX ADMIN — METOPROLOL SUCCINATE 50 MG: 50 TABLET, EXTENDED RELEASE ORAL at 08:39

## 2019-03-15 RX ADMIN — FUROSEMIDE 40 MG: 40 TABLET ORAL at 13:02

## 2019-03-15 RX ADMIN — ASPIRIN 325 MG: 325 TABLET, DELAYED RELEASE ORAL at 08:39

## 2019-03-15 RX ADMIN — ATORVASTATIN CALCIUM 80 MG: 80 TABLET, FILM COATED ORAL at 20:51

## 2019-03-15 RX ADMIN — INSULIN GLARGINE 4 UNITS: 100 INJECTION, SOLUTION SUBCUTANEOUS at 22:10

## 2019-03-15 RX ADMIN — POTASSIUM CHLORIDE 20 MEQ: 1500 TABLET, EXTENDED RELEASE ORAL at 08:43

## 2019-03-15 RX ADMIN — HEPARIN SODIUM 1000 UNITS/HR: 10000 INJECTION, SOLUTION INTRAVENOUS at 05:46

## 2019-03-15 RX ADMIN — IRON SUCROSE 200 MG: 20 INJECTION, SOLUTION INTRAVENOUS at 13:02

## 2019-03-15 RX ADMIN — LISINOPRIL 5 MG: 5 TABLET ORAL at 08:39

## 2019-03-15 ASSESSMENT — ACTIVITIES OF DAILY LIVING (ADL): ADLS_ACUITY_SCORE: 10

## 2019-03-15 ASSESSMENT — MIFFLIN-ST. JEOR: SCORE: 1397.31

## 2019-03-15 NOTE — PROGRESS NOTES
[facility]    Medicine Progress Note - Hospitalist Service        Date of Admission:  3/11/2019  5:46 PM    Assessment & Plan:   Khang Mcelroy is a 63 year old male admitted on 3/11/2019 with acute hypoxemic and hypercapnic respiratory failure presumed secondary to NSTEMI     Acute hypoxemic and hypercapnic respiratory failure secondary to acute systolic congestive heart failure,  NSTEMI  Multi-vessel coronary artery disease  -Patient with acute onset of shortness of breath after an episode of chest pain the day prior  -Initial EKG was concerning for STEMI, but repeat EKG did not show DWIGHT, but nonspecific t wave changes  -CXR with bilateral infiltrates concerning for pulmonary edema  -Initial troponin of 2.1, peaked at 53  -coronary angiogram revealed three-vessel coronary artery disease   -Echocardiogram revealed an EF of 35-40%, with multiple wall motion abnormalities.  -switch Lasix to p.o.  -CT surgery following, plan for CABG on 3/18  -Continue aspirin 325 mg daily  -Lipitor 80 mg daily  -Metoprolol XL 25 mg daily  -Continues on heparin drip  -Oxygenating well on room air    Lactic acidosis  -LA 5 on admission, improved to 2.7  -Most likely due to poor tissue perfusion from MI     DM2  -Hemoglobin A1c 7.9  -prior to admission on glipizide and metformin  -Hold glipizide and metformin at this time  -High intensity sliding scale  -Decrease Lantus to 4 units at bedtime.     HTN   HLD  -Prior to admission on amlodipine, lisinopril/hydrochlorothiazide, losartan  -Hold prior to admission antihypertensives   -currently on low-dose metoprolol and lisinopril  -Statin as above    CKD stage III  -baseline creatinine is between 1.3-1.6  -at baseline, monitor closely while on IV diuretics.    Acute on chronic anemia:  -Baseline hemoglobin per care everywhere appears to be between 9-10.5  -Presenting hemoglobin was 11.2, dropped to 8.0 on 3/13 morning, back up again at 9.5 today  -No obvious  "bleeding  -Iron studies with mixed picture, started on iron replacement    Diet: Advance Diet as Tolerated: Regular Diet Adult  Room Service  Snacks/Supplements Pediatric: Beneprotein; Between Meals     DVT Prophylaxis: Heparin gtt  Dejesus Catheter: not present  Code Status: Full Code     Disposition Plan    Expected discharge: 6-8 days pending CABG on 3/18  Entered: Ayaan Albrecht MD 03/15/2019, 10:42 AM        The patient's care was discussed with the Bedside nurse and the patient.  Evaluated the presence of Laotian speaking .    Ayaan Albrecht MD  Hospitalist Service  [facility]    ______________________________________________________________________    Interval History   Denies chest pain or dyspnea.  Renal function marginally worse.    Data reviewed today: I reviewed all medications, new labs and imaging results over the last 24 hours. I personally reviewed the EKG tracing showing nsr.    Physical Exam   Vital signs:  Temp: 98.1  F (36.7  C) Temp src: Oral BP: 138/79 Pulse: 63 Heart Rate: 63 Resp: 10 SpO2: 96 % O2 Device: None (Room air) Oxygen Delivery: 3 LPM Height: 160 cm (5' 3\") Weight: 69.7 kg (153 lb 11.2 oz)  Estimated body mass index is 27.23 kg/m  as calculated from the following:    Height as of this encounter: 1.6 m (5' 3\").    Weight as of this encounter: 69.7 kg (153 lb 11.2 oz).      Wt Readings from Last 2 Encounters:   03/15/19 69.7 kg (153 lb 11.2 oz)       Gen: AAOX3, NAD  HEENT:  no pallor  Resp: Few crackles bilaterally, normal effort of breathing  CVS: RRR, no murmur  Abd/GI: Soft, non-tender. BS- normoactive.    Skin: Warm, dry no rashes  MSK: No joint deformities, trace pedal edema  Neuro- CN- intact. No focal deficits.      Data   Recent Labs   Lab 03/15/19  0544 03/14/19  1448 03/14/19  0623 03/13/19  0532 03/12/19  1837 03/12/19  0437  03/11/19  1754   WBC 12.5*  --  12.9* 11.4*  --   --   --  14.4*   HGB 9.5*  --  9.0* 8.0*  --   --    < > 10.0*   MCV " 76*  --  76* 77*  --   --   --  79     --  311 295  --   --   --  368     --  138 141  --  144   < > 139   POTASSIUM 3.4 4.0 3.3* 4.0  --  3.8   < > 3.4   CHLORIDE 100  --  105 108  --  111*   < > 106   CO2 30  --  27 28  --  27   < > 23   BUN 21  --  18 22  --  25   < > 26   CR 1.53*  --  1.25 1.41*  --  1.55*   < > 1.52*   ANIONGAP 6  --  6 5  --  6   < > 10   MYKE 8.6  --  8.8 8.5  --  8.6   < > 8.4*   GLC 79  --  109* 141*  --  117*   < > 350*   ALBUMIN  --   --  2.5* 2.4*  --  2.5*  --  2.7*   PROTTOTAL  --   --  7.4 6.6*  --  7.0  --  8.1   BILITOTAL  --   --  0.4 0.3  --  0.3  --  0.3   ALKPHOS  --   --  101 97  --  115  --  151*   ALT  --   --  25 25  --  28  --  29   AST  --   --  31 40  --  57*  --  27   TROPI  --   --   --  39.579* 53.250* 53.862*   < > 2.102*   TROPONIN  --   --   --   --   --   --   --  0.36*    < > = values in this interval not displayed.       No results found for this or any previous visit (from the past 24 hour(s)).  Medications     HEParin 1,000 Units/hr (03/15/19 0844)     sodium chloride 20 mL/hr at 03/15/19 0844       aspirin  325 mg Oral Daily     atorvastatin  80 mg Oral QPM     furosemide  40 mg Oral Daily     insulin aspart  1-10 Units Subcutaneous TID AC     insulin aspart  1-7 Units Subcutaneous At Bedtime     insulin glargine  6 Units Subcutaneous At Bedtime     iron sucrose (VENOFER) intermittent infusion (200 mg)  200 mg Intravenous Q24H     lisinopril  5 mg Oral Daily     metoprolol succinate ER  50 mg Oral Daily     sodium chloride (PF)  3 mL Intracatheter Q8H

## 2019-03-15 NOTE — PLAN OF CARE
A&OX4, VSS, on room air, denies SOB. Tele- SR with 1 degree AVB, BBB. On  regular diet. Turns and repositions self in bed, voiding on the  urinal at bedside. Up with SBA. Heparin infusing at 10 ml/hr, NS at 20 ml/hr. Left radial site CMS intact.  and 106, lantus and novolog given.

## 2019-03-15 NOTE — PROGRESS NOTES
[facility]  Cardiology Progress Note    Date of Service (when I saw the patient): 03/15/2019       Assessment & Plan   Khang Mcelroy is a 63 year old male who was admitted on 3/11/2019 with shortness of breath.   He carries a PMH significant for hypertension, hyperlipidemia, and type 2 diabetes.     1.  : NSTEMI  -Troponin peaked at 53  - Angiogram demonstrated 3- vessel disease. Consulted by CV surgery with plans for CABG 3/18/19.   - Continue on Aspirin, lasix, lisinopril, metoprolol, and statin.  - Heparin gtt per CVS  - Blood pressure stable  - Creatinine mildly elevated 1.53  - Tele NSR with 1st degree AVB  - Hgb 9.5, plt 344  - denies chest pain      2.  : Ischemic cardiomyopathy  - Echo demonstrated EF 35-40%, elevated RVSP, mod PH, mod left pleural effusion   - BB, ACE, and diureitcs    3.  : Hypertenison  -Well controlled    4. Hyperlipidemia   - , on high intensity statin    MICHELE Carvajal CNP  Text Page  (M-F, 7:30 am - 4:00 pm)    Interval History   Visit completed with the assistance of . Denies cardiac complaints, no chest pain, shortness of breath, palpitations, presyncope, syncope, PND, orthopnea, or LE edema. Anticipated CABG monday.     Physical Exam   Temp: 98.1  F (36.7  C) Temp src: Oral BP: 138/79 Pulse: 63 Heart Rate: 63 Resp: 10 SpO2: 96 % O2 Device: None (Room air)    Vitals:    03/13/19 0325 03/14/19 0420 03/15/19 0455   Weight: 72.3 kg (159 lb 6.3 oz) 72.5 kg (159 lb 13.3 oz) 69.7 kg (153 lb 11.2 oz)     Vital Signs with Ranges  Temp:  [97.9  F (36.6  C)-98.4  F (36.9  C)] 98.1  F (36.7  C)  Pulse:  [63-93] 63  Heart Rate:  [63-92] 63  Resp:  [10-32] 10  BP: (136-165)/(66-95) 138/79  SpO2:  [92 %-99 %] 96 %  I/O last 3 completed shifts:  In: 1375.95 [P.O.:900; I.V.:475.95]  Out: 2025 [Urine:2025]    GEN:  In general, this is a well nourished male in no acute distress  HEENT:  Pupils equal, round. Sclerae nonicteric. Clear oropharynx. Mucous  membranes moist.  NECK: Supple, no masses appreciated. Trachea midline. No JVD   C/V:  Regular rate and rhythm, no murmur, rub or gallop. No S3 or RV heave.   RESP: Respirations are unlabored. No use of accessory muscles. Clear to auscultation bilaterally without wheezing, rales, or rhonchi.  GI: Abdomen soft, nontender, nondistended. No HSM appreciated.   EXTREM: No LE edema. No cyanosis or clubbing.  NEURO: Alert and oriented, cooperative. No obvious focal deficits.   PSYCH: Normal affect.  SKIN: Warm and dry. No rashes or petechiae appreciated.       Medications     HEParin 1,000 Units/hr (03/15/19 0844)     sodium chloride 20 mL/hr at 03/15/19 0844       aspirin  325 mg Oral Daily     atorvastatin  80 mg Oral QPM     furosemide  20 mg Intravenous Q12H     insulin aspart  1-10 Units Subcutaneous TID AC     insulin aspart  1-7 Units Subcutaneous At Bedtime     insulin glargine  6 Units Subcutaneous At Bedtime     iron sucrose (VENOFER) intermittent infusion (200 mg)  200 mg Intravenous Q24H     lisinopril  5 mg Oral Daily     metoprolol succinate ER  50 mg Oral Daily     sodium chloride (PF)  3 mL Intracatheter Q8H       Data   Reviewed       Mary Medeiros, MICHELE CNP 3/15/2019

## 2019-03-15 NOTE — PLAN OF CARE
Discharge Planner OT   Patient plan for discharge: N/A, pt to have CABG on Monday  Current status: Eval completed and treatment initiated. Pt lives in a house with his wife, adult daughter and 2 grand kids 14 and 6 yo grandchildren. 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 Pt admitted due SOB, found to have a NSTEMI, s/p angio, indicating need for CABG, scheduled for Monday, March, 18. Spoke with Melba, CV surgery PA/NP and would like OT/CR to see pt. Pt educated in sternal precautions and practiced during session, following sternal precautions requires MIN A for supine to sit, sit <> stand with CGA/SBA, ambulates with CGA approx 225 ft with unsteadiness but no LOB and no signs of SOB, etc and CV response stable, see vital sign flow sheet for details. Pt requires SBA for ADL's.  Barriers to return to prior living situation: unsteadiness, planned CABG scheduled for Monday.   Recommendations for discharge: Will make recs after surgery  Rationale for recommendations: cont'd OT/CR for monitored progressive exercise and risk factor education and modification.        Entered by: Millie Sanchez 03/15/2019 11:24 AM

## 2019-03-15 NOTE — PROGRESS NOTES
03/15/19 1041   Quick Adds   Type of Visit Initial Occupational Therapy Evaluation   Living Environment   Lives With spouse;child(araceli), adult;grandchild(araceli)   Living Arrangements house   Home Accessibility stairs to enter home;stairs within home   Number of Stairs, Main Entrance 3   Transportation Anticipated car, drives self   Living Environment Comment Pt lives w/ his wife in a home w/ 3 DWIGHT. Pt has stairs to the basement where laundry is located (pt usually does not need to access). Pt's daughter and 2 grandchildren (ages 5 and 14) also live w/ pt. Pt has a walk-in shower w/ 2 grab bars.    Self-Care   Equipment Currently Used at Home grab bar, toilet;grab bar, tub/shower   Activity/Exercise/Self-Care Comment walk in shower with grab bar   Functional Level   Ambulation 0-->independent   Transferring 0-->independent   Toileting 0-->independent   Bathing 0-->independent   Dressing 0-->independent   Eating 0-->independent   Communication 0-->understands/communicates without difficulty   Swallowing 0-->swallows foods/liquids without difficulty   Cognition 0 - no cognition issues reported   Fall history within last six months no   Which of the above functional risks had a recent onset or change? none   Prior Functional Level Comment Pt works 3 days/week as a  in [place].        Present yes   General Information   Onset of Illness/Injury or Date of Surgery - Date 03/11/19   Referring Physician Emmy Lindo PA-C   Patient/Family Goals Statement not stated   Additional Occupational Profile Info/Pertinent History of Current Problem Pt was admitted on 3/11/2019 with shortness of breath. Found to have NSTEMI. underwent coronary angiogram which revealed three-vessel coronary artery disease. Plan for pt to have CABG on Monday, 3/18/19   Precautions/Limitations fall precautions   Cognitive Status Examination   Orientation orientation to person, place and time   Level of Consciousness alert    Follows Commands (Cognition) WNL   Memory intact   Sensory Examination   Sensory Quick Adds No deficits were identified   Pain Assessment   Patient Currently in Pain No   Range of Motion (ROM)   ROM Comment LUE shoulder flexion impaired to ~ 100 degrees.    Bed Mobility Skill: Supine to Sit   Level of [place]: Supine/Sit minimum assist (75% patients effort)   Transfer Skill: Bed to Chair/Chair to Bed   Level of [place]: Bed to Chair contact guard   Transfer Skill: Sit to Stand   Level of [place]: Sit/Stand contact guard   Lower Body Dressing   Level of [place]: Dress Lower Body contact guard   Eating/Self Feeding   Level of [place]: Eating independent   Instrumental Activities of Daily Living (IADL)   Previous Responsibilities work;driving;finances;meal prep;housekeeping;laundry;shopping;yardwork;medication management   Activities of Daily Living Analysis   Impairments Contributing to Impaired Activities of Daily Living balance impaired  (MI precautions)   General Therapy Interventions   Planned Therapy Interventions ADL retraining;strengthening;transfer training;home program guidelines;progressive activity/exercise;risk factor education   Clinical Impression   Criteria for Skilled Therapeutic Interventions Met yes, treatment indicated   OT Diagnosis decreased ADL and functional mobility   Influenced by the following impairments impaired balance, MI precautions.   Assessment of Occupational Performance 3-5 Performance Deficits   Identified Performance Deficits impaired I with toilet and shower transfer, dressing, driving, etc   Clinical Decision Making (Complexity) Moderate complexity   Therapy Frequency daily   Predicted Duration of Therapy Intervention (days/wks) 3 days   Anticipated Discharge Disposition Home with Assist;Home with Outpatient Therapy  (pending progress following CABG surgery on Mond)   Risks and Benefits of Treatment have been explained. Yes   Patient, Family & other  "staff in agreement with plan of care Yes   [facility] TM \"6 Clicks\"   2016, Trustees of [facility], under license to [employer].  All rights reserved.   6 Clicks Short Forms Daily Activity Inpatient Short Form   [facility]  \"6 Clicks\" Daily Activity Inpatient Short Form   1. Putting on and taking off regular lower body clothing? 3 - A Little   2. Bathing (including washing, rinsing, drying)? 3 - A Little   3. Toileting, which includes using toilet, bedpan or urinal? 3 - A Little   4. Putting on and taking off regular upper body clothing? 4 - None   5. Taking care of personal grooming such as brushing teeth? 4 - None   6. Eating meals? 4 - None   Daily Activity Raw Score (Score out of 24.Lower scores equate to lower levels of function) 21   Total Evaluation Time   Total Evaluation Time (Minutes) 10     "

## 2019-03-15 NOTE — PROGRESS NOTES
12 hour RN.  Transfers with SBA.  A/Ox4.  VSS.   Tele SR with 1st deg AVB.  Denies pain.  LS dim.  Denies SOB.  on RA.  Continues on IV heparin.  Plan to have CABG on Monday.  K+ replaced today. Recheck in am.  Pt was transitioned to po Lasix today.  Pt voiding w/o difficulty.      Heart Failure Care Pathway  GOALS TO BE MET BEFORE DISCHARGE:    1. Decrease congestion and/or edema with diuretic therapy to achieve near      optimal volume status.            Dyspnea improved:  Yes            Edema improved:     Yes        Net I/O and Weights since admission:          02/13 2300 - 03/15 2259  In: 3961.66 [P.O.:1480; I.V.:2481.66]  Out: 8850 [Urine:8850]  Net: -4888.34            Vitals:    03/11/19 1750 03/11/19 2053 03/13/19 0325 03/14/19 0420   Weight: 77.1 kg (170 lb) 73 kg (160 lb 15 oz) 72.3 kg (159 lb 6.3 oz) 72.5 kg (159 lb 13.3 oz)    03/15/19 0455   Weight: 69.7 kg (153 lb 11.2 oz)       2.  O2 sats > 92% on RA or at prior home O2 therapy level.          Current oxygenation status:       SpO2: 97 %         O2 Device: None (Room air),            Able to wean O2 this shift to keep sats > 92%:  RA       Does patient use Home O2? No    3.  Tolerates ambulation and mobility near baseline:  No        How many times did the patient ambulate with nursing staff this shift? 1    Please review the Heart Failure Care Pathway for additional HF goal outcomes.    Bari Underwood RN  3/15/2019

## 2019-03-16 ENCOUNTER — APPOINTMENT (OUTPATIENT)
Dept: OCCUPATIONAL THERAPY | Facility: CLINIC | Age: 64
DRG: 233 | End: 2019-03-16
Payer: COMMERCIAL

## 2019-03-16 LAB
ANION GAP SERPL CALCULATED.3IONS-SCNC: 4 MMOL/L (ref 3–14)
ANION GAP SERPL CALCULATED.3IONS-SCNC: 7 MMOL/L (ref 3–14)
BUN SERPL-MCNC: 24 MG/DL (ref 7–30)
BUN SERPL-MCNC: 25 MG/DL (ref 7–30)
CALCIUM SERPL-MCNC: 8.4 MG/DL (ref 8.5–10.1)
CALCIUM SERPL-MCNC: 9.3 MG/DL (ref 8.5–10.1)
CHLORIDE SERPL-SCNC: 100 MMOL/L (ref 94–109)
CHLORIDE SERPL-SCNC: 101 MMOL/L (ref 94–109)
CO2 SERPL-SCNC: 28 MMOL/L (ref 20–32)
CO2 SERPL-SCNC: 30 MMOL/L (ref 20–32)
CREAT SERPL-MCNC: 1.45 MG/DL (ref 0.66–1.25)
CREAT SERPL-MCNC: 1.49 MG/DL (ref 0.66–1.25)
GFR SERPL CREATININE-BSD FRML MDRD: 50 ML/MIN/{1.73_M2}
GFR SERPL CREATININE-BSD FRML MDRD: 52 ML/MIN/{1.73_M2}
GLUCOSE BLDC GLUCOMTR-MCNC: 104 MG/DL (ref 70–99)
GLUCOSE BLDC GLUCOMTR-MCNC: 108 MG/DL (ref 70–99)
GLUCOSE BLDC GLUCOMTR-MCNC: 162 MG/DL (ref 70–99)
GLUCOSE BLDC GLUCOMTR-MCNC: 92 MG/DL (ref 70–99)
GLUCOSE SERPL-MCNC: 118 MG/DL (ref 70–99)
GLUCOSE SERPL-MCNC: 97 MG/DL (ref 70–99)
LMWH PPP CHRO-ACNC: 0.19 IU/ML
POTASSIUM SERPL-SCNC: 3.9 MMOL/L (ref 3.4–5.3)
POTASSIUM SERPL-SCNC: 4.4 MMOL/L (ref 3.4–5.3)
SODIUM SERPL-SCNC: 135 MMOL/L (ref 133–144)
SODIUM SERPL-SCNC: 135 MMOL/L (ref 133–144)

## 2019-03-16 PROCEDURE — 99232 SBSQ HOSP IP/OBS MODERATE 35: CPT | Performed by: INTERNAL MEDICINE

## 2019-03-16 PROCEDURE — 97530 THERAPEUTIC ACTIVITIES: CPT | Mod: GO | Performed by: OCCUPATIONAL THERAPIST

## 2019-03-16 PROCEDURE — 86850 RBC ANTIBODY SCREEN: CPT | Performed by: INTERNAL MEDICINE

## 2019-03-16 PROCEDURE — 00000146 ZZHCL STATISTIC GLUCOSE BY METER IP

## 2019-03-16 PROCEDURE — 80048 BASIC METABOLIC PNL TOTAL CA: CPT | Performed by: HOSPITALIST

## 2019-03-16 PROCEDURE — 25000131 ZZH RX MED GY IP 250 OP 636 PS 637: Performed by: INTERNAL MEDICINE

## 2019-03-16 PROCEDURE — 25000128 H RX IP 250 OP 636: Performed by: HOSPITALIST

## 2019-03-16 PROCEDURE — 21000001 ZZH R&B HEART CARE

## 2019-03-16 PROCEDURE — 36415 COLL VENOUS BLD VENIPUNCTURE: CPT | Performed by: HOSPITALIST

## 2019-03-16 PROCEDURE — 86923 COMPATIBILITY TEST ELECTRIC: CPT | Performed by: INTERNAL MEDICINE

## 2019-03-16 PROCEDURE — 36415 COLL VENOUS BLD VENIPUNCTURE: CPT | Performed by: INTERNAL MEDICINE

## 2019-03-16 PROCEDURE — 25000132 ZZH RX MED GY IP 250 OP 250 PS 637: Performed by: PHYSICIAN ASSISTANT

## 2019-03-16 PROCEDURE — 25000132 ZZH RX MED GY IP 250 OP 250 PS 637: Performed by: INTERNAL MEDICINE

## 2019-03-16 PROCEDURE — 85520 HEPARIN ASSAY: CPT | Performed by: HOSPITALIST

## 2019-03-16 PROCEDURE — 86901 BLOOD TYPING SEROLOGIC RH(D): CPT | Performed by: INTERNAL MEDICINE

## 2019-03-16 PROCEDURE — 97110 THERAPEUTIC EXERCISES: CPT | Mod: GO | Performed by: OCCUPATIONAL THERAPIST

## 2019-03-16 PROCEDURE — 25800030 ZZH RX IP 258 OP 636: Performed by: INTERNAL MEDICINE

## 2019-03-16 PROCEDURE — 99232 SBSQ HOSP IP/OBS MODERATE 35: CPT | Performed by: HOSPITALIST

## 2019-03-16 PROCEDURE — 86900 BLOOD TYPING SEROLOGIC ABO: CPT | Performed by: INTERNAL MEDICINE

## 2019-03-16 RX ADMIN — ATORVASTATIN CALCIUM 80 MG: 80 TABLET, FILM COATED ORAL at 19:59

## 2019-03-16 RX ADMIN — LISINOPRIL 5 MG: 5 TABLET ORAL at 08:56

## 2019-03-16 RX ADMIN — METOPROLOL SUCCINATE 50 MG: 50 TABLET, EXTENDED RELEASE ORAL at 08:56

## 2019-03-16 RX ADMIN — HEPARIN SODIUM 1000 UNITS/HR: 10000 INJECTION, SOLUTION INTRAVENOUS at 07:50

## 2019-03-16 RX ADMIN — INSULIN GLARGINE 4 UNITS: 100 INJECTION, SOLUTION SUBCUTANEOUS at 22:32

## 2019-03-16 RX ADMIN — SODIUM CHLORIDE: 9 INJECTION, SOLUTION INTRAVENOUS at 07:50

## 2019-03-16 RX ADMIN — FUROSEMIDE 40 MG: 40 TABLET ORAL at 08:56

## 2019-03-16 RX ADMIN — POTASSIUM CHLORIDE 20 MEQ: 1500 TABLET, EXTENDED RELEASE ORAL at 06:55

## 2019-03-16 RX ADMIN — ASPIRIN 325 MG: 325 TABLET, DELAYED RELEASE ORAL at 08:56

## 2019-03-16 ASSESSMENT — ACTIVITIES OF DAILY LIVING (ADL)
ADLS_ACUITY_SCORE: 10

## 2019-03-16 ASSESSMENT — MIFFLIN-ST. JEOR: SCORE: 1392.77

## 2019-03-16 NOTE — PLAN OF CARE
Discharge Planner OT   Patient plan for discharge: home  Current status: pt practicing sternal precautions, sit to supine following precautions with MIN A, sit <> stand with SBA and ambulates with SBA/CGA, noted improved balance today, walked aprpox 6.5 mins no c/o SOB, etc. BP blunted, see vital sign flow sheet for details, sit to supine with SBA/CGA and cues for tech.   Barriers to return to prior living situation: CABG surgery scheduled for 3/18  Recommendations for discharge: pending progress following surgery on Monday.   Rationale for recommendations: daily CR/OT for monitored progressive exercise and risk factor education and modification.        Entered by: Millie Sanchez 03/16/2019 10:53 AM

## 2019-03-16 NOTE — PLAN OF CARE
Pt able to make needs known. Education on medications given.  this am although pt speaks some english- communication was enhanced, questions answered.  Short visit from family. Pt indep. Cont to monitor.

## 2019-03-16 NOTE — PROGRESS NOTES
"  Cardiology Progress Note          Assessment and Plan:   63 year-old man originally from [facility]. He presented to ER for SOB on 3/11/2019. Diagnosed to have NSTEMI. Echo EF 35-40%. Angiography showed multivessel CAD. Waiting for CABG on Monday next week. No pain or SOB on current medications.    History of diabetes. Never smoked.  No lipid profile on record.    Ordered lipid profile for tomorrow.  Continue current medications.  Narendra Skelton MD  Cardiology   [phone]              Diagnosis:     Patient Active Problem List   Diagnosis     Type 2 diabetes mellitus (H)     NSTEMI (non-ST elevated myocardial infarction) (H)                Physical Exam:   Blood pressure 142/80, pulse 66, temperature 98.2  F (36.8  C), temperature source Oral, resp. rate 16, height 1.6 m (5' 3\"), weight 69.3 kg (152 lb 11.2 oz), SpO2 98 %.  Wt Readings from Last 4 Encounters:   03/16/19 69.3 kg (152 lb 11.2 oz)      I/O last 3 completed shifts:  In: 860 [P.O.:720; I.V.:140]  Out: 1175 [Urine:1175]    Constitutional: Alert, no apparent distress,    Lungs: No crackles or wheezing,    Cardiovascular: Regular rate and rhythm, normal S1 and S2, and no murmur,    Lymphm node  Neck  ENT  Neurologic  Abdomen: No enlargement  No jugular vein extension or carotid bruit  No apparent abnormality  No focal deficit  Normal bowel sounds, soft, no distension, no tender   Skin: No rashes, no cyanosis   Extremity: No edema          Medications:     Current Facility-Administered Medications:      acetaminophen (TYLENOL) tablet 325-650 mg, 325-650 mg, Oral, Q4H PRN, Remi Rodriguez MD     aspirin (ASA) EC tablet 325 mg, 325 mg, Oral, Daily, Emmy Lindo PA-C, 325 mg at 03/16/19 0856     atorvastatin (LIPITOR) tablet 80 mg, 80 mg, Oral, QPM, Bib Hernández MD, 80 mg at 03/15/19 2051     glucose gel 15-30 g, 15-30 g, Oral, Q15 Min PRN **OR** dextrose 50 % injection 25-50 mL, 25-50 mL, Intravenous, Q15 Min PRN **OR** glucagon injection 1 mg, 1 mg, " Subcutaneous, Q15 Min PRN, Misha Santos DO     diphenhydrAMINE (BENADRYL) injection 50 mg, 50 mg, Intravenous, Once PRN, Emmy Lindo PA-C     EPINEPHrine (ADRENALIN) kit 0.3 mg, 0.3 mg, Intramuscular, Q3 Min PRN, Emmy Lindo PA-C     famotidine (PEPCID) injection 20 mg, 20 mg, Intravenous, Once PRN, Emmy Lindo PA-C     furosemide (LASIX) tablet 40 mg, 40 mg, Oral, Daily, Ayaan Albrecht MD, 40 mg at 03/16/19 0856     heparin infusion 25,000 units in 0.45% NaCl 250 mL, 1,000 Units/hr, Intravenous, Continuous, Alexandria Kaye, [facility], Last Rate: 10 mL/hr at 03/16/19 0750, 1,000 Units/hr at 03/16/19 0750     HOLD:  Metformin and metformin containing medications if patient received IV contrast with acute kidney injury or severe chronic kidney disease (stage IV or stage V; i.e., eGFR less than 30), , Does not apply, HOLD, Remi Rodriguez MD     hydrALAZINE (APRESOLINE) injection 5 mg, 5 mg, Intravenous, Q4H PRN, Bib Hernández MD, 5 mg at 03/14/19 0606     hydrALAZINE (APRESOLINE) tablet 10 mg, 10 mg, Oral, TID PRN, Emmy Lindo PA-C     HYDROcodone-acetaminophen (NORCO) 5-325 MG per tablet 1-2 tablet, 1-2 tablet, Oral, Q4H PRN, Remi Rodriguez MD     insulin aspart (NovoLOG) inj (RAPID ACTING), 1-10 Units, Subcutaneous, TID AC, Ayaan Albrecht MD, 4 Units at 03/15/19 1815     insulin aspart (NovoLOG) inj (RAPID ACTING), 1-7 Units, Subcutaneous, At Bedtime, Ayaan Albrecht MD, 2 Units at 03/14/19 2124     insulin glargine (LANTUS PEN) injection 4 Units, 4 Units, Subcutaneous, At Bedtime, Ayaan Albrecht MD, 4 Units at 03/15/19 2210     lidocaine 1 % 1 mL, 1 mL, Other, Q1H PRN, Remi Rodriguez MD     lisinopril (PRINIVIL/ZESTRIL) tablet 5 mg, 5 mg, Oral, Daily, Bib Hernández MD, 5 mg at 03/16/19 0856     magnesium sulfate 2 g in water intermittent infusion, 2 g, Intravenous, Daily PRN, Ayaan Albrecht MD, 2 g at 03/14/19 1347     magnesium sulfate  4 g in 100 mL sterile water (premade), 4 g, Intravenous, Q4H PRN, Ayaan Albrecht MD     methylPREDNISolone sodium succinate (solu-MEDROL) injection 125 mg, 125 mg, Intravenous, Once PRN, Emmy Lindo PA-C     metoprolol succinate ER (TOPROL-XL) 24 hr tablet 50 mg, 50 mg, Oral, Daily, Bib Hernández MD, 50 mg at 03/16/19 0856     naloxone (NARCAN) injection 0.1-0.4 mg, 0.1-0.4 mg, Intravenous, Q2 Min PRN, Misha Santos,      ondansetron (ZOFRAN-ODT) ODT tab 4 mg, 4 mg, Oral, Q6H PRN **OR** ondansetron (ZOFRAN) injection 4 mg, 4 mg, Intravenous, Q6H PRN, Misha Santos,      potassium chloride (KLOR-CON) Packet 20-40 mEq, 20-40 mEq, Oral or Feeding Tube, Q2H PRN, Ayaan Albrecht MD, 20 mEq at 03/14/19 1047     potassium chloride 10 mEq in 100 mL intermittent infusion with 10 mg lidocaine, 10 mEq, Intravenous, Q1H PRN, Ayaan Albrecht MD     potassium chloride 10 mEq in 100 mL sterile water intermittent infusion (premix), 10 mEq, Intravenous, Q1H PRN, Ayaan Albrecht MD     potassium chloride 20 mEq in 50 mL intermittent infusion, 20 mEq, Intravenous, Q1H PRN, Ayaan Albrecht MD     potassium chloride ER (K-DUR/KLOR-CON M) CR tablet 20-40 mEq, 20-40 mEq, Oral, Q2H PRN, Ayaan Albrecht MD, 20 mEq at 03/16/19 0655     sodium chloride (PF) 0.9% PF flush 3 mL, 3 mL, Intracatheter, Q1H PRN, Remi Rodriguez MD     sodium chloride (PF) 0.9% PF flush 3 mL, 3 mL, Intracatheter, Q8H, Remi Rodriguez MD, 3 mL at 03/14/19 0607     sodium chloride 0.9% infusion, , Intravenous, Continuous, Ayaan Albrecht MD, Last Rate: 20 mL/hr at 03/16/19 0750           Lab results:        Recent Labs   Lab Test 03/16/19  0547 03/15/19  0544 03/14/19  1448 03/14/19  0623    136  --  138   POTASSIUM 3.9 3.4 4.0 3.3*   CHLORIDE 101 100  --  105   MYKE 8.4* 8.6  --  8.8   CO2 30 30  --  27   BUN 24 21  --  18   CR 1.49* 1.53*  --  1.25   GLC 97 79  --  109*     Recent Labs   Lab  Test 03/15/19  0544 03/14/19  0623 03/13/19  0532   HGB 9.5* 9.0* 8.0*   WBC 12.5* 12.9* 11.4*    311 295     No lab results found.  Recent Labs   Lab Test 03/13/19  0532 03/12/19  1837 03/12/19  0437  03/11/19  1754   TROPONIN  --   --   --   --  0.36*   TROPI 39.579* 53.250* 53.862*   < > 2.102*    < > = values in this interval not displayed.

## 2019-03-16 NOTE — PROGRESS NOTES
[facility]    Medicine Progress Note - Hospitalist Service       Date of Admission:  3/11/2019  Assessment & Plan    Khang Mcelroy is a 63 year old male admitted on 3/11/2019 with acute hypoxemic and hypercapnic respiratory failure presumed secondary to a non-ST-elevation myocardial infarction. He was found to have multivessel coronary artery disease and is scheduled for bypass surgery on 3/18.     Acute hypoxic and hypercapnic respiratory failure secondary to acute systolic congestive heart failure  Non-ST-elevation myocardial infarction   Multi-vessel coronary artery disease  Ischemic cardiomyopathy   -Patient with acute onset of shortness of breath after an episode of chest pain the day prior  -Initial EKG was concerning for STEMI, but repeat EKG did not show DWIGHT, but nonspecific t wave changes  -CXR with bilateral infiltrates concerning for pulmonary edema  -Initial troponin of 2.1, peaked at 53  -coronary angiogram revealed three-vessel coronary artery disease   -Echocardiogram revealed an EF of 35-40%, with multiple wall motion abnormalities.  -switch Lasix to p.o.  -CT surgery following, plan for CABG on 3/18  -Continue aspirin 325 mg daily  -Lipitor 80 mg daily  -Metoprolol XL 25 mg daily  -Continues on heparin drip  -Oxygenating well on room air  3/16-stable, coronary artery bypass grafting 3/18     Lactic acidosis  LA 5 on admission  resolved     Type 2 diabetes mellitus   -Hemoglobin A1c 7.9  -prior to admission on glipizide and metformin  -Hold glipizide and metformin at this time  -High intensity sliding scale  -Decrease Lantus to 4 units at bedtime.  3/16- glucometers 97 and 92 this morning- monitor today     Hypertension   Hyperlipidemia   -Prior to admission on amlodipine, lisinopril/hydrochlorothiazide, losartan  -Hold prior to admission antihypertensives   -currently on low-dose metoprolol and lisinopril  -Statin as above     Stage 3 chronic kidney disease   -baseline  creatinine is between 1.3-1.6  -at baseline, monitor closely while on IV diuretics.  3/16- creatinine 1.5     Acute on chronic anemia  -Baseline hemoglobin per care everywhere appears to be between 9-10.5  -Presenting hemoglobin was 11.2, dropped to 8.0 on 3/13 morning, back up again at 9.5 today  -No obvious bleeding  -Iron studies with mixed picture, started on iron replacement    Diet: Advance Diet as Tolerated: Regular Diet Adult  Room Service  Snacks/Supplements Pediatric: Beneprotein; Between Meals    DVT Prophylaxis: Pneumatic Compression Devices  Dejesus Catheter: not present  Code Status: Full Code      Disposition Plan   Expected discharge: 4 - 7 days, recommended to prior living arrangement once when stable postop.  Entered: Oliver Motley MD 03/16/2019, 12:37 PM       The patient's care was discussed with the Patient.    Oliver Motley MD  Hospitalist Service  [facility]    ______________________________________________________________________    Interval History   No pain or complaints     Data reviewed today: I reviewed all medications, new labs and imaging results over the last 24 hours. I personally reviewed no images or EKG's today.    Physical Exam   Vital Signs: Temp: 98.2  F (36.8  C) Temp src: Oral BP: 142/80(post ex OT.CR) Pulse: 66 Heart Rate: 68 Resp: 16 SpO2: 98 % O2 Device: None (Room air)    Weight: 152 lbs 11.2 oz  Constitutional: awake, alert, cooperative, no apparent distress, and appears stated age  Respiratory: No increased work of breathing, good air exchange, clear to auscultation bilaterally, no crackles or wheezing  Cardiovascular:  regular rate and rhythm, normal S1 and S2, no S3 or S4, and no murmur noted  GI: No scars, normal bowel sounds, soft, non-distended, non-tender  Skin: no jaundice  Neurologic: Awake, alert, oriented to name, place and time.  Cranial nerves II-XII are grossly intact.  Motor is 5 out of 5 bilaterally  Neuropsychiatric:  General: normal, calm and normal eye contact    Data   Recent Labs   Lab 03/16/19  0547 03/15/19  0544 03/14/19  1448 03/14/19  0623 03/13/19  0532 03/12/19  1837 03/12/19  0437  03/11/19  1754   WBC  --  12.5*  --  12.9* 11.4*  --   --   --  14.4*   HGB  --  9.5*  --  9.0* 8.0*  --   --    < > 10.0*   MCV  --  76*  --  76* 77*  --   --   --  79   PLT  --  344  --  311 295  --   --   --  368    136  --  138 141  --  144   < > 139   POTASSIUM 3.9 3.4 4.0 3.3* 4.0  --  3.8   < > 3.4   CHLORIDE 101 100  --  105 108  --  111*   < > 106   CO2 30 30  --  27 28  --  27   < > 23   BUN 24 21  --  18 22  --  25   < > 26   CR 1.49* 1.53*  --  1.25 1.41*  --  1.55*   < > 1.52*   ANIONGAP 4 6  --  6 5  --  6   < > 10   MYKE 8.4* 8.6  --  8.8 8.5  --  8.6   < > 8.4*   GLC 97 79  --  109* 141*  --  117*   < > 350*   ALBUMIN  --   --   --  2.5* 2.4*  --  2.5*  --  2.7*   PROTTOTAL  --   --   --  7.4 6.6*  --  7.0  --  8.1   BILITOTAL  --   --   --  0.4 0.3  --  0.3  --  0.3   ALKPHOS  --   --   --  101 97  --  115  --  151*   ALT  --   --   --  25 25  --  28  --  29   AST  --   --   --  31 40  --  57*  --  27   TROPI  --   --   --   --  39.579* 53.250* 53.862*   < > 2.102*   TROPONIN  --   --   --   --   --   --   --   --  0.36*    < > = values in this interval not displayed.     No results found for this or any previous visit (from the past 24 hour(s)).

## 2019-03-16 NOTE — PLAN OF CARE
Vitals stable through night. Rested comfortably with no complaints of pain. Up to bedside to use urinal. Heparin and KVO infusing. Awaiting CABG Monday.

## 2019-03-17 ENCOUNTER — APPOINTMENT (OUTPATIENT)
Dept: OCCUPATIONAL THERAPY | Facility: CLINIC | Age: 64
DRG: 233 | End: 2019-03-17
Payer: COMMERCIAL

## 2019-03-17 LAB
ABO + RH BLD: NORMAL
ABO + RH BLD: NORMAL
BACTERIA SPEC CULT: NO GROWTH
BACTERIA SPEC CULT: NO GROWTH
BLD GP AB SCN SERPL QL: NORMAL
BLD PROD TYP BPU: NORMAL
BLOOD BANK CMNT PATIENT-IMP: NORMAL
GLUCOSE BLDC GLUCOMTR-MCNC: 139 MG/DL (ref 70–99)
GLUCOSE BLDC GLUCOMTR-MCNC: 239 MG/DL (ref 70–99)
GLUCOSE BLDC GLUCOMTR-MCNC: 90 MG/DL (ref 70–99)
GLUCOSE BLDC GLUCOMTR-MCNC: 95 MG/DL (ref 70–99)
GLUCOSE BLDC GLUCOMTR-MCNC: 96 MG/DL (ref 70–99)
LMWH PPP CHRO-ACNC: 0.21 IU/ML
Lab: NORMAL
Lab: NORMAL
MAGNESIUM SERPL-MCNC: 2.1 MG/DL (ref 1.6–2.3)
NUM BPU REQUESTED: 4
SPECIMEN EXP DATE BLD: NORMAL
SPECIMEN SOURCE: NORMAL
SPECIMEN SOURCE: NORMAL

## 2019-03-17 PROCEDURE — 25000132 ZZH RX MED GY IP 250 OP 250 PS 637: Performed by: PHYSICIAN ASSISTANT

## 2019-03-17 PROCEDURE — 25000132 ZZH RX MED GY IP 250 OP 250 PS 637: Performed by: INTERNAL MEDICINE

## 2019-03-17 PROCEDURE — 99232 SBSQ HOSP IP/OBS MODERATE 35: CPT | Performed by: HOSPITALIST

## 2019-03-17 PROCEDURE — 25000128 H RX IP 250 OP 636

## 2019-03-17 PROCEDURE — 00000146 ZZHCL STATISTIC GLUCOSE BY METER IP

## 2019-03-17 PROCEDURE — 85520 HEPARIN ASSAY: CPT | Performed by: HOSPITALIST

## 2019-03-17 PROCEDURE — 25000131 ZZH RX MED GY IP 250 OP 636 PS 637: Performed by: HOSPITALIST

## 2019-03-17 PROCEDURE — 21000001 ZZH R&B HEART CARE

## 2019-03-17 PROCEDURE — 83735 ASSAY OF MAGNESIUM: CPT | Performed by: HOSPITALIST

## 2019-03-17 PROCEDURE — 99232 SBSQ HOSP IP/OBS MODERATE 35: CPT | Performed by: INTERNAL MEDICINE

## 2019-03-17 PROCEDURE — 25000125 ZZHC RX 250: Performed by: THORACIC SURGERY (CARDIOTHORACIC VASCULAR SURGERY)

## 2019-03-17 PROCEDURE — 97110 THERAPEUTIC EXERCISES: CPT | Mod: GO | Performed by: OCCUPATIONAL THERAPIST

## 2019-03-17 PROCEDURE — 36415 COLL VENOUS BLD VENIPUNCTURE: CPT | Performed by: HOSPITALIST

## 2019-03-17 RX ORDER — CEFAZOLIN SODIUM 2 G/100ML
2 INJECTION, SOLUTION INTRAVENOUS
Status: DISCONTINUED | OUTPATIENT
Start: 2019-03-18 | End: 2019-03-18 | Stop reason: HOSPADM

## 2019-03-17 RX ORDER — MUPIROCIN 20 MG/G
OINTMENT TOPICAL 2 TIMES DAILY
Status: DISCONTINUED | OUTPATIENT
Start: 2019-03-17 | End: 2019-03-18 | Stop reason: HOSPADM

## 2019-03-17 RX ORDER — CEFAZOLIN SODIUM 1 G/3ML
1 INJECTION, POWDER, FOR SOLUTION INTRAMUSCULAR; INTRAVENOUS SEE ADMIN INSTRUCTIONS
Status: DISCONTINUED | OUTPATIENT
Start: 2019-03-18 | End: 2019-03-18 | Stop reason: HOSPADM

## 2019-03-17 RX ORDER — METOPROLOL TARTRATE 25 MG/1
25 TABLET, FILM COATED ORAL
Status: DISCONTINUED | OUTPATIENT
Start: 2019-03-17 | End: 2019-03-17 | Stop reason: CLARIF

## 2019-03-17 RX ADMIN — ATORVASTATIN CALCIUM 80 MG: 80 TABLET, FILM COATED ORAL at 20:29

## 2019-03-17 RX ADMIN — MUPIROCIN: 20 OINTMENT TOPICAL at 22:44

## 2019-03-17 RX ADMIN — HEPARIN SODIUM 1000 UNITS/HR: 10000 INJECTION, SOLUTION INTRAVENOUS at 11:10

## 2019-03-17 RX ADMIN — FUROSEMIDE 40 MG: 40 TABLET ORAL at 09:32

## 2019-03-17 RX ADMIN — INSULIN GLARGINE 3 UNITS: 100 INJECTION, SOLUTION SUBCUTANEOUS at 21:55

## 2019-03-17 RX ADMIN — ASPIRIN 325 MG: 325 TABLET, DELAYED RELEASE ORAL at 09:32

## 2019-03-17 RX ADMIN — LISINOPRIL 5 MG: 5 TABLET ORAL at 09:32

## 2019-03-17 RX ADMIN — METOPROLOL SUCCINATE 50 MG: 50 TABLET, EXTENDED RELEASE ORAL at 09:32

## 2019-03-17 ASSESSMENT — ACTIVITIES OF DAILY LIVING (ADL)
ADLS_ACUITY_SCORE: 10

## 2019-03-17 ASSESSMENT — MIFFLIN-ST. JEOR: SCORE: 1375.99

## 2019-03-17 NOTE — PLAN OF CARE
Discharge Planner OT   Patient plan for discharge: having CABG tomorrow  Current status: sit<> stand with SBA, ambulated with SBA approx 7 mins with no reports of adverse symptoms and BP slightly hypertensive prior to walk, and blunted after walk, pt reports taking meds prior to walk. Pt educated in CAD risk factors, pt will require further ed.   Barriers to return to prior living situation: pending CABG surgery tomorrow.   Recommendations for discharge: anticipate home with OP CR at [facility] after surgery will make further recs then.   Rationale for recommendations: daily therapy after CV surgery to increase ADL and functional mobility and monitored progressive exercise.        Entered by: Millie Sanchez 03/17/2019 10:08 AM

## 2019-03-17 NOTE — PLAN OF CARE
Pt is A&O. Denies pain. Tele is SR/SB.  present for AM assessment. Fine crackles noted in lung bases. Pt using IS. Pt will have CABG tomorrow. Aware of pre-op scrub tonight. Spouse at bedside and will stay the night. Pt reports he watched educational videos. Reviewed pt handouts with pt and spouse. Heparin infusing. Pt reports he feels good about surgery tomorrow.

## 2019-03-17 NOTE — PROGRESS NOTES
CV Surgery Progress Note     Visited with patient and wife. No  present but they verbalized that they have no further questions at this time. Plan for surgery tomorrow, 3/18 at 0720.    ELIU Andrews, CCRN-CSC  Pager: [phone]

## 2019-03-17 NOTE — PROGRESS NOTES
[facility]    Medicine Progress Note - Hospitalist Service       Date of Admission:  3/11/2019  Assessment & Plan    Khang Mcelroy is a 63 year old male admitted on 3/11/2019 with acute hypoxemic and hypercapnic respiratory failure presumed secondary to a non-ST-elevation myocardial infarction. He was found to have multivessel coronary artery disease and is scheduled for bypass surgery on 3/18.     Acute hypoxic and hypercapnic respiratory failure secondary to acute systolic congestive heart failure  Non-ST-elevation myocardial infarction   Multi-vessel coronary artery disease  Ischemic cardiomyopathy   -Patient with acute onset of shortness of breath after an episode of chest pain the day prior  -Initial EKG was concerning for STEMI, but repeat EKG did not show DWIGHT, but nonspecific t wave changes  -CXR with bilateral infiltrates concerning for pulmonary edema  -Initial troponin of 2.1, peaked at 53  -coronary angiogram revealed three-vessel coronary artery disease   -Echocardiogram revealed an EF of 35-40%, with multiple wall motion abnormalities.  -switch Lasix to p.o.  -CT surgery following, plan for CABG on 3/18  -Continue aspirin 325 mg daily  -Lipitor 80 mg daily  -Metoprolol XL 25 mg daily  -Continues on heparin drip  -Oxygenating well on room air  3/16,3/17-stable, coronary artery bypass grafting 3/18     Lactic acidosis  LA 5 on admission  resolved     Type 2 diabetes mellitus   -Hemoglobin A1c 7.9  -prior to admission on glipizide and metformin  -Hold glipizide and metformin at this time  -High intensity sliding scale  -Decrease Lantus to 4 units at bedtime.  3/16- glucometers 97 and 92 this morning- monitor today  3/17-still <100 0200-1124H- decrease Lantus to 3 units     Hypertension   Hyperlipidemia   -Prior to admission on amlodipine, lisinopril/hydrochlorothiazide, losartan  -Hold prior to admission antihypertensives   -currently on low-dose metoprolol and lisinopril  -Statin as  above     Stage 3 chronic kidney disease   -baseline creatinine is between 1.3-1.6  -at baseline, monitor closely while on IV diuretics.  3/16- creatinine 1.5     Acute on chronic anemia  -Baseline hemoglobin per care everywhere appears to be between 9-10.5  -Presenting hemoglobin was 11.2, dropped to 8.0 on 3/13 morning, back up again at 9.5 today  -No obvious bleeding  -Iron studies with mixed picture, started on iron replacement    Diet: Advance Diet as Tolerated: Regular Diet Adult  Room Service  Snacks/Supplements Pediatric: Beneprotein; Between Meals    DVT Prophylaxis: Pneumatic Compression Devices  Dejesus Catheter: not present  Code Status: Full Code      Disposition Plan   Expected discharge: 4 - 7 days, recommended to prior living arrangement once when stable postop.  Entered: Oliver Motley MD 03/17/2019, 11:49 AM       The patient's care was discussed with the Patient.    Oliver Motley MD  Hospitalist Service  [facility]    ______________________________________________________________________    Interval History   No pain or complaints     Data reviewed today: I reviewed all medications, new labs and imaging results over the last 24 hours. I personally reviewed no images or EKG's today.    Physical Exam   Vital Signs: Temp: 98.2  F (36.8  C) Temp src: Oral BP: 145/80 Pulse: 57 Heart Rate: 53 Resp: 16 SpO2: 98 % O2 Device: None (Room air)    Weight: 149 lbs 0 oz  Constitutional: awake, alert, cooperative, no apparent distress, and appears stated age  Respiratory: No increased work of breathing, good air exchange, clear to auscultation bilaterally, no crackles or wheezing  Cardiovascular:  regular rate and rhythm, normal S1 and S2, no S3 or S4, and no murmur noted  GI: No scars, normal bowel sounds, soft, non-distended, non-tender  Skin: no jaundice  Neurologic: Awake, alert, oriented to name, place and time.  Cranial nerves II-XII are grossly intact.  Motor is 5 out of 5  bilaterally  Neuropsychiatric: General: normal, calm and normal eye contact    Data   Recent Labs   Lab 03/16/19  1414 03/16/19  0547 03/15/19  0544  03/14/19  0623 03/13/19  0532 03/12/19  1837 03/12/19  0437  03/11/19  1754   WBC  --   --  12.5*  --  12.9* 11.4*  --   --   --  14.4*   HGB  --   --  9.5*  --  9.0* 8.0*  --   --    < > 10.0*   MCV  --   --  76*  --  76* 77*  --   --   --  79   PLT  --   --  344  --  311 295  --   --   --  368    135 136  --  138 141  --  144   < > 139   POTASSIUM 4.4 3.9 3.4   < > 3.3* 4.0  --  3.8   < > 3.4   CHLORIDE 100 101 100  --  105 108  --  111*   < > 106   CO2 28 30 30  --  27 28  --  27   < > 23   BUN 25 24 21  --  18 22  --  25   < > 26   CR 1.45* 1.49* 1.53*  --  1.25 1.41*  --  1.55*   < > 1.52*   ANIONGAP 7 4 6  --  6 5  --  6   < > 10   MYKE 9.3 8.4* 8.6  --  8.8 8.5  --  8.6   < > 8.4*   * 97 79  --  109* 141*  --  117*   < > 350*   ALBUMIN  --   --   --   --  2.5* 2.4*  --  2.5*  --  2.7*   PROTTOTAL  --   --   --   --  7.4 6.6*  --  7.0  --  8.1   BILITOTAL  --   --   --   --  0.4 0.3  --  0.3  --  0.3   ALKPHOS  --   --   --   --  101 97  --  115  --  151*   ALT  --   --   --   --  25 25  --  28  --  29   AST  --   --   --   --  31 40  --  57*  --  27   TROPI  --   --   --   --   --  39.579* 53.250* 53.862*   < > 2.102*   TROPONIN  --   --   --   --   --   --   --   --   --  0.36*    < > = values in this interval not displayed.     No results found for this or any previous visit (from the past 24 hour(s)).

## 2019-03-17 NOTE — PLAN OF CARE
Pt is alert/oriented x4, denies pain/discomfort. VSS on RA with tele SR with 1st degree AVB. Pt up independently in room. Hep gtt running at 10 ml/hr, plan for surgery on Monday.

## 2019-03-17 NOTE — PLAN OF CARE
Pt is A&O. Denies CP. Heparin infusing. No ssi required before dinner. Up independently on unit. Will have CABG on Monday 3/18.

## 2019-03-17 NOTE — PROGRESS NOTES
"  Cardiology Progress Note          Assessment and Plan:   No complaints, vitals stable.    63 year-old man originally from [facility]. He presented to ER for SOB on 3/11/2019. Diagnosed to have NSTEMI. Echo EF 35-40%. Angiography showed multivessel CAD. Waiting for CABG on Monday next week. No pain or SOB on current medications.     History of diabetes. Never smoked.  No lipid profile on record. Ordered lipid profile.  Continue current medications.    Narendra Skelton MD  Cardiology   [phone]            Diagnosis:     Patient Active Problem List   Diagnosis     Type 2 diabetes mellitus (H)     NSTEMI (non-ST elevated myocardial infarction) (H)                Physical Exam:   Blood pressure 137/75, pulse 57, temperature 98.2  F (36.8  C), temperature source Oral, resp. rate 16, height 1.6 m (5' 3\"), weight 67.6 kg (149 lb), SpO2 99 %.  Wt Readings from Last 4 Encounters:   03/17/19 67.6 kg (149 lb)      I/O last 3 completed shifts:  In: -   Out: 2450 [Urine:2450]    Constitutional: Alert, no apparent distress,    Lungs: No crackles or wheezing,    Cardiovascular: Regular rate and rhythm, normal S1 and S2, and no murmur,    Lymphm node  Neck  ENT  Neurologic  Abdomen: No enlargement  No jugular vein extension or carotid bruit  No apparent abnormality  No focal deficit  Normal bowel sounds, soft, no distension, no tender   Skin: No rashes, no cyanosis   Extremity: No edema          Medications:     Current Facility-Administered Medications:      acetaminophen (TYLENOL) tablet 325-650 mg, 325-650 mg, Oral, Q4H PRN, Remi Rodriguez MD     aspirin (ASA) EC tablet 325 mg, 325 mg, Oral, Daily, Emmy Lindo PA-C, 325 mg at 03/16/19 0856     atorvastatin (LIPITOR) tablet 80 mg, 80 mg, Oral, QPM, Bib Hernández MD, 80 mg at 03/16/19 1959     glucose gel 15-30 g, 15-30 g, Oral, Q15 Min PRN **OR** dextrose 50 % injection 25-50 mL, 25-50 mL, Intravenous, Q15 Min PRN **OR** glucagon injection 1 mg, 1 mg, Subcutaneous, Q15 " Min PRN, Misha Santos,      furosemide (LASIX) tablet 40 mg, 40 mg, Oral, Daily, Ayaan Albrecht MD, 40 mg at 03/16/19 0856     heparin infusion 25,000 units in 0.45% NaCl 250 mL, 1,000 Units/hr, Intravenous, Continuous, Alexandria Kaye [facility], Last Rate: 10 mL/hr at 03/16/19 1959, 1,000 Units/hr at 03/16/19 1959     HOLD:  Metformin and metformin containing medications if patient received IV contrast with acute kidney injury or severe chronic kidney disease (stage IV or stage V; i.e., eGFR less than 30), , Does not apply, HOLD, Remi Rodriguez MD     hydrALAZINE (APRESOLINE) injection 5 mg, 5 mg, Intravenous, Q4H PRN, Bib Hernández MD, 5 mg at 03/14/19 0606     hydrALAZINE (APRESOLINE) tablet 10 mg, 10 mg, Oral, TID PRN, Emmy Lindo PA-C     HYDROcodone-acetaminophen (NORCO) 5-325 MG per tablet 1-2 tablet, 1-2 tablet, Oral, Q4H PRN, Remi Rodriguez MD     insulin aspart (NovoLOG) inj (RAPID ACTING), 1-10 Units, Subcutaneous, TID AC, Ayaan Albrecht MD, 4 Units at 03/15/19 1815     insulin aspart (NovoLOG) inj (RAPID ACTING), 1-7 Units, Subcutaneous, At Bedtime, Ayaan Albrecht MD, 2 Units at 03/14/19 2124     insulin glargine (LANTUS PEN) injection 4 Units, 4 Units, Subcutaneous, At Bedtime, Ayaan Albrecht MD, 4 Units at 03/16/19 2232     lidocaine 1 % 1 mL, 1 mL, Other, Q1H PRN, Remi Rodriguez MD     lisinopril (PRINIVIL/ZESTRIL) tablet 5 mg, 5 mg, Oral, Daily, Bib Hernández MD, 5 mg at 03/16/19 0856     magnesium sulfate 2 g in water intermittent infusion, 2 g, Intravenous, Daily PRN, Ayaan Albrecht MD, 2 g at 03/14/19 1347     magnesium sulfate 4 g in 100 mL sterile water (premade), 4 g, Intravenous, Q4H PRN, Ayaan Albrecht MD     metoprolol succinate ER (TOPROL-XL) 24 hr tablet 50 mg, 50 mg, Oral, Daily, Bib Hernández MD, 50 mg at 03/16/19 0856     naloxone (NARCAN) injection 0.1-0.4 mg, 0.1-0.4 mg, Intravenous, Q2 Min PRN, Misha Santos  Sharad,      ondansetron (ZOFRAN-ODT) ODT tab 4 mg, 4 mg, Oral, Q6H PRN **OR** ondansetron (ZOFRAN) injection 4 mg, 4 mg, Intravenous, Q6H PRN, Misha Santos DO     potassium chloride (KLOR-CON) Packet 20-40 mEq, 20-40 mEq, Oral or Feeding Tube, Q2H PRN, Ayaan Albrecht MD, 20 mEq at 03/14/19 1047     potassium chloride 10 mEq in 100 mL intermittent infusion with 10 mg lidocaine, 10 mEq, Intravenous, Q1H PRN, Ayaan Albrecht MD     potassium chloride 10 mEq in 100 mL sterile water intermittent infusion (premix), 10 mEq, Intravenous, Q1H PRN, Ayaan Albrecht MD     potassium chloride 20 mEq in 50 mL intermittent infusion, 20 mEq, Intravenous, Q1H PRN, Ayaan Albrecht MD     potassium chloride ER (K-DUR/KLOR-CON M) CR tablet 20-40 mEq, 20-40 mEq, Oral, Q2H PRN, Ayaan Albrecht MD, 20 mEq at 03/16/19 0655     sodium chloride (PF) 0.9% PF flush 3 mL, 3 mL, Intracatheter, Q1H PRN, Remi Rodriguez MD     sodium chloride (PF) 0.9% PF flush 3 mL, 3 mL, Intracatheter, Q8H, Remi Rodriguez MD, 3 mL at 03/16/19 1959     sodium chloride 0.9% infusion, , Intravenous, Continuous, Ayaan Albrecht MD, Stopped at 03/17/19 0545           Lab results:        Recent Labs   Lab Test 03/16/19  1414 03/16/19  0547 03/15/19  0544    135 136   POTASSIUM 4.4 3.9 3.4   CHLORIDE 100 101 100   MYKE 9.3 8.4* 8.6   CO2 28 30 30   BUN 25 24 21   CR 1.45* 1.49* 1.53*   * 97 79     Recent Labs   Lab Test 03/15/19  0544 03/14/19  0623 03/13/19  0532   HGB 9.5* 9.0* 8.0*   WBC 12.5* 12.9* 11.4*    311 295     No lab results found.  Recent Labs   Lab Test 03/13/19  0532 03/12/19  1837 03/12/19  0437  03/11/19  1754   TROPONIN  --   --   --   --  0.36*   TROPI 39.579* 53.250* 53.862*   < > 2.102*    < > = values in this interval not displayed.

## 2019-03-18 ENCOUNTER — APPOINTMENT (OUTPATIENT)
Dept: GENERAL RADIOLOGY | Facility: CLINIC | Age: 64
DRG: 233 | End: 2019-03-18
Attending: STUDENT IN AN ORGANIZED HEALTH CARE EDUCATION/TRAINING PROGRAM
Payer: COMMERCIAL

## 2019-03-18 ENCOUNTER — ANESTHESIA EVENT (OUTPATIENT)
Dept: SURGERY | Facility: CLINIC | Age: 64
DRG: 233 | End: 2019-03-18
Payer: COMMERCIAL

## 2019-03-18 ENCOUNTER — ANESTHESIA (OUTPATIENT)
Dept: SURGERY | Facility: CLINIC | Age: 64
DRG: 233 | End: 2019-03-18
Payer: COMMERCIAL

## 2019-03-18 PROBLEM — I25.10 CAD IN NATIVE ARTERY: Status: ACTIVE | Noted: 2019-03-18

## 2019-03-18 LAB
ALBUMIN SERPL-MCNC: 2.3 G/DL (ref 3.4–5)
ALP SERPL-CCNC: 80 U/L (ref 40–150)
ALT SERPL W P-5'-P-CCNC: 35 U/L (ref 0–70)
ANION GAP SERPL CALCULATED.3IONS-SCNC: 12 MMOL/L (ref 3–14)
ANION GAP SERPL CALCULATED.3IONS-SCNC: 9 MMOL/L (ref 3–14)
APTT PPP: 45 SEC (ref 22–37)
APTT PPP: 49 SEC (ref 22–37)
AST SERPL W P-5'-P-CCNC: 47 U/L (ref 0–45)
BASE DEFICIT BLDA-SCNC: 2.2 MMOL/L
BASE DEFICIT BLDA-SCNC: 3.6 MMOL/L
BASE DEFICIT BLDV-SCNC: 2.8 MMOL/L
BILIRUB SERPL-MCNC: 0.5 MG/DL (ref 0.2–1.3)
BLD PROD TYP BPU: NORMAL
BLD PROD TYP BPU: NORMAL
BLD UNIT ID BPU: 0
BLD UNIT ID BPU: 0
BLOOD PRODUCT CODE: NORMAL
BLOOD PRODUCT CODE: NORMAL
BPU ID: NORMAL
BPU ID: NORMAL
BUN SERPL-MCNC: 21 MG/DL (ref 7–30)
BUN SERPL-MCNC: 21 MG/DL (ref 7–30)
CA-I BLD-MCNC: 4.5 MG/DL (ref 4.4–5.2)
CA-I BLD-SCNC: 4.1 MG/DL (ref 4.4–5.2)
CA-I BLD-SCNC: 4.2 MG/DL (ref 4.4–5.2)
CA-I BLD-SCNC: 4.7 MG/DL (ref 4.4–5.2)
CA-I BLD-SCNC: 5 MG/DL (ref 4.4–5.2)
CALCIUM SERPL-MCNC: 8.2 MG/DL (ref 8.5–10.1)
CALCIUM SERPL-MCNC: 8.7 MG/DL (ref 8.5–10.1)
CHLORIDE SERPL-SCNC: 108 MMOL/L (ref 94–109)
CHLORIDE SERPL-SCNC: 108 MMOL/L (ref 94–109)
CO2 BLD-SCNC: 25 MMOL/L (ref 21–28)
CO2 BLD-SCNC: 26 MMOL/L (ref 21–28)
CO2 BLD-SCNC: 28 MMOL/L (ref 21–28)
CO2 BLD-SCNC: 31 MMOL/L (ref 21–28)
CO2 BLDCOV-SCNC: 26 MMOL/L (ref 21–28)
CO2 SERPL-SCNC: 20 MMOL/L (ref 20–32)
CO2 SERPL-SCNC: 22 MMOL/L (ref 20–32)
CPB APPLIED: ABNORMAL
CREAT SERPL-MCNC: 1.31 MG/DL (ref 0.66–1.25)
CREAT SERPL-MCNC: 1.32 MG/DL (ref 0.66–1.25)
ERYTHROCYTE [DISTWIDTH] IN BLOOD BY AUTOMATED COUNT: 14.4 % (ref 10–15)
ERYTHROCYTE [DISTWIDTH] IN BLOOD BY AUTOMATED COUNT: 14.6 % (ref 10–15)
ERYTHROCYTE [DISTWIDTH] IN BLOOD BY AUTOMATED COUNT: 14.7 % (ref 10–15)
FIBRINOGEN PPP-MCNC: 503 MG/DL (ref 200–420)
GFR SERPL CREATININE-BSD FRML MDRD: 58 ML/MIN/{1.73_M2}
GFR SERPL CREATININE-BSD FRML MDRD: 58 ML/MIN/{1.73_M2}
GLUCOSE BLDC GLUCOMTR-MCNC: 101 MG/DL (ref 70–99)
GLUCOSE BLDC GLUCOMTR-MCNC: 119 MG/DL (ref 70–99)
GLUCOSE BLDC GLUCOMTR-MCNC: 129 MG/DL (ref 70–99)
GLUCOSE BLDC GLUCOMTR-MCNC: 135 MG/DL (ref 70–99)
GLUCOSE BLDC GLUCOMTR-MCNC: 143 MG/DL (ref 70–99)
GLUCOSE BLDC GLUCOMTR-MCNC: 143 MG/DL (ref 70–99)
GLUCOSE BLDC GLUCOMTR-MCNC: 146 MG/DL (ref 70–99)
GLUCOSE BLDC GLUCOMTR-MCNC: 157 MG/DL (ref 70–99)
GLUCOSE BLDC GLUCOMTR-MCNC: 169 MG/DL (ref 70–99)
GLUCOSE BLDC GLUCOMTR-MCNC: 173 MG/DL (ref 70–99)
GLUCOSE BLDC GLUCOMTR-MCNC: 174 MG/DL (ref 70–99)
GLUCOSE BLDC GLUCOMTR-MCNC: 180 MG/DL (ref 70–99)
GLUCOSE BLDC GLUCOMTR-MCNC: 181 MG/DL (ref 70–99)
GLUCOSE BLDC GLUCOMTR-MCNC: 92 MG/DL (ref 70–99)
GLUCOSE SERPL-MCNC: 174 MG/DL (ref 70–99)
GLUCOSE SERPL-MCNC: 175 MG/DL (ref 70–99)
HCO3 BLD-SCNC: 21 MMOL/L (ref 21–28)
HCO3 BLD-SCNC: 23 MMOL/L (ref 21–28)
HCO3 BLDV-SCNC: 23 MMOL/L (ref 21–28)
HCT VFR BLD AUTO: 20.5 % (ref 40–53)
HCT VFR BLD AUTO: 26.3 % (ref 40–53)
HCT VFR BLD AUTO: 29.8 % (ref 40–53)
HCT VFR BLD CALC: 21 %PCV (ref 40–53)
HCT VFR BLD CALC: 22 %PCV (ref 40–53)
HCT VFR BLD CALC: 22 %PCV (ref 40–53)
HCT VFR BLD CALC: 23 %PCV (ref 40–53)
HCT VFR BLD CALC: 24 %PCV (ref 40–53)
HCT VFR BLD CALC: 24 %PCV (ref 40–53)
HCT VFR BLD CALC: 26 %PCV (ref 40–53)
HGB BLD CALC-MCNC: 7.1 G/DL (ref 13.3–17.7)
HGB BLD CALC-MCNC: 7.5 G/DL (ref 13.3–17.7)
HGB BLD CALC-MCNC: 7.5 G/DL (ref 13.3–17.7)
HGB BLD CALC-MCNC: 7.8 G/DL (ref 13.3–17.7)
HGB BLD CALC-MCNC: 8.2 G/DL (ref 13.3–17.7)
HGB BLD CALC-MCNC: 8.2 G/DL (ref 13.3–17.7)
HGB BLD CALC-MCNC: 8.8 G/DL (ref 13.3–17.7)
HGB BLD-MCNC: 6.8 G/DL (ref 13.3–17.7)
HGB BLD-MCNC: 8.8 G/DL (ref 13.3–17.7)
HGB BLD-MCNC: 9.8 G/DL (ref 13.3–17.7)
INR PPP: 1.54 (ref 0.86–1.14)
INR PPP: 1.79 (ref 0.86–1.14)
LACTATE BLD-SCNC: 1.8 MMOL/L (ref 0.7–2.1)
LACTATE BLD-SCNC: 2.3 MMOL/L (ref 0.7–2)
LMWH PPP CHRO-ACNC: 0.18 IU/ML
MAGNESIUM SERPL-MCNC: 2.8 MG/DL (ref 1.6–2.3)
MAGNESIUM SERPL-MCNC: 3.1 MG/DL (ref 1.6–2.3)
MCH RBC QN AUTO: 25.1 PG (ref 26.5–33)
MCH RBC QN AUTO: 25.7 PG (ref 26.5–33)
MCH RBC QN AUTO: 25.7 PG (ref 26.5–33)
MCHC RBC AUTO-ENTMCNC: 32.9 G/DL (ref 31.5–36.5)
MCHC RBC AUTO-ENTMCNC: 33.2 G/DL (ref 31.5–36.5)
MCHC RBC AUTO-ENTMCNC: 33.5 G/DL (ref 31.5–36.5)
MCV RBC AUTO: 76 FL (ref 78–100)
MCV RBC AUTO: 77 FL (ref 78–100)
MCV RBC AUTO: 77 FL (ref 78–100)
OXYHGB MFR BLD: 97 % (ref 92–100)
OXYHGB MFR BLD: 99 % (ref 92–100)
OXYHGB MFR BLDV: 70 %
PCO2 BLD: 38 MM HG (ref 35–45)
PCO2 BLD: 38 MM HG (ref 35–45)
PCO2 BLD: 44 MM HG (ref 35–45)
PCO2 BLD: 47 MM HG (ref 35–45)
PCO2 BLD: 47 MM HG (ref 35–45)
PCO2 BLD: 49 MM HG (ref 35–45)
PCO2 BLD: 49 MM HG (ref 35–45)
PCO2 BLD: 53 MM HG (ref 35–45)
PCO2 BLDV: 43 MM HG (ref 40–50)
PCO2 BLDV: 44 MM HG (ref 40–50)
PH BLD: 7.34 PH (ref 7.35–7.45)
PH BLD: 7.34 PH (ref 7.35–7.45)
PH BLD: 7.35 PH (ref 7.35–7.45)
PH BLD: 7.36 PH (ref 7.35–7.45)
PH BLD: 7.38 PH (ref 7.35–7.45)
PH BLD: 7.46 PH (ref 7.35–7.45)
PH BLDV: 7.33 PH (ref 7.32–7.43)
PH BLDV: 7.39 PH (ref 7.32–7.43)
PHOSPHATE SERPL-MCNC: 3.7 MG/DL (ref 2.5–4.5)
PLATELET # BLD AUTO: 208 10E9/L (ref 150–450)
PLATELET # BLD AUTO: 226 10E9/L (ref 150–450)
PLATELET # BLD AUTO: 405 10E9/L (ref 150–450)
PO2 BLD: 235 MM HG (ref 80–105)
PO2 BLD: 313 MM HG (ref 80–105)
PO2 BLD: 387 MM HG (ref 80–105)
PO2 BLD: 426 MM HG (ref 80–105)
PO2 BLD: 433 MM HG (ref 80–105)
PO2 BLD: 492 MM HG (ref 80–105)
PO2 BLD: 508 MM HG (ref 80–105)
PO2 BLD: 99 MM HG (ref 80–105)
PO2 BLDV: 42 MM HG (ref 25–47)
PO2 BLDV: 44 MM HG (ref 25–47)
POTASSIUM BLD-SCNC: 3.7 MMOL/L (ref 3.4–5.3)
POTASSIUM BLD-SCNC: 3.8 MMOL/L (ref 3.4–5.3)
POTASSIUM BLD-SCNC: 4.5 MMOL/L (ref 3.4–5.3)
POTASSIUM BLD-SCNC: 4.6 MMOL/L (ref 3.4–5.3)
POTASSIUM BLD-SCNC: 4.8 MMOL/L (ref 3.4–5.3)
POTASSIUM BLD-SCNC: 5.2 MMOL/L (ref 3.4–5.3)
POTASSIUM BLD-SCNC: 5.5 MMOL/L (ref 3.4–5.3)
POTASSIUM SERPL-SCNC: 4.9 MMOL/L (ref 3.4–5.3)
POTASSIUM SERPL-SCNC: 5.2 MMOL/L (ref 3.4–5.3)
PROT SERPL-MCNC: 5.7 G/DL (ref 6.8–8.8)
RBC # BLD AUTO: 2.65 10E12/L (ref 4.4–5.9)
RBC # BLD AUTO: 3.42 10E12/L (ref 4.4–5.9)
RBC # BLD AUTO: 3.9 10E12/L (ref 4.4–5.9)
SAO2 % BLDA FROM PO2: 100 % (ref 92–100)
SAO2 % BLDV FROM PO2: 77 %
SODIUM BLD-SCNC: 136 MMOL/L (ref 133–144)
SODIUM BLD-SCNC: 138 MMOL/L (ref 133–144)
SODIUM BLD-SCNC: 139 MMOL/L (ref 133–144)
SODIUM BLD-SCNC: 139 MMOL/L (ref 133–144)
SODIUM SERPL-SCNC: 139 MMOL/L (ref 133–144)
SODIUM SERPL-SCNC: 140 MMOL/L (ref 133–144)
TRANSFUSION STATUS PATIENT QL: NORMAL
WBC # BLD AUTO: 11.9 10E9/L (ref 4–11)
WBC # BLD AUTO: 23 10E9/L (ref 4–11)
WBC # BLD AUTO: 28 10E9/L (ref 4–11)

## 2019-03-18 PROCEDURE — 36000073 ZZH SURGERY LEVEL 6 1ST 30 MIN: Performed by: THORACIC SURGERY (CARDIOTHORACIC VASCULAR SURGERY)

## 2019-03-18 PROCEDURE — 25000565 ZZH ISOFLURANE, EA 15 MIN: Performed by: THORACIC SURGERY (CARDIOTHORACIC VASCULAR SURGERY)

## 2019-03-18 PROCEDURE — P9041 ALBUMIN (HUMAN),5%, 50ML: HCPCS | Performed by: THORACIC SURGERY (CARDIOTHORACIC VASCULAR SURGERY)

## 2019-03-18 PROCEDURE — 06BQ4ZZ EXCISION OF LEFT SAPHENOUS VEIN, PERCUTANEOUS ENDOSCOPIC APPROACH: ICD-10-PCS | Performed by: THORACIC SURGERY (CARDIOTHORACIC VASCULAR SURGERY)

## 2019-03-18 PROCEDURE — 25800030 ZZH RX IP 258 OP 636: Performed by: ANESTHESIOLOGY

## 2019-03-18 PROCEDURE — 25000132 ZZH RX MED GY IP 250 OP 250 PS 637: Performed by: INTERNAL MEDICINE

## 2019-03-18 PROCEDURE — 80048 BASIC METABOLIC PNL TOTAL CA: CPT | Performed by: HOSPITALIST

## 2019-03-18 PROCEDURE — 85027 COMPLETE CBC AUTOMATED: CPT | Performed by: HOSPITALIST

## 2019-03-18 PROCEDURE — 25000301 ZZH OR RX SURGIFLO W/THROMBIN KIT 2ML 1991 OPNP: Performed by: THORACIC SURGERY (CARDIOTHORACIC VASCULAR SURGERY)

## 2019-03-18 PROCEDURE — 85347 COAGULATION TIME ACTIVATED: CPT

## 2019-03-18 PROCEDURE — 5A1221Z PERFORMANCE OF CARDIAC OUTPUT, CONTINUOUS: ICD-10-PCS | Performed by: THORACIC SURGERY (CARDIOTHORACIC VASCULAR SURGERY)

## 2019-03-18 PROCEDURE — 82805 BLOOD GASES W/O2 SATURATION: CPT | Performed by: STUDENT IN AN ORGANIZED HEALTH CARE EDUCATION/TRAINING PROGRAM

## 2019-03-18 PROCEDURE — 25800030 ZZH RX IP 258 OP 636: Performed by: THORACIC SURGERY (CARDIOTHORACIC VASCULAR SURGERY)

## 2019-03-18 PROCEDURE — 37000009 ZZH ANESTHESIA TECHNICAL FEE, EACH ADDTL 15 MIN: Performed by: THORACIC SURGERY (CARDIOTHORACIC VASCULAR SURGERY)

## 2019-03-18 PROCEDURE — 83605 ASSAY OF LACTIC ACID: CPT | Performed by: THORACIC SURGERY (CARDIOTHORACIC VASCULAR SURGERY)

## 2019-03-18 PROCEDURE — 85520 HEPARIN ASSAY: CPT | Performed by: HOSPITALIST

## 2019-03-18 PROCEDURE — 84132 ASSAY OF SERUM POTASSIUM: CPT

## 2019-03-18 PROCEDURE — 82330 ASSAY OF CALCIUM: CPT

## 2019-03-18 PROCEDURE — 40000986 XR CHEST PORT 1 VW

## 2019-03-18 PROCEDURE — 83735 ASSAY OF MAGNESIUM: CPT | Performed by: HOSPITALIST

## 2019-03-18 PROCEDURE — 25000125 ZZHC RX 250: Performed by: HOSPITALIST

## 2019-03-18 PROCEDURE — 25000128 H RX IP 250 OP 636: Performed by: NURSE ANESTHETIST, CERTIFIED REGISTERED

## 2019-03-18 PROCEDURE — 40000171 ZZH STATISTIC PRE-PROCEDURE ASSESSMENT III: Performed by: THORACIC SURGERY (CARDIOTHORACIC VASCULAR SURGERY)

## 2019-03-18 PROCEDURE — 40000275 ZZH STATISTIC RCP TIME EA 10 MIN

## 2019-03-18 PROCEDURE — 25000125 ZZHC RX 250: Performed by: INTERNAL MEDICINE

## 2019-03-18 PROCEDURE — 85610 PROTHROMBIN TIME: CPT | Performed by: STUDENT IN AN ORGANIZED HEALTH CARE EDUCATION/TRAINING PROGRAM

## 2019-03-18 PROCEDURE — 021209W BYPASS CORONARY ARTERY, THREE ARTERIES FROM AORTA WITH AUTOLOGOUS VENOUS TISSUE, OPEN APPROACH: ICD-10-PCS | Performed by: THORACIC SURGERY (CARDIOTHORACIC VASCULAR SURGERY)

## 2019-03-18 PROCEDURE — 25000132 ZZH RX MED GY IP 250 OP 250 PS 637: Performed by: THORACIC SURGERY (CARDIOTHORACIC VASCULAR SURGERY)

## 2019-03-18 PROCEDURE — 20000003 ZZH R&B ICU

## 2019-03-18 PROCEDURE — 00000146 ZZHCL STATISTIC GLUCOSE BY METER IP

## 2019-03-18 PROCEDURE — 85730 THROMBOPLASTIN TIME PARTIAL: CPT | Performed by: STUDENT IN AN ORGANIZED HEALTH CARE EDUCATION/TRAINING PROGRAM

## 2019-03-18 PROCEDURE — 25000132 ZZH RX MED GY IP 250 OP 250 PS 637: Performed by: STUDENT IN AN ORGANIZED HEALTH CARE EDUCATION/TRAINING PROGRAM

## 2019-03-18 PROCEDURE — 25000125 ZZHC RX 250: Performed by: NURSE ANESTHETIST, CERTIFIED REGISTERED

## 2019-03-18 PROCEDURE — 99291 CRITICAL CARE FIRST HOUR: CPT | Performed by: NURSE PRACTITIONER

## 2019-03-18 PROCEDURE — 85730 THROMBOPLASTIN TIME PARTIAL: CPT | Performed by: HOSPITALIST

## 2019-03-18 PROCEDURE — 25000128 H RX IP 250 OP 636: Performed by: THORACIC SURGERY (CARDIOTHORACIC VASCULAR SURGERY)

## 2019-03-18 PROCEDURE — 27210794 ZZH OR GENERAL SUPPLY STERILE: Performed by: THORACIC SURGERY (CARDIOTHORACIC VASCULAR SURGERY)

## 2019-03-18 PROCEDURE — 93010 ELECTROCARDIOGRAM REPORT: CPT | Performed by: INTERNAL MEDICINE

## 2019-03-18 PROCEDURE — 25800030 ZZH RX IP 258 OP 636: Performed by: NURSE ANESTHETIST, CERTIFIED REGISTERED

## 2019-03-18 PROCEDURE — 36415 COLL VENOUS BLD VENIPUNCTURE: CPT | Performed by: HOSPITALIST

## 2019-03-18 PROCEDURE — 25800030 ZZH RX IP 258 OP 636: Performed by: HOSPITALIST

## 2019-03-18 PROCEDURE — 82330 ASSAY OF CALCIUM: CPT | Performed by: THORACIC SURGERY (CARDIOTHORACIC VASCULAR SURGERY)

## 2019-03-18 PROCEDURE — 83735 ASSAY OF MAGNESIUM: CPT | Performed by: STUDENT IN AN ORGANIZED HEALTH CARE EDUCATION/TRAINING PROGRAM

## 2019-03-18 PROCEDURE — 36000075 ZZH SURGERY LEVEL 6 EA 15 ADDTL MIN: Performed by: THORACIC SURGERY (CARDIOTHORACIC VASCULAR SURGERY)

## 2019-03-18 PROCEDURE — 85384 FIBRINOGEN ACTIVITY: CPT | Performed by: HOSPITALIST

## 2019-03-18 PROCEDURE — C1758 CATHETER, URETERAL: HCPCS | Performed by: THORACIC SURGERY (CARDIOTHORACIC VASCULAR SURGERY)

## 2019-03-18 PROCEDURE — 25000125 ZZHC RX 250: Performed by: STUDENT IN AN ORGANIZED HEALTH CARE EDUCATION/TRAINING PROGRAM

## 2019-03-18 PROCEDURE — P9041 ALBUMIN (HUMAN),5%, 50ML: HCPCS | Performed by: STUDENT IN AN ORGANIZED HEALTH CARE EDUCATION/TRAINING PROGRAM

## 2019-03-18 PROCEDURE — 41000022 ZZH PER-PERFUSION, SH ONLY,  1ST 30 MIN: Performed by: THORACIC SURGERY (CARDIOTHORACIC VASCULAR SURGERY)

## 2019-03-18 PROCEDURE — P9016 RBC LEUKOCYTES REDUCED: HCPCS | Performed by: INTERNAL MEDICINE

## 2019-03-18 PROCEDURE — 41000023 ZZH PERA-PERFUSION, SH ONLY,  EACH ADDTL 15 MIN: Performed by: THORACIC SURGERY (CARDIOTHORACIC VASCULAR SURGERY)

## 2019-03-18 PROCEDURE — 82803 BLOOD GASES ANY COMBINATION: CPT

## 2019-03-18 PROCEDURE — 83605 ASSAY OF LACTIC ACID: CPT

## 2019-03-18 PROCEDURE — C9113 INJ PANTOPRAZOLE SODIUM, VIA: HCPCS | Performed by: STUDENT IN AN ORGANIZED HEALTH CARE EDUCATION/TRAINING PROGRAM

## 2019-03-18 PROCEDURE — 25000128 H RX IP 250 OP 636: Performed by: STUDENT IN AN ORGANIZED HEALTH CARE EDUCATION/TRAINING PROGRAM

## 2019-03-18 PROCEDURE — 84295 ASSAY OF SERUM SODIUM: CPT

## 2019-03-18 PROCEDURE — 25000131 ZZH RX MED GY IP 250 OP 636 PS 637: Performed by: THORACIC SURGERY (CARDIOTHORACIC VASCULAR SURGERY)

## 2019-03-18 PROCEDURE — 25000125 ZZHC RX 250: Performed by: THORACIC SURGERY (CARDIOTHORACIC VASCULAR SURGERY)

## 2019-03-18 PROCEDURE — 93005 ELECTROCARDIOGRAM TRACING: CPT

## 2019-03-18 PROCEDURE — 99207 ZZC NO CHARGE LOS: CPT | Performed by: INTERNAL MEDICINE

## 2019-03-18 PROCEDURE — 02100Z9 BYPASS CORONARY ARTERY, ONE ARTERY FROM LEFT INTERNAL MAMMARY, OPEN APPROACH: ICD-10-PCS | Performed by: THORACIC SURGERY (CARDIOTHORACIC VASCULAR SURGERY)

## 2019-03-18 PROCEDURE — 25000128 H RX IP 250 OP 636

## 2019-03-18 PROCEDURE — 84100 ASSAY OF PHOSPHORUS: CPT | Performed by: STUDENT IN AN ORGANIZED HEALTH CARE EDUCATION/TRAINING PROGRAM

## 2019-03-18 PROCEDURE — 80053 COMPREHEN METABOLIC PANEL: CPT | Performed by: STUDENT IN AN ORGANIZED HEALTH CARE EDUCATION/TRAINING PROGRAM

## 2019-03-18 PROCEDURE — 85027 COMPLETE CBC AUTOMATED: CPT | Performed by: STUDENT IN AN ORGANIZED HEALTH CARE EDUCATION/TRAINING PROGRAM

## 2019-03-18 PROCEDURE — 25000128 H RX IP 250 OP 636: Performed by: HOSPITALIST

## 2019-03-18 PROCEDURE — 25000128 H RX IP 250 OP 636: Performed by: ANESTHESIOLOGY

## 2019-03-18 PROCEDURE — 85014 HEMATOCRIT: CPT

## 2019-03-18 PROCEDURE — P9041 ALBUMIN (HUMAN),5%, 50ML: HCPCS

## 2019-03-18 PROCEDURE — 37000008 ZZH ANESTHESIA TECHNICAL FEE, 1ST 30 MIN: Performed by: THORACIC SURGERY (CARDIOTHORACIC VASCULAR SURGERY)

## 2019-03-18 PROCEDURE — 85610 PROTHROMBIN TIME: CPT | Performed by: HOSPITALIST

## 2019-03-18 RX ORDER — SODIUM CHLORIDE, SODIUM LACTATE, POTASSIUM CHLORIDE, CALCIUM CHLORIDE 600; 310; 30; 20 MG/100ML; MG/100ML; MG/100ML; MG/100ML
INJECTION, SOLUTION INTRAVENOUS CONTINUOUS
Status: DISCONTINUED | OUTPATIENT
Start: 2019-03-18 | End: 2019-03-18

## 2019-03-18 RX ORDER — FENTANYL CITRATE 50 UG/ML
50-100 INJECTION, SOLUTION INTRAMUSCULAR; INTRAVENOUS
Status: DISCONTINUED | OUTPATIENT
Start: 2019-03-18 | End: 2019-03-19

## 2019-03-18 RX ORDER — GABAPENTIN 300 MG/1
300 CAPSULE ORAL 2 TIMES DAILY
Status: DISCONTINUED | OUTPATIENT
Start: 2019-03-18 | End: 2019-03-19

## 2019-03-18 RX ORDER — OXYCODONE HYDROCHLORIDE 5 MG/1
5-10 TABLET ORAL
Status: DISCONTINUED | OUTPATIENT
Start: 2019-03-18 | End: 2019-03-24 | Stop reason: HOSPADM

## 2019-03-18 RX ORDER — PROPOFOL 10 MG/ML
INJECTION, EMULSION INTRAVENOUS PRN
Status: DISCONTINUED | OUTPATIENT
Start: 2019-03-18 | End: 2019-03-18

## 2019-03-18 RX ORDER — DEXTROSE MONOHYDRATE, SODIUM CHLORIDE, AND POTASSIUM CHLORIDE 50; 1.49; 4.5 G/1000ML; G/1000ML; G/1000ML
INJECTION, SOLUTION INTRAVENOUS CONTINUOUS
Status: DISCONTINUED | OUTPATIENT
Start: 2019-03-18 | End: 2019-03-18

## 2019-03-18 RX ORDER — PROTAMINE SULFATE 10 MG/ML
INJECTION, SOLUTION INTRAVENOUS PRN
Status: DISCONTINUED | OUTPATIENT
Start: 2019-03-18 | End: 2019-03-18

## 2019-03-18 RX ORDER — LIDOCAINE HYDROCHLORIDE 20 MG/ML
INJECTION, SOLUTION INFILTRATION; PERINEURAL PRN
Status: DISCONTINUED | OUTPATIENT
Start: 2019-03-18 | End: 2019-03-18

## 2019-03-18 RX ORDER — POTASSIUM CL/LIDO/0.9 % NACL 10MEQ/0.1L
10 INTRAVENOUS SOLUTION, PIGGYBACK (ML) INTRAVENOUS
Status: DISCONTINUED | OUTPATIENT
Start: 2019-03-18 | End: 2019-03-24 | Stop reason: HOSPADM

## 2019-03-18 RX ORDER — POTASSIUM CHLORIDE 1500 MG/1
20-40 TABLET, EXTENDED RELEASE ORAL
Status: DISCONTINUED | OUTPATIENT
Start: 2019-03-18 | End: 2019-03-24 | Stop reason: HOSPADM

## 2019-03-18 RX ORDER — POTASSIUM CHLORIDE 29.8 MG/ML
20 INJECTION INTRAVENOUS
Status: DISCONTINUED | OUTPATIENT
Start: 2019-03-18 | End: 2019-03-24 | Stop reason: HOSPADM

## 2019-03-18 RX ORDER — MEPERIDINE HYDROCHLORIDE 25 MG/ML
12.5-25 INJECTION INTRAMUSCULAR; INTRAVENOUS; SUBCUTANEOUS
Status: DISCONTINUED | OUTPATIENT
Start: 2019-03-18 | End: 2019-03-19

## 2019-03-18 RX ORDER — PROPOFOL 10 MG/ML
INJECTION, EMULSION INTRAVENOUS CONTINUOUS PRN
Status: DISCONTINUED | OUTPATIENT
Start: 2019-03-18 | End: 2019-03-18

## 2019-03-18 RX ORDER — ALBUMIN, HUMAN INJ 5% 5 %
SOLUTION INTRAVENOUS
Status: COMPLETED
Start: 2019-03-18 | End: 2019-03-18

## 2019-03-18 RX ORDER — CEFAZOLIN SODIUM 1 G/3ML
INJECTION, POWDER, FOR SOLUTION INTRAMUSCULAR; INTRAVENOUS PRN
Status: DISCONTINUED | OUTPATIENT
Start: 2019-03-18 | End: 2019-03-18

## 2019-03-18 RX ORDER — MUPIROCIN 20 MG/G
0.5 OINTMENT TOPICAL 2 TIMES DAILY
Status: DISCONTINUED | OUTPATIENT
Start: 2019-03-18 | End: 2019-03-19

## 2019-03-18 RX ORDER — HEPARIN SODIUM 5000 [USP'U]/.5ML
5000 INJECTION, SOLUTION INTRAVENOUS; SUBCUTANEOUS EVERY 8 HOURS
Status: DISCONTINUED | OUTPATIENT
Start: 2019-03-19 | End: 2019-03-24 | Stop reason: HOSPADM

## 2019-03-18 RX ORDER — POTASSIUM CHLORIDE 1.5 G/1.58G
20-40 POWDER, FOR SOLUTION ORAL
Status: DISCONTINUED | OUTPATIENT
Start: 2019-03-18 | End: 2019-03-24 | Stop reason: HOSPADM

## 2019-03-18 RX ORDER — METOPROLOL TARTRATE 25 MG/1
25 TABLET, FILM COATED ORAL EVERY 12 HOURS
Status: DISCONTINUED | OUTPATIENT
Start: 2019-03-19 | End: 2019-03-24 | Stop reason: HOSPADM

## 2019-03-18 RX ORDER — NITROGLYCERIN 10 MG/100ML
INJECTION INTRAVENOUS PRN
Status: DISCONTINUED | OUTPATIENT
Start: 2019-03-18 | End: 2019-03-18

## 2019-03-18 RX ORDER — ACETAMINOPHEN 325 MG/1
650 TABLET ORAL EVERY 4 HOURS PRN
Status: DISCONTINUED | OUTPATIENT
Start: 2019-03-21 | End: 2019-03-24 | Stop reason: HOSPADM

## 2019-03-18 RX ORDER — VECURONIUM BROMIDE 1 MG/ML
INJECTION, POWDER, LYOPHILIZED, FOR SOLUTION INTRAVENOUS PRN
Status: DISCONTINUED | OUTPATIENT
Start: 2019-03-18 | End: 2019-03-18

## 2019-03-18 RX ORDER — MAGNESIUM HYDROXIDE 1200 MG/15ML
LIQUID ORAL PRN
Status: DISCONTINUED | OUTPATIENT
Start: 2019-03-18 | End: 2019-03-18 | Stop reason: HOSPADM

## 2019-03-18 RX ORDER — LIDOCAINE 4 G/G
1 PATCH TOPICAL
Status: DISCONTINUED | OUTPATIENT
Start: 2019-03-19 | End: 2019-03-24 | Stop reason: HOSPADM

## 2019-03-18 RX ORDER — AMOXICILLIN 250 MG
2 CAPSULE ORAL 2 TIMES DAILY
Status: DISCONTINUED | OUTPATIENT
Start: 2019-03-18 | End: 2019-03-24 | Stop reason: HOSPADM

## 2019-03-18 RX ORDER — ALBUMIN, HUMAN INJ 5% 5 %
500 SOLUTION INTRAVENOUS ONCE
Status: COMPLETED | OUTPATIENT
Start: 2019-03-18 | End: 2019-03-18

## 2019-03-18 RX ORDER — ONDANSETRON 2 MG/ML
4 INJECTION INTRAMUSCULAR; INTRAVENOUS EVERY 6 HOURS PRN
Status: DISCONTINUED | OUTPATIENT
Start: 2019-03-18 | End: 2019-03-24 | Stop reason: HOSPADM

## 2019-03-18 RX ORDER — CEFAZOLIN SODIUM 1 G/3ML
1 INJECTION, POWDER, FOR SOLUTION INTRAMUSCULAR; INTRAVENOUS EVERY 8 HOURS
Status: COMPLETED | OUTPATIENT
Start: 2019-03-18 | End: 2019-03-19

## 2019-03-18 RX ORDER — FENTANYL CITRATE 50 UG/ML
INJECTION, SOLUTION INTRAMUSCULAR; INTRAVENOUS PRN
Status: DISCONTINUED | OUTPATIENT
Start: 2019-03-18 | End: 2019-03-18

## 2019-03-18 RX ORDER — ASPIRIN 81 MG/1
81 TABLET ORAL DAILY
Status: DISCONTINUED | OUTPATIENT
Start: 2019-03-19 | End: 2019-03-19

## 2019-03-18 RX ORDER — MAGNESIUM SULFATE HEPTAHYDRATE 40 MG/ML
4 INJECTION, SOLUTION INTRAVENOUS EVERY 4 HOURS PRN
Status: DISCONTINUED | OUTPATIENT
Start: 2019-03-18 | End: 2019-03-24 | Stop reason: HOSPADM

## 2019-03-18 RX ORDER — FENTANYL CITRATE 50 UG/ML
25-50 INJECTION, SOLUTION INTRAMUSCULAR; INTRAVENOUS
Status: COMPLETED | OUTPATIENT
Start: 2019-03-18 | End: 2019-03-18

## 2019-03-18 RX ORDER — LIDOCAINE 40 MG/G
CREAM TOPICAL
Status: DISCONTINUED | OUTPATIENT
Start: 2019-03-18 | End: 2019-03-24 | Stop reason: HOSPADM

## 2019-03-18 RX ORDER — ALBUMIN, HUMAN INJ 5% 5 %
500-1000 SOLUTION INTRAVENOUS
Status: COMPLETED | OUTPATIENT
Start: 2019-03-18 | End: 2019-03-18

## 2019-03-18 RX ORDER — POTASSIUM CHLORIDE 7.45 MG/ML
10 INJECTION INTRAVENOUS
Status: DISCONTINUED | OUTPATIENT
Start: 2019-03-18 | End: 2019-03-24 | Stop reason: HOSPADM

## 2019-03-18 RX ORDER — SODIUM CHLORIDE, SODIUM LACTATE, POTASSIUM CHLORIDE, CALCIUM CHLORIDE 600; 310; 30; 20 MG/100ML; MG/100ML; MG/100ML; MG/100ML
INJECTION, SOLUTION INTRAVENOUS CONTINUOUS PRN
Status: DISCONTINUED | OUTPATIENT
Start: 2019-03-18 | End: 2019-03-18

## 2019-03-18 RX ORDER — MAGNESIUM SULFATE HEPTAHYDRATE 40 MG/ML
2 INJECTION, SOLUTION INTRAVENOUS DAILY PRN
Status: DISCONTINUED | OUTPATIENT
Start: 2019-03-18 | End: 2019-03-24 | Stop reason: HOSPADM

## 2019-03-18 RX ORDER — SODIUM CHLORIDE, SODIUM LACTATE, POTASSIUM CHLORIDE, CALCIUM CHLORIDE 600; 310; 30; 20 MG/100ML; MG/100ML; MG/100ML; MG/100ML
INJECTION, SOLUTION INTRAVENOUS CONTINUOUS
Status: ACTIVE | OUTPATIENT
Start: 2019-03-18 | End: 2019-03-21

## 2019-03-18 RX ORDER — HEPARIN SODIUM 1000 [USP'U]/ML
INJECTION, SOLUTION INTRAVENOUS; SUBCUTANEOUS PRN
Status: DISCONTINUED | OUTPATIENT
Start: 2019-03-18 | End: 2019-03-18

## 2019-03-18 RX ORDER — SODIUM CHLORIDE 9 MG/ML
INJECTION, SOLUTION INTRAVENOUS CONTINUOUS PRN
Status: DISCONTINUED | OUTPATIENT
Start: 2019-03-18 | End: 2019-03-18

## 2019-03-18 RX ORDER — AMOXICILLIN 250 MG
1 CAPSULE ORAL 2 TIMES DAILY
Status: DISCONTINUED | OUTPATIENT
Start: 2019-03-18 | End: 2019-03-24 | Stop reason: HOSPADM

## 2019-03-18 RX ORDER — NITROGLYCERIN 20 MG/100ML
.07-2 INJECTION INTRAVENOUS CONTINUOUS
Status: DISCONTINUED | OUTPATIENT
Start: 2019-03-18 | End: 2019-03-19

## 2019-03-18 RX ORDER — EPHEDRINE SULFATE 50 MG/ML
INJECTION, SOLUTION INTRAMUSCULAR; INTRAVENOUS; SUBCUTANEOUS PRN
Status: DISCONTINUED | OUTPATIENT
Start: 2019-03-18 | End: 2019-03-18

## 2019-03-18 RX ORDER — METHOCARBAMOL 750 MG/1
750 TABLET, FILM COATED ORAL 4 TIMES DAILY PRN
Status: DISCONTINUED | OUTPATIENT
Start: 2019-03-18 | End: 2019-03-24 | Stop reason: HOSPADM

## 2019-03-18 RX ORDER — ACETAMINOPHEN 325 MG/1
975 TABLET ORAL EVERY 8 HOURS
Status: COMPLETED | OUTPATIENT
Start: 2019-03-18 | End: 2019-03-21

## 2019-03-18 RX ORDER — ONDANSETRON 4 MG/1
4 TABLET, ORALLY DISINTEGRATING ORAL EVERY 6 HOURS PRN
Status: DISCONTINUED | OUTPATIENT
Start: 2019-03-18 | End: 2019-03-24 | Stop reason: HOSPADM

## 2019-03-18 RX ORDER — PROPOFOL 10 MG/ML
5-75 INJECTION, EMULSION INTRAVENOUS CONTINUOUS
Status: DISCONTINUED | OUTPATIENT
Start: 2019-03-18 | End: 2019-03-19

## 2019-03-18 RX ORDER — HYDRALAZINE HYDROCHLORIDE 20 MG/ML
10 INJECTION INTRAMUSCULAR; INTRAVENOUS EVERY 30 MIN PRN
Status: DISCONTINUED | OUTPATIENT
Start: 2019-03-18 | End: 2019-03-24 | Stop reason: HOSPADM

## 2019-03-18 RX ORDER — NALOXONE HYDROCHLORIDE 0.4 MG/ML
.1-.4 INJECTION, SOLUTION INTRAMUSCULAR; INTRAVENOUS; SUBCUTANEOUS
Status: DISCONTINUED | OUTPATIENT
Start: 2019-03-18 | End: 2019-03-24 | Stop reason: HOSPADM

## 2019-03-18 RX ORDER — NICOTINE POLACRILEX 4 MG
15-30 LOZENGE BUCCAL
Status: DISCONTINUED | OUTPATIENT
Start: 2019-03-18 | End: 2019-03-20

## 2019-03-18 RX ORDER — DEXTROSE MONOHYDRATE 25 G/50ML
25-50 INJECTION, SOLUTION INTRAVENOUS
Status: DISCONTINUED | OUTPATIENT
Start: 2019-03-18 | End: 2019-03-20

## 2019-03-18 RX ADMIN — LIDOCAINE HYDROCHLORIDE 1 ML: 10 INJECTION, SOLUTION EPIDURAL; INFILTRATION; INTRACAUDAL; PERINEURAL at 06:34

## 2019-03-18 RX ADMIN — PHENYLEPHRINE HYDROCHLORIDE 50 MCG: 10 INJECTION, SOLUTION INTRAMUSCULAR; INTRAVENOUS; SUBCUTANEOUS at 09:30

## 2019-03-18 RX ADMIN — MIDAZOLAM HYDROCHLORIDE 4 MG: 1 INJECTION, SOLUTION INTRAMUSCULAR; INTRAVENOUS at 07:36

## 2019-03-18 RX ADMIN — FENTANYL CITRATE 100 MCG: 50 INJECTION, SOLUTION INTRAMUSCULAR; INTRAVENOUS at 10:11

## 2019-03-18 RX ADMIN — FENTANYL CITRATE 50 MCG: 50 INJECTION, SOLUTION INTRAMUSCULAR; INTRAVENOUS at 13:54

## 2019-03-18 RX ADMIN — MUPIROCIN 0.5 G: 20 OINTMENT TOPICAL at 22:29

## 2019-03-18 RX ADMIN — RANITIDINE 150 MG: 150 TABLET ORAL at 05:34

## 2019-03-18 RX ADMIN — VECURONIUM BROMIDE 3 MG: 1 INJECTION, POWDER, LYOPHILIZED, FOR SOLUTION INTRAVENOUS at 11:05

## 2019-03-18 RX ADMIN — SENNOSIDES AND DOCUSATE SODIUM 2 TABLET: 8.6; 5 TABLET ORAL at 21:52

## 2019-03-18 RX ADMIN — Medication 5 MG: at 07:46

## 2019-03-18 RX ADMIN — VECURONIUM BROMIDE 5 MG: 1 INJECTION, POWDER, LYOPHILIZED, FOR SOLUTION INTRAVENOUS at 08:30

## 2019-03-18 RX ADMIN — ATORVASTATIN CALCIUM 80 MG: 80 TABLET, FILM COATED ORAL at 21:52

## 2019-03-18 RX ADMIN — NITROGLYCERIN 20 MCG: 10 INJECTION INTRAVENOUS at 10:14

## 2019-03-18 RX ADMIN — ALBUMIN HUMAN 500 ML: 0.05 INJECTION, SOLUTION INTRAVENOUS at 18:12

## 2019-03-18 RX ADMIN — HEPARIN SODIUM 27000 UNITS: 1000 INJECTION, SOLUTION INTRAVENOUS; SUBCUTANEOUS at 10:04

## 2019-03-18 RX ADMIN — Medication 5 MG: at 08:40

## 2019-03-18 RX ADMIN — PROPOFOL 20 MG: 10 INJECTION, EMULSION INTRAVENOUS at 13:11

## 2019-03-18 RX ADMIN — PHENYLEPHRINE HYDROCHLORIDE 50 MCG: 10 INJECTION, SOLUTION INTRAMUSCULAR; INTRAVENOUS; SUBCUTANEOUS at 13:47

## 2019-03-18 RX ADMIN — PHENYLEPHRINE HYDROCHLORIDE 100 MCG: 10 INJECTION, SOLUTION INTRAMUSCULAR; INTRAVENOUS; SUBCUTANEOUS at 10:33

## 2019-03-18 RX ADMIN — Medication 5 MG: at 07:44

## 2019-03-18 RX ADMIN — Medication 5 MG: at 09:45

## 2019-03-18 RX ADMIN — EPINEPHRINE 0.03 MCG/KG/MIN: 1 INJECTION INTRAMUSCULAR; INTRAVENOUS; SUBCUTANEOUS at 12:37

## 2019-03-18 RX ADMIN — FENTANYL CITRATE 50 MCG: 50 INJECTION, SOLUTION INTRAMUSCULAR; INTRAVENOUS at 10:14

## 2019-03-18 RX ADMIN — SODIUM CHLORIDE, POTASSIUM CHLORIDE, SODIUM LACTATE AND CALCIUM CHLORIDE: 600; 310; 30; 20 INJECTION, SOLUTION INTRAVENOUS at 06:15

## 2019-03-18 RX ADMIN — FENTANYL CITRATE 50 MCG: 50 INJECTION, SOLUTION INTRAMUSCULAR; INTRAVENOUS at 20:05

## 2019-03-18 RX ADMIN — SODIUM CHLORIDE: 9 INJECTION, SOLUTION INTRAVENOUS at 07:30

## 2019-03-18 RX ADMIN — Medication 5 MG: at 10:34

## 2019-03-18 RX ADMIN — PHENYLEPHRINE HYDROCHLORIDE 100 MCG: 10 INJECTION, SOLUTION INTRAMUSCULAR; INTRAVENOUS; SUBCUTANEOUS at 13:49

## 2019-03-18 RX ADMIN — MIDAZOLAM HYDROCHLORIDE 2 MG: 1 INJECTION, SOLUTION INTRAMUSCULAR; INTRAVENOUS at 13:10

## 2019-03-18 RX ADMIN — PHENYLEPHRINE HYDROCHLORIDE 100 MCG: 10 INJECTION, SOLUTION INTRAMUSCULAR; INTRAVENOUS; SUBCUTANEOUS at 10:31

## 2019-03-18 RX ADMIN — CEFAZOLIN 2 G: 1 INJECTION, POWDER, FOR SOLUTION INTRAMUSCULAR; INTRAVENOUS at 08:00

## 2019-03-18 RX ADMIN — NITROGLYCERIN 20 MCG: 10 INJECTION INTRAVENOUS at 10:16

## 2019-03-18 RX ADMIN — PHENYLEPHRINE HYDROCHLORIDE 100 MCG: 10 INJECTION, SOLUTION INTRAMUSCULAR; INTRAVENOUS; SUBCUTANEOUS at 12:56

## 2019-03-18 RX ADMIN — PHENYLEPHRINE HYDROCHLORIDE 0.25 MCG/KG/MIN: 10 INJECTION, SOLUTION INTRAMUSCULAR; INTRAVENOUS; SUBCUTANEOUS at 07:44

## 2019-03-18 RX ADMIN — METOPROLOL SUCCINATE 50 MG: 50 TABLET, EXTENDED RELEASE ORAL at 05:34

## 2019-03-18 RX ADMIN — PROPOFOL 50 MG: 10 INJECTION, EMULSION INTRAVENOUS at 07:36

## 2019-03-18 RX ADMIN — SODIUM CHLORIDE 1.5 UNITS/HR: 9 INJECTION, SOLUTION INTRAVENOUS at 14:21

## 2019-03-18 RX ADMIN — SODIUM CHLORIDE, POTASSIUM CHLORIDE, SODIUM LACTATE AND CALCIUM CHLORIDE: 600; 310; 30; 20 INJECTION, SOLUTION INTRAVENOUS at 15:00

## 2019-03-18 RX ADMIN — MIDAZOLAM HYDROCHLORIDE 2 MG: 1 INJECTION, SOLUTION INTRAMUSCULAR; INTRAVENOUS at 12:41

## 2019-03-18 RX ADMIN — ALBUMIN HUMAN 12.5 G: 0.05 INJECTION, SOLUTION INTRAVENOUS at 14:09

## 2019-03-18 RX ADMIN — VECURONIUM BROMIDE 5 MG: 1 INJECTION, POWDER, LYOPHILIZED, FOR SOLUTION INTRAVENOUS at 10:00

## 2019-03-18 RX ADMIN — CEFAZOLIN 1 G: 1 INJECTION, POWDER, FOR SOLUTION INTRAMUSCULAR; INTRAVENOUS at 13:53

## 2019-03-18 RX ADMIN — PROTAMINE SULFATE 25 MG: 10 INJECTION, SOLUTION INTRAVENOUS at 13:21

## 2019-03-18 RX ADMIN — PROTAMINE SULFATE 250 MG: 10 INJECTION, SOLUTION INTRAVENOUS at 12:58

## 2019-03-18 RX ADMIN — AMINOCAPROIC ACID 5 G/HR: 250 INJECTION, SOLUTION INTRAVENOUS at 07:49

## 2019-03-18 RX ADMIN — ACETAMINOPHEN 975 MG: 325 TABLET, FILM COATED ORAL at 21:51

## 2019-03-18 RX ADMIN — FENTANYL CITRATE 100 MCG: 50 INJECTION, SOLUTION INTRAMUSCULAR; INTRAVENOUS at 12:57

## 2019-03-18 RX ADMIN — CEFAZOLIN 1 G: 1 INJECTION, POWDER, FOR SOLUTION INTRAMUSCULAR; INTRAVENOUS at 21:52

## 2019-03-18 RX ADMIN — GABAPENTIN 300 MG: 300 CAPSULE ORAL at 21:52

## 2019-03-18 RX ADMIN — PROPOFOL 50 MCG/KG/MIN: 10 INJECTION, EMULSION INTRAVENOUS at 13:11

## 2019-03-18 RX ADMIN — SODIUM CHLORIDE, POTASSIUM CHLORIDE, SODIUM LACTATE AND CALCIUM CHLORIDE: 600; 310; 30; 20 INJECTION, SOLUTION INTRAVENOUS at 07:32

## 2019-03-18 RX ADMIN — FENTANYL CITRATE 100 MCG: 50 INJECTION, SOLUTION INTRAMUSCULAR; INTRAVENOUS at 13:30

## 2019-03-18 RX ADMIN — MIDAZOLAM HYDROCHLORIDE 2 MG: 1 INJECTION, SOLUTION INTRAMUSCULAR; INTRAVENOUS at 07:29

## 2019-03-18 RX ADMIN — PHENYLEPHRINE HYDROCHLORIDE 100 MCG: 10 INJECTION, SOLUTION INTRAMUSCULAR; INTRAVENOUS; SUBCUTANEOUS at 10:04

## 2019-03-18 RX ADMIN — PHENYLEPHRINE HYDROCHLORIDE 50 MCG: 10 INJECTION, SOLUTION INTRAMUSCULAR; INTRAVENOUS; SUBCUTANEOUS at 10:34

## 2019-03-18 RX ADMIN — PHENYLEPHRINE HYDROCHLORIDE 100 MCG: 10 INJECTION, SOLUTION INTRAMUSCULAR; INTRAVENOUS; SUBCUTANEOUS at 07:46

## 2019-03-18 RX ADMIN — CEFAZOLIN 1 G: 1 INJECTION, POWDER, FOR SOLUTION INTRAMUSCULAR; INTRAVENOUS at 10:00

## 2019-03-18 RX ADMIN — SODIUM CHLORIDE: 9 INJECTION, SOLUTION INTRAVENOUS at 10:35

## 2019-03-18 RX ADMIN — DEXMEDETOMIDINE HYDROCHLORIDE 8 MCG: 100 INJECTION, SOLUTION INTRAVENOUS at 10:16

## 2019-03-18 RX ADMIN — NITROGLYCERIN 20 MCG: 10 INJECTION INTRAVENOUS at 13:31

## 2019-03-18 RX ADMIN — SODIUM CHLORIDE, POTASSIUM CHLORIDE, SODIUM LACTATE AND CALCIUM CHLORIDE: 600; 310; 30; 20 INJECTION, SOLUTION INTRAVENOUS at 12:18

## 2019-03-18 RX ADMIN — PANTOPRAZOLE SODIUM 40 MG: 40 INJECTION, POWDER, FOR SOLUTION INTRAVENOUS at 15:17

## 2019-03-18 RX ADMIN — LIDOCAINE HYDROCHLORIDE 60 MG: 20 INJECTION, SOLUTION INFILTRATION; PERINEURAL at 07:36

## 2019-03-18 RX ADMIN — FENTANYL CITRATE 100 MCG: 50 INJECTION, SOLUTION INTRAMUSCULAR; INTRAVENOUS at 07:00

## 2019-03-18 RX ADMIN — FENTANYL CITRATE 50 MCG: 50 INJECTION, SOLUTION INTRAMUSCULAR; INTRAVENOUS at 08:53

## 2019-03-18 RX ADMIN — FENTANYL CITRATE 50 MCG: 50 INJECTION, SOLUTION INTRAMUSCULAR; INTRAVENOUS at 17:36

## 2019-03-18 RX ADMIN — ALBUMIN HUMAN 500 ML: 0.05 INJECTION, SOLUTION INTRAVENOUS at 20:06

## 2019-03-18 RX ADMIN — CEFAZOLIN 1 G: 1 INJECTION, POWDER, FOR SOLUTION INTRAMUSCULAR; INTRAVENOUS at 12:02

## 2019-03-18 RX ADMIN — FENTANYL CITRATE 50 MCG: 50 INJECTION, SOLUTION INTRAMUSCULAR; INTRAVENOUS at 08:55

## 2019-03-18 RX ADMIN — HEPARIN SODIUM 5000 UNITS: 1000 INJECTION, SOLUTION INTRAVENOUS; SUBCUTANEOUS at 10:18

## 2019-03-18 RX ADMIN — MIDAZOLAM HYDROCHLORIDE 2 MG: 1 INJECTION, SOLUTION INTRAMUSCULAR; INTRAVENOUS at 07:00

## 2019-03-18 RX ADMIN — FENTANYL CITRATE 250 MCG: 50 INJECTION, SOLUTION INTRAMUSCULAR; INTRAVENOUS at 07:36

## 2019-03-18 RX ADMIN — ROCURONIUM BROMIDE 50 MG: 10 INJECTION INTRAVENOUS at 07:36

## 2019-03-18 ASSESSMENT — ACTIVITIES OF DAILY LIVING (ADL)
ADLS_ACUITY_SCORE: 11
ADLS_ACUITY_SCORE: 10

## 2019-03-18 ASSESSMENT — LIFESTYLE VARIABLES: TOBACCO_USE: 0

## 2019-03-18 ASSESSMENT — ENCOUNTER SYMPTOMS: SEIZURES: 0

## 2019-03-18 ASSESSMENT — COPD QUESTIONNAIRES: COPD: 0

## 2019-03-18 ASSESSMENT — MIFFLIN-ST. JEOR: SCORE: 1371.45

## 2019-03-18 NOTE — OP NOTE
Date of Service: 3/18/2019    Preoperative Diagnosis:   1) Severe multivessel coronary artery disease with recent NSTEMI  2) Diabetes mellitus  3) Chronic kidney disease  4) Congestive heart failure with reduced ejection fraction  5) Hypertension  6) Hyperlipidemia    Postoperative Diagnosis:   Same    Operation: Coronary artery bypass grafting x4, LIMA-LAD, SVG-DIAG, SVG-OM, SVG-PDA; endoscopic harvest left greater saphenous vein     Surgeon: Adarsh Sanchez MD    Assistant: Joseph Lubin MD; YOLY Rodas      Indication: 63 year old male who presented with dyspnea last week. Was noted to have newly reduced EF and rising troponin, up to 50. Cor angio did not reveal an acute lesion, but did show severe multivessel coronary artery disease. He was aggressively managed medically and allowed to recover from his MI. Following discussion of risks and benefits, he has elected to proceed with surgery.     Findings: Sternum of good quality. LIMA 1.75mm and good flow. Saphenous vein mostly 3mm and thin, but adequate for conduit. Large area of epicardial infarct along the inferior wall. PDA 1.75mm and adequate target. EF pre bypass ~30-35%. OM 2mm and good target. DIAG 1.75mm and adequate target. LAD grafted just proximal to the heavy diffuse disease distally. 1.75mm at this area and good target.     Procedure: The patient was brought to the operating room and placed supine on the OR table. Appropriate monitoring lines were placed and general endotracheal anesthesia established. The patient was then sterilely prepped and draped in the standard fashion. A time out was performed to confirm patient identity, procedure to be performed, and the administration of pre operative antibiotics.    A midline sternotomy was performed. Peristernal hemostasis was achieved and the pericardium opened. The left pleural space was entered and the left internal thoracic artery was dissected off the chest wall from the level of the subclavian  vein to the xiphoid process. Simultaneously, the greater saphenous vein was harvested from the left lower extremity.    The patient was systemically heparinized and the internal thoracic artery divided. Hemostasis of the chest wall was confirmed. The pericardium was tented on sutures and the vein inspected and noted to be adequate for conduit. After confirmation of adequate ACT, the distal ascending aorta and right atrial appendage were cannulated. Additionally a retrograde cardioplegia catheter was inserted into the coronary sinus and an antegrade catheter/root vent secured in the mid ascending aorta. Cardiopulmonary bypass was established.    The aorta was cross clamped and the heart arrested with antegrade cardioplegia. Additional retrograde was given to ensure good position, and additional doses of cardioplegia were supplied every 15 minutes for the remainder of cross clamp.    The posterior descending coronary artery was identified and dissected out over approximately 1 cm and opened for approximately 7 mm. The vein was brought to the field and the distal anastomosis created in end to side fashion with running 7-0 Prolene. Cardioplegia was flushed down the graft and the anastomosis noted to be hemostatic.    The obtuse marginal coronary artery was identified and dissected out over approximately 1 cm and opened for approximately 7 mm. The vein was brought to the field and the distal anastomosis created in end to side fashion with running 7-0 Prolene. Cardioplegia was flushed down the graft and the anastomosis noted to be hemostatic.    The diagonal branch of the left anterior descending coronary artery was identified and dissected out over approximately 1 cm and opened for approximately 7 mm. The vein was brought to the field and the distal anastomosis created in end to side fashion with running 7-0 Prolene. Cardioplegia was flushed down the graft and the anastomosis noted to be hemostatic.    The left anterior  descending coronary artery was identified and dissected out over approximately 1 cm and opened for approximately 7 mm. The internal thoracic artery was brought to the field and the distal anastomosis created in end to side fashion with running 7-0 Prolene. Native flow was allowed down the graft and the anastomosis noted to be hemostatic.    Three separate punch aortotomies were created on the ascending aorta, and the proximal anastomoses created in end to side fashion with running 6-0 Prolene. The hot shot was administered in a retrograde fashion and the aortic cross clamp removed. Total period of aortic cross clamp was 117 minutes. The vein grafts were de aired and the retrograde catheter removed. The distal anastomoses were inspected and noted to be hemostatic. No pacing wires were placed as the patient entered a sinus rhythm    Ventilation was resumed and the patient weaned from cardiopulmonary bypass without difficulty. Total period of cardiopulmonary bypass was 138 minutes. Protamine was administered and the patient decannulated after the pump volume was infused.      A 32F angled chest tube was placed in the left pleural space, as well as a 32F mediastinal chest tube, both of which were brought out through separate subcostal stab incisions. The sternum was then reapproximated with interrupted single steel wires and the presternal tissues closed in multiple layers of absorbable suture. Sterile dressings were applied. All counts were reported as correct.    The patient was then taken to the intensive care unit in critical but stable condition.        Adarsh Sanchez MD

## 2019-03-18 NOTE — PLAN OF CARE
Neuro: pt slow to wake 1830 now awake and able to follow commands  Pulm: failied first wean attempt  CV: weaning gtts as tolerated       Rhona Henry, RN

## 2019-03-18 NOTE — CONSULTS
CARDIAC SURGERY NUTRITION CONSULT    Received standing order to assess and educate patient.    Note that patient was also due for a 7 day Length of Stay assessment today.  He was previously on a Regular diet with BeneProtein BID btw meals.  He was eating % when nurses recorded on the nursing flow sheet.    Will follow and complete assessment once patient is extubated and/or is transferred to medical unit.    Patient will receive nutrition education during the Outpatient Cardiac Rehab Program (nutrition classes/dietitian counseling).    Tana Plasencia RD, LD,CNSC

## 2019-03-18 NOTE — PROGRESS NOTES
CVTS Staff Note    63 M with NSTEMI last week. Plan for CABG today.    At time of consult, as well as today, discussed risks and benefits of surgery with aid of an . Risks include, but are not limited to, bleeding, infection, poor healing, prolonged recovery, incomplete revascularization, MI, heart failure, other end organ dysfunction, stroke, and death.      He denies having any further questions, and agrees to proceed.        Adarsh Sanchez MD

## 2019-03-18 NOTE — ANESTHESIA POSTPROCEDURE EVALUATION
Patient: Khang Mcelroy    Procedure(s):  CORONARY BYPASS GRAFTING X 4 - LIMA TO LAD, SV TO DIAGONAL, PDA, AND OM; ON PUMP WITH NOA; ENDOVEIN HARVEST LEFT LEG    Diagnosis:CORONARY ARTERY DISEASE  Diagnosis Additional Information: No value filed.    Anesthesia Type:  General, ETT    Note:  Anesthesia Post Evaluation    Patient location during evaluation: ICU  Patient participation: Unable to evaluate secondary to administered sedation  Level of consciousness: obtunded/minimal responses  Pain management: unable to assess  Airway patency: patent  Cardiovascular status: hemodynamically stable  Respiratory status: ETT  Hydration status: balanced  PONV: unable to assess     Anesthetic complications: None    Comments: Report to ICU NP given        Last vitals:  Vitals:    03/18/19 1510 03/18/19 1520 03/18/19 1530   BP:      Pulse:      Resp: 15 15 15   Temp:  36.1  C (97  F) 36.2  C (97.2  F)   SpO2: 100% 100% 100%         Electronically Signed By: Blayne Leblanc MD  March 18, 2019  3:35 PM

## 2019-03-18 NOTE — CONSULTS
[facility]  History and Physical/ Consult  Critical Care Service  Date of Admission:  3/11/2019  Date of Service (when I saw the patient): 03/18/19  Assessment & Plan   Khang Mcelroy is a 63 year old male with PMH DM2 and CKD who presents to the ICU after a CABG x4 (LIMA TO LAD, SV TO DIAGONAL, PDA, AND OM).    Neuro:  Dx: Post-operative pain and sedation needs  - Propofol infusion to achieve RASS goal 0 to -1, dexmedetomidine ordered if needed  - Fentanyl, oxycodone, and robaxin available prn  - Scheduled acetaminophen, lidocaine patch, and gapabentin    CV:  Dx: CAD x/p CABG x4  Dx: HTN  Dx: Shock, vasoplegia versus other; likely vasoplegia in the setting of bypass  - Wean vasopressors and inotropes as able per CV surgery: currently on epinephrine and phenylephrine  - Volume resuscitation with 5% albumin now, transfuse 1 unit PRBCs if post-op hgb returns <8.0  - Continue ASA and statin  - No problems coming off pump, no epicardial pacing wires placed    Resp:  Dx: Post-operative ventilator management  - Assist control 500/16/+5/50%  - Wean FiO2 to achieve SpO2 >92%  - SBT when awake, extubate this afternoon    GI/Nutrition:  Dx: Nutrition  - NPO until extubated    Renal:  Dx: CKD III stable, baseline creatinine 1.2-1.5  Dx: Hyperkalemia  - Monitor function and electrolytes as needed with replacement per ICU protocols.   - Avoid nephrotoxic agents such as NSAID, IV contrast unless specifically required  - Adjust medications as needed for renal clearance  - Dejesus for strict I/O in post-operative period    ID:  Dx: No acute issues  - Complete westley-operative antibiotics (cefazolin)  - Monitor fevers and leukocytosis consistent with stress response from procedure, no cultures for fevers in POD 1-2    Endocrine:  Dx: DM2  Dx: Stress induced hyperglycemia  - POC glucose every 2 hours for the first 4 hours and then 4 hours for 48 hours, goal to maintain less than 150  - Insulin drip to maintain blood  glucose <150    Heme:  Dx: Acute blood loss on chronic anemia  Dx: Thrombocytopenia related to bypass  - Monitor chest tube output  - Transfuse for hemoglobin <7 (but transfuse now if post-op hgb returns <8 per Dr. Sanchez)    MSK:  Dx: Sternotomy  - Sternal precautions per protocol    General cares:  DVT Prophylaxis: Defer to primary service  GI Prophylaxis: PPI per primary service  Restraints: Restraints for medical healing needed: NO  Family update by me today: No  Current lines are required for patient management  Access:  - Right internal jugular  - Arterial line  - ETT  - OG  - Dejesus  - Chest tube x2    Karen SINA Burns    Time Spent on this Encounter   Billing: I spent 35 minutes bedside and on the inpatient unit today managing the critical care of Khang Mcelroy in relation to the issues listed in this note.    Code Status   Full Code    Primary Care Physician   [facility], operated by [facility]    Chief Complaint   Post-op CABG  History is obtained from the chart. The patient is unable to participate in interview secondary to intubation and sedation.     History of Present Illness   Khang Mcelroy is a 63 year old male with PMH DM2 and CKD who presents to the ICU after a CABG x4 (LIMA TO LAD, SV TO DIAGONAL, PDA, AND OM).    He was an easy intubation with glidescope, although his cords were anterior. The procedure was uncomplicated. CPB was 138 minutes. Cross clamp time was 117 minutes. He received 2.9 L crystalloid, 500 mL 5% albumin,  430 mL cell saver (last hemoglobin was before this was given), and no other product. EBL was 1430 mL and  mL. Anesthesiologist reported his heart function appeared similar post-op as it did pre-op (LVEF 35-40%).    He arrived to the ICU on low dose epinephrine and phenylephrine. He last received a dose of NMB about 11:00 am and had 3/4 twitches leaving the OR. He is currently sedated with propofol.    He was initially admitted on 3/11/19 with acute hypoxic and  hypercarbic respiratory failure secondary to NSTEMI. His troponin jeannette from 2 to 53 prompting cardiac cath on 3/12/19. Cath showed multivessel disease. CV surgery was consulted and CABG planned for today.     Past Medical History    PMH is obtained from the chart. The patient is unable to participate in interview secondary to intubation and sedation.  HTN  HLD  DM2  CKD    Past Surgical History   Surgical history is obtained from the chart. The patient is unable to participate in interview secondary to intubation and sedation.  ESWL for stones    Prior to Admission Medications   Prior to Admission Medications   Prescriptions Last Dose Informant Patient Reported? Taking?   amLODIPine (NORVASC) 10 MG tablet Past Week at na  Yes Yes   Sig: Take 5 mg by mouth daily    atorvastatin (LIPITOR) 40 MG tablet Past Week at na  Yes Yes   Sig: Take 40 mg by mouth daily   glipiZIDE (GLUCOTROL XL) 5 MG 24 hr tablet Past Week at na  Yes Yes   Sig: Take 5 mg by mouth daily   lisinopril-hydrochlorothiazide (PRINZIDE/ZESTORETIC) 20-25 MG tablet Past Week at na  Yes Yes   Sig: Take 1 tablet by mouth daily   losartan (COZAAR) 100 MG tablet Past Week at na  Yes Yes   Sig: Take 100 mg by mouth daily   metFORMIN (GLUCOPHAGE) 1000 MG tablet Past Week at na  Yes Yes   Sig: Take 1,000 mg by mouth 2 times daily (with meals)   metoprolol succinate ER (TOPROL-XL) 50 MG 24 hr tablet Past Week at na  Yes Yes   Sig: Take 50 mg by mouth daily      Facility-Administered Medications: None     Allergies   No Known Allergies    Social History   Social history is obtained from the chart. The patient is unable to participate in interview secondary to intubation and sedation.  Never smoker, does not drink alcohol or use illicit drugs    Family History   Family history is obtained from the chart. The patient is unable to participate in interview secondary to intubation and sedation.  No family history of MI     Review of Systems   Unable to perform complete  review of systems secondary to intubation and sedation.    Physical Exam   Temp: 97.3  F (36.3  C) Temp src: Bladder Temp  Min: 97.3  F (36.3  C)  Max: 97.7  F (36.5  C) BP: 161/76 Pulse: 68 Heart Rate: 77 Resp: 16 SpO2: 100 % O2 Device: Mechanical Ventilator    Vital Signs with Ranges  Temp:  [97.3  F (36.3  C)-97.7  F (36.5  C)] 97.3  F (36.3  C)  Pulse:  [68] 68  Heart Rate:  [63-77] 77  Resp:  [12-21] 16  BP: (151-161)/(76-83) 161/76  MAP:  [61 mmHg-70 mmHg] 70 mmHg  Arterial Line BP: ()/(46-53) 111/53  FiO2 (%):  [50 %-60 %] 50 %  SpO2:  [98 %-100 %] 100 %  148 lbs 0 oz    GEN: Sedated  EYES: No icterus  HEENT:  Normocephalic, trachea midline, ETT secure  CV: RRR, no gallops, rubs, or murmurs; pedal pulses 1+; no pedal edema  PULM/CHEST: Clear breath sounds bilaterally without rhonchi, crackles or wheeze, symmetric chest rise  GI: Bowel sounds hypoactive, abdomen soft and non-tender, no masses  : Dejesus catheter in place, urine yellow and clear  NEURO: Unable to assess due to sedation and NMB. Pupils 3 mm.  SKIN: No rashes noted. Sternotomy incision covered with dressing, clean/dry/intact. Chest tube insertion sites without drainage.    Imaging personally reviewed: CXR without pneumothorax or large pleural effusion    Data   Results for orders placed or performed during the hospital encounter of 03/11/19 (from the past 24 hour(s))   Glucose by meter   Result Value Ref Range    Glucose 139 (H) 70 - 99 mg/dL   Glucose by meter   Result Value Ref Range    Glucose 239 (H) 70 - 99 mg/dL   Glucose by meter   Result Value Ref Range    Glucose 92 70 - 99 mg/dL   CBC with platelets   Result Value Ref Range    WBC 11.9 (H) 4.0 - 11.0 10e9/L    RBC Count 3.90 (L) 4.4 - 5.9 10e12/L    Hemoglobin 9.8 (L) 13.3 - 17.7 g/dL    Hematocrit 29.8 (L) 40.0 - 53.0 %    MCV 76 (L) 78 - 100 fl    MCH 25.1 (L) 26.5 - 33.0 pg    MCHC 32.9 31.5 - 36.5 g/dL    RDW 14.4 10.0 - 15.0 %    Platelet Count 405 150 - 450 10e9/L   Heparin  Xa (10a) Level   Result Value Ref Range    Heparin 10A Level 0.18 IU/mL   Glucose by meter   Result Value Ref Range    Glucose 101 (H) 70 - 99 mg/dL   Glucose by meter   Result Value Ref Range    Glucose 146 (H) 70 - 99 mg/dL   Glucose by meter   Result Value Ref Range    Glucose 143 (H) 70 - 99 mg/dL   Glucose by meter   Result Value Ref Range    Glucose 174 (H) 70 - 99 mg/dL   Glucose by meter   Result Value Ref Range    Glucose 173 (H) 70 - 99 mg/dL   Basic metabolic panel   Result Value Ref Range    Sodium 139 133 - 144 mmol/L    Potassium 5.2 3.4 - 5.3 mmol/L    Chloride 108 94 - 109 mmol/L    Carbon Dioxide 22 20 - 32 mmol/L    Anion Gap 9 3 - 14 mmol/L    Glucose 175 (H) 70 - 99 mg/dL    Urea Nitrogen 21 7 - 30 mg/dL    Creatinine 1.32 (H) 0.66 - 1.25 mg/dL    GFR Estimate 58 (L) >60 mL/min/[1.73_m2]    GFR Estimate If Black 67 >60 mL/min/[1.73_m2]    Calcium 8.7 8.5 - 10.1 mg/dL   CBC with platelets   Result Value Ref Range    WBC 23.0 (H) 4.0 - 11.0 10e9/L    RBC Count 2.65 (L) 4.4 - 5.9 10e12/L    Hemoglobin 6.8 (LL) 13.3 - 17.7 g/dL    Hematocrit 20.5 (L) 40.0 - 53.0 %    MCV 77 (L) 78 - 100 fl    MCH 25.7 (L) 26.5 - 33.0 pg    MCHC 33.2 31.5 - 36.5 g/dL    RDW 14.6 10.0 - 15.0 %    Platelet Count 208 150 - 450 10e9/L   Fibrinogen activity   Result Value Ref Range    Fibrinogen 503 (H) 200 - 420 mg/dL   INR   Result Value Ref Range    INR 1.79 (H) 0.86 - 1.14   Magnesium   Result Value Ref Range    Magnesium 3.1 (H) 1.6 - 2.3 mg/dL   Partial thromboplastin time   Result Value Ref Range    PTT 49 (H) 22 - 37 sec   Glucose by meter   Result Value Ref Range    Glucose 181 (H) 70 - 99 mg/dL

## 2019-03-18 NOTE — ANESTHESIA PROCEDURE NOTES
ARTERIAL LINE PROCEDURE NOTE:   Pre-Procedure  Performed by Blayne Leblanc MD  Location: pre-op      Pre-Anesthestic Checklist: patient identified, IV checked, site marked, risks and benefits discussed, informed consent, monitors and equipment checked and pre-op evaluation    Timeout  Correct Patient: Yes   Correct Procedure: Yes   Correct Site: Yes   Correct Laterality: N/A   Correct Position: Yes   Site Marked: N/A   .   Procedure Documentation  Procedure: arterial line    Supine  Insertion Site:radial, left.Skin infiltrated with 2 mL of 1% lidocaine. Injection technique: Seldinger Technique and Musa's test completed  .  .  Patient Prep;all elements of maximal sterile barrier technique followed, mask, hat, sterile gown, sterile gloves, draped, hand hygiene, chlorhexidine gluconate and isopropyl alcohol    Assessment/Narrative    Catheter: 20 gauge, 1.75 in/4.5 cm quick cath (integral wire)      Tegaderm dressing used.    Arterial waveform: Yes IBP within 10% of NIBP: Yes    Comments:  Pt tolerated procedure well with light sedation   No complications

## 2019-03-18 NOTE — PLAN OF CARE
VSS, rested well through night with no complaints. Pre op shower and scrub completed. Awaiting to go to pre op.

## 2019-03-18 NOTE — PROGRESS NOTES
Patient was not seen by cardiology team today.  He was taken to the OR for CABG early this morning.    Cardiology will sign off.  Please page us if there are any postoperative issues we can assist with.  I will request follow-up with the admitting cardiologist, Dr. Bib Hernández, in 6-8 weeks.    LAKHWINDER Hernandez MD [facility]  Cardiology

## 2019-03-18 NOTE — BRIEF OP NOTE
[facility]    Brief Operative Note    Pre-operative diagnosis: CORONARY ARTERY DISEASE  Post-operative diagnosis * No post-op diagnosis entered *  Procedure: Procedure(s):  CORONARY BYPASS GRAFTING X 4 - LIMA TO LAD, SV TO DIAGONAL, PDA, AND OM; ON PUMP WITH NOA; ENDOVEIN HARVEST LEFT LEG  Surgeon: Surgeon(s) and Role:     * Adarsh Sanchez MD - Primary     * Terri Beasley PA-C - Assisting     * Joseph Lubin MD - Fellow - Assisting  Anesthesia: General   Estimated blood loss: 500 ml  Drains: Chest tubes  Specimens: * No specimens in log *  Findings:   see op note.  Complications: None.  Implants: None.

## 2019-03-18 NOTE — ANESTHESIA PROCEDURE NOTES
CENTRAL LINE INSERTION PROCEDURE NOTE:   Pre-Procedure  Performed by Blayne Leblanc MD  Location: pre-op      Pre-Anesthestic Checklist: patient identified, IV checked, site marked, risks and benefits discussed, informed consent, monitors and equipment checked and pre-op evaluation    Timeout  Correct Patient: Yes   Correct Procedure: Yes   Correct Site: Yes   Correct Laterality: N/A   Correct Position: Yes   Site Marked: N/A   .   Procedure Documentation   Procedure: central line, new line and elective  Position: Trendelenburg  Patient Prep;all elements of maximal sterile barrier technique followed, mask, hat, sterile gown, sterile gloves, draped, hand hygiene, chlorhexidine gluconate and isopropyl alcohol      Insertion Site:internal jugular, right  Using U/S with sterile probe cover and sterile gel, vein evaluated for patency/adequacy of cortis insertion is adequate, and using realtime U/S imaging the david was punctured, and needle was observed entering vein on U/S. A permanent image is entered into the patient's record.   Skin infiltrated with 2 mL of 1% lidocaine.  Catheter: PA Catheter, 9 Nigerian, 10 cm (sheath introducer)  Assessment/Narrative    Catheter: 9 Fr, 2-lumen MAC     Secured by suture  Tegaderm and Biopatch dressing used.  blood aspirated from all lumens  All lumens flushed: Yes  Verification method: Ultrasound  Comments:  Pt tolerated procedure well with light sedation   No complications

## 2019-03-18 NOTE — ANESTHESIA PREPROCEDURE EVALUATION
Anesthesia Pre-Procedure Evaluation    Patient: Khang Mcelroy   MRN: [id number] : 1955          Preoperative Diagnosis: CORONARY ARTERY DISEASE    Procedure(s):  CORONARY BYPASS GRAFTING  (ON PUMP, NO NOA, NO BALLOON) CARDIOLOGIST:  DR SPRINGER ([facility])(LANGUAGE:  ALHAJI)    Past Medical History:   Diagnosis Date     Chronic renal insufficiency      Diabetes mellitus with proliferative retinopathy (H)      Hyperlipidemia      Hypertension      Nephrolithiasis      NSTEMI (non-ST elevated myocardial infarction) (H)      Past Surgical History:   Procedure Laterality Date     CV CORONARY ANGIOGRAM N/A 3/12/2019    Procedure: Coronary Angiogram;  Surgeon: Remi Rodriguez MD;  Location:  HEART CARDIAC CATH LAB     CV HEART CATHETERIZATION WITH POSSIBLE INTERVENTION N/A 3/12/2019    Procedure: Heart Catheterization with possible Intervention;  Surgeon: Remi Rodriguez MD;  Location: [facility] CARDIAC CATH LAB     CV LEFT VENTRICULAR ANGIOGRAM N/A 3/12/2019    Procedure: Left Ventricular Angiogram;  Surgeon: Remi Rodriguez MD;  Location: [facility] CARDIAC CATH LAB     LITHOTRIPSY         Anesthesia Evaluation     . Pt has had prior anesthetic.     No history of anesthetic complications          ROS/MED HX    ENT/Pulmonary:      (-) tobacco use, COPD and sleep apnea   Neurologic:      (-) seizures and CVA   Cardiovascular:     (+) Dyslipidemia, hypertension--CAD, -past MI,-. : . CHF . . :. . pulmonary hypertension, Previous cardiac testing Echodate:3/2019results:The visual ejection fraction is estimated at 35-40%.  Left ventricular systolic function is mild to moderately reduced.  There are regional wall motion abnormalities as specified.  Right ventricular systolic pressure is elevated, consistent with mild to  moderate pulmonary hypertension.  The ascending aorta is Mildly dilated.  Moderate left pleural effusion  The study was technically difficult.date: results: date: results:Cath date: 3/2019  results:1. Patient with multiple sausage links like stenoses through the mid and distal right coronary artery. There is a 70-80% stenosis in the continuation of the right coronary artery just beyond the posterior descending artery.   2. Mid LAD to Dist LAD lesion is 90% stenosed. The lesion is segmental and serial. There is a long distal LAD stenosis extending all the way through the apex.   3. Prox LAD to Mid LAD lesion is 45% stenosed.   4. Prox Cx lesion is 85% stenosed.   5. The ejection fraction is calculated to be 35%. LVEDP 31mmHg.  6. Mid LM lesion is 40% stenosed.          METS/Exercise Tolerance:  >4 METS   Hematologic:     (+) Anemia, -      Musculoskeletal:         GI/Hepatic:        (-) GERD and liver disease   Renal/Genitourinary:     (+) chronic renal disease, type: CRI, Nephrolithiasis ,       Endo:     (+) type II DM .      Psychiatric:         Infectious Disease:         Malignancy:         Other:                          Physical Exam  Normal systems: cardiovascular, pulmonary and dental    Airway   Mallampati: II  TM distance: >3 FB  Neck ROM: full    Dental     Cardiovascular       Pulmonary             Lab Results   Component Value Date    WBC 11.9 (H) 03/18/2019    HGB 9.8 (L) 03/18/2019    HCT 29.8 (L) 03/18/2019     03/18/2019     03/16/2019    POTASSIUM 4.4 03/16/2019    CHLORIDE 100 03/16/2019    CO2 28 03/16/2019    BUN 25 03/16/2019    CR 1.45 (H) 03/16/2019     (H) 03/16/2019    MYKE 9.3 03/16/2019    MAG 2.1 03/17/2019    ALBUMIN 2.5 (L) 03/14/2019    PROTTOTAL 7.4 03/14/2019    ALT 25 03/14/2019    AST 31 03/14/2019    ALKPHOS 101 03/14/2019    BILITOTAL 0.4 03/14/2019       Preop Vitals  BP Readings from Last 3 Encounters:   03/18/19 161/76    Pulse Readings from Last 3 Encounters:   03/18/19 68      Resp Readings from Last 3 Encounters:   03/18/19 12    SpO2 Readings from Last 3 Encounters:   03/18/19 99%      Temp Readings from Last 1 Encounters:   03/18/19 36.5  " C (97.7  F) (Temporal)    Ht Readings from Last 1 Encounters:   03/11/19 1.6 m (5' 3\")      Wt Readings from Last 1 Encounters:   03/18/19 67.1 kg (148 lb)    Estimated body mass index is 26.22 kg/m  as calculated from the following:    Height as of this encounter: 1.6 m (5' 3\").    Weight as of this encounter: 67.1 kg (148 lb).       Anesthesia Plan      History & Physical Review  History and physical reviewed and following examination; no interval change.    ASA Status:  4 .    NPO Status:  > 8 hours    Plan for General and ETT with Intravenous induction. Maintenance will be Balanced.      Additional equipment: Videolaryngoscope, 2nd IV, Arterial Line, Central Line, CVP, PA Catheter and NOA      Postoperative Care      Consents  Anesthetic plan, risks, benefits and alternatives discussed with:  Patient and Spouse.  Use of blood products discussed: Yes.   Use of blood products discussed with Patient.  Consented to blood products.  .                 Blayne Leblanc MD  "

## 2019-03-19 ENCOUNTER — APPOINTMENT (OUTPATIENT)
Dept: OCCUPATIONAL THERAPY | Facility: CLINIC | Age: 64
DRG: 233 | End: 2019-03-19
Attending: STUDENT IN AN ORGANIZED HEALTH CARE EDUCATION/TRAINING PROGRAM
Payer: COMMERCIAL

## 2019-03-19 ENCOUNTER — APPOINTMENT (OUTPATIENT)
Dept: GENERAL RADIOLOGY | Facility: CLINIC | Age: 64
DRG: 233 | End: 2019-03-19
Attending: STUDENT IN AN ORGANIZED HEALTH CARE EDUCATION/TRAINING PROGRAM
Payer: COMMERCIAL

## 2019-03-19 LAB
ALBUMIN SERPL-MCNC: 3.4 G/DL (ref 3.4–5)
ALP SERPL-CCNC: 63 U/L (ref 40–150)
ALT SERPL W P-5'-P-CCNC: 27 U/L (ref 0–70)
ANION GAP SERPL CALCULATED.3IONS-SCNC: 10 MMOL/L (ref 3–14)
AST SERPL W P-5'-P-CCNC: 50 U/L (ref 0–45)
BILIRUB SERPL-MCNC: 0.4 MG/DL (ref 0.2–1.3)
BLD PROD TYP BPU: NORMAL
BLD UNIT ID BPU: 0
BLOOD PRODUCT CODE: NORMAL
BPU ID: NORMAL
BUN SERPL-MCNC: 25 MG/DL (ref 7–30)
CA-I BLD-MCNC: 4.6 MG/DL (ref 4.4–5.2)
CALCIUM SERPL-MCNC: 8.4 MG/DL (ref 8.5–10.1)
CHLORIDE SERPL-SCNC: 109 MMOL/L (ref 94–109)
CO2 SERPL-SCNC: 23 MMOL/L (ref 20–32)
CREAT SERPL-MCNC: 1.72 MG/DL (ref 0.66–1.25)
ERYTHROCYTE [DISTWIDTH] IN BLOOD BY AUTOMATED COUNT: 14.9 % (ref 10–15)
GFR SERPL CREATININE-BSD FRML MDRD: 42 ML/MIN/{1.73_M2}
GLUCOSE BLDC GLUCOMTR-MCNC: 101 MG/DL (ref 70–99)
GLUCOSE BLDC GLUCOMTR-MCNC: 120 MG/DL (ref 70–99)
GLUCOSE BLDC GLUCOMTR-MCNC: 121 MG/DL (ref 70–99)
GLUCOSE BLDC GLUCOMTR-MCNC: 125 MG/DL (ref 70–99)
GLUCOSE BLDC GLUCOMTR-MCNC: 129 MG/DL (ref 70–99)
GLUCOSE BLDC GLUCOMTR-MCNC: 134 MG/DL (ref 70–99)
GLUCOSE BLDC GLUCOMTR-MCNC: 143 MG/DL (ref 70–99)
GLUCOSE BLDC GLUCOMTR-MCNC: 144 MG/DL (ref 70–99)
GLUCOSE BLDC GLUCOMTR-MCNC: 152 MG/DL (ref 70–99)
GLUCOSE BLDC GLUCOMTR-MCNC: 154 MG/DL (ref 70–99)
GLUCOSE BLDC GLUCOMTR-MCNC: 174 MG/DL (ref 70–99)
GLUCOSE BLDC GLUCOMTR-MCNC: 176 MG/DL (ref 70–99)
GLUCOSE SERPL-MCNC: 137 MG/DL (ref 70–99)
HCT VFR BLD AUTO: 22.3 % (ref 40–53)
HGB BLD-MCNC: 7.3 G/DL (ref 13.3–17.7)
KCT BLD-ACNC: 156 SEC (ref 75–150)
KCT BLD-ACNC: 180 SEC (ref 75–150)
KCT BLD-ACNC: 436 SEC (ref 75–150)
KCT BLD-ACNC: 513 SEC (ref 75–150)
KCT BLD-ACNC: 599 SEC (ref 75–150)
KCT BLD-ACNC: 611 SEC (ref 75–150)
KCT BLD-ACNC: 619 SEC (ref 75–150)
MAGNESIUM SERPL-MCNC: 2.6 MG/DL (ref 1.6–2.3)
MCH RBC QN AUTO: 25.2 PG (ref 26.5–33)
MCHC RBC AUTO-ENTMCNC: 32.7 G/DL (ref 31.5–36.5)
MCV RBC AUTO: 77 FL (ref 78–100)
PHOSPHATE SERPL-MCNC: 3.7 MG/DL (ref 2.5–4.5)
PLATELET # BLD AUTO: 202 10E9/L (ref 150–450)
POTASSIUM SERPL-SCNC: 4.9 MMOL/L (ref 3.4–5.3)
PROT SERPL-MCNC: 6.3 G/DL (ref 6.8–8.8)
RBC # BLD AUTO: 2.9 10E12/L (ref 4.4–5.9)
SODIUM SERPL-SCNC: 142 MMOL/L (ref 133–144)
TRANSFUSION STATUS PATIENT QL: NORMAL
WBC # BLD AUTO: 18.1 10E9/L (ref 4–11)

## 2019-03-19 PROCEDURE — 82330 ASSAY OF CALCIUM: CPT | Performed by: STUDENT IN AN ORGANIZED HEALTH CARE EDUCATION/TRAINING PROGRAM

## 2019-03-19 PROCEDURE — 80053 COMPREHEN METABOLIC PANEL: CPT | Performed by: STUDENT IN AN ORGANIZED HEALTH CARE EDUCATION/TRAINING PROGRAM

## 2019-03-19 PROCEDURE — 85027 COMPLETE CBC AUTOMATED: CPT | Performed by: STUDENT IN AN ORGANIZED HEALTH CARE EDUCATION/TRAINING PROGRAM

## 2019-03-19 PROCEDURE — 00000146 ZZHCL STATISTIC GLUCOSE BY METER IP

## 2019-03-19 PROCEDURE — 99232 SBSQ HOSP IP/OBS MODERATE 35: CPT | Performed by: HOSPITALIST

## 2019-03-19 PROCEDURE — 84100 ASSAY OF PHOSPHORUS: CPT | Performed by: STUDENT IN AN ORGANIZED HEALTH CARE EDUCATION/TRAINING PROGRAM

## 2019-03-19 PROCEDURE — 97166 OT EVAL MOD COMPLEX 45 MIN: CPT | Mod: GO | Performed by: OCCUPATIONAL THERAPIST

## 2019-03-19 PROCEDURE — 97110 THERAPEUTIC EXERCISES: CPT | Mod: GO | Performed by: OCCUPATIONAL THERAPIST

## 2019-03-19 PROCEDURE — 97168 OT RE-EVAL EST PLAN CARE: CPT | Mod: GO | Performed by: OCCUPATIONAL THERAPIST

## 2019-03-19 PROCEDURE — 83735 ASSAY OF MAGNESIUM: CPT | Performed by: STUDENT IN AN ORGANIZED HEALTH CARE EDUCATION/TRAINING PROGRAM

## 2019-03-19 PROCEDURE — 25000132 ZZH RX MED GY IP 250 OP 250 PS 637: Performed by: INTERNAL MEDICINE

## 2019-03-19 PROCEDURE — 97535 SELF CARE MNGMENT TRAINING: CPT | Mod: GO | Performed by: OCCUPATIONAL THERAPIST

## 2019-03-19 PROCEDURE — 25800030 ZZH RX IP 258 OP 636: Performed by: STUDENT IN AN ORGANIZED HEALTH CARE EDUCATION/TRAINING PROGRAM

## 2019-03-19 PROCEDURE — 97530 THERAPEUTIC ACTIVITIES: CPT | Mod: GO | Performed by: OCCUPATIONAL THERAPIST

## 2019-03-19 PROCEDURE — 94799 UNLISTED PULMONARY SVC/PX: CPT

## 2019-03-19 PROCEDURE — 25000128 H RX IP 250 OP 636: Performed by: STUDENT IN AN ORGANIZED HEALTH CARE EDUCATION/TRAINING PROGRAM

## 2019-03-19 PROCEDURE — 12000000 ZZH R&B MED SURG/OB

## 2019-03-19 PROCEDURE — C9113 INJ PANTOPRAZOLE SODIUM, VIA: HCPCS | Performed by: STUDENT IN AN ORGANIZED HEALTH CARE EDUCATION/TRAINING PROGRAM

## 2019-03-19 PROCEDURE — 71045 X-RAY EXAM CHEST 1 VIEW: CPT

## 2019-03-19 PROCEDURE — 25000132 ZZH RX MED GY IP 250 OP 250 PS 637: Performed by: STUDENT IN AN ORGANIZED HEALTH CARE EDUCATION/TRAINING PROGRAM

## 2019-03-19 PROCEDURE — P9016 RBC LEUKOCYTES REDUCED: HCPCS | Performed by: INTERNAL MEDICINE

## 2019-03-19 PROCEDURE — 93005 ELECTROCARDIOGRAM TRACING: CPT

## 2019-03-19 PROCEDURE — 93010 ELECTROCARDIOGRAM REPORT: CPT | Performed by: INTERNAL MEDICINE

## 2019-03-19 PROCEDURE — 40000809 ZZH STATISTIC NO DOCUMENTATION TO SUPPORT CHARGE

## 2019-03-19 PROCEDURE — 25000131 ZZH RX MED GY IP 250 OP 636 PS 637: Performed by: STUDENT IN AN ORGANIZED HEALTH CARE EDUCATION/TRAINING PROGRAM

## 2019-03-19 RX ORDER — GABAPENTIN 300 MG/1
300 CAPSULE ORAL 3 TIMES DAILY PRN
Status: DISCONTINUED | OUTPATIENT
Start: 2019-03-19 | End: 2019-03-24 | Stop reason: HOSPADM

## 2019-03-19 RX ORDER — HYDROMORPHONE HYDROCHLORIDE 1 MG/ML
.3-.5 INJECTION, SOLUTION INTRAMUSCULAR; INTRAVENOUS; SUBCUTANEOUS
Status: DISCONTINUED | OUTPATIENT
Start: 2019-03-19 | End: 2019-03-24 | Stop reason: HOSPADM

## 2019-03-19 RX ORDER — PANTOPRAZOLE SODIUM 40 MG/1
40 TABLET, DELAYED RELEASE ORAL
Status: DISCONTINUED | OUTPATIENT
Start: 2019-03-20 | End: 2019-03-24 | Stop reason: HOSPADM

## 2019-03-19 RX ADMIN — OXYCODONE HYDROCHLORIDE 5 MG: 5 TABLET ORAL at 00:17

## 2019-03-19 RX ADMIN — ACETAMINOPHEN 975 MG: 325 TABLET, FILM COATED ORAL at 06:06

## 2019-03-19 RX ADMIN — METOPROLOL TARTRATE 25 MG: 25 TABLET ORAL at 08:20

## 2019-03-19 RX ADMIN — METOPROLOL TARTRATE 25 MG: 25 TABLET ORAL at 20:43

## 2019-03-19 RX ADMIN — HEPARIN SODIUM 5000 UNITS: 5000 INJECTION, SOLUTION INTRAVENOUS; SUBCUTANEOUS at 11:56

## 2019-03-19 RX ADMIN — LIDOCAINE 1 PATCH: 560 PATCH PERCUTANEOUS; TOPICAL; TRANSDERMAL at 08:21

## 2019-03-19 RX ADMIN — OXYCODONE HYDROCHLORIDE 5 MG: 5 TABLET ORAL at 20:48

## 2019-03-19 RX ADMIN — SENNOSIDES AND DOCUSATE SODIUM 1 TABLET: 8.6; 5 TABLET ORAL at 08:34

## 2019-03-19 RX ADMIN — HEPARIN SODIUM 5000 UNITS: 5000 INJECTION, SOLUTION INTRAVENOUS; SUBCUTANEOUS at 20:43

## 2019-03-19 RX ADMIN — SODIUM CHLORIDE 1 UNITS/HR: 9 INJECTION, SOLUTION INTRAVENOUS at 14:30

## 2019-03-19 RX ADMIN — GABAPENTIN 300 MG: 300 CAPSULE ORAL at 08:21

## 2019-03-19 RX ADMIN — CEFAZOLIN 1 G: 1 INJECTION, POWDER, FOR SOLUTION INTRAMUSCULAR; INTRAVENOUS at 14:36

## 2019-03-19 RX ADMIN — ASPIRIN 81 MG: 81 TABLET, COATED ORAL at 08:21

## 2019-03-19 RX ADMIN — ACETAMINOPHEN 975 MG: 325 TABLET, FILM COATED ORAL at 14:32

## 2019-03-19 RX ADMIN — ATORVASTATIN CALCIUM 80 MG: 80 TABLET, FILM COATED ORAL at 20:43

## 2019-03-19 RX ADMIN — PANTOPRAZOLE SODIUM 40 MG: 40 INJECTION, POWDER, FOR SOLUTION INTRAVENOUS at 08:21

## 2019-03-19 RX ADMIN — METHOCARBAMOL TABLETS 750 MG: 750 TABLET, COATED ORAL at 09:48

## 2019-03-19 RX ADMIN — SODIUM CHLORIDE 1 UNITS/HR: 9 INJECTION, SOLUTION INTRAVENOUS at 14:10

## 2019-03-19 RX ADMIN — ONDANSETRON 4 MG: 2 INJECTION INTRAMUSCULAR; INTRAVENOUS at 02:29

## 2019-03-19 RX ADMIN — ACETAMINOPHEN 975 MG: 325 TABLET, FILM COATED ORAL at 22:20

## 2019-03-19 RX ADMIN — OXYCODONE HYDROCHLORIDE 5 MG: 5 TABLET ORAL at 08:51

## 2019-03-19 RX ADMIN — SENNOSIDES AND DOCUSATE SODIUM 2 TABLET: 8.6; 5 TABLET ORAL at 20:43

## 2019-03-19 RX ADMIN — CEFAZOLIN 1 G: 1 INJECTION, POWDER, FOR SOLUTION INTRAMUSCULAR; INTRAVENOUS at 06:06

## 2019-03-19 ASSESSMENT — ACTIVITIES OF DAILY LIVING (ADL)
PREVIOUS_RESPONSIBILITIES: WORK;DRIVING;FINANCES;MEDICATION MANAGEMENT;MEAL PREP;HOUSEKEEPING;LAUNDRY;SHOPPING
ADLS_ACUITY_SCORE: 10
ADLS_ACUITY_SCORE: 10
ADLS_ACUITY_SCORE: 12
ADLS_ACUITY_SCORE: 10
ADLS_ACUITY_SCORE: 10
ADLS_ACUITY_SCORE: 12

## 2019-03-19 ASSESSMENT — MIFFLIN-ST. JEOR
SCORE: 1399.13
SCORE: 1404.06

## 2019-03-19 NOTE — PROGRESS NOTES
Khang Mcelroy is a 63 year old male with PMH DM2 and CKD who presented to the ICU yesterday after a CABG x4.    He was extubated per pathway last night, vasopressors were weaned off. He is currently hemodynamically stable without the support of vasoactives and oxygenating adequately on 2L nasal cannula. He's receiving 1 unit PRBCs for a hemoglobin of 7.3. Hospital medicine has assumed care this morning, intensivist team will sign off. Please call if critical care issues arise.    Karen Burns, AMBERP

## 2019-03-19 NOTE — PLAN OF CARE
"Discharge Planner OT   Patient plan for discharge: home  Current status: Re eval completed and treatment initiated. Pt s/p CABGx4. Supine to sit with MIN/MOD A, mAx A to scoot toward EOB. CGA to sit at EOB for safety, sit <> stand and transfer to Chair with CGA x1-2 slowly with cues for tech and sternal precautions. Pt feeling dizzy, completed 1 seated CVC LE ex but very fatigued, BP stable seated at EOB.   Barriers to return to prior living situation: decreased activity tolerance, strength, sternal precautions. Balance.   Recommendations for discharge: anticipate home with wife A with LE ADL\"s, dressing, tub transfer, driving, etc and OP CR.  Rationale for recommendations: OP CR for monitored progressive exercise and risk factor education and modification. Pending progress may require TCU.       Entered by: Millie Sanchez 03/19/2019 10:54 AM       "

## 2019-03-19 NOTE — PLAN OF CARE
Patient is alert and appears oriented, however he does not speak English so difficult to assess. Extubated to bipap at 2000 last evening. Lung sounds occasionally coarse with wheezing, good cough effort. Off and on bipap throughout night. Bowel sounds hypoactive, zofran given for nausea x1. Urine output borderline low. Chest tubes with minimal output. Family updated bedside.

## 2019-03-19 NOTE — PROGRESS NOTES
[facility]    Medicine Progress Note - Hospitalist Service       Date of Admission:  3/11/2019  Assessment & Plan    Khang Mcelroy is a 63 year old male admitted on 3/11/2019 with acute hypoxemic and hypercapnic respiratory failure presumed secondary to a non-ST-elevation myocardial infarction. He was found to have multivessel coronary artery disease and is postoperative day # 1 coronary artery bypass grafting.     Acute hypoxic and hypercapnic respiratory failure secondary to acute systolic congestive heart failure  Non-ST-elevation myocardial infarction   Multi-vessel coronary artery disease  Ischemic cardiomyopathy   Patient with acute onset of shortness of breath after an episode of chest pain the day prior to admission  Initial EKG was concerning for STEMI, but repeat EKG did not show DWIGHT, but nonspecific t wave changes  CXR with bilateral infiltrates concerning for pulmonary edema  Initial troponin of 2.1, peaked at 53  Coronary angiogram revealed three-vessel coronary artery disease   Echocardiogram revealed an EF of 35-40%, with multiple wall motion abnormalities.  3/19-Postoperative day # 1 Coronary artery bypass grafting x4, LIMA-LAD, SVG-DIAG, SVG-OM, SVG-PDA; endoscopic harvest left greater saphenous vein, management per CT surgery    Type 2 diabetes mellitus   Hemoglobin A1c 7.9  Prior to admission on glipizide and metformin- held  Currently on intravenous insulin infusion- will likely change to subcutaneous today. This morning glucometer was 129     Hypertension   Hyperlipidemia   Prior to admission on amlodipine, lisinopril/hydrochlorothiazide, losartan  Currently on atorvastatin and metoprolol     Stage 3 chronic kidney disease   baseline creatinine is between 1.3-1.6  Today creatinine is up to 1.7 with K 4.9- continue to monitor   Urine output yesterday was 1.14L    Acute on chronic anemia  Baseline hemoglobin per care everywhere appears to be between 9-10.5. He received a  course of iron sucrose 3/13-15.  This morning hemoglobin is 7.3g and 1 unit of packed red blood cells was ordered.  Continue to monitor.    Diet: Room Service  Snacks/Supplements Pediatric: Beneprotein; Between Meals  NPO for Medical/Clinical Reasons Except for: No Exceptions  Advance Diet as Tolerated: Clear Liquid Diet; Regular Diet Adult    DVT Prophylaxis: Pneumatic Compression Devices  Dejesus Catheter: in place, indication: Strict 1-2 Hour I&O, Strict 1-2 Hour I&O  Code Status: Full Code      Disposition Plan   Expected discharge: 4 - 7 days, recommended to prior living arrangement once when stable postop.  Entered: Oliver Motley MD 03/19/2019, 8:19 AM     The patient's care was discussed with the Patient.    Oliver Motley MD  Hospitalist Service  [facility]    ______________________________________________________________________    Interval History   The patient is complaining of incisional pain    Data reviewed today: I reviewed all medications, new labs and imaging results over the last 24 hours. I personally reviewed no images or EKG's today.    Physical Exam   Vital Signs: Temp: 99.7  F (37.6  C) Temp src: Bladder BP: 125/70 Pulse: 77 Heart Rate: 77 Resp: 21 SpO2: 100 % O2 Device: Nasal cannula Oxygen Delivery: 2 LPM  Weight: 154 lbs 1.62 oz  Constitutional: awake, alert, cooperative, no apparent distress, and appears stated age  Respiratory: No increased work of breathing, good air exchange, clear to auscultation bilaterally, no crackles or wheezing  Cardiovascular:  regular rate and rhythm, normal S1 and S2, no S3 or S4, and no murmur noted  GI: No scars, normal bowel sounds, soft, non-distended, non-tender  Skin: no jaundice  Neurologic: Awake, alert, oriented to name, place and time.  Cranial nerves II-XII are grossly intact.  Motor is 5 out of 5 bilaterally  Neuropsychiatric: General: normal, calm and normal eye contact    Data   Recent Labs   Lab 03/19/19  1820  03/18/19  1413 03/18/19  1312  03/13/19  0532 03/12/19  1837   WBC 18.1* 28.0* 23.0*   < > 11.4*  --    HGB 7.3* 8.8* 6.8*   < > 8.0*  --    MCV 77* 77* 77*   < > 77*  --     226 208   < > 295  --    INR  --  1.54* 1.79*  --   --   --     140 139   < > 141  --    POTASSIUM 4.9 4.9 5.2   < > 4.0  --    CHLORIDE 109 108 108   < > 108  --    CO2 23 20 22   < > 28  --    BUN 25 21 21   < > 22  --    CR 1.72* 1.31* 1.32*   < > 1.41*  --    ANIONGAP 10 12 9   < > 5  --    MYKE 8.4* 8.2* 8.7   < > 8.5  --    * 174* 175*   < > 141*  --    ALBUMIN 3.4 2.3*  --    < > 2.4*  --    PROTTOTAL 6.3* 5.7*  --    < > 6.6*  --    BILITOTAL 0.4 0.5  --    < > 0.3  --    ALKPHOS 63 80  --    < > 97  --    ALT 27 35  --    < > 25  --    AST 50* 47*  --    < > 40  --    TROPI  --   --   --   --  39.579* 53.250*    < > = values in this interval not displayed.     Recent Results (from the past 24 hour(s))   XR Chest Port 1 View    Narrative    CHEST ONE VIEW PORTABLE   3/18/2019 2:40 PM     HISTORY:  Coronary bypass surgery.    COMPARISON: 3/13/2019      Impression    IMPRESSION: Endotracheal tube is within 1 cm of the nick and should  be pulled back about 2 cm. Central venous catheter with the tip in the  proximal right pulmonary artery. Nasogastric tube in the stomach. Left  chest tube and mediastinal tube, no pneumothorax. Sternal wires.    RICCI WALLS MD

## 2019-03-19 NOTE — PLAN OF CARE
Pt  Per plan of care, pain control better with robaxin.  Report called to derick [facility] RN pt ready to transfer    Rhona Henry RN

## 2019-03-19 NOTE — PROGRESS NOTES
SPIRITUAL HEALTH SERVICES Progress Note  [facility] ICU    Attempted visit per LOS. No  present. Not able to assess at this time.     SH will continue to follow for further assessment.      Claus Taylor  Chaplain Resident

## 2019-03-19 NOTE — PROGRESS NOTES
03/19/19 1012   Quick Adds   Type of Visit Occupational Therapy Re-evaluation   Living Environment   Lives With spouse;child(araceli), adult;grandchild(araceli)   Living Arrangements house   Home Accessibility stairs to enter home   Number of Stairs, Main Entrance 3   Self-Care   Equipment Currently Used at Home grab bar, toilet;grab bar, tub/shower   Functional Level   Ambulation 0-->independent   Transferring 0-->independent   Toileting 0-->independent   Bathing 0-->independent   Dressing 0-->independent   Eating 0-->independent   Communication 0-->understands/communicates without difficulty   Swallowing 0-->swallows foods/liquids without difficulty   Cognition 0 - no cognition issues reported   General Information   Onset of Illness/Injury or Date of Surgery - Date 03/11/19   Referring Physician Joseph Lubin MD   Patient/Family Goals Statement home   Additional Occupational Profile Info/Pertinent History of Current Problem s/p CABGx4   Precautions/Limitations fall precautions;oxygen therapy device and L/min;sternal precautions  (3 L O2)   Heart Disease Risk Factors High blood pressure;Lack of physical activity;Dislipidemia;Gender;Age   Cognitive Status Examination   Orientation orientation to person, place and time   Level of Consciousness alert   Follows Commands (Cognition) WNL   Sensory Examination   Sensory Quick Adds No deficits were identified   Pain Assessment   Patient Currently in Pain Yes, see Vital Sign flowsheet  (9/10 sternal pain)   Range of Motion (ROM)   ROM Comment B shoulder to approx 90 degrees flexion due to pain   Strength   Strength Comments B UE MMT NT due to sternal precautions   Bed Mobility Skill: Supine to Sit   Level of [place]: Supine/Sit moderate assist (50% patients effort)   Transfer Skill: Bed to Chair/Chair to Bed   Level of [place]: Bed to Chair contact guard   Transfer Skill: Sit to Stand   Level of [place]: Sit/Stand minimum assist (75% patients effort)   Upper Body  "Dressing   Level of [place]: Dress Upper Body moderate assist (50% patients effort)   Lower Body Dressing   Level of [place]: Dress Lower Body maximum assist (25% patients effort)   Instrumental Activities of Daily Living (IADL)   Previous Responsibilities work;driving;finances;medication management;meal prep;housekeeping;laundry;shopping   Activities of Daily Living Analysis   Impairments Contributing to Impaired Activities of Daily Living balance impaired;pain;post surgical precautions;strength decreased  (decreased activity tolerance)   General Therapy Interventions   Planned Therapy Interventions ADL retraining;cognition;transfer training;home program guidelines;progressive activity/exercise;risk factor education   Clinical Impression   Criteria for Skilled Therapeutic Interventions Met yes, treatment indicated   OT Diagnosis decreased ADLs and functional mobility   Influenced by the following impairments impaired balance, activity tolerance, strength, pain and sternal precautions   Assessment of Occupational Performance 3-5 Performance Deficits   Identified Performance Deficits impaired I with dressing, toilet and shower transfer, work, driving.   Clinical Decision Making (Complexity) Moderate complexity   Therapy Frequency 2 times/day   Predicted Duration of Therapy Intervention (days/wks) 5 days   Anticipated Discharge Disposition Home with Assist;Home with Outpatient Therapy;Transitional Care Facility   Risks and Benefits of Treatment have been explained. Yes   Patient, Family & other staff in agreement with plan of care Yes   [facility] AM-PAC  \"6 Clicks\" Daily Activity Inpatient Short Form   1. Putting on and taking off regular lower body clothing? 2 - A Lot   2. Bathing (including washing, rinsing, drying)? 2 - A Lot   3. Toileting, which includes using toilet, bedpan or urinal? 2 - A Lot   4. Putting on and taking off regular upper body clothing? 2 - A Lot   5. Taking care of personal " grooming such as brushing teeth? 3 - A Little   6. Eating meals? 3 - A Little   Daily Activity Raw Score (Score out of 24.Lower scores equate to lower levels of function) 14   Total Evaluation Time   Total Evaluation Time (Minutes) 10

## 2019-03-19 NOTE — PROGRESS NOTES
[facility]  Cardiovascular and Thoracic Surgery Daily Note          Assessment and Plan:   POD#1 s/p Coronary artery bypass grafting x4, LIMA-LAD, SVG-DIAG, SVG-OM, SVG-PDA; endoscopic harvest left greater saphenous vein Dr. Lawrence Wright  -CVS: HR: 70-80. SBP: 120-130s. ASA, BB, statin. Weaned off gtts. Chest tubes to suction. Pacer wires capped. Pre op EF 35-40%. CORE consult placed.  -Resp: Extubated within protocol. Saturating well on 2L. Continue to encourage IS, cough, deep breathing, ambulation  -Neuro:  Grossly intact. Pain controlled-- better controlled with robaxin  -Renal: good UO, baseline Cr is around 1.5. Below preoperative weight. Will hold diuretics today.   Creatinine   Date Value Ref Range Status   2019 1.72 (H) 0.66 - 1.25 mg/dL Final   ]  -GI: Tolerating clears. Will advance as tolerated Continue bowel regimen  -:  Dejesus in place d/t limited mobility and need for accurate I&Os.   -Endo: pre op a1c:7.9. Hx of DM II. PTA metformin on hold. Continue Insulin gtt 48hrs, Consult placed to Hospitalist for glucose management  -FEN: replete lytes as needed, ADAT, Na: 142. K: 4.9  Orders Placed This Encounter      Advance Diet as Tolerated: Clear Liquid Diet; Regular Diet Adult    -ID: Temp (24hrs), Av.8  F (37.1  C), Min:97  F (36.1  C), Max:99.9  F (37.7  C)   Completing perioperative abx.   WBC   Date Value Ref Range Status   2019 18.1 (H) 4.0 - 11.0 10e9/L Final   ]  -Heme: plt: 202. Acute blood loss anemia and thrombocytopenia.   Hemoglobin   Date Value Ref Range Status   2019 7.3 (L) 13.3 - 17.7 g/dL Final   ],   -Proph: PCD, ASA, BB, statin, PPI, sub q heparin  -Dispo: Transfer to [facility]. Initiate therapies. Continue to encourage IS, cough, deep breathing, ambulation          Interval History:   Patient seen with . Extubated within protocol. Saturating well on 2L. Poor pain control this am-- better with robaxin. Tolerating clears.           "Medications:       acetaminophen  975 mg Oral Q8H     [START ON 3/20/2019] aspirin  325 mg Oral Daily     atorvastatin  80 mg Oral QPM     ceFAZolin  1 g Intravenous Q8H     heparin  5,000 Units Subcutaneous Q8H     insulin aspart   Subcutaneous TID w/meals     lidocaine  1 patch Transdermal Q24H     lidocaine   Transdermal Q8H     lidocaine   Transdermal Q24h     metoprolol tartrate  25 mg Oral Q12H    Or     metoprolol  25 mg Per NG tube Q12H     [START ON 3/20/2019] pantoprazole  40 mg Oral QAM AC     senna-docusate  1 tablet Oral BID    Or     senna-docusate  2 tablet Oral BID     sodium chloride (PF)  3 mL Intracatheter Q8H     [START ON 3/21/2019] acetaminophen, IV fluid REPLACEMENT ONLY, glucose **OR** dextrose **OR** glucagon, gabapentin, HOLD MEDICATION, hydrALAZINE, HYDROmorphone, lidocaine 4%, lidocaine (buffered or not buffered), lidocaine (buffered or not buffered), magnesium sulfate, magnesium sulfate, methocarbamol, naloxone, ondansetron **OR** ondansetron, oxyCODONE, potassium chloride, potassium chloride with lidocaine, potassium chloride, potassium chloride, potassium chloride, sodium chloride (PF), sodium phosphate, sodium phosphate, sodium phosphate, sodium phosphate          Physical Exam:   Vitals were reviewed  Blood pressure 127/79, pulse 71, temperature 98.2  F (36.8  C), resp. rate 11, height 1.6 m (5' 3\"), weight 69.9 kg (154 lb 1.6 oz), SpO2 100 %.  Rhythm: NSR    Lungs: diminished bases    Cardiovascular: RRR normal s1 and s2    Abdomen: soft NTND    Extremeties: minimal edema    Incision: CDI    CT: to suction    Weight:   Vitals:    03/15/19 0455 03/16/19 0550 03/17/19 0314 03/18/19 0536   Weight: 69.7 kg (153 lb 11.2 oz) 69.3 kg (152 lb 11.2 oz) 67.6 kg (149 lb) 67.1 kg (148 lb)    03/19/19 0600   Weight: 69.9 kg (154 lb 1.6 oz)            Data:   Labs:   Lab Results   Component Value Date    WBC 18.1 03/19/2019     Lab Results   Component Value Date    RBC 2.90 03/19/2019     Lab " Results   Component Value Date    HGB 7.3 03/19/2019     Lab Results   Component Value Date    HCT 22.3 03/19/2019     No components found for: MCT  Lab Results   Component Value Date    MCV 77 03/19/2019     Lab Results   Component Value Date    MCH 25.2 03/19/2019     Lab Results   Component Value Date    MCHC 32.7 03/19/2019     Lab Results   Component Value Date    RDW 14.9 03/19/2019     Lab Results   Component Value Date     03/19/2019       Last Basic Metabolic Panel:  Lab Results   Component Value Date     03/19/2019      Lab Results   Component Value Date    POTASSIUM 4.9 03/19/2019     Lab Results   Component Value Date    CHLORIDE 109 03/19/2019     Lab Results   Component Value Date    MYKE 8.4 03/19/2019     Lab Results   Component Value Date    CO2 23 03/19/2019     Lab Results   Component Value Date    BUN 25 03/19/2019     Lab Results   Component Value Date    CR 1.72 03/19/2019     Lab Results   Component Value Date     03/19/2019       CXR: 3/19/19  Final read pending. Clinical read-- stable post operative changes.    Terri Beasley PA-C  CV Surgery  Pager #[phone]

## 2019-03-20 ENCOUNTER — APPOINTMENT (OUTPATIENT)
Dept: OCCUPATIONAL THERAPY | Facility: CLINIC | Age: 64
DRG: 233 | End: 2019-03-20
Payer: COMMERCIAL

## 2019-03-20 ENCOUNTER — APPOINTMENT (OUTPATIENT)
Dept: GENERAL RADIOLOGY | Facility: CLINIC | Age: 64
DRG: 233 | End: 2019-03-20
Attending: HOSPITALIST
Payer: COMMERCIAL

## 2019-03-20 LAB
ANION GAP SERPL CALCULATED.3IONS-SCNC: 9 MMOL/L (ref 3–14)
BASOPHILS # BLD AUTO: 0 10E9/L (ref 0–0.2)
BASOPHILS NFR BLD AUTO: 0.1 %
BUN SERPL-MCNC: 34 MG/DL (ref 7–30)
CALCIUM SERPL-MCNC: 8.5 MG/DL (ref 8.5–10.1)
CHLORIDE SERPL-SCNC: 106 MMOL/L (ref 94–109)
CO2 SERPL-SCNC: 25 MMOL/L (ref 20–32)
CREAT SERPL-MCNC: 2.23 MG/DL (ref 0.66–1.25)
DIFFERENTIAL METHOD BLD: ABNORMAL
EOSINOPHIL # BLD AUTO: 0.1 10E9/L (ref 0–0.7)
EOSINOPHIL NFR BLD AUTO: 0.3 %
ERYTHROCYTE [DISTWIDTH] IN BLOOD BY AUTOMATED COUNT: 15.9 % (ref 10–15)
GFR SERPL CREATININE-BSD FRML MDRD: 31 ML/MIN/{1.73_M2}
GLUCOSE BLDC GLUCOMTR-MCNC: 100 MG/DL (ref 70–99)
GLUCOSE BLDC GLUCOMTR-MCNC: 112 MG/DL (ref 70–99)
GLUCOSE BLDC GLUCOMTR-MCNC: 115 MG/DL (ref 70–99)
GLUCOSE BLDC GLUCOMTR-MCNC: 119 MG/DL (ref 70–99)
GLUCOSE BLDC GLUCOMTR-MCNC: 120 MG/DL (ref 70–99)
GLUCOSE BLDC GLUCOMTR-MCNC: 126 MG/DL (ref 70–99)
GLUCOSE BLDC GLUCOMTR-MCNC: 135 MG/DL (ref 70–99)
GLUCOSE BLDC GLUCOMTR-MCNC: 140 MG/DL (ref 70–99)
GLUCOSE BLDC GLUCOMTR-MCNC: 142 MG/DL (ref 70–99)
GLUCOSE BLDC GLUCOMTR-MCNC: 168 MG/DL (ref 70–99)
GLUCOSE BLDC GLUCOMTR-MCNC: 208 MG/DL (ref 70–99)
GLUCOSE SERPL-MCNC: 125 MG/DL (ref 70–99)
HCT VFR BLD AUTO: 26.7 % (ref 40–53)
HGB BLD-MCNC: 8.5 G/DL (ref 13.3–17.7)
IMM GRANULOCYTES # BLD: 0.1 10E9/L (ref 0–0.4)
IMM GRANULOCYTES NFR BLD: 0.3 %
INTERPRETATION ECG - MUSE: NORMAL
LACTATE BLD-SCNC: 1.2 MMOL/L (ref 0.7–2)
LYMPHOCYTES # BLD AUTO: 1.1 10E9/L (ref 0.8–5.3)
LYMPHOCYTES NFR BLD AUTO: 6.5 %
MAGNESIUM SERPL-MCNC: 2.8 MG/DL (ref 1.6–2.3)
MCH RBC QN AUTO: 25.1 PG (ref 26.5–33)
MCHC RBC AUTO-ENTMCNC: 31.8 G/DL (ref 31.5–36.5)
MCV RBC AUTO: 79 FL (ref 78–100)
MONOCYTES # BLD AUTO: 2 10E9/L (ref 0–1.3)
MONOCYTES NFR BLD AUTO: 11.3 %
NEUTROPHILS # BLD AUTO: 14.2 10E9/L (ref 1.6–8.3)
NEUTROPHILS NFR BLD AUTO: 81.5 %
NRBC # BLD AUTO: 0 10*3/UL
NRBC BLD AUTO-RTO: 0 /100
PLATELET # BLD AUTO: 195 10E9/L (ref 150–450)
POTASSIUM SERPL-SCNC: 4.7 MMOL/L (ref 3.4–5.3)
RBC # BLD AUTO: 3.38 10E12/L (ref 4.4–5.9)
SODIUM SERPL-SCNC: 140 MMOL/L (ref 133–144)
WBC # BLD AUTO: 17.5 10E9/L (ref 4–11)

## 2019-03-20 PROCEDURE — 25800030 ZZH RX IP 258 OP 636: Performed by: PHYSICIAN ASSISTANT

## 2019-03-20 PROCEDURE — 25000132 ZZH RX MED GY IP 250 OP 250 PS 637: Performed by: PHYSICIAN ASSISTANT

## 2019-03-20 PROCEDURE — 12000000 ZZH R&B MED SURG/OB

## 2019-03-20 PROCEDURE — 80048 BASIC METABOLIC PNL TOTAL CA: CPT | Performed by: PHYSICIAN ASSISTANT

## 2019-03-20 PROCEDURE — 83605 ASSAY OF LACTIC ACID: CPT | Performed by: THORACIC SURGERY (CARDIOTHORACIC VASCULAR SURGERY)

## 2019-03-20 PROCEDURE — 97530 THERAPEUTIC ACTIVITIES: CPT | Mod: GO

## 2019-03-20 PROCEDURE — 25800030 ZZH RX IP 258 OP 636: Performed by: STUDENT IN AN ORGANIZED HEALTH CARE EDUCATION/TRAINING PROGRAM

## 2019-03-20 PROCEDURE — 25000132 ZZH RX MED GY IP 250 OP 250 PS 637: Performed by: STUDENT IN AN ORGANIZED HEALTH CARE EDUCATION/TRAINING PROGRAM

## 2019-03-20 PROCEDURE — 36415 COLL VENOUS BLD VENIPUNCTURE: CPT | Performed by: THORACIC SURGERY (CARDIOTHORACIC VASCULAR SURGERY)

## 2019-03-20 PROCEDURE — 25000128 H RX IP 250 OP 636: Performed by: STUDENT IN AN ORGANIZED HEALTH CARE EDUCATION/TRAINING PROGRAM

## 2019-03-20 PROCEDURE — 85025 COMPLETE CBC W/AUTO DIFF WBC: CPT | Performed by: PHYSICIAN ASSISTANT

## 2019-03-20 PROCEDURE — 74019 RADEX ABDOMEN 2 VIEWS: CPT

## 2019-03-20 PROCEDURE — 36415 COLL VENOUS BLD VENIPUNCTURE: CPT | Performed by: PHYSICIAN ASSISTANT

## 2019-03-20 PROCEDURE — 25000131 ZZH RX MED GY IP 250 OP 636 PS 637: Performed by: STUDENT IN AN ORGANIZED HEALTH CARE EDUCATION/TRAINING PROGRAM

## 2019-03-20 PROCEDURE — 99232 SBSQ HOSP IP/OBS MODERATE 35: CPT | Performed by: HOSPITALIST

## 2019-03-20 PROCEDURE — 25000131 ZZH RX MED GY IP 250 OP 636 PS 637: Performed by: HOSPITALIST

## 2019-03-20 PROCEDURE — 97110 THERAPEUTIC EXERCISES: CPT | Mod: GO

## 2019-03-20 PROCEDURE — 00000146 ZZHCL STATISTIC GLUCOSE BY METER IP

## 2019-03-20 PROCEDURE — 83735 ASSAY OF MAGNESIUM: CPT | Performed by: PHYSICIAN ASSISTANT

## 2019-03-20 PROCEDURE — 25000132 ZZH RX MED GY IP 250 OP 250 PS 637: Performed by: INTERNAL MEDICINE

## 2019-03-20 RX ORDER — NICOTINE POLACRILEX 4 MG
15-30 LOZENGE BUCCAL
Status: DISCONTINUED | OUTPATIENT
Start: 2019-03-20 | End: 2019-03-24 | Stop reason: HOSPADM

## 2019-03-20 RX ORDER — DEXTROSE MONOHYDRATE 25 G/50ML
25-50 INJECTION, SOLUTION INTRAVENOUS
Status: DISCONTINUED | OUTPATIENT
Start: 2019-03-20 | End: 2019-03-24 | Stop reason: HOSPADM

## 2019-03-20 RX ORDER — POLYETHYLENE GLYCOL 3350 17 G/17G
17 POWDER, FOR SOLUTION ORAL DAILY
Status: DISCONTINUED | OUTPATIENT
Start: 2019-03-20 | End: 2019-03-24 | Stop reason: HOSPADM

## 2019-03-20 RX ADMIN — METOPROLOL TARTRATE 25 MG: 25 TABLET ORAL at 21:13

## 2019-03-20 RX ADMIN — OXYCODONE HYDROCHLORIDE 5 MG: 5 TABLET ORAL at 06:29

## 2019-03-20 RX ADMIN — ATORVASTATIN CALCIUM 80 MG: 80 TABLET, FILM COATED ORAL at 21:12

## 2019-03-20 RX ADMIN — PANTOPRAZOLE SODIUM 40 MG: 40 TABLET, DELAYED RELEASE ORAL at 06:31

## 2019-03-20 RX ADMIN — ASPIRIN 325 MG: 325 TABLET, DELAYED RELEASE ORAL at 09:25

## 2019-03-20 RX ADMIN — SENNOSIDES AND DOCUSATE SODIUM 2 TABLET: 8.6; 5 TABLET ORAL at 09:25

## 2019-03-20 RX ADMIN — SENNOSIDES AND DOCUSATE SODIUM 2 TABLET: 8.6; 5 TABLET ORAL at 21:12

## 2019-03-20 RX ADMIN — SODIUM CHLORIDE, POTASSIUM CHLORIDE, SODIUM LACTATE AND CALCIUM CHLORIDE: 600; 310; 30; 20 INJECTION, SOLUTION INTRAVENOUS at 22:52

## 2019-03-20 RX ADMIN — ACETAMINOPHEN 975 MG: 325 TABLET, FILM COATED ORAL at 06:29

## 2019-03-20 RX ADMIN — POLYETHYLENE GLYCOL 3350 17 G: 17 POWDER, FOR SOLUTION ORAL at 16:15

## 2019-03-20 RX ADMIN — INSULIN GLARGINE 3 UNITS: 100 INJECTION, SOLUTION SUBCUTANEOUS at 22:52

## 2019-03-20 RX ADMIN — LIDOCAINE 1 PATCH: 560 PATCH PERCUTANEOUS; TOPICAL; TRANSDERMAL at 09:25

## 2019-03-20 RX ADMIN — SODIUM CHLORIDE 0.5 UNITS/HR: 9 INJECTION, SOLUTION INTRAVENOUS at 10:24

## 2019-03-20 RX ADMIN — ACETAMINOPHEN 975 MG: 325 TABLET, FILM COATED ORAL at 21:13

## 2019-03-20 RX ADMIN — ONDANSETRON 4 MG: 2 INJECTION INTRAMUSCULAR; INTRAVENOUS at 16:43

## 2019-03-20 RX ADMIN — HEPARIN SODIUM 5000 UNITS: 5000 INJECTION, SOLUTION INTRAVENOUS; SUBCUTANEOUS at 04:42

## 2019-03-20 RX ADMIN — OXYCODONE HYDROCHLORIDE 5 MG: 5 TABLET ORAL at 10:20

## 2019-03-20 RX ADMIN — METOPROLOL TARTRATE 25 MG: 25 TABLET ORAL at 09:25

## 2019-03-20 RX ADMIN — ACETAMINOPHEN 975 MG: 325 TABLET, FILM COATED ORAL at 12:54

## 2019-03-20 RX ADMIN — SODIUM CHLORIDE 0.5 UNITS/HR: 9 INJECTION, SOLUTION INTRAVENOUS at 14:26

## 2019-03-20 RX ADMIN — HEPARIN SODIUM 5000 UNITS: 5000 INJECTION, SOLUTION INTRAVENOUS; SUBCUTANEOUS at 21:14

## 2019-03-20 RX ADMIN — HEPARIN SODIUM 5000 UNITS: 5000 INJECTION, SOLUTION INTRAVENOUS; SUBCUTANEOUS at 12:54

## 2019-03-20 ASSESSMENT — ACTIVITIES OF DAILY LIVING (ADL)
ADLS_ACUITY_SCORE: 12

## 2019-03-20 ASSESSMENT — MIFFLIN-ST. JEOR: SCORE: 1412.73

## 2019-03-20 NOTE — PLAN OF CARE
Pt A&O. VSS on 2L NC. Insulin drip discontinued, now on sliding scale with meals and at bedtime. Tele: NSR. Sternal incision and LLE incisions with liquid adhesive CDI and VIOLA. Up with 1 and walker.

## 2019-03-20 NOTE — PROVIDER NOTIFICATION
Whalen discontinued at 1100, urine amount was 175cc.  No void since whalen discontinued, pt bladder scanned for 84cc.  Terri FREDERICK paged to update her.

## 2019-03-20 NOTE — PROVIDER NOTIFICATION
Hospitalist paged and asked to assess the insulin gtt and possible transition to insulin pens.  Awaiting a return response.

## 2019-03-20 NOTE — PLAN OF CARE
Pt is A&O, limited english. AVSS, sinus rhythm. Sats 99% on 2 liters. Pain control with oxycodone and tylenol. Chest tube patent. No air leak. Borderline urine output. Insulin gtt infusing.

## 2019-03-20 NOTE — CONSULTS
[facility] evaluation referral received from Melba David CNP. Khang is not currently established in the [facility].     Note that Khang was admitted for SOB and CP, NSTEMI w/ ischemic CM and HFrEF. Now S/P CABG and wt increasing. Was on hydrochlorothiazide PTA, not currently on diuretic as inpt. He had been receiving IV lasix pre-op. Note worsening renal fxn on today's BMP.     [facility] RNs will continue to follow progress and arrange for follow-up with a [facility] KEVIN once a discharge date/disposition is probable.     We look forward to seeing Khang in the clinic.   Please call with questions.     Angelina Oropeza RN, BSN  [facility] RN Care Coordinator  [facility]  [phone]    [facility]: Cardiomyopathy, Optimization, Rehabilitation, Education   The [facility] is a heart failure specialty clinic within the [facility] where you will work with your cardiologist, nurse practitioners, physician assistants and registered nurses who specialize in heart failure care. They are dedicated to helping patients with heart failure to carefully adjust medications, receive education, and learn who and when to call if symptoms develop. They specialize in helping you better understand your condition, slow the progression of your disease, improve the length and quality of your life, help you detect future heart problems before they become life threatening, and avoid hospitalizations.

## 2019-03-20 NOTE — PLAN OF CARE
Pt A&O. VSS on 2L NC. Tele: NSR. Pt on insulin drip with blood sugar checks every 2 hours; alg 1, 0.5u. Incisions CDI/VIOLA. CT in place to -20 suction. No pacer wires. CMS/Neuros intact. Pericardial rub. Dejesus in place and patent; good urine output. BS hypo, no flatus noted. Pain managed with scheduled tylenol. Up with assist of 2.

## 2019-03-20 NOTE — PROGRESS NOTES
[facility]  Cardiovascular and Thoracic Surgery Daily Note          Assessment and Plan:   POD#2 s/p Coronary artery bypass grafting x4, LIMA-LAD, SVG-DIAG, SVG-OM, SVG-PDA; endoscopic harvest left greater saphenous vein Dr. Lawrence Wright  -CVS: HR: 60s-70s. SBP: 100s-110s. ASA, BB, statin. Chest tubes too much to pull. Pre op EF 35-40%. CORE consult placed. Repeated abdominal x-ray for free peritoneal air from surgery. Stable.   -Resp: Extubated within protocol. Saturating well on 2L. Continue to wean as able. Continue to encourage IS, cough, deep breathing, ambulation  -Neuro:  Grossly intact. Pain controlled.   -Renal: good UOP. Below preoperative weight. ASIA with CKD Baseline Cr around 1.3-1.6. Cr: 2.23 today. Continue to monitor and hold diuretics.   -GI:  Tolerating diet. No BM. Continue bowel regimen. miralax added  -:  Dejesus removed today. Due to void  -Endo: pre op A1c: 7.9. Hx of DM II. PTA metformin on hold. Insulin gtt for 48 hours then transition to subcutaneous today per Hospitalist following for glycemic control  -FEN: replace electrolytes as needed. Na: 140. K: 4.7  Orders Placed This Encounter      Moderate Consistent CHO Diet    -ID: Temp (24hrs), Av.1  F (36.7  C), Min:97.7  F (36.5  C), Max:98.3  F (36.8  C)  WBC: 17.5. Leukocytosis likely related to stress from surgery, will continue to monitor.   -Heme: Hgb: 8.5. Plt: 195. Acute blood loss anemia and thrombocytopenia related to surgery.   -Proph: PCD, ASA, BB, statin, PPI, sub q heparin  -Dispo: [facility]. Continue therapies. Continue to encourage IS, cough, deep breathing ambulation. Chest tubes to remain in d/t too much to pull.           Interval History:   No acute events overnight. Saturating well on 2L. Pain controlled. Tolerating diet. No BM.          Medications:       acetaminophen  975 mg Oral Q8H     aspirin  325 mg Oral Daily     atorvastatin  80 mg Oral QPM     heparin  5,000 Units Subcutaneous Q8H     insulin  "aspart   Subcutaneous TID w/meals     lidocaine  1 patch Transdermal Q24H     lidocaine   Transdermal Q8H     lidocaine   Transdermal Q24h     metoprolol tartrate  25 mg Oral Q12H    Or     metoprolol  25 mg Per NG tube Q12H     pantoprazole  40 mg Oral QAM AC     senna-docusate  1 tablet Oral BID    Or     senna-docusate  2 tablet Oral BID     sodium chloride (PF)  3 mL Intracatheter Q8H     [START ON 3/21/2019] acetaminophen, IV fluid REPLACEMENT ONLY, glucose **OR** dextrose **OR** glucagon, gabapentin, HOLD MEDICATION, hydrALAZINE, HYDROmorphone, lidocaine 4%, lidocaine (buffered or not buffered), lidocaine (buffered or not buffered), magnesium sulfate, magnesium sulfate, methocarbamol, naloxone, ondansetron **OR** ondansetron, oxyCODONE, potassium chloride, potassium chloride with lidocaine, potassium chloride, potassium chloride, potassium chloride, sodium chloride (PF), sodium phosphate, sodium phosphate, sodium phosphate, sodium phosphate          Physical Exam:   Vitals were reviewed  Blood pressure 122/68, pulse 64, temperature 97.7  F (36.5  C), temperature source Oral, resp. rate 24, height 1.6 m (5' 3\"), weight 71.3 kg (157 lb 1.6 oz), SpO2 100 %.  Rhythm: NSR    Lungs: diminished bases    Cardiovascular: RRR normal s1 and s2    Abdomen: soft NTND    Extremeties: minimal edema    Incision: CDI    CT: to suction    Weight:   Vitals:    03/17/19 0314 03/18/19 0536 03/19/19 0600 03/19/19 1431   Weight: 67.6 kg (149 lb) 67.1 kg (148 lb) 69.9 kg (154 lb 1.6 oz) 70.4 kg (155 lb 3 oz)    03/20/19 0626   Weight: 71.3 kg (157 lb 1.6 oz)            Data:   Labs:   Lab Results   Component Value Date    WBC 17.5 03/20/2019     Lab Results   Component Value Date    RBC 3.38 03/20/2019     Lab Results   Component Value Date    HGB 8.5 03/20/2019     Lab Results   Component Value Date    HCT 26.7 03/20/2019     No components found for: MCT  Lab Results   Component Value Date    MCV 79 03/20/2019     Lab Results "   Component Value Date    MCH 25.1 03/20/2019     Lab Results   Component Value Date    MCHC 31.8 03/20/2019     Lab Results   Component Value Date    RDW 15.9 03/20/2019     Lab Results   Component Value Date     03/20/2019       Last Basic Metabolic Panel:  Lab Results   Component Value Date     03/20/2019      Lab Results   Component Value Date    POTASSIUM 4.7 03/20/2019     Lab Results   Component Value Date    CHLORIDE 106 03/20/2019     Lab Results   Component Value Date    MYKE 8.5 03/20/2019     Lab Results   Component Value Date    CO2 25 03/20/2019     Lab Results   Component Value Date    BUN 34 03/20/2019     Lab Results   Component Value Date    CR 2.23 03/20/2019     Lab Results   Component Value Date     03/20/2019       CXR: 3/18/19    IMPRESSION: Endotracheal tube has been removed. Remaining tubes are  unchanged, no pneumothorax. Question of increase in cardiomegaly  versus expiratory chest x-ray. Free air in the peritoneum, possibly  new. Follow-up exam tomorrow is advised. I discussed the findings with  the hospitalist, Dr. Bhaskar Motley, at 4:30 PM on 3/19/2019.    Terri Beasley PA-C  CV Surgery  Pager # [phone]

## 2019-03-20 NOTE — PROGRESS NOTES
SPIRITUAL HEALTH SERVICES Progress Note  [facility]    Visited per follow up.      present for this visit. SH introduced themselves and oriented Pt with SH services. Pt had no needs for SH at this time.      SH is available if needs arise.     Claus Taylor  Chaplain Resident

## 2019-03-20 NOTE — PROGRESS NOTES
[facility]    Medicine Progress Note - Hospitalist Service       Date of Admission:  3/11/2019  Assessment & Plan    Khang Mcelroy is a 63 year old male admitted on 3/11/2019 with acute hypoxemic and hypercapnic respiratory failure presumed secondary to a non-ST-elevation myocardial infarction. He was found to have multivessel coronary artery disease and is postoperative day # 2 coronary artery bypass grafting.     Acute hypoxic and hypercapnic respiratory failure secondary to acute systolic congestive heart failure  Non-ST-elevation myocardial infarction   Multi-vessel coronary artery disease  Ischemic cardiomyopathy   Patient with acute onset of shortness of breath after an episode of chest pain the day prior to admission  Initial EKG was concerning for STEMI, but repeat EKG did not show DWIGHT, but nonspecific t wave changes  CXR with bilateral infiltrates concerning for pulmonary edema  Initial troponin of 2.1, peaked at 53  Coronary angiogram revealed three-vessel coronary artery disease   Echocardiogram revealed an EF of 35-40%, with multiple wall motion abnormalities.  3/19-Postoperative day # 1 Coronary artery bypass grafting x4, LIMA-LAD, SVG-DIAG, SVG-OM, SVG-PDA; endoscopic harvest left greater saphenous vein, management per CT surgery  3/20- stable-x-rays show small amount of intra-abdominal air likely drom surgery- he is not symptomatic     Type 2 diabetes mellitus   Hemoglobin A1c 7.9  Prior to admission on glipizide and metformin- held  Currently on intravenous insulin infusion  3/20-change to subcutaneous today     Hypertension   Hyperlipidemia   Prior to admission on amlodipine, lisinopril/hydrochlorothiazide, losartan  Currently on atorvastatin and metoprolol     Stage 3 chronic kidney disease   Acute kidney injury   baseline creatinine is between 1.3-1.6  Creatinine 2.23 today- continue to monitor     Acute on chronic anemia  Baseline hemoglobin per care everywhere appears to be  between 9-10.5. He received a course of iron sucrose 3/13-15.   3/19- hemoglobin 7.3g and 1 unit of packed red blood cells was ordered  3/20- hemoglobin 8.5g    Diet: Room Service  Advance Diet as Tolerated: Clear Liquid Diet; Regular Diet Adult    DVT Prophylaxis: Pneumatic Compression Devices  Dejesus Catheter: in place, indication: Strict 1-2 Hour I&O, Strict 1-2 Hour I&O  Code Status: Full Code      Disposition Plan   Expected discharge: recommended to prior living arrangement once when stable postop.  Entered: Oliver Motley MD 03/20/2019, 10:18 AM     The patient's care was discussed with the Patient.    Oliver Motley MD  Hospitalist Service  [facility]    ______________________________________________________________________    Interval History   The patient still has expected incisional pain    Data reviewed today: I reviewed all medications, new labs and imaging results over the last 24 hours. I personally reviewed no images or EKG's today.    Physical Exam   Vital Signs: Temp: 98.2  F (36.8  C) Temp src: Oral BP: 108/67 Pulse: 65 Heart Rate: 65 Resp: 24 SpO2: 100 % O2 Device: Nasal cannula Oxygen Delivery: 2 LPM  Weight: 157 lbs 1.6 oz  Constitutional: awake, alert, cooperative, no apparent distress, and appears stated age  Respiratory: No increased work of breathing, good air exchange, clear to auscultation bilaterally, no crackles or wheezing  Cardiovascular:  regular rate and rhythm, normal S1 and S2, no S3 or S4, and no murmur noted  GI: soft and non tender with + bowel sounds  Skin: no jaundice  Neurologic: Awake, alert, oriented to name, place and time.  Cranial nerves II-XII are grossly intact.  Motor is 5 out of 5 bilaterally  Neuropsychiatric: General: normal, calm and normal eye contact    Data   Recent Labs   Lab 03/20/19  0457 03/19/19  0405 03/18/19  1413 03/18/19  1312   WBC 17.5* 18.1* 28.0* 23.0*   HGB 8.5* 7.3* 8.8* 6.8*   MCV 79 77* 77* 77*    202  226 208   INR  --   --  1.54* 1.79*    142 140 139   POTASSIUM 4.7 4.9 4.9 5.2   CHLORIDE 106 109 108 108   CO2 25 23 20 22   BUN 34* 25 21 21   CR 2.23* 1.72* 1.31* 1.32*   ANIONGAP 9 10 12 9   MYKE 8.5 8.4* 8.2* 8.7   * 137* 174* 175*   ALBUMIN  --  3.4 2.3*  --    PROTTOTAL  --  6.3* 5.7*  --    BILITOTAL  --  0.4 0.5  --    ALKPHOS  --  63 80  --    ALT  --  27 35  --    AST  --  50* 47*  --      Recent Results (from the past 24 hour(s))   XR Abdomen Port 2 Views    Narrative    XR ABDOMEN PORT 2 VW   3/20/2019 5:25 AM     INDICATION: Follow-up of free intraperitoneal air seen on 319 chest  x-ray.    COMPARISON: 3/19/2019 at 0523 hours.      Impression    IMPRESSION: Portable supine and lateral decubitus views again  demonstrates a small amount of free air in the abdomen as seen  yesterday. Correlate clinically. If patient has not had recent surgery  this could be due to bowel perforation. Scattered gas in the colon and  within nondilated small bowel loops.     ELINOR CASTELLON MD

## 2019-03-20 NOTE — PLAN OF CARE
Pt remains on the insulin gtt, awaiting for the hospitalist to round to assess transitioning off the insulin gtt. See computer for BG results.  Tele= SR. Sternal incision and (L) LE incision is C/D/I with derma bond.  Lungs are diminished bilaterally and pt has a non productive cough.  Pt remains on 2 of PO2 to keep SATs above 92%.  Pt C/O pain, oxycodone administered per MD orders and relief noted.

## 2019-03-20 NOTE — PLAN OF CARE
Discharge Planner OT   Patient plan for discharge: None stated.   Current status: ambulated for 6 min with walker and CG A, stable CV response. O2 94% on RA throughout.   Barriers to return to prior living situation: None.  Recommendations for discharge: anticipate home with wife A with LE ADLs, dressing, tub transfer, driving and OP CR.  Rationale for recommendations: Anticipate with LOS here, patient will be moving I'ly and able to return home with wife's A and OP CR.       Entered by: Millie Jacobsen 03/20/2019 10:51 AM        PM session: Patient nauseated at ~50 feet amb, BP 85/52 and HR 45 upon return to room. RN in to assess.

## 2019-03-21 ENCOUNTER — APPOINTMENT (OUTPATIENT)
Dept: OCCUPATIONAL THERAPY | Facility: CLINIC | Age: 64
DRG: 233 | End: 2019-03-21
Payer: COMMERCIAL

## 2019-03-21 LAB
ANION GAP SERPL CALCULATED.3IONS-SCNC: 7 MMOL/L (ref 3–14)
BUN SERPL-MCNC: 39 MG/DL (ref 7–30)
CALCIUM SERPL-MCNC: 8.6 MG/DL (ref 8.5–10.1)
CHLORIDE SERPL-SCNC: 106 MMOL/L (ref 94–109)
CO2 SERPL-SCNC: 24 MMOL/L (ref 20–32)
CREAT SERPL-MCNC: 1.99 MG/DL (ref 0.66–1.25)
ERYTHROCYTE [DISTWIDTH] IN BLOOD BY AUTOMATED COUNT: 15.7 % (ref 10–15)
GFR SERPL CREATININE-BSD FRML MDRD: 35 ML/MIN/{1.73_M2}
GLUCOSE BLDC GLUCOMTR-MCNC: 144 MG/DL (ref 70–99)
GLUCOSE BLDC GLUCOMTR-MCNC: 161 MG/DL (ref 70–99)
GLUCOSE BLDC GLUCOMTR-MCNC: 166 MG/DL (ref 70–99)
GLUCOSE BLDC GLUCOMTR-MCNC: 166 MG/DL (ref 70–99)
GLUCOSE BLDC GLUCOMTR-MCNC: 170 MG/DL (ref 70–99)
GLUCOSE SERPL-MCNC: 154 MG/DL (ref 70–99)
HCT VFR BLD AUTO: 25.7 % (ref 40–53)
HGB BLD-MCNC: 8.3 G/DL (ref 13.3–17.7)
INTERPRETATION ECG - MUSE: NORMAL
INTERPRETATION ECG - MUSE: NORMAL
MCH RBC QN AUTO: 25.5 PG (ref 26.5–33)
MCHC RBC AUTO-ENTMCNC: 32.3 G/DL (ref 31.5–36.5)
MCV RBC AUTO: 79 FL (ref 78–100)
PLATELET # BLD AUTO: 243 10E9/L (ref 150–450)
POTASSIUM SERPL-SCNC: 4.6 MMOL/L (ref 3.4–5.3)
RBC # BLD AUTO: 3.26 10E12/L (ref 4.4–5.9)
SODIUM SERPL-SCNC: 137 MMOL/L (ref 133–144)
WBC # BLD AUTO: 12.8 10E9/L (ref 4–11)

## 2019-03-21 PROCEDURE — 25000132 ZZH RX MED GY IP 250 OP 250 PS 637: Performed by: INTERNAL MEDICINE

## 2019-03-21 PROCEDURE — 25000132 ZZH RX MED GY IP 250 OP 250 PS 637: Performed by: STUDENT IN AN ORGANIZED HEALTH CARE EDUCATION/TRAINING PROGRAM

## 2019-03-21 PROCEDURE — 36415 COLL VENOUS BLD VENIPUNCTURE: CPT | Performed by: HOSPITALIST

## 2019-03-21 PROCEDURE — 97110 THERAPEUTIC EXERCISES: CPT | Mod: GO

## 2019-03-21 PROCEDURE — 00000146 ZZHCL STATISTIC GLUCOSE BY METER IP

## 2019-03-21 PROCEDURE — 25000132 ZZH RX MED GY IP 250 OP 250 PS 637: Performed by: PHYSICIAN ASSISTANT

## 2019-03-21 PROCEDURE — 99232 SBSQ HOSP IP/OBS MODERATE 35: CPT | Performed by: HOSPITALIST

## 2019-03-21 PROCEDURE — 97530 THERAPEUTIC ACTIVITIES: CPT | Mod: GO

## 2019-03-21 PROCEDURE — 25000131 ZZH RX MED GY IP 250 OP 636 PS 637: Performed by: HOSPITALIST

## 2019-03-21 PROCEDURE — 80048 BASIC METABOLIC PNL TOTAL CA: CPT | Performed by: HOSPITALIST

## 2019-03-21 PROCEDURE — 25800030 ZZH RX IP 258 OP 636: Performed by: PHYSICIAN ASSISTANT

## 2019-03-21 PROCEDURE — 12000000 ZZH R&B MED SURG/OB

## 2019-03-21 PROCEDURE — 97535 SELF CARE MNGMENT TRAINING: CPT | Mod: GO

## 2019-03-21 PROCEDURE — 25000128 H RX IP 250 OP 636: Performed by: STUDENT IN AN ORGANIZED HEALTH CARE EDUCATION/TRAINING PROGRAM

## 2019-03-21 PROCEDURE — 85027 COMPLETE CBC AUTOMATED: CPT | Performed by: HOSPITALIST

## 2019-03-21 RX ADMIN — METOPROLOL TARTRATE 25 MG: 25 TABLET ORAL at 08:48

## 2019-03-21 RX ADMIN — ACETAMINOPHEN 975 MG: 325 TABLET, FILM COATED ORAL at 06:55

## 2019-03-21 RX ADMIN — SENNOSIDES AND DOCUSATE SODIUM 2 TABLET: 8.6; 5 TABLET ORAL at 08:51

## 2019-03-21 RX ADMIN — HEPARIN SODIUM 5000 UNITS: 5000 INJECTION, SOLUTION INTRAVENOUS; SUBCUTANEOUS at 22:00

## 2019-03-21 RX ADMIN — ACETAMINOPHEN 975 MG: 325 TABLET, FILM COATED ORAL at 14:25

## 2019-03-21 RX ADMIN — HEPARIN SODIUM 5000 UNITS: 5000 INJECTION, SOLUTION INTRAVENOUS; SUBCUTANEOUS at 14:24

## 2019-03-21 RX ADMIN — PANTOPRAZOLE SODIUM 40 MG: 40 TABLET, DELAYED RELEASE ORAL at 06:56

## 2019-03-21 RX ADMIN — ASPIRIN 325 MG: 325 TABLET, DELAYED RELEASE ORAL at 08:51

## 2019-03-21 RX ADMIN — METOPROLOL TARTRATE 25 MG: 25 TABLET ORAL at 21:59

## 2019-03-21 RX ADMIN — ATORVASTATIN CALCIUM 80 MG: 80 TABLET, FILM COATED ORAL at 21:59

## 2019-03-21 RX ADMIN — LIDOCAINE 1 PATCH: 560 PATCH PERCUTANEOUS; TOPICAL; TRANSDERMAL at 08:52

## 2019-03-21 RX ADMIN — OXYCODONE HYDROCHLORIDE 5 MG: 5 TABLET ORAL at 12:03

## 2019-03-21 RX ADMIN — OXYCODONE HYDROCHLORIDE 5 MG: 5 TABLET ORAL at 08:49

## 2019-03-21 RX ADMIN — SODIUM CHLORIDE, POTASSIUM CHLORIDE, SODIUM LACTATE AND CALCIUM CHLORIDE: 600; 310; 30; 20 INJECTION, SOLUTION INTRAVENOUS at 11:40

## 2019-03-21 RX ADMIN — ACETAMINOPHEN 650 MG: 325 TABLET, FILM COATED ORAL at 02:15

## 2019-03-21 RX ADMIN — INSULIN GLARGINE 3 UNITS: 100 INJECTION, SOLUTION SUBCUTANEOUS at 22:00

## 2019-03-21 RX ADMIN — HEPARIN SODIUM 5000 UNITS: 5000 INJECTION, SOLUTION INTRAVENOUS; SUBCUTANEOUS at 06:55

## 2019-03-21 RX ADMIN — ONDANSETRON 4 MG: 2 INJECTION INTRAMUSCULAR; INTRAVENOUS at 17:45

## 2019-03-21 RX ADMIN — POLYETHYLENE GLYCOL 3350 17 G: 17 POWDER, FOR SOLUTION ORAL at 08:47

## 2019-03-21 RX ADMIN — SENNOSIDES AND DOCUSATE SODIUM 2 TABLET: 8.6; 5 TABLET ORAL at 21:59

## 2019-03-21 RX ADMIN — OXYCODONE HYDROCHLORIDE 5 MG: 5 TABLET ORAL at 17:01

## 2019-03-21 ASSESSMENT — ACTIVITIES OF DAILY LIVING (ADL)
ADLS_ACUITY_SCORE: 12

## 2019-03-21 ASSESSMENT — MIFFLIN-ST. JEOR: SCORE: 1418.13

## 2019-03-21 NOTE — PLAN OF CARE
VSS, bowels active, no BM, zofran once this shift for nausea, Chest tube no crepitus no airleak, lungs clear, , using green acapella with encouragement, ambulating 1 assist with walker and gait belt, incisions intact with dermabond, tele NSR

## 2019-03-21 NOTE — PLAN OF CARE
Vtials stable. Pain controlled with po. Incisions CDI. LS dim. Diet mod carb. Up with assist of 1 and walker. BS active and audible. Voiding WDL.

## 2019-03-21 NOTE — PLAN OF CARE
Discharge Planner OT   Patient plan for discharge: None stated today.  Current status: Able to stand from chair and toilet with I. Cueing needed for walker safety during ambulation. Declines walk, just did with nursing.   Barriers to return to prior living situation: None.  Recommendations for discharge: Home with wife's A with ADL as needed and all IADL and OP CR.   Rationale for recommendations: Moves slowly but well. Might not need a walker on discharge.       Entered by: Millie Jacobsen 03/21/2019 10:31 AM

## 2019-03-21 NOTE — PROGRESS NOTES
[facility]    Hospitalist Progress Note      Assessment & Plan   Khang Mcelroy is a 63 year old male who was admitted on 3/11/2019 with acute hypoxemic and hypercapnic respiratory failure presumed secondary to a NSTEMI. Found to have multivessel coronary artery disease and POD #3 CABG    Acute hypoxic and hypercapnic respiratory failure secondary to acute systolic congestive heart failure  Non-ST-elevation myocardial infarction   Multi-vessel coronary artery disease  Ischemic cardiomyopathy   Patient with acute onset of shortness of breath after an episode of chest pain the day prior to admission. Initial EKG was concerning for STEMI, but repeat EKG did not show DWIGHT, but nonspecific t wave changes. CXR with bilateral infiltrates concerning for pulmonary edema  Initial troponin of 2.1, peaked at 53. Coronary angiogram revealed three-vessel coronary artery disease. Echocardiogram revealed an EF of 35-40%, with multiple wall motion abnormalities.  3/19-Postoperative day # 1 Coronary artery bypass grafting x4, LIMA-LAD, SVG-DIAG, SVG-OM, SVG-PDA; endoscopic harvest left greater saphenous vein, management per CT surgery  3/20- stable-x-rays show small amount of intra-abdominal air likely drom surgery- he is not symptomatic   - Chest tubes remain in place  - Stop IVF after 8 more hours.     Type 2 diabetes mellitus   Hemoglobin A1c 7.9  Prior to admission on glipizide and metformin- held  Was on IV insulin, switched to subcutaneous on 3/20. BGs well controlled although he has poor intake  - Continue medium resistance sliding scale insulin and lantus 3 units at bedtime.     Hypertension   Hyperlipidemia   Prior to admission on amlodipine, lisinopril/hydrochlorothiazide, losartan  Currently on atorvastatin and metoprolol     Stage 3 chronic kidney disease   Acute kidney injury   baseline creatinine is between 1.3-1.6  Creatinine 2.23 improved to 1.99 with IVF     Acute on chronic anemia  Baseline  hemoglobin per care everywhere appears to be between 9-10.5. He received a course of iron sucrose 3/13-15.   3/19- hemoglobin 7.3g and 1 unit of packed red blood cells was ordered  3/20- hemoglobin 8.5g  3/21- hgb 8.3    DVT Prophylaxis: Pneumatic Compression Devices  Code Status: Full Code  Expected discharge: 2 - 3 days, recommended to prior living arrangement once chest tubes removed and stable from cardiac standpoint    Natalya Blanchard, DO  Text Page (7am - 6pm)    Interval History   Patient seen and examined. Pain in chest is tolerable. Appetite is poor. Wife willing to bring in some porridge from home to help encourage intake. Some dizziness with ambulation and sitting, but has not felt like passing out. No shortness of breath.  No BM yet, but does not feel the urge to go yet,    -Data reviewed today: I reviewed all new labs and imaging results over the last 24 hours. I personally reviewed no images or EKG's today.    Physical Exam   Temp: 97.7  F (36.5  C) Temp src: Oral BP: 109/65 Pulse: 67 Heart Rate: 59 Resp: 18 SpO2: 97 % O2 Device: None (Room air) Oxygen Delivery: 2 LPM  Vitals:    03/19/19 1431 03/20/19 0626 03/21/19 0300   Weight: 70.4 kg (155 lb 3 oz) 71.3 kg (157 lb 1.6 oz) 71.8 kg (158 lb 4.6 oz)     Vital Signs with Ranges  Temp:  [97.7  F (36.5  C)-98.2  F (36.8  C)] 97.7  F (36.5  C)  Pulse:  [45-77] 67  Heart Rate:  [59-63] 59  Resp:  [16-22] 18  BP: ()/(52-80) 109/65  SpO2:  [94 %-100 %] 97 %  I/O last 3 completed shifts:  In: 2313 [P.O.:1460; I.V.:853]  Out: 1245 [Urine:925; Chest Tube:320]    Constitutional: Awake, alert, cooperative, no apparent distress  Respiratory: Clear to auscultation bilaterally, occasional crackles bilateral bases, no wheeze.  Cardiovascular: Regular rate and rhythm, normal S1 and S2, and no murmur noted. Anterior chest midline incision, healing, no drainage. Chest tube in place.  GI: Normal bowel sounds, soft, non-distended, non-tender  Skin/Integumen: No  rashes, no cyanosis, no edema  Other:     Medications     IV fluid REPLACEMENT ONLY       lactated ringers 75 mL/hr at 03/21/19 1140       aspirin  325 mg Oral Daily     atorvastatin  80 mg Oral QPM     heparin  5,000 Units Subcutaneous Q8H     insulin aspart  1-7 Units Subcutaneous TID AC     insulin aspart  1-5 Units Subcutaneous At Bedtime     insulin glargine  3 Units Subcutaneous At Bedtime     lidocaine  1 patch Transdermal Q24H     lidocaine 2 %  10 mL Urethral Once     lidocaine   Transdermal Q8H     lidocaine   Transdermal Q24h     metoprolol tartrate  25 mg Oral Q12H    Or     metoprolol  25 mg Per NG tube Q12H     pantoprazole  40 mg Oral QAM AC     polyethylene glycol  17 g Oral Daily     senna-docusate  1 tablet Oral BID    Or     senna-docusate  2 tablet Oral BID     sodium chloride (PF)  3 mL Intracatheter Q8H       Data   Recent Labs   Lab 03/21/19  0751 03/20/19  0457 03/19/19  0405 03/18/19  1413 03/18/19  1312   WBC 12.8* 17.5* 18.1* 28.0* 23.0*   HGB 8.3* 8.5* 7.3* 8.8* 6.8*   MCV 79 79 77* 77* 77*    195 202 226 208   INR  --   --   --  1.54* 1.79*    140 142 140 139   POTASSIUM 4.6 4.7 4.9 4.9 5.2   CHLORIDE 106 106 109 108 108   CO2 24 25 23 20 22   BUN 39* 34* 25 21 21   CR 1.99* 2.23* 1.72* 1.31* 1.32*   ANIONGAP 7 9 10 12 9   MYKE 8.6 8.5 8.4* 8.2* 8.7   * 125* 137* 174* 175*   ALBUMIN  --   --  3.4 2.3*  --    PROTTOTAL  --   --  6.3* 5.7*  --    BILITOTAL  --   --  0.4 0.5  --    ALKPHOS  --   --  63 80  --    ALT  --   --  27 35  --    AST  --   --  50* 47*  --        No results found for this or any previous visit (from the past 24 hour(s)).

## 2019-03-21 NOTE — PROGRESS NOTES
[facility]  Cardiovascular and Thoracic Surgery Daily Note          Assessment and Plan:   POD#3 s/p Coronary artery bypass grafting x4, LIMA-LAD, SVG-DIAG, SVG-OM, SVG-PDA; endoscopic harvest left greater saphenous vein Dr. Lawrence Wright  -CVS: HR: 60s-70s. SBP: 110s-140s. ASA, BB, statin. Pre op EF 35-40%. CORE consult placed. Chest tubes remain too much to pull.   -Resp: Extubated within protocol. Saturating well 2L-room air. Continue to wean as able. Continue to encourage IS, cough, deep breathing, ambulation.  -Neuro:  Grossly intact. Pain controlled.   -Renal: good UOP. ASIA with CKD. Baseline creat 1.3-1.6. Cr: 1.99<2.23. Below preoperative weight. Continue to monitor.   -GI:  Tolerating diet. +BM. Continue bowel regimen  -:  Voiding well on own.   -Endo: preop a1c: 7.9. Hx of DMII. PTA metformin on hold. Completed insulin gtt per protocol. Transitioned to lantus and sliding scale insulin. Hospitalist following for glycemic control.   -FEN: replace electrolytes as needed. Na: 137. K: 4.6.   Orders Placed This Encounter      Moderate Consistent CHO Diet    -ID: Temp (24hrs), Av.9  F (36.6  C), Min:97.7  F (36.5  C), Max:98.2  F (36.8  C)  WBC: 12.8<17.5. Leukocytosis likely related to stress from surgery. Will continue to monitor.   -Heme: Hgb: 8.3. Plt: 243. Acute blood loss anemia and thrombocytopenia related to surgery.  -Proph: PCD, ASA, BB, statin, PPI, sub q heparin  -Dispo: ST. 33. Continue therapies. Continue to encourage IS, cough, deep breathing, ambulation. Chest tubes to remain in d/t too much to pull.           Interval History:   No acute events overnight. Saturating well on room air. Pain controlled. Tolerating diet. +BM          Medications:       aspirin  325 mg Oral Daily     atorvastatin  80 mg Oral QPM     heparin  5,000 Units Subcutaneous Q8H     insulin aspart  1-7 Units Subcutaneous TID AC     insulin aspart  1-5 Units Subcutaneous At Bedtime     insulin glargine   "3 Units Subcutaneous At Bedtime     lidocaine  1 patch Transdermal Q24H     lidocaine 2 %  10 mL Urethral Once     lidocaine   Transdermal Q8H     lidocaine   Transdermal Q24h     metoprolol tartrate  25 mg Oral Q12H    Or     metoprolol  25 mg Per NG tube Q12H     pantoprazole  40 mg Oral QAM AC     polyethylene glycol  17 g Oral Daily     senna-docusate  1 tablet Oral BID    Or     senna-docusate  2 tablet Oral BID     sodium chloride (PF)  3 mL Intracatheter Q8H     acetaminophen, IV fluid REPLACEMENT ONLY, glucose **OR** dextrose **OR** glucagon, gabapentin, HOLD MEDICATION, hydrALAZINE, HYDROmorphone, lidocaine 4%, lidocaine (buffered or not buffered), lidocaine (buffered or not buffered), magnesium sulfate, magnesium sulfate, methocarbamol, naloxone, ondansetron **OR** ondansetron, oxyCODONE, potassium chloride, potassium chloride with lidocaine, potassium chloride, potassium chloride, potassium chloride, sodium chloride (PF), sodium phosphate, sodium phosphate, sodium phosphate, sodium phosphate          Physical Exam:   Vitals were reviewed  Blood pressure 109/65, pulse 67, temperature 97.7  F (36.5  C), temperature source Oral, resp. rate 18, height 1.6 m (5' 3\"), weight 71.8 kg (158 lb 4.6 oz), SpO2 97 %.  Rhythm: NSR    Lungs: diminished bases    Cardiovascular: RRR normal s1 and s2    Abdomen: soft NTND    Extremeties: minimal edema    Incision: CDI    CT: to suction    Weight:   Vitals:    03/18/19 0536 03/19/19 0600 03/19/19 1431 03/20/19 0626   Weight: 67.1 kg (148 lb) 69.9 kg (154 lb 1.6 oz) 70.4 kg (155 lb 3 oz) 71.3 kg (157 lb 1.6 oz)    03/21/19 0300   Weight: 71.8 kg (158 lb 4.6 oz)            Data:   Labs:   Lab Results   Component Value Date    WBC 12.8 03/21/2019     Lab Results   Component Value Date    RBC 3.26 03/21/2019     Lab Results   Component Value Date    HGB 8.3 03/21/2019     Lab Results   Component Value Date    HCT 25.7 03/21/2019     No components found for: MCT  Lab Results "   Component Value Date    MCV 79 03/21/2019     Lab Results   Component Value Date    MCH 25.5 03/21/2019     Lab Results   Component Value Date    MCHC 32.3 03/21/2019     Lab Results   Component Value Date    RDW 15.7 03/21/2019     Lab Results   Component Value Date     03/21/2019       Last Basic Metabolic Panel:  Lab Results   Component Value Date     03/21/2019      Lab Results   Component Value Date    POTASSIUM 4.6 03/21/2019     Lab Results   Component Value Date    CHLORIDE 106 03/21/2019     Lab Results   Component Value Date    MYKE 8.6 03/21/2019     Lab Results   Component Value Date    CO2 24 03/21/2019     Lab Results   Component Value Date    BUN 39 03/21/2019     Lab Results   Component Value Date    CR 1.99 03/21/2019     Lab Results   Component Value Date     03/21/2019       Terri Beasley PA-C  CV Surgery  Pager # [phone]

## 2019-03-21 NOTE — CONSULTS
"NUTRITION ASSESSMENT      REASON FOR ASSESSMENT:  Cardiac Surgery Nutrition Consult    CURRENT DIET / INTAKE:  Moderate CHO     Patient states that he consumed 50% of breakfast this morning --> Oatmeal, fruit cup, yogurt, and apple juice.   He does endorse some poor appetite post-op.    ANTHROPOMETRICS:   Ht: 5'3\"  Wt: 73 kg (3/11)  BMI: 26.22 kg/m^2  IBW: 52.3 kg   Weight Status: Overweight BMI 25-29.9  %IBW: 140%    MALNUTRITION:  Patient does not meet two of the following criteria necessary for diagnosing malnutrition:  significant weight loss, reduced intake, subcutaneous fat loss, muscle loss or fluid retention    NUTRITION DIAGNOSIS:   Inadequate oral intake R/t decreased appetite AEB consumed 50% of breakfast     INTERVENTIONS:    Nutrition Prescription:  Moderate CHO diet     Implementation:  Nutrition Education (Content):  Discussed the importance of adequate nutrition post-op for healing and energy, emphasizing protein foods, and encouraged patient to order a protein food at each meal.    Goals:  Patient to consume ~75% at meals in the next 3 - 5 days    Follow Up/Monitoring (InPatient):  Food and Fluid intake -  Monitor for adequacy    Follow Up/Monitoring (OutPatient):  Patient will participate in out-patient cardiac rehab and attend nutrition classes during the program    Charley Dahl RD, LD, CNSC   Clinical Dietitian - [facility]       "

## 2019-03-22 ENCOUNTER — APPOINTMENT (OUTPATIENT)
Dept: OCCUPATIONAL THERAPY | Facility: CLINIC | Age: 64
DRG: 233 | End: 2019-03-22
Payer: COMMERCIAL

## 2019-03-22 LAB
ANION GAP SERPL CALCULATED.3IONS-SCNC: 5 MMOL/L (ref 3–14)
BUN SERPL-MCNC: 31 MG/DL (ref 7–30)
CALCIUM SERPL-MCNC: 8.6 MG/DL (ref 8.5–10.1)
CHLORIDE SERPL-SCNC: 105 MMOL/L (ref 94–109)
CO2 SERPL-SCNC: 26 MMOL/L (ref 20–32)
CREAT SERPL-MCNC: 1.69 MG/DL (ref 0.66–1.25)
ERYTHROCYTE [DISTWIDTH] IN BLOOD BY AUTOMATED COUNT: 15.8 % (ref 10–15)
GFR SERPL CREATININE-BSD FRML MDRD: 43 ML/MIN/{1.73_M2}
GLUCOSE BLDC GLUCOMTR-MCNC: 134 MG/DL (ref 70–99)
GLUCOSE BLDC GLUCOMTR-MCNC: 169 MG/DL (ref 70–99)
GLUCOSE BLDC GLUCOMTR-MCNC: 185 MG/DL (ref 70–99)
GLUCOSE BLDC GLUCOMTR-MCNC: 206 MG/DL (ref 70–99)
GLUCOSE SERPL-MCNC: 129 MG/DL (ref 70–99)
HCT VFR BLD AUTO: 28.7 % (ref 40–53)
HGB BLD-MCNC: 9.3 G/DL (ref 13.3–17.7)
MAGNESIUM SERPL-MCNC: 2.7 MG/DL (ref 1.6–2.3)
MCH RBC QN AUTO: 25.8 PG (ref 26.5–33)
MCHC RBC AUTO-ENTMCNC: 32.4 G/DL (ref 31.5–36.5)
MCV RBC AUTO: 80 FL (ref 78–100)
PHOSPHATE SERPL-MCNC: 2.4 MG/DL (ref 2.5–4.5)
PLATELET # BLD AUTO: 279 10E9/L (ref 150–450)
POTASSIUM SERPL-SCNC: 4.3 MMOL/L (ref 3.4–5.3)
RBC # BLD AUTO: 3.6 10E12/L (ref 4.4–5.9)
SODIUM SERPL-SCNC: 136 MMOL/L (ref 133–144)
WBC # BLD AUTO: 12.5 10E9/L (ref 4–11)

## 2019-03-22 PROCEDURE — 99232 SBSQ HOSP IP/OBS MODERATE 35: CPT | Performed by: HOSPITALIST

## 2019-03-22 PROCEDURE — 12000000 ZZH R&B MED SURG/OB

## 2019-03-22 PROCEDURE — 85027 COMPLETE CBC AUTOMATED: CPT | Performed by: HOSPITALIST

## 2019-03-22 PROCEDURE — 83735 ASSAY OF MAGNESIUM: CPT | Performed by: THORACIC SURGERY (CARDIOTHORACIC VASCULAR SURGERY)

## 2019-03-22 PROCEDURE — 25000132 ZZH RX MED GY IP 250 OP 250 PS 637: Performed by: STUDENT IN AN ORGANIZED HEALTH CARE EDUCATION/TRAINING PROGRAM

## 2019-03-22 PROCEDURE — 80048 BASIC METABOLIC PNL TOTAL CA: CPT | Performed by: HOSPITALIST

## 2019-03-22 PROCEDURE — 36415 COLL VENOUS BLD VENIPUNCTURE: CPT | Performed by: THORACIC SURGERY (CARDIOTHORACIC VASCULAR SURGERY)

## 2019-03-22 PROCEDURE — 97110 THERAPEUTIC EXERCISES: CPT | Mod: GO | Performed by: OCCUPATIONAL THERAPIST

## 2019-03-22 PROCEDURE — 84100 ASSAY OF PHOSPHORUS: CPT | Performed by: THORACIC SURGERY (CARDIOTHORACIC VASCULAR SURGERY)

## 2019-03-22 PROCEDURE — 25000132 ZZH RX MED GY IP 250 OP 250 PS 637: Performed by: INTERNAL MEDICINE

## 2019-03-22 PROCEDURE — 25000132 ZZH RX MED GY IP 250 OP 250 PS 637: Performed by: PHYSICIAN ASSISTANT

## 2019-03-22 PROCEDURE — 25800030 ZZH RX IP 258 OP 636: Performed by: STUDENT IN AN ORGANIZED HEALTH CARE EDUCATION/TRAINING PROGRAM

## 2019-03-22 PROCEDURE — 25000131 ZZH RX MED GY IP 250 OP 636 PS 637: Performed by: HOSPITALIST

## 2019-03-22 PROCEDURE — 25000128 H RX IP 250 OP 636: Performed by: STUDENT IN AN ORGANIZED HEALTH CARE EDUCATION/TRAINING PROGRAM

## 2019-03-22 PROCEDURE — 00000146 ZZHCL STATISTIC GLUCOSE BY METER IP

## 2019-03-22 PROCEDURE — 25000125 ZZHC RX 250: Performed by: STUDENT IN AN ORGANIZED HEALTH CARE EDUCATION/TRAINING PROGRAM

## 2019-03-22 PROCEDURE — 85049 AUTOMATED PLATELET COUNT: CPT | Performed by: PHYSICIAN ASSISTANT

## 2019-03-22 PROCEDURE — 36415 COLL VENOUS BLD VENIPUNCTURE: CPT | Performed by: HOSPITALIST

## 2019-03-22 PROCEDURE — 97535 SELF CARE MNGMENT TRAINING: CPT | Mod: GO | Performed by: OCCUPATIONAL THERAPIST

## 2019-03-22 RX ORDER — AMLODIPINE BESYLATE 10 MG/1
10 TABLET ORAL DAILY
Status: DISCONTINUED | OUTPATIENT
Start: 2019-03-22 | End: 2019-03-24 | Stop reason: HOSPADM

## 2019-03-22 RX ORDER — LISINOPRIL 20 MG/1
20 TABLET ORAL DAILY
Status: DISCONTINUED | OUTPATIENT
Start: 2019-03-22 | End: 2019-03-24 | Stop reason: HOSPADM

## 2019-03-22 RX ADMIN — HEPARIN SODIUM 5000 UNITS: 5000 INJECTION, SOLUTION INTRAVENOUS; SUBCUTANEOUS at 15:31

## 2019-03-22 RX ADMIN — ONDANSETRON 4 MG: 2 INJECTION INTRAMUSCULAR; INTRAVENOUS at 18:20

## 2019-03-22 RX ADMIN — ASPIRIN 325 MG: 325 TABLET, DELAYED RELEASE ORAL at 09:24

## 2019-03-22 RX ADMIN — PANTOPRAZOLE SODIUM 40 MG: 40 TABLET, DELAYED RELEASE ORAL at 06:49

## 2019-03-22 RX ADMIN — SENNOSIDES AND DOCUSATE SODIUM 1 TABLET: 8.6; 5 TABLET ORAL at 21:11

## 2019-03-22 RX ADMIN — HEPARIN SODIUM 5000 UNITS: 5000 INJECTION, SOLUTION INTRAVENOUS; SUBCUTANEOUS at 06:48

## 2019-03-22 RX ADMIN — LISINOPRIL 20 MG: 20 TABLET ORAL at 09:24

## 2019-03-22 RX ADMIN — POLYETHYLENE GLYCOL 3350 17 G: 17 POWDER, FOR SOLUTION ORAL at 09:24

## 2019-03-22 RX ADMIN — OXYCODONE HYDROCHLORIDE 5 MG: 5 TABLET ORAL at 15:31

## 2019-03-22 RX ADMIN — ACETAMINOPHEN 650 MG: 325 TABLET, FILM COATED ORAL at 15:31

## 2019-03-22 RX ADMIN — METHOCARBAMOL TABLETS 750 MG: 750 TABLET, COATED ORAL at 09:24

## 2019-03-22 RX ADMIN — ATORVASTATIN CALCIUM 80 MG: 80 TABLET, FILM COATED ORAL at 21:11

## 2019-03-22 RX ADMIN — AMLODIPINE BESYLATE 10 MG: 10 TABLET ORAL at 09:24

## 2019-03-22 RX ADMIN — ACETAMINOPHEN 650 MG: 325 TABLET, FILM COATED ORAL at 09:24

## 2019-03-22 RX ADMIN — SODIUM PHOSPHATE, MONOBASIC, MONOHYDRATE 15 MMOL: 276; 142 INJECTION, SOLUTION INTRAVENOUS at 10:50

## 2019-03-22 RX ADMIN — LIDOCAINE 1 PATCH: 560 PATCH PERCUTANEOUS; TOPICAL; TRANSDERMAL at 09:24

## 2019-03-22 RX ADMIN — METOPROLOL TARTRATE 25 MG: 25 TABLET ORAL at 09:24

## 2019-03-22 RX ADMIN — HEPARIN SODIUM 5000 UNITS: 5000 INJECTION, SOLUTION INTRAVENOUS; SUBCUTANEOUS at 21:11

## 2019-03-22 RX ADMIN — METOPROLOL TARTRATE 25 MG: 25 TABLET ORAL at 21:10

## 2019-03-22 RX ADMIN — INSULIN GLARGINE 3 UNITS: 100 INJECTION, SOLUTION SUBCUTANEOUS at 21:11

## 2019-03-22 RX ADMIN — SENNOSIDES AND DOCUSATE SODIUM 2 TABLET: 8.6; 5 TABLET ORAL at 10:50

## 2019-03-22 ASSESSMENT — ACTIVITIES OF DAILY LIVING (ADL)
ADLS_ACUITY_SCORE: 12

## 2019-03-22 ASSESSMENT — MIFFLIN-ST. JEOR: SCORE: 1425.13

## 2019-03-22 NOTE — PLAN OF CARE
Discharge Planner OT   Patient plan for discharge: Home with wife and OP CR at [facility]  Current status: Patient with hypertension pre and post exercise. OK to exercise per RN, as patient just got BP meds. Refer to vitals flow sheet for details. Ambulated x 12 min with WW and CGA with stable CV response. (Treadmill deferred due to using walker with assist-will try NuStep in PM.) rates effort at 4/10.  Ascended/descended 3 stairs with single railing and SBA. Educated on sternal precautions with ADL, effort scale, signs of exercise intolerance. Patient able to don and doff socks with SBA following sternal precautions. Transfers with SBA from chair following sternal precautions.  Barriers to return to prior living situation: Impaired activity tolerance, assist for ADL and mobility.  Recommendations for discharge: Home with assist of wife for IADL/heavy lifting and OP CR.  Rationale for recommendations: Patient will continue to benefit from monitored exercise program at OP CR post CABG.       Entered by: Fay Ryan 03/22/2019 10:29 AM        In PM, used NuStep x 15 min with stable CV response, no signs/symptoms of activity intolerance. Patient and wife were educated on activity progression, exercise, sternal precautions with  present and were given handouts. They have an OP CR appt scheduled for next Tuesday.

## 2019-03-22 NOTE — PROGRESS NOTES
[facility]    Hospitalist Progress Note      Assessment & Plan   Khang Mcelroy is a 63 year old male who was admitted on 3/11/2019 with acute hypoxemic and hypercapnic respiratory failure presumed secondary to a NSTEMI. Found to have multivessel coronary artery disease and subsequently underwent CABG    Acute hypoxic and hypercapnic respiratory failure secondary to acute systolic congestive heart failure- resolved  Non-ST-elevation myocardial infarction   Multi-vessel coronary artery disease  Ischemic cardiomyopathy   POD#4 CABG  Patient with acute onset of shortness of breath after an episode of chest pain the day prior to admission. Initial EKG was concerning for STEMI, but repeat EKG did not show DWIGHT, but nonspecific t wave changes. CXR with bilateral infiltrates concerning for pulmonary edema  Initial troponin of 2.1, peaked at 53. Coronary angiogram revealed three-vessel coronary artery disease. Echocardiogram revealed an EF of 35-40%, with multiple wall motion abnormalities.  3/18- Coronary artery bypass grafting x4, LIMA-LAD, SVG-DIAG, SVG-OM, SVG-PDA; endoscopic harvest left greater saphenous vein  3/20- stable-x-rays show small amount of intra-abdominal air likely from surgery- he is not symptomatic. Leukocytosis secondary to surgery, improving.  - Chest tubes remain in place, post op cares per CT surgery. Remains off supplemental O2 and stable on room air.     Type 2 diabetes mellitus   Hemoglobin A1c 7.9  Prior to admission on glipizide and metformin- held  Was on IV insulin, switched to subcutaneous on 3/20. BGs well controlled although he has poor intake  - Continue medium resistance sliding scale insulin and lantus 3 units at bedtime.     Hypertension   Hyperlipidemia   Prior to admission on amlodipine, lisinopril/hydrochlorothiazide, losartan  Resumed amlodipine, lisinopril this AM, trend BPs. Continues on atorvastatin and metoprolol     Stage 3 chronic kidney disease   Acute  kidney injury   baseline creatinine is between 1.3-1.6  Creatinine 2.23 improved to 1.69 with IVF  - Repeat BMP in AM     Acute on chronic anemia  Baseline hemoglobin per care everywhere appears to be between 9-10.5. He received a course of iron sucrose 3/13-15. 3/19- hemoglobin 7.3g and 1 unit of packed red blood cells was ordered  3/22 hgb 9.3, stable.    DVT Prophylaxis: Pneumatic Compression Devices  Code Status: Full Code  Expected discharge: Per primary service, recommended to prior living arrangement once chest tubes removed and stable from cardiac standpoint    Natalya Blanchard, DO  Text Page (7am - 6pm)    Interval History   Patient seen and examined. Pain in chest is tolerable. Wife brought in rice soup from home and he reports eating multiple bowls. Poor appetite this morning with breakfast. No BM yet, does not yet feel the urge to go. Reports feeling of dizziness when he coughs. No shortness of breath, abdominal pain, vomiting, fevers, chills.    -Data reviewed today: I reviewed all new labs and imaging results over the last 24 hours. I personally reviewed no images or EKG's today.    Physical Exam   Temp: 98.1  F (36.7  C) Temp src: Oral BP: 160/83 Pulse: 70 Heart Rate: 70 Resp: 16 SpO2: 98 % O2 Device: None (Room air)    Vitals:    03/20/19 0626 03/21/19 0300 03/22/19 0500   Weight: 71.3 kg (157 lb 1.6 oz) 71.8 kg (158 lb 4.6 oz) 72.5 kg (159 lb 13.3 oz)     Vital Signs with Ranges  Temp:  [97.5  F (36.4  C)-98.1  F (36.7  C)] 98.1  F (36.7  C)  Pulse:  [60-70] 70  Heart Rate:  [59-70] 70  Resp:  [16-18] 16  BP: (109-160)/(65-83) 160/83  SpO2:  [96 %-98 %] 98 %  I/O last 3 completed shifts:  In: 2112 [P.O.:1580; I.V.:532]  Out: 1690 [Urine:1350; Chest Tube:340]    Constitutional: Awake, alert, cooperative, no apparent distress  Respiratory: Clear to auscultation bilaterally, crackles bilateral bases, no wheeze.  Cardiovascular: Regular rate and rhythm, normal S1 and S2, and no murmur noted. Anterior  chest midline incision, healing, no drainage. Chest tube in place.  GI: Normal bowel sounds, soft, non-distended, non-tender  Skin/Integumen: No rashes, no cyanosis, no edema  Other:     Medications     IV fluid REPLACEMENT ONLY         amLODIPine  10 mg Oral Daily     aspirin  325 mg Oral Daily     atorvastatin  80 mg Oral QPM     heparin  5,000 Units Subcutaneous Q8H     insulin aspart  1-7 Units Subcutaneous TID AC     insulin aspart  1-5 Units Subcutaneous At Bedtime     insulin glargine  3 Units Subcutaneous At Bedtime     lidocaine  1 patch Transdermal Q24H     lidocaine 2 %  10 mL Urethral Once     lidocaine   Transdermal Q8H     lidocaine   Transdermal Q24h     lisinopril  20 mg Oral Daily     metoprolol tartrate  25 mg Oral Q12H    Or     metoprolol  25 mg Per NG tube Q12H     pantoprazole  40 mg Oral QAM AC     polyethylene glycol  17 g Oral Daily     senna-docusate  1 tablet Oral BID    Or     senna-docusate  2 tablet Oral BID     sodium chloride (PF)  3 mL Intracatheter Q8H       Data   Recent Labs   Lab 03/22/19  0753 03/21/19  0751 03/20/19  0457 03/19/19  0405 03/18/19  1413 03/18/19  1312   WBC 12.5* 12.8* 17.5* 18.1* 28.0* 23.0*   HGB 9.3* 8.3* 8.5* 7.3* 8.8* 6.8*   MCV 80 79 79 77* 77* 77*    243 195 202 226 208   INR  --   --   --   --  1.54* 1.79*    137 140 142 140 139   POTASSIUM 4.3 4.6 4.7 4.9 4.9 5.2   CHLORIDE 105 106 106 109 108 108   CO2 26 24 25 23 20 22   BUN 31* 39* 34* 25 21 21   CR 1.69* 1.99* 2.23* 1.72* 1.31* 1.32*   ANIONGAP 5 7 9 10 12 9   MYKE 8.6 8.6 8.5 8.4* 8.2* 8.7   * 154* 125* 137* 174* 175*   ALBUMIN  --   --   --  3.4 2.3*  --    PROTTOTAL  --   --   --  6.3* 5.7*  --    BILITOTAL  --   --   --  0.4 0.5  --    ALKPHOS  --   --   --  63 80  --    ALT  --   --   --  27 35  --    AST  --   --   --  50* 47*  --        No results found for this or any previous visit (from the past 24 hour(s)).

## 2019-03-22 NOTE — PLAN OF CARE
AVSS. Tele NSR. Friction rub audible. CTs remain patent, serousy output, to water seal. No AL/no crep. LS dim, IS and acapella IND, 1250. Midline and LLE incisions X3 bruised, dermabonded and ecchymotic.Pain well controlled tylenol, robaxin, 5mg oxy. Ambulates well in halls/room with assist 1 ww. 2 soft BMs on shift.BS covered per POC. Appetite improving. Phos replaced, recheck in AM.  ordered, able to make needs known without.

## 2019-03-22 NOTE — PROGRESS NOTES
[facility]  Cardiovascular and Thoracic Surgery Daily Note          Assessment and Plan:   POD#4 s/p Coronary artery bypass grafting x4, LIMA-LAD, SVG-DIAG, SVG-OM, SVG-PDA; endoscopic harvest left greater saphenous vein Dr. Lawrence Wright  -CVS: HR: 60s-70s. SBP: 100s-160s. ASA, BB, statin. Resume PTA norvasc and lisinopril. Pre op EF 35-40%. CORE consult placed. Will monitor chest tube output and possible removal later today  -Resp: Extubated within protocol. Saturating well on room air. Continue to wean as able. Continue to encourage IS, cough, deep breathing, ambulation  -Neuro:  Grossly intact. Pain controlled.   -Renal: good UOP. ASIA with CKD. Baseline Creat 1.3-1.6. Cr: 1.69<1.99<2.23. Below preoperative weight. Continue to monitor.   -GI:  Tolerating diet. +BM. Continue bowel regimen  -:  Voiding well on own  -Endo: pre op a1c: 7.9. Hx of DMII. PTA metformin on hold. Completed insulin gtt per protocol. Transitioned to lantus and sliding scale insulin. Hospitalist following for glycemic control.   -FEN: replace electrolytes as needed Na: 136. K: 4.3  Orders Placed This Encounter      Moderate Consistent CHO Diet    -ID: Temp (24hrs), Av.8  F (36.6  C), Min:97.5  F (36.4  C), Max:98.1  F (36.7  C)  WBC: 12.5<12.8<17.5. Leukocytosis likely related to stress from surgery. Will continue to monitor.   -Heme: Hgb: 9.3. Plt: 279. Acute blood loss anemia and thrombocytopenia related to surgery.   -Proph: PCD, ASA, BB, statin, PPI, sub q heparin  -Dispo: [facility]. Continue therapies. Continue to encourage IS, cough, deep breathing, ambulation. Plan to remove chest tubes later if output appropriate this shift.           Interval History:   No acute events overnight. Saturating well on room air. Pain controlled. Tolerating diet. +BM         Medications:       amLODIPine  10 mg Oral Daily     aspirin  325 mg Oral Daily     atorvastatin  80 mg Oral QPM     heparin  5,000 Units Subcutaneous Q8H      "insulin aspart  1-7 Units Subcutaneous TID AC     insulin aspart  1-5 Units Subcutaneous At Bedtime     insulin glargine  3 Units Subcutaneous At Bedtime     lidocaine  1 patch Transdermal Q24H     lidocaine 2 %  10 mL Urethral Once     lidocaine   Transdermal Q8H     lidocaine   Transdermal Q24h     lisinopril  20 mg Oral Daily     metoprolol tartrate  25 mg Oral Q12H    Or     metoprolol  25 mg Per NG tube Q12H     pantoprazole  40 mg Oral QAM AC     polyethylene glycol  17 g Oral Daily     senna-docusate  1 tablet Oral BID    Or     senna-docusate  2 tablet Oral BID     sodium chloride (PF)  3 mL Intracatheter Q8H     acetaminophen, IV fluid REPLACEMENT ONLY, glucose **OR** dextrose **OR** glucagon, gabapentin, HOLD MEDICATION, hydrALAZINE, HYDROmorphone, lidocaine 4%, lidocaine (buffered or not buffered), lidocaine (buffered or not buffered), magnesium hydroxide, magnesium sulfate, magnesium sulfate, methocarbamol, naloxone, ondansetron **OR** ondansetron, oxyCODONE, potassium chloride, potassium chloride with lidocaine, potassium chloride, potassium chloride, potassium chloride, sodium chloride (PF), sodium phosphate, sodium phosphate, sodium phosphate, sodium phosphate          Physical Exam:   Vitals were reviewed  Blood pressure 170/80, pulse 70, temperature 98.1  F (36.7  C), temperature source Oral, resp. rate 16, height 1.6 m (5' 3\"), weight 72.5 kg (159 lb 13.3 oz), SpO2 97 %.  Rhythm: NSR    Lungs: diminished bases    Cardiovascular: RRR normal s1 and s2    Abdomen: soft NTND    Extremeties: minimal edema    Incision: CDI    CT: to suction    Weight:   Vitals:    03/19/19 0600 03/19/19 1431 03/20/19 0626 03/21/19 0300   Weight: 69.9 kg (154 lb 1.6 oz) 70.4 kg (155 lb 3 oz) 71.3 kg (157 lb 1.6 oz) 71.8 kg (158 lb 4.6 oz)    03/22/19 0500   Weight: 72.5 kg (159 lb 13.3 oz)            Data:   Labs:   Lab Results   Component Value Date    WBC 12.5 03/22/2019     Lab Results   Component Value Date    RBC " 3.60 03/22/2019     Lab Results   Component Value Date    HGB 9.3 03/22/2019     Lab Results   Component Value Date    HCT 28.7 03/22/2019     No components found for: MCT  Lab Results   Component Value Date    MCV 80 03/22/2019     Lab Results   Component Value Date    MCH 25.8 03/22/2019     Lab Results   Component Value Date    MCHC 32.4 03/22/2019     Lab Results   Component Value Date    RDW 15.8 03/22/2019     Lab Results   Component Value Date     03/22/2019       Last Basic Metabolic Panel:  Lab Results   Component Value Date     03/22/2019      Lab Results   Component Value Date    POTASSIUM 4.3 03/22/2019     Lab Results   Component Value Date    CHLORIDE 105 03/22/2019     Lab Results   Component Value Date    MYKE 8.6 03/22/2019     Lab Results   Component Value Date    CO2 26 03/22/2019     Lab Results   Component Value Date    BUN 31 03/22/2019     Lab Results   Component Value Date    CR 1.69 03/22/2019     Lab Results   Component Value Date     03/22/2019       Terri Beasley PA-C  [facility]  Pager # [phone]

## 2019-03-22 NOTE — PLAN OF CARE
AVSS. Pain controlled with PO. Incisions open to air with skin glue. LS dim. Pericardial friction rub on auscultation. Diet Mod carb; appetite still poor. Up with assist of 1; walker; gait belt. BS active and audible; passing gas. Voiding well.

## 2019-03-23 ENCOUNTER — APPOINTMENT (OUTPATIENT)
Dept: OCCUPATIONAL THERAPY | Facility: CLINIC | Age: 64
DRG: 233 | End: 2019-03-23
Payer: COMMERCIAL

## 2019-03-23 LAB
ANION GAP SERPL CALCULATED.3IONS-SCNC: 6 MMOL/L (ref 3–14)
BUN SERPL-MCNC: 24 MG/DL (ref 7–30)
CALCIUM SERPL-MCNC: 8.2 MG/DL (ref 8.5–10.1)
CHLORIDE SERPL-SCNC: 105 MMOL/L (ref 94–109)
CO2 SERPL-SCNC: 24 MMOL/L (ref 20–32)
CREAT SERPL-MCNC: 1.35 MG/DL (ref 0.66–1.25)
ERYTHROCYTE [DISTWIDTH] IN BLOOD BY AUTOMATED COUNT: 15.6 % (ref 10–15)
GFR SERPL CREATININE-BSD FRML MDRD: 56 ML/MIN/{1.73_M2}
GLUCOSE BLDC GLUCOMTR-MCNC: 125 MG/DL (ref 70–99)
GLUCOSE BLDC GLUCOMTR-MCNC: 127 MG/DL (ref 70–99)
GLUCOSE BLDC GLUCOMTR-MCNC: 128 MG/DL (ref 70–99)
GLUCOSE BLDC GLUCOMTR-MCNC: 143 MG/DL (ref 70–99)
GLUCOSE BLDC GLUCOMTR-MCNC: 163 MG/DL (ref 70–99)
GLUCOSE SERPL-MCNC: 115 MG/DL (ref 70–99)
HCT VFR BLD AUTO: 29.8 % (ref 40–53)
HGB BLD-MCNC: 9.5 G/DL (ref 13.3–17.7)
MCH RBC QN AUTO: 25.3 PG (ref 26.5–33)
MCHC RBC AUTO-ENTMCNC: 31.9 G/DL (ref 31.5–36.5)
MCV RBC AUTO: 80 FL (ref 78–100)
PHOSPHATE SERPL-MCNC: 2.6 MG/DL (ref 2.5–4.5)
PLATELET # BLD AUTO: 321 10E9/L (ref 150–450)
POTASSIUM SERPL-SCNC: 4.1 MMOL/L (ref 3.4–5.3)
RBC # BLD AUTO: 3.75 10E12/L (ref 4.4–5.9)
SODIUM SERPL-SCNC: 135 MMOL/L (ref 133–144)
WBC # BLD AUTO: 10.9 10E9/L (ref 4–11)

## 2019-03-23 PROCEDURE — 80048 BASIC METABOLIC PNL TOTAL CA: CPT | Performed by: PHYSICIAN ASSISTANT

## 2019-03-23 PROCEDURE — 25000132 ZZH RX MED GY IP 250 OP 250 PS 637: Performed by: STUDENT IN AN ORGANIZED HEALTH CARE EDUCATION/TRAINING PROGRAM

## 2019-03-23 PROCEDURE — 36415 COLL VENOUS BLD VENIPUNCTURE: CPT | Performed by: PHYSICIAN ASSISTANT

## 2019-03-23 PROCEDURE — 00000146 ZZHCL STATISTIC GLUCOSE BY METER IP

## 2019-03-23 PROCEDURE — 97535 SELF CARE MNGMENT TRAINING: CPT | Mod: GO

## 2019-03-23 PROCEDURE — 99232 SBSQ HOSP IP/OBS MODERATE 35: CPT | Performed by: HOSPITALIST

## 2019-03-23 PROCEDURE — 25000131 ZZH RX MED GY IP 250 OP 636 PS 637: Performed by: HOSPITALIST

## 2019-03-23 PROCEDURE — 12000000 ZZH R&B MED SURG/OB

## 2019-03-23 PROCEDURE — 84100 ASSAY OF PHOSPHORUS: CPT | Performed by: PHYSICIAN ASSISTANT

## 2019-03-23 PROCEDURE — 25000132 ZZH RX MED GY IP 250 OP 250 PS 637: Performed by: PHYSICIAN ASSISTANT

## 2019-03-23 PROCEDURE — 25000132 ZZH RX MED GY IP 250 OP 250 PS 637: Performed by: INTERNAL MEDICINE

## 2019-03-23 PROCEDURE — 25000128 H RX IP 250 OP 636: Performed by: STUDENT IN AN ORGANIZED HEALTH CARE EDUCATION/TRAINING PROGRAM

## 2019-03-23 PROCEDURE — 85027 COMPLETE CBC AUTOMATED: CPT | Performed by: PHYSICIAN ASSISTANT

## 2019-03-23 PROCEDURE — 97110 THERAPEUTIC EXERCISES: CPT | Mod: GO

## 2019-03-23 RX ADMIN — INSULIN GLARGINE 3 UNITS: 100 INJECTION, SOLUTION SUBCUTANEOUS at 22:42

## 2019-03-23 RX ADMIN — LIDOCAINE 1 PATCH: 560 PATCH PERCUTANEOUS; TOPICAL; TRANSDERMAL at 09:30

## 2019-03-23 RX ADMIN — HEPARIN SODIUM 5000 UNITS: 5000 INJECTION, SOLUTION INTRAVENOUS; SUBCUTANEOUS at 06:26

## 2019-03-23 RX ADMIN — ACETAMINOPHEN 650 MG: 325 TABLET, FILM COATED ORAL at 18:27

## 2019-03-23 RX ADMIN — ASPIRIN 325 MG: 325 TABLET, DELAYED RELEASE ORAL at 09:31

## 2019-03-23 RX ADMIN — METHOCARBAMOL TABLETS 750 MG: 750 TABLET, COATED ORAL at 18:27

## 2019-03-23 RX ADMIN — HEPARIN SODIUM 5000 UNITS: 5000 INJECTION, SOLUTION INTRAVENOUS; SUBCUTANEOUS at 18:27

## 2019-03-23 RX ADMIN — LISINOPRIL 20 MG: 20 TABLET ORAL at 09:32

## 2019-03-23 RX ADMIN — ATORVASTATIN CALCIUM 80 MG: 80 TABLET, FILM COATED ORAL at 21:54

## 2019-03-23 RX ADMIN — AMLODIPINE BESYLATE 10 MG: 10 TABLET ORAL at 09:32

## 2019-03-23 RX ADMIN — METOPROLOL TARTRATE 25 MG: 25 TABLET ORAL at 21:54

## 2019-03-23 RX ADMIN — ACETAMINOPHEN 650 MG: 325 TABLET, FILM COATED ORAL at 09:31

## 2019-03-23 RX ADMIN — METOPROLOL TARTRATE 25 MG: 25 TABLET ORAL at 09:31

## 2019-03-23 RX ADMIN — PANTOPRAZOLE SODIUM 40 MG: 40 TABLET, DELAYED RELEASE ORAL at 06:26

## 2019-03-23 RX ADMIN — METHOCARBAMOL TABLETS 750 MG: 750 TABLET, COATED ORAL at 09:31

## 2019-03-23 ASSESSMENT — ACTIVITIES OF DAILY LIVING (ADL)
ADLS_ACUITY_SCORE: 12

## 2019-03-23 ASSESSMENT — MIFFLIN-ST. JEOR: SCORE: 1418.13

## 2019-03-23 NOTE — PLAN OF CARE
Alert and oriented x4. Vital signs stable on room air. Assist of 1 + gait belt and walker. Tolerating mod carb diet. Lung sounds diminished. Pericardial friction rub noted. Bowel sounds active, + flatus, BM today. Adequate urine output. Sternal and left groin, knee, and ankle incisions bruised, VIOLA. Chest tube to water seal, dressing CDI. Rated pain 5/10, denied interventions. Denies nausea. Tele sinus rhythm.

## 2019-03-23 NOTE — PLAN OF CARE
AVSS on RA. Pain controlled with PO. Incisions VIOLA with skin glue. Ct to water seal. LS dim throughout. Diet mod carb. Up with assist of 1 and walker. BS active.

## 2019-03-23 NOTE — PROGRESS NOTES
[facility]    Hospitalist Progress Note      Assessment & Plan   Khang Mcelroy is a 63 year old male who was admitted on 3/11/2019 with acute hypoxemic and hypercapnic respiratory failure presumed secondary to a NSTEMI. Found to have multivessel coronary artery disease and subsequently underwent CABG    Acute hypoxic and hypercapnic respiratory failure secondary to acute systolic congestive heart failure- resolved  Non-ST-elevation myocardial infarction   Multi-vessel coronary artery disease  Ischemic cardiomyopathy   POD#5 CABG  Patient with acute onset of shortness of breath after an episode of chest pain the day prior to admission. Initial EKG was concerning for STEMI, but repeat EKG did not show DWIGHT, but nonspecific t wave changes. CXR with bilateral infiltrates concerning for pulmonary edema  Initial troponin of 2.1, peaked at 53. Coronary angiogram revealed three-vessel coronary artery disease. Echocardiogram revealed an EF of 35-40%, with multiple wall motion abnormalities.  3/18- Coronary artery bypass grafting x4, LIMA-LAD, SVG-DIAG, SVG-OM, SVG-PDA; endoscopic harvest left greater saphenous vein  3/20- stable-x-rays show small amount of intra-abdominal air likely from surgery- he is not symptomatic. Leukocytosis secondary to surgery, improving.  - Chest tubes removed on 3/23. Stable on room air.  - Expected discharge 3/24     Type 2 diabetes mellitus   Hemoglobin A1c 7.9  Prior to admission on glipizide and metformin- held  Was on IV insulin, switched to subcutaneous on 3/20. BGs well controlled although he has poor intake  - Continue medium resistance sliding scale insulin and lantus 3 units at bedtime.     Hypertension   Hyperlipidemia   Prior to admission on amlodipine, lisinopril/hydrochlorothiazide, losartan  Resumed amlodipine, lisinopril on 3/22. Continues on atorvastatin and metoprolol. BPs well controlled     Stage 3 chronic kidney disease   Acute kidney injury   baseline  creatinine is between 1.3-1.6  Creatinine 2.23 improved to 1.35, stable     Acute on chronic anemia  Baseline hemoglobin per care everywhere appears to be between 9-10.5. He received a course of iron sucrose 3/13-15. 3/19- hemoglobin 7.3g and 1 unit of packed red blood cells was ordered  3/22 hgb 9.3, stable.    DVT Prophylaxis: Pneumatic Compression Devices  Code Status: Full Code  Expected discharge: Tomorrow recommended to prior living arrangement    Natalya Blanchard DO  Text Page (7am - 6pm)    Interval History   Patient seen and examined. Pain in chest is tolerable. Intake still remains about 50%. Chest tube removed this morning. No shortness of breath. Excited to go home tomorrow.    -Data reviewed today: I reviewed all new labs and imaging results over the last 24 hours. I personally reviewed no images or EKG's today.    Physical Exam   Temp: 98.1  F (36.7  C) Temp src: Oral BP: 136/75 Pulse: 59 Heart Rate: 58 Resp: 18 SpO2: 97 % O2 Device: None (Room air)    Vitals:    03/21/19 0300 03/22/19 0500 03/23/19 0146   Weight: 71.8 kg (158 lb 4.6 oz) 72.5 kg (159 lb 13.3 oz) 71.8 kg (158 lb 4.6 oz)     Vital Signs with Ranges  Temp:  [97.9  F (36.6  C)-98.1  F (36.7  C)] 98.1  F (36.7  C)  Pulse:  [59-66] 59  Heart Rate:  [58-77] 58  Resp:  [16-18] 18  BP: (129-149)/(73-80) 136/75  SpO2:  [97 %-100 %] 97 %  I/O last 3 completed shifts:  In: 820 [P.O.:820]  Out: 1280 [Urine:1100; Chest Tube:180]    Constitutional: Awake, alert, cooperative, no apparent distress  Respiratory: Clear to auscultation bilaterally, crackles bilateral bases, no wheeze.  Cardiovascular: Regular rate and rhythm, normal S1 and S2, and no murmur noted. Anterior chest midline incision, healing, no drainage.  GI: Normal bowel sounds, soft, non-distended, non-tender  Skin/Integumen: No rashes, no cyanosis, no edema  Other:     Medications     IV fluid REPLACEMENT ONLY         amLODIPine  10 mg Oral Daily     aspirin  325 mg Oral Daily      atorvastatin  80 mg Oral QPM     heparin  5,000 Units Subcutaneous Q8H     insulin aspart  1-7 Units Subcutaneous TID AC     insulin aspart  1-5 Units Subcutaneous At Bedtime     insulin glargine  3 Units Subcutaneous At Bedtime     lidocaine  1 patch Transdermal Q24H     lidocaine 2 %  10 mL Urethral Once     lidocaine   Transdermal Q8H     lidocaine   Transdermal Q24h     lisinopril  20 mg Oral Daily     metoprolol tartrate  25 mg Oral Q12H    Or     metoprolol  25 mg Per NG tube Q12H     pantoprazole  40 mg Oral QAM AC     polyethylene glycol  17 g Oral Daily     senna-docusate  1 tablet Oral BID    Or     senna-docusate  2 tablet Oral BID     sodium chloride (PF)  3 mL Intracatheter Q8H       Data   Recent Labs   Lab 03/23/19  0757 03/22/19  0753 03/21/19  0751  03/19/19  0405 03/18/19  1413 03/18/19  1312   WBC 10.9 12.5* 12.8*   < > 18.1* 28.0* 23.0*   HGB 9.5* 9.3* 8.3*   < > 7.3* 8.8* 6.8*   MCV 80 80 79   < > 77* 77* 77*    279 243   < > 202 226 208   INR  --   --   --   --   --  1.54* 1.79*    136 137   < > 142 140 139   POTASSIUM 4.1 4.3 4.6   < > 4.9 4.9 5.2   CHLORIDE 105 105 106   < > 109 108 108   CO2 24 26 24   < > 23 20 22   BUN 24 31* 39*   < > 25 21 21   CR 1.35* 1.69* 1.99*   < > 1.72* 1.31* 1.32*   ANIONGAP 6 5 7   < > 10 12 9   MYKE 8.2* 8.6 8.6   < > 8.4* 8.2* 8.7   * 129* 154*   < > 137* 174* 175*   ALBUMIN  --   --   --   --  3.4 2.3*  --    PROTTOTAL  --   --   --   --  6.3* 5.7*  --    BILITOTAL  --   --   --   --  0.4 0.5  --    ALKPHOS  --   --   --   --  63 80  --    ALT  --   --   --   --  27 35  --    AST  --   --   --   --  50* 47*  --     < > = values in this interval not displayed.       No results found for this or any previous visit (from the past 24 hour(s)).

## 2019-03-23 NOTE — PROGRESS NOTES
[facility]  Cardiovascular and Thoracic Surgery Daily Note          Assessment and Plan:   POD#5 s/p Coronary artery bypass grafting x4, LIMA-LAD, SVG-DIAG, SVG-OM, SVG-PDA; endoscopic harvest left greater saphenous vein Dr. Lawrence Wright  -CVS:  ASA, BB, statin. Continue PTA norvasc and lisinopril. Pre op EF 35-40%. CORE consult placed. Chest tubes removed today.  -Resp: Extubated within protocol. Saturating well on room air. Continue to wean as able. Continue to encourage IS, cough, deep breathing, ambulation  -Neuro:  Grossly intact. Pain controlled.   -Renal: good UOP. ASIA with CKD. Baseline Creat 1.3-1.6. Cr: 1.35<1.69<1.99<2.23. Below preoperative weight. Continue to monitor.   -GI:  Tolerating diet. +BM. Continue bowel regimen  -:  Voiding well on own  -Endo: pre op a1c: 7.9. Hx of DMII. PTA metformin on hold. Completed insulin gtt per protocol. Transitioned to lantus and sliding scale insulin. Hospitalist following for glycemic control.   -FEN: replace electrolytes as needed  Orders Placed This Encounter      Moderate Consistent CHO Diet     -ID: Temp (24hrs), Av.8  F (36.6  C), Min:97.5  F (36.4  C), Max:98.1  F (36.7  C)  WBC: 10.9<12.5<12.8<17.5. Leukocytosis likely related to stress from surgery. Will continue to monitor.   -Heme:  Acute blood loss anemia and thrombocytopenia related to surgery.   -Proph: PCD, ASA, BB, statin, PPI, sub q heparin  -Dispo: [facility]. Continue therapies. Tentative plan for discharge tomorrow. Continue to encourage IS, cough, deep breathing, ambulation.           Interval History:   No acute issues overnight. Tolerating PO. Ambulating. Passing flatus and has had a BM.          Medications:       amLODIPine  10 mg Oral Daily     aspirin  325 mg Oral Daily     atorvastatin  80 mg Oral QPM     heparin  5,000 Units Subcutaneous Q8H     insulin aspart  1-7 Units Subcutaneous TID AC     insulin aspart  1-5 Units Subcutaneous At Bedtime     insulin glargine  3  "Units Subcutaneous At Bedtime     lidocaine  1 patch Transdermal Q24H     lidocaine 2 %  10 mL Urethral Once     lidocaine   Transdermal Q8H     lidocaine   Transdermal Q24h     lisinopril  20 mg Oral Daily     metoprolol tartrate  25 mg Oral Q12H    Or     metoprolol  25 mg Per NG tube Q12H     pantoprazole  40 mg Oral QAM AC     polyethylene glycol  17 g Oral Daily     senna-docusate  1 tablet Oral BID    Or     senna-docusate  2 tablet Oral BID     sodium chloride (PF)  3 mL Intracatheter Q8H     acetaminophen, IV fluid REPLACEMENT ONLY, glucose **OR** dextrose **OR** glucagon, gabapentin, HOLD MEDICATION, hydrALAZINE, HYDROmorphone, lidocaine 4%, lidocaine (buffered or not buffered), lidocaine (buffered or not buffered), magnesium hydroxide, magnesium sulfate, magnesium sulfate, methocarbamol, naloxone, ondansetron **OR** ondansetron, oxyCODONE, potassium chloride, potassium chloride with lidocaine, potassium chloride, potassium chloride, potassium chloride, sodium chloride (PF), sodium phosphate, sodium phosphate, sodium phosphate, sodium phosphate          Physical Exam:   Vitals were reviewed  Blood pressure 144/74, pulse 63, temperature 97.9  F (36.6  C), temperature source Oral, resp. rate 18, height 1.6 m (5' 3\"), weight 71.8 kg (158 lb 4.6 oz), SpO2 98 %.  Rhythm: NSR     Lungs: diminished bases     Cardiovascular: RRR normal s1 and s2     Abdomen: soft NT ND     Extremeties: minimal edema     Incision: CDI    CT: removed today    Weight:   Vitals:    03/19/19 1431 03/20/19 0626 03/21/19 0300 03/22/19 0500   Weight: 70.4 kg (155 lb 3 oz) 71.3 kg (157 lb 1.6 oz) 71.8 kg (158 lb 4.6 oz) 72.5 kg (159 lb 13.3 oz)    03/23/19 0146   Weight: 71.8 kg (158 lb 4.6 oz)            Data:   Labs:   Lab Results   Component Value Date    WBC 10.9 03/23/2019     Lab Results   Component Value Date    RBC 3.75 03/23/2019     Lab Results   Component Value Date    HGB 9.5 03/23/2019     Lab Results   Component Value Date    " HCT 29.8 03/23/2019     No components found for: MCT  Lab Results   Component Value Date    MCV 80 03/23/2019     Lab Results   Component Value Date    MCH 25.3 03/23/2019     Lab Results   Component Value Date    MCHC 31.9 03/23/2019     Lab Results   Component Value Date    RDW 15.6 03/23/2019     Lab Results   Component Value Date     03/23/2019       Last Basic Metabolic Panel:  Lab Results   Component Value Date     03/23/2019      Lab Results   Component Value Date    POTASSIUM 4.1 03/23/2019     Lab Results   Component Value Date    CHLORIDE 105 03/23/2019     Lab Results   Component Value Date    MYKE 8.2 03/23/2019     Lab Results   Component Value Date    CO2 24 03/23/2019     Lab Results   Component Value Date    BUN 24 03/23/2019     Lab Results   Component Value Date    CR 1.35 03/23/2019     Lab Results   Component Value Date     03/23/2019       CXR: no new imaging today    Joseph Lubin MD  Fellow, CT Surgery

## 2019-03-23 NOTE — PLAN OF CARE
Discharge Planner OT   Patient plan for discharge: home with assist   Current status: Pt completed 9 minutes on the treadmill at a speed of 1.0 MPH. Educated on signs of activity intolerance during exercise. Pt reported dizziness during session, see vitals flowsheet for details.   Barriers to return to prior living situation: sternal precautions   Recommendations for discharge: Home with wife's A with heavy IADL's (yardwork etc) and OP CR  Rationale for recommendations: Pt has a strong support system at home and will have assist as needed for I/ADLs. Pt is progressing in established IP OT/CR goal areas. OP CR for monitored and progressive physical activity.        Entered by: Alvarado Mendoza 03/23/2019 2:11 PM

## 2019-03-23 NOTE — PLAN OF CARE
Discharge Planner OT   Patient plan for discharge: Home.  Current status: Able to ambulate I'ly (no walker) for 6 minutes with stable HR. Unable to get BP as patient needing to use toilet and NA in to bathe patient.   Barriers to return to prior living situation: None.  Recommendations for discharge: Home with wife's A with heavy IADL and OP CR.   Rationale for recommendations: Moving well and shouldn't need a walker on discharge but he wants one.        Entered by: Millie Jacobsen 03/23/2019 11:58 AM

## 2019-03-24 ENCOUNTER — APPOINTMENT (OUTPATIENT)
Dept: OCCUPATIONAL THERAPY | Facility: CLINIC | Age: 64
DRG: 233 | End: 2019-03-24
Payer: COMMERCIAL

## 2019-03-24 ENCOUNTER — APPOINTMENT (OUTPATIENT)
Dept: GENERAL RADIOLOGY | Facility: CLINIC | Age: 64
DRG: 233 | End: 2019-03-24
Attending: PHYSICIAN ASSISTANT
Payer: COMMERCIAL

## 2019-03-24 VITALS
TEMPERATURE: 98.7 F | HEIGHT: 63 IN | DIASTOLIC BLOOD PRESSURE: 74 MMHG | OXYGEN SATURATION: 98 % | BODY MASS INDEX: 27.7 KG/M2 | SYSTOLIC BLOOD PRESSURE: 120 MMHG | WEIGHT: 156.31 LBS | RESPIRATION RATE: 16 BRPM | HEART RATE: 60 BPM

## 2019-03-24 LAB
ANION GAP SERPL CALCULATED.3IONS-SCNC: 7 MMOL/L (ref 3–14)
BUN SERPL-MCNC: 19 MG/DL (ref 7–30)
CALCIUM SERPL-MCNC: 8.6 MG/DL (ref 8.5–10.1)
CHLORIDE SERPL-SCNC: 105 MMOL/L (ref 94–109)
CO2 SERPL-SCNC: 26 MMOL/L (ref 20–32)
CREAT SERPL-MCNC: 1.35 MG/DL (ref 0.66–1.25)
ERYTHROCYTE [DISTWIDTH] IN BLOOD BY AUTOMATED COUNT: 15.3 % (ref 10–15)
GFR SERPL CREATININE-BSD FRML MDRD: 56 ML/MIN/{1.73_M2}
GLUCOSE BLDC GLUCOMTR-MCNC: 107 MG/DL (ref 70–99)
GLUCOSE BLDC GLUCOMTR-MCNC: 174 MG/DL (ref 70–99)
GLUCOSE SERPL-MCNC: 118 MG/DL (ref 70–99)
HCT VFR BLD AUTO: 27.9 % (ref 40–53)
HGB BLD-MCNC: 9 G/DL (ref 13.3–17.7)
MCH RBC QN AUTO: 25 PG (ref 26.5–33)
MCHC RBC AUTO-ENTMCNC: 32.3 G/DL (ref 31.5–36.5)
MCV RBC AUTO: 78 FL (ref 78–100)
PLATELET # BLD AUTO: 358 10E9/L (ref 150–450)
POTASSIUM SERPL-SCNC: 4.1 MMOL/L (ref 3.4–5.3)
RBC # BLD AUTO: 3.6 10E12/L (ref 4.4–5.9)
SODIUM SERPL-SCNC: 138 MMOL/L (ref 133–144)
WBC # BLD AUTO: 10.8 10E9/L (ref 4–11)

## 2019-03-24 PROCEDURE — 25000132 ZZH RX MED GY IP 250 OP 250 PS 637: Performed by: STUDENT IN AN ORGANIZED HEALTH CARE EDUCATION/TRAINING PROGRAM

## 2019-03-24 PROCEDURE — 99239 HOSP IP/OBS DSCHRG MGMT >30: CPT | Performed by: HOSPITALIST

## 2019-03-24 PROCEDURE — 97110 THERAPEUTIC EXERCISES: CPT | Mod: GO

## 2019-03-24 PROCEDURE — 71046 X-RAY EXAM CHEST 2 VIEWS: CPT

## 2019-03-24 PROCEDURE — 85027 COMPLETE CBC AUTOMATED: CPT | Performed by: STUDENT IN AN ORGANIZED HEALTH CARE EDUCATION/TRAINING PROGRAM

## 2019-03-24 PROCEDURE — 25000132 ZZH RX MED GY IP 250 OP 250 PS 637: Performed by: PHYSICIAN ASSISTANT

## 2019-03-24 PROCEDURE — 25000128 H RX IP 250 OP 636: Performed by: STUDENT IN AN ORGANIZED HEALTH CARE EDUCATION/TRAINING PROGRAM

## 2019-03-24 PROCEDURE — 36415 COLL VENOUS BLD VENIPUNCTURE: CPT | Performed by: STUDENT IN AN ORGANIZED HEALTH CARE EDUCATION/TRAINING PROGRAM

## 2019-03-24 PROCEDURE — 80048 BASIC METABOLIC PNL TOTAL CA: CPT | Performed by: STUDENT IN AN ORGANIZED HEALTH CARE EDUCATION/TRAINING PROGRAM

## 2019-03-24 PROCEDURE — 00000146 ZZHCL STATISTIC GLUCOSE BY METER IP

## 2019-03-24 RX ORDER — LISINOPRIL 20 MG/1
20 TABLET ORAL DAILY
Qty: 30 TABLET | Refills: 3 | Status: SHIPPED | OUTPATIENT
Start: 2019-03-24 | End: 2019-05-15

## 2019-03-24 RX ORDER — ATORVASTATIN CALCIUM 80 MG/1
80 TABLET, FILM COATED ORAL EVERY EVENING
Qty: 30 TABLET | Refills: 3 | Status: SHIPPED | OUTPATIENT
Start: 2019-03-24 | End: 2019-05-15

## 2019-03-24 RX ORDER — METHOCARBAMOL 750 MG/1
750 TABLET, FILM COATED ORAL 4 TIMES DAILY PRN
Qty: 30 TABLET | Refills: 0 | Status: SHIPPED | OUTPATIENT
Start: 2019-03-24 | End: 2019-04-25

## 2019-03-24 RX ORDER — AMOXICILLIN 250 MG
1 CAPSULE ORAL 2 TIMES DAILY PRN
Qty: 30 TABLET | Refills: 0 | Status: SHIPPED | OUTPATIENT
Start: 2019-03-24 | End: 2019-06-14

## 2019-03-24 RX ORDER — ACETAMINOPHEN 325 MG/1
650 TABLET ORAL EVERY 4 HOURS PRN
Qty: 60 TABLET | Refills: 0 | Status: SHIPPED | OUTPATIENT
Start: 2019-03-24 | End: 2022-06-14

## 2019-03-24 RX ORDER — OXYCODONE HYDROCHLORIDE 5 MG/1
5-10 TABLET ORAL
Qty: 20 TABLET | Refills: 0 | Status: SHIPPED | OUTPATIENT
Start: 2019-03-24 | End: 2019-04-18

## 2019-03-24 RX ORDER — AMLODIPINE BESYLATE 10 MG/1
10 TABLET ORAL DAILY
Qty: 60 TABLET | Refills: 3 | Status: SHIPPED | OUTPATIENT
Start: 2019-03-25 | End: 2019-06-14

## 2019-03-24 RX ORDER — ASPIRIN 325 MG
325 TABLET, DELAYED RELEASE (ENTERIC COATED) ORAL DAILY
Qty: 30 TABLET | Refills: 0 | Status: SHIPPED | OUTPATIENT
Start: 2019-03-24 | End: 2022-06-14

## 2019-03-24 RX ORDER — METOPROLOL TARTRATE 25 MG/1
25 TABLET, FILM COATED ORAL EVERY 12 HOURS
Qty: 60 TABLET | Refills: 3 | Status: SHIPPED | OUTPATIENT
Start: 2019-03-24 | End: 2019-04-03

## 2019-03-24 RX ORDER — POLYETHYLENE GLYCOL 3350 17 G/17G
17 POWDER, FOR SOLUTION ORAL DAILY
Qty: 7 PACKET | Refills: 0 | Status: SHIPPED | OUTPATIENT
Start: 2019-03-25 | End: 2019-06-14

## 2019-03-24 RX ORDER — PANTOPRAZOLE SODIUM 40 MG/1
40 TABLET, DELAYED RELEASE ORAL
Qty: 14 TABLET | Refills: 0 | Status: SHIPPED | OUTPATIENT
Start: 2019-03-25 | End: 2019-04-09

## 2019-03-24 RX ADMIN — PANTOPRAZOLE SODIUM 40 MG: 40 TABLET, DELAYED RELEASE ORAL at 06:41

## 2019-03-24 RX ADMIN — ASPIRIN 325 MG: 325 TABLET, DELAYED RELEASE ORAL at 09:56

## 2019-03-24 RX ADMIN — METOPROLOL TARTRATE 25 MG: 25 TABLET ORAL at 09:56

## 2019-03-24 RX ADMIN — AMLODIPINE BESYLATE 10 MG: 10 TABLET ORAL at 09:56

## 2019-03-24 RX ADMIN — ACETAMINOPHEN 650 MG: 325 TABLET, FILM COATED ORAL at 09:56

## 2019-03-24 RX ADMIN — HEPARIN SODIUM 5000 UNITS: 5000 INJECTION, SOLUTION INTRAVENOUS; SUBCUTANEOUS at 03:59

## 2019-03-24 ASSESSMENT — ACTIVITIES OF DAILY LIVING (ADL)
ADLS_ACUITY_SCORE: 12

## 2019-03-24 ASSESSMENT — MIFFLIN-ST. JEOR: SCORE: 1409.13

## 2019-03-24 NOTE — PLAN OF CARE
Occupational Therapy Discharge Summary    Reason for therapy discharge:    Discharged to home with outpatient therapy.    Progress towards therapy goal(s). See goals on Care Plan in [facility] electronic health record for goal details.  Goals partially met.  Barriers to achieving goals:   discharge from facility.    Therapy recommendation(s):    Continued therapy is recommended.  Rationale/Recommendations:  Home with wife's A with heavy IADL's (yardwork etc) and OP CR.

## 2019-03-24 NOTE — PROGRESS NOTES
CV Surgery    S: Sitting up in chair, denies pain, tolerating diet, breathing fine    O: B/P: 143/67, T: 98.6, P: 60, R: 16  General: NAD  Heart: s1/s2, no m/r/g  Lungs: clear  Abd: s/nt/nd  Sternum: cdi  Extremities: no LE edema    Lab Results   Component Value Date    WBC 10.8 03/24/2019     Lab Results   Component Value Date    RBC 3.60 03/24/2019     Lab Results   Component Value Date    HGB 9.0 03/24/2019     Lab Results   Component Value Date    HCT 27.9 03/24/2019     No components found for: MCT  Lab Results   Component Value Date    MCV 78 03/24/2019     Lab Results   Component Value Date    MCH 25.0 03/24/2019     Lab Results   Component Value Date    MCHC 32.3 03/24/2019     Lab Results   Component Value Date    RDW 15.3 03/24/2019     Lab Results   Component Value Date     03/24/2019       Last Basic Metabolic Panel:  Lab Results   Component Value Date     03/24/2019      Lab Results   Component Value Date    POTASSIUM 4.1 03/24/2019     Lab Results   Component Value Date    CHLORIDE 105 03/24/2019     Lab Results   Component Value Date    MYKE 8.6 03/24/2019     Lab Results   Component Value Date    CO2 26 03/24/2019     Lab Results   Component Value Date    BUN 19 03/24/2019     Lab Results   Component Value Date    CR 1.35 03/24/2019     Lab Results   Component Value Date     03/24/2019       A/P: POD#6 s/p Coronary artery bypass grafting x4, LIMA-LAD, SVG-DIAG, SVG-OM, SVG-PDA; endoscopic harvest left greater saphenous vein Dr. Lawrence Lindo, PA-C  Pager [phone]

## 2019-03-24 NOTE — PLAN OF CARE
A&O, AVSS on RA, tele SR. Laotian speaking, can make needs known. CTs out today @ 0930, dressing CDI. Midline and LLE incisions bruised, VIOLA. Assist 1 ww/gb. Pain well control;led tylenol and robaxin. Fair appetite, mod carb, BS covered with sliding scale insulin per POC.

## 2019-03-24 NOTE — DISCHARGE SUMMARY
[facility]    Discharge Summary  Hospitalist    Date of Admission:  3/11/2019  Date of Discharge:  3/24/2019  Discharging Provider: Natalya Blanchard DO  Date of Service (when I saw the patient): 03/24/19    Discharge Diagnoses   Acute hypoxic and hypercapnic respiratory failure secondary to acute systolic congestive heart failure- resolved  Non-ST-elevation myocardial infarction   Multi-vessel coronary artery disease  Ischemic cardiomyopathy   Status post CABG  Type 2 diabetes mellitus  Hypertension  Hyperlipidemia  Stage 3 chronic kidney disease  Acute kidney injury  Acute on chronic anemia    History of Present Illness   Khang Mcelroy is an 63 year old male who presented with acute hypoxemic and hypercapnic respiratory failure secondary to NSTEMI, found to have multivessel disease and underwent CABG on 3/18/19    Hospital Course   Khang Mcelroy was admitted on 3/11/2019.  The following problems were addressed during his hospitalization:    Acute hypoxic and hypercapnic respiratory failure secondary to acute systolic congestive heart failure- resolved  Non-ST-elevation myocardial infarction   Multi-vessel coronary artery disease  Ischemic cardiomyopathy   POD#6 CABG  Patient with acute onset of shortness of breath after an episode of chest pain the day prior to admission. Initial EKG was concerning for STEMI, but repeat EKG did not show DWIGHT, but nonspecific t wave changes. CXR with bilateral infiltrates concerning for pulmonary edema  Initial troponin of 2.1, peaked at 53. Coronary angiogram revealed three-vessel coronary artery disease. Echocardiogram revealed an EF of 35-40%, with multiple wall motion abnormalities.  3/18- Coronary artery bypass grafting x4, LIMA-LAD, SVG-DIAG, SVG-OM, SVG-PDA; endoscopic harvest left greater saphenous vein  3/20- stable-x-rays show small amount of intra-abdominal air likely from surgery- he is not symptomatic. Leukocytosis secondary to surgery, resolving.  Chest tubes removed on 3/23. Stable on room air.  - Cardiac rehab on discharge  - Start ASA  - Oxycodone, tylenol PRN pain prescribed by CV surgery  - Bowel regimen prescribed by CV surgery   - Follow up 3/26 for cardiac rehab, 4/3 for post-op CV surgery and 5/15 with cardiology.    Type 2 diabetes mellitus   Hemoglobin A1c 7.9  Prior to admission on glipizide and metformin- held  Was on IV insulin, switched to subcutaneous on 3/20. BGs well controlled during inpatient stay.  - Resume glipizide and metformin on discharge     Hypertension   Hyperlipidemia   Prior to admission on amlodipine, lisinopril/hydrochlorothiazide, losartan  Resumed amlodipine, lisinopril on 3/22. BPs well controlled.  - Potential resumption of hydrochlorothiazide at discretion of cardiology team.  - Stop losartan on discharge  - Continue amlodipine, lisinopril and metoprolol tartrate (in place of succinate) on discharge     Stage 3 chronic kidney disease   Acute kidney injury -resolved  baseline creatinine is between 1.3-1.6  Creatinine 2.23 improved to 1.35, stable. Defer further BMP monitoring to PCP     Acute on chronic anemia  Baseline hemoglobin per care everywhere appears to be between 9-10.5. He received a course of iron sucrose and 1 unit PRBCs on 3/19.  Hemoglobin stable near 9. Defer further hemoglobin monitoring to PCP    Natalya Blanchard, DO    Significant Results and Procedures   3/18: CABG x4, LIMA-LAD, SVG-Diag, SVG-OM, SVG-PDA, endoscopic harvest of left greater saphenous vein.    Pending Results   NA    Code Status   Full Code       Primary Care Physician   [facility]    Physical Exam   Temp: 98.6  F (37  C) Temp src: Oral BP: 143/67 Pulse: 60 Heart Rate: 76 Resp: 16 SpO2: 96 % O2 Device: None (Room air)    Vitals:    03/22/19 0500 03/23/19 0146 03/24/19 0459   Weight: 72.5 kg (159 lb 13.3 oz) 71.8 kg (158 lb 4.6 oz) 70.9 kg (156 lb 4.9 oz)     Vital Signs with Ranges  Temp:  [97.8  F (36.6  C)-98.6  F  (37  C)] 98.6  F (37  C)  Pulse:  [57-60] 60  Heart Rate:  [57-79] 76  Resp:  [16-18] 16  BP: (115-147)/(67-83) 143/67  SpO2:  [96 %-100 %] 96 %  I/O last 3 completed shifts:  In: 670 [P.O.:670]  Out: 350 [Urine:350]    Constitutional: Awake, alert, cooperative, no apparent distress.  Eyes: Conjunctiva and pupils examined and normal.  HEENT: Moist mucous membranes, normal dentition.  Respiratory: Bilateral crackles at bases, no wheeze  Cardiovascular: Regular rate and rhythm, normal S1 and S2, and no murmur noted. Anterior chest midline incision without drainage, skin glue intact.  GI: Soft, non-distended, non-tender, normal bowel sounds.  Skin: No rashes, no cyanosis, +1 bilateral lower extremity pitting edema  Musculoskeletal: No joint swelling, erythema or tenderness.  Neurologic: Cranial nerves 2-12 intact, normal strength and sensation.  Psychiatric: Alert, oriented to person, place and time, no obvious anxiety or depression.    Discharge Disposition   Discharged to home  Condition at discharge: Stable    Consultations This Hospital Stay   PHARMACY TO DOSE HEPARIN  PHARMACY TO DOSE HEPARIN  PHARMACY IP CONSULT  PHARMACY IP CONSULT  PHARMACY IP CONSULT  CARDIOVASCULAR SURGERY IP CONSULT  CARDIAC REHAB IP CONSULT  RESPIRATORY CARE IP CONSULT  PHARMACY IP CONSULT  NUTRITION SERVICES ADULT IP CONSULT  CARDIAC REHAB IP CONSULT  INTENSIVIST IP CONSULT  RESPIRATORY CARE IP CONSULT  RESPIRATORY CARE IP CONSULT  CORE CLINIC EVALUATION IP CONSULT  SMOKING CESSATION PROGRAM IP CONSULT    Time Spent on this Encounter   INatalya, personally saw the patient today and spent greater than 30 minutes discharging this patient.    Discharge Orders      Lipid Profile     Comprehensive metabolic panel     Lipid Profile     CBC with platelets differential    Last Lab Result: Hemoglobin (g/dL)       Date                     Value                 03/18/2019               8.8 (L)          ----------     Cardiovascular Surgery  Referral      CARDIAC REHAB REFERRAL      Follow-Up with Cardiologist      EKG 12-lead complete w/read  (to be scheduled)     Follow-up and recommended labs and tests     *Follow up primary care provider in 7-10 days after discharge in order to review your medication, vital signs, obtain any necessary lab work your doctor may want, and to update them on your hospitalization and medical issues.  *Follow up with Emmy/Terri/Melba Christopher with Dr Sanchez, heart surgeon, at [facility] at [facility] [address] on 4/3/19 at 2PM. If any questions or concerns call [phone].  You will see us once at this visit and then if everything is going well you will not need to see us again.  You will follow long term with your cardiologist.  *Follow up with Dr Hernández, cardiologist, on 5/15/19 at 10:15 am. This is who you will follow with long term about your heart issues. [phone].  *Please schedule outpatient cardiac rehab within 5 days of discharge from TCU, call [phone].     Reason for your hospital stay    Coronary artery bypass surgery     Activity    Walk at least 30 mins total daily     Wound care and dressings    Cleanse surgical incisions daily with antibacterial soap     Full Code     Echocardiogram Complete    Administration of IV contrast will be tailored to this examination per the appropriate written protocol listed in the Echocardiography department Protocol Book, or by the supervising Cardiologist. This may result in an order change.    Use of contrast is at the discretion of the supervising Cardiologist.     Diet    Moderate carbohydrate diet with inc protein intake     Discharge Medications   Current Discharge Medication List      START taking these medications    Details   acetaminophen (TYLENOL) 325 MG tablet Take 2 tablets (650 mg) by mouth every 4 hours as needed for other  Qty: 60 tablet, Refills: 0    Associated Diagnoses: Status post coronary artery bypass graft      aspirin (ASA)  325 MG EC tablet Take 1 tablet (325 mg) by mouth daily  Qty: 30 tablet, Refills: 0    Associated Diagnoses: Status post coronary artery bypass graft      lisinopril (PRINIVIL/ZESTRIL) 20 MG tablet Take 1 tablet (20 mg) by mouth daily  Qty: 30 tablet, Refills: 3    Associated Diagnoses: Status post coronary artery bypass graft      methocarbamol (ROBAXIN) 750 MG tablet Take 1 tablet (750 mg) by mouth 4 times daily as needed for muscle spasms  Qty: 30 tablet, Refills: 0    Associated Diagnoses: Status post coronary artery bypass graft      metoprolol tartrate (LOPRESSOR) 25 MG tablet Take 1 tablet (25 mg) by mouth every 12 hours  Qty: 60 tablet, Refills: 3    Associated Diagnoses: Status post coronary artery bypass graft      order for DME Equipment being ordered: Walker ()  Treatment Diagnosis: s/p coronary artery bypass  Qty: 1 Device, Refills: 0    Associated Diagnoses: S/P CABG (coronary artery bypass graft)      oxyCODONE (ROXICODONE) 5 MG tablet Take 1-2 tablets (5-10 mg) by mouth every 3 hours as needed for moderate to severe pain  Qty: 20 tablet, Refills: 0    Associated Diagnoses: Status post coronary artery bypass graft      pantoprazole (PROTONIX) 40 MG EC tablet Take 1 tablet (40 mg) by mouth every morning (before breakfast) for 14 days  Qty: 14 tablet, Refills: 0    Associated Diagnoses: Status post coronary artery bypass graft      polyethylene glycol (MIRALAX/GLYCOLAX) packet Take 17 g by mouth daily  Qty: 7 packet, Refills: 0    Associated Diagnoses: Status post coronary artery bypass graft      senna-docusate (SENOKOT-S/PERICOLACE) 8.6-50 MG tablet Take 1 tablet by mouth 2 times daily as needed for constipation  Qty: 30 tablet, Refills: 0    Associated Diagnoses: Status post coronary artery bypass graft         CONTINUE these medications which have CHANGED    Details   amLODIPine (NORVASC) 10 MG tablet Take 1 tablet (10 mg) by mouth daily  Qty: 60 tablet, Refills: 3    Associated Diagnoses:  Status post coronary artery bypass graft      atorvastatin (LIPITOR) 80 MG tablet Take 1 tablet (80 mg) by mouth every evening  Qty: 30 tablet, Refills: 3    Associated Diagnoses: Status post coronary artery bypass graft         CONTINUE these medications which have NOT CHANGED    Details   glipiZIDE (GLUCOTROL XL) 5 MG 24 hr tablet Take 5 mg by mouth daily      metFORMIN (GLUCOPHAGE) 1000 MG tablet Take 1,000 mg by mouth 2 times daily (with meals)         STOP taking these medications       lisinopril-hydrochlorothiazide (PRINZIDE/ZESTORETIC) 20-25 MG tablet Comments:   Reason for Stopping:         losartan (COZAAR) 100 MG tablet Comments:   Reason for Stopping:         metoprolol succinate ER (TOPROL-XL) 50 MG 24 hr tablet Comments:   Reason for Stopping:             Allergies   No Known Allergies  Data   Most Recent 3 CBC's:  Recent Labs   Lab Test 03/24/19  0822 03/23/19  0757 03/22/19  0753   WBC 10.8 10.9 12.5*   HGB 9.0* 9.5* 9.3*   MCV 78 80 80    321 279      Most Recent 3 BMP's:  Recent Labs   Lab Test 03/24/19  0822 03/23/19  0757 03/22/19  0753    135 136   POTASSIUM 4.1 4.1 4.3   CHLORIDE 105 105 105   CO2 26 24 26   BUN 19 24 31*   CR 1.35* 1.35* 1.69*   ANIONGAP 7 6 5   MYKE 8.6 8.2* 8.6   * 115* 129*     Most Recent 2 LFT's:  Recent Labs   Lab Test 03/19/19  0405 03/18/19  1413   AST 50* 47*   ALT 27 35   ALKPHOS 63 80   BILITOTAL 0.4 0.5     Most Recent INR's and Anticoagulation Dosing History:  Anticoagulation Dose History     Recent Dosing and Labs Latest Ref Rng & Units 3/18/2019 3/18/2019    INR 0.86 - 1.14 1.79(H) 1.54(H)        Most Recent 3 Troponin's:  Recent Labs   Lab Test 03/13/19  0532 03/12/19  1837 03/12/19  0437  03/11/19  1754   TROPI 39.579* 53.250* 53.862*   < > 2.102*   TROPONIN  --   --   --   --  0.36*    < > = values in this interval not displayed.     Most Recent Cholesterol Panel:No lab results found.  Most Recent 6 Bacteria Isolates From Any Culture (See  EPIC Reports for Culture Details):  Recent Labs   Lab Test 03/12/19  2111 03/12/19  0356 03/11/19 2041 03/11/19 2035   CULT No growth No growth No growth No growth     Most Recent TSH, T4 and A1c Labs:  Recent Labs   Lab Test 03/11/19  1754   A1C 7.9*     Results for orders placed or performed during the hospital encounter of 03/11/19   Chest  XR, 1 view PORTABLE    Narrative    XR PORTABLE CHEST ONE VIEW   3/11/2019 6:05 PM     HISTORY: Respiratory distress.    COMPARISON: None.      Impression    IMPRESSION: Lungs are slightly hypoinflated. Patchy bilateral  pulmonary opacities are present suspicious for pulmonary edema.  Superimposed infection, aspiration, or hemorrhage cannot be excluded.  Probable small bilateral pleural effusions. Heart size is at upper  limits of normal.    RICCI LEE MD   US Carotid Bilateral    Narrative    BILATERAL CAROTID ULTRASOUND   3/12/2019 8:27 PM     HISTORY:  pre op    COMPARISON: None.    RIGHT CAROTID FINDINGS:  There is no significant calcified and  noncalcified atherosclerotic plaque in the carotid bifurcation.   Right ICA PSV:  79  cm/sec.  Right ICA EDV:  32 cm/sec.  Right ICA/CCA PSV Ratio:  These indicate less than 50% diameter stenosis of the right ICA.    Right Vertebral: Antegrade flow.   Right ECA: Antegrade flow.     LEFT CAROTID FINDINGS:  There is no significant calcified and  noncalcified atherosclerotic plaque in the carotid bifurcation.   Left ICA PSV:  118  cm/sec.  Left ICA EDV:  37 cm/sec.  Left ICA/CCA PSV Ratio:  These indicate less than 50% diameter stenosis of the left ICA.    Left Vertebral: Antegrade flow.   Left ECA: Antegrade flow.     Causes of Decreased Accuracy:   None.       Impression    IMPRESSION:    1. Less than 50% diameter stenosis of the right ICA relative to the  distal ICA diameter.  2. Less than 50% diameter stenosis of the left ICA relative to the  distal ICA diameter.      Consensus Panel Gray-Scale And Doppler Us Criteria For  Diagnosis of  ICA Stenosis (Radiology 11/2003)    Normal   ICA PSV < 125 cm/sec  Plaque Estimate None  ICA/CCA PSV Ratio < 2.0  ICA EDV < 40 cm/sec    < 50%   ICA PSV < 125 cm/sec  Plaque Estimate < 50%  ICA/CCA PSV Ratio < 2.0  ICA EDV < 40 cm/sec    50- 69%  ICA -230 cm/sec  Plaque Estimate > or = 50%  ICA/CCA PSV Ratio 2.0-4.0  ICA EDV  cm/sec     > or = 70%, less than near occlusion  ICA PSV > 230 cm/sec  Plaque Estimate > or = 50%  ICA/CCA Ratio > 4.0  ICA EDV > 100 cm/sec       TASNEEM TRACY MD   US Lower Extremity Venous Mapping Bilateral    Narrative    ULTRASOUND BILATERAL LOWER EXTREMITY VENOUS MAPPING March 12, 2019 at  2027 hours    HISTORY: 63-year-old patient with history of coronary arterial disease  with preoperative evaluation prior to coronary artery bypass graft.    COMPARISON: None.    FINDINGS: The right greater saphenous vein ranges from 2.5 to 6.5 mm  throughout the thigh and 2.3 to 3.2 mm at and below the level of the  knee. The left greater saphenous vein ranges from 2.2 to 3.8 mm  throughout the thigh and 2 to 2.9 mm at and below the level of the  knee.      Impression    IMPRESSION: Bilateral greater saphenous vein measurements as  described.    SABRA OSORIO MD   XR Chest 2 Views    Narrative    CHEST TWO VIEWS   3/13/2019 12:37 PM     HISTORY: Pulmonary edema    COMPARISON: 3/11/2019      Impression    IMPRESSION: Patchy bilateral groundglass opacities have slightly  improved compared to the prior exam in the left lung, but are similar  in the right lung. Superimposed basilar opacities likely represent  atelectasis. Small bilateral pleural effusions are present, likely  similar compared to the prior exam. Heart size is unchanged.    RICCI LEE MD   XR Chest Port 1 View    Narrative    CHEST ONE VIEW PORTABLE   3/18/2019 2:40 PM     HISTORY:  Coronary bypass surgery.    COMPARISON: 3/13/2019      Impression    IMPRESSION: Endotracheal tube is within 1 cm of the nick and  should  be pulled back about 2 cm. Central venous catheter with the tip in the  proximal right pulmonary artery. Nasogastric tube in the stomach. Left  chest tube and mediastinal tube, no pneumothorax. Sternal wires.    RICCI WALLS MD   XR Chest Port 1 View    Narrative    CHEST ONE VIEW PORTABLE   3/19/2019 5:25 AM     HISTORY: Postoperative coronary artery bypass graft.      COMPARISON: 3/18/2019      Impression    IMPRESSION: Endotracheal tube has been removed. Remaining tubes are  unchanged, no pneumothorax. Question of increase in cardiomegaly  versus expiratory chest x-ray. Free air in the peritoneum, possibly  new. Follow-up exam tomorrow is advised. I discussed the findings with  the hospitalist, Dr. Bhaskar Motley, at 4:30 PM on 3/19/2019.    RICCI WALLS MD   XR Abdomen Port 2 Views    Narrative    XR ABDOMEN PORT 2 VW   3/20/2019 5:25 AM     INDICATION: Follow-up of free intraperitoneal air seen on 319 chest  x-ray.    COMPARISON: 3/19/2019 at 0523 hours.      Impression    IMPRESSION: Portable supine and lateral decubitus views again  demonstrates a small amount of free air in the abdomen as seen  yesterday. Correlate clinically. If patient has not had recent surgery  this could be due to bowel perforation. Scattered gas in the colon and  within nondilated small bowel loops.     ELINOR CASTELLON MD

## 2019-03-24 NOTE — PLAN OF CARE
Discharge Planner OT   Patient plan for discharge: Home with assist   Current status: Pt completed 15 minutes on the treadmill at a speed of 1.2 MPH (gradual increase in speed). Pt reported dizziness during session, see vitals flowsheet for details.   Barriers to return to prior living situation: sternal precautions  Recommendations for discharge: Home with wife's A with heavy IADL's (yardwork etc) and OP CR  Rationale for recommendations: Pt has a strong support system at home and will have assist as needed for I/ADLs. OP CR for monitored and progressive physical activity.          Entered by: Alvarado Mendoza 03/24/2019 9:31 AM

## 2019-03-24 NOTE — PROGRESS NOTES
"Discharge Summary    Khang Mcelroy MRN# [id number]   YOB: 1955 Age: 63 year old     Date of Admission:  3/11/2019  Date of Discharge:  3/24/2019  Admitting Physician:  Adarsh Sanchez MD  Discharge Physician:  Adarsh Sanchez, *  Discharging Service:  Cardiovascular and Thoracic Surgery     Home clinic: as below  Primary Provider: [facility], [facility]          Admission Diagnoses:   Pulmonary edema [J81.1]  S/P CABG (coronary artery bypass graft) [Z95.1]          Discharge Diagnosis:   Patient Active Problem List   Diagnosis     Type 2 diabetes mellitus (H)     NSTEMI (non-ST elevated myocardial infarction) (H)     CAD in native artery     S/P CABG (coronary artery bypass graft)     Fluid overload     Transient hyperglycemia post procedure                Discharge Disposition:   Discharged to home           Condition on Discharge:   Discharge condition: Stable   Discharge vitals: Blood pressure 143/67, pulse 60, temperature 98.6  F (37  C), temperature source Oral, resp. rate 16, height 1.6 m (5' 3\"), weight 70.9 kg (156 lb 4.9 oz), SpO2 96 %.     Code status on discharge: Full Code           Procedures:   On 3/18/19 Mr Mcerloy successfully underwent Coronary artery bypass grafting x4, LIMA-LAD, SVG-DIAG, SVG-OM, SVG-PDA; endoscopic harvest left greater saphenous vein by Dr. Jonathon Sanchez          Medications Prior to Admission:     Medications Prior to Admission   Medication Sig Dispense Refill Last Dose     glipiZIDE (GLUCOTROL XL) 5 MG 24 hr tablet Take 5 mg by mouth daily   Past Week at na     metFORMIN (GLUCOPHAGE) 1000 MG tablet Take 1,000 mg by mouth 2 times daily (with meals)   Past Week at na     [DISCONTINUED] amLODIPine (NORVASC) 10 MG tablet Take 5 mg by mouth daily    Past Week at na     [DISCONTINUED] atorvastatin (LIPITOR) 40 MG tablet Take 40 mg by mouth daily   Past Week at na     [DISCONTINUED] lisinopril-hydrochlorothiazide (PRINZIDE/ZESTORETIC) " 20-25 MG tablet Take 1 tablet by mouth daily   Past Week at na     [DISCONTINUED] losartan (COZAAR) 100 MG tablet Take 100 mg by mouth daily   Past Week at na     [DISCONTINUED] metoprolol succinate ER (TOPROL-XL) 50 MG 24 hr tablet Take 50 mg by mouth daily   Past Week at na             Discharge Medications:     Current Discharge Medication List      START taking these medications    Details   acetaminophen (TYLENOL) 325 MG tablet Take 2 tablets (650 mg) by mouth every 4 hours as needed for other  Qty: 60 tablet, Refills: 0    Associated Diagnoses: Status post coronary artery bypass graft      aspirin (ASA) 325 MG EC tablet Take 1 tablet (325 mg) by mouth daily  Qty: 30 tablet, Refills: 0    Associated Diagnoses: Status post coronary artery bypass graft      lisinopril (PRINIVIL/ZESTRIL) 20 MG tablet Take 1 tablet (20 mg) by mouth daily  Qty: 30 tablet, Refills: 3    Associated Diagnoses: Status post coronary artery bypass graft      methocarbamol (ROBAXIN) 750 MG tablet Take 1 tablet (750 mg) by mouth 4 times daily as needed for muscle spasms  Qty: 30 tablet, Refills: 0    Associated Diagnoses: Status post coronary artery bypass graft      metoprolol tartrate (LOPRESSOR) 25 MG tablet Take 1 tablet (25 mg) by mouth every 12 hours  Qty: 60 tablet, Refills: 3    Associated Diagnoses: Status post coronary artery bypass graft      oxyCODONE (ROXICODONE) 5 MG tablet Take 1-2 tablets (5-10 mg) by mouth every 3 hours as needed for moderate to severe pain  Qty: 20 tablet, Refills: 0    Associated Diagnoses: Status post coronary artery bypass graft      pantoprazole (PROTONIX) 40 MG EC tablet Take 1 tablet (40 mg) by mouth every morning (before breakfast) for 14 days  Qty: 14 tablet, Refills: 0    Associated Diagnoses: Status post coronary artery bypass graft      polyethylene glycol (MIRALAX/GLYCOLAX) packet Take 17 g by mouth daily  Qty: 7 packet, Refills: 0    Associated Diagnoses: Status post coronary artery bypass  graft      senna-docusate (SENOKOT-S/PERICOLACE) 8.6-50 MG tablet Take 1 tablet by mouth 2 times daily as needed for constipation  Qty: 30 tablet, Refills: 0    Associated Diagnoses: Status post coronary artery bypass graft         CONTINUE these medications which have CHANGED    Details   amLODIPine (NORVASC) 10 MG tablet Take 1 tablet (10 mg) by mouth daily  Qty: 60 tablet, Refills: 3    Associated Diagnoses: Status post coronary artery bypass graft      atorvastatin (LIPITOR) 80 MG tablet Take 1 tablet (80 mg) by mouth every evening  Qty: 30 tablet, Refills: 3    Associated Diagnoses: Status post coronary artery bypass graft         CONTINUE these medications which have NOT CHANGED    Details   glipiZIDE (GLUCOTROL XL) 5 MG 24 hr tablet Take 5 mg by mouth daily      metFORMIN (GLUCOPHAGE) 1000 MG tablet Take 1,000 mg by mouth 2 times daily (with meals)         STOP taking these medications       lisinopril-hydrochlorothiazide (PRINZIDE/ZESTORETIC) 20-25 MG tablet Comments:   Reason for Stopping:         losartan (COZAAR) 100 MG tablet Comments:   Reason for Stopping:         metoprolol succinate ER (TOPROL-XL) 50 MG 24 hr tablet Comments:   Reason for Stopping:                     Consultations:   Nutrition, Intensivist, CORE clinic, Cardiology             Brief History of Illness:   63 year old male who presented with dyspnea last week. Was noted to have newly reduced EF and rising troponin, up to 50. Cor angio did not reveal an acute lesion, but did show severe multivessel coronary artery disease. He was aggressively managed medically and allowed to recover from his MI. Following discussion of risks and benefits, he has elected to proceed with surgery.          Hospital Course:   On 3/18/19 Mr Mcelroy successfully underwent Coronary artery bypass grafting x4, LIMA-LAD, SVG-DIAG, SVG-OM, SVG-PDA; endoscopic harvest left greater saphenous vein by Dr. Jonathon Sanchez  Was extubated within 24 hours of surgery. Once  he was weaned off hemodynamic stabilizing gtt's, transferred to the surgical floor.   Had some transient hyperglycemia treated with an insulin gtt, transitioned to   sliding scale insulin and is resumed on glipizide and metformin at discharge by the hospitalist service who was consulted to help aid with his diabetes. Pre-op A1C 7.9.  Had some fluid overload treated with diuretic medication. At discharge he is 7 kg below their pre-op weight and is not sent with any diuretics.   Heart rhythm remained stable. He progressed well with cardiac rehab. They are discharged to their home on [address] with  the help of their family.              Significant Results:   Lab Results   Component Value Date    WBC 10.8 03/24/2019     Lab Results   Component Value Date    RBC 3.60 03/24/2019     Lab Results   Component Value Date    HGB 9.0 03/24/2019     Lab Results   Component Value Date    HCT 27.9 03/24/2019     No components found for: MCT  Lab Results   Component Value Date    MCV 78 03/24/2019     Lab Results   Component Value Date    MCH 25.0 03/24/2019     Lab Results   Component Value Date    MCHC 32.3 03/24/2019     Lab Results   Component Value Date    RDW 15.3 03/24/2019     Lab Results   Component Value Date     03/24/2019       Last Basic Metabolic Panel:  Lab Results   Component Value Date     03/24/2019      Lab Results   Component Value Date    POTASSIUM 4.1 03/24/2019     Lab Results   Component Value Date    CHLORIDE 105 03/24/2019     Lab Results   Component Value Date    MYKE 8.6 03/24/2019     Lab Results   Component Value Date    CO2 26 03/24/2019     Lab Results   Component Value Date    BUN 19 03/24/2019     Lab Results   Component Value Date    CR 1.35 03/24/2019     Lab Results   Component Value Date     03/24/2019                  Pending Results:   None           Discharge Instructions and Follow-Up:   Discharge diet: Regular   Discharge activity: Daily weights: Call if weight gain 2-3  lbs over 24 hours or if greater than 5 lbs in 1 week.  No lifting more than 10 lbs for 8-12 weeks.  No driving for 1 month.  Call for pain medication refill.  Call for temperature greater than 101.0  Daily shower with antibacterial soap.   Discharge follow-up: *Follow up primary care provider in 7-10 days after discharge in order to review your medication, vital signs, obtain any necessary lab work your doctor may want, and to update them on your hospitalization and medical issues.   *Follow up with Emmy/Terri/Melba Christopher with Dr Sanchez, heart surgeon, at [facility] at [facility] [address] on 4/3/19 at 2PM. If any questions or concerns call [phone].  You will see us once at this visit and then if everything is going well you will not need to see us again.  You will follow long term with your cardiologist.   *Follow up with Dr Hernández, cardiologist, on 5/15/19 at 10:15 am. This is who you will follow with long term about your heart issues. [phone].   *Please schedule outpatient cardiac rehab within 5 days of discharge from TCU, call [phone].   Outpatient therapy: Cardiac rehab   Home Care agency: None    Supplies and equipment: None   Lines and drains: None    Wound care: Wash incision daily with antibacterial soap   Other instructions: None

## 2019-03-24 NOTE — PLAN OF CARE
A&O. VSS. Denies pain. Incisions CDI. CMS intact. Murmer. Edema to ankles and legs. Voiding.Up with 1 assist.

## 2019-03-25 ENCOUNTER — CARE COORDINATION (OUTPATIENT)
Dept: CARDIOLOGY | Facility: CLINIC | Age: 64
End: 2019-03-25

## 2019-03-25 DIAGNOSIS — I50.9 CHF (CONGESTIVE HEART FAILURE) (H): Primary | ICD-10-CM

## 2019-03-25 NOTE — PROGRESS NOTES
With [place]  called patient to schedule CORE enrollment appts.   Future Appointments   Date Time Provider Department Center   4/2/2019 10:00 AM 1[facility]   4/3/2019 11:40 AM [facility]   4/3/2019 12:30 PM Viv Culp APRN CNP [facility]   4/3/2019  2:00 PM [facility] CARDIOTHORACIC SURGERY, YOLY GUTIÉRREZ [facility]   5/15/2019 10:15 AM Bib Hernández MD [facility]     CORE labs ordered.  Melinda Rodriguez, RN on 3/25/2019 at 9:38 AM  ]

## 2019-03-26 ENCOUNTER — TELEPHONE (OUTPATIENT)
Dept: OTHER | Facility: CLINIC | Age: 64
End: 2019-03-26

## 2019-03-26 PROBLEM — R73.9 TRANSIENT HYPERGLYCEMIA POST PROCEDURE: Status: ACTIVE | Noted: 2019-03-26

## 2019-03-26 PROBLEM — Z95.1 S/P CABG (CORONARY ARTERY BYPASS GRAFT): Status: ACTIVE | Noted: 2019-03-26

## 2019-03-26 PROBLEM — E87.70 FLUID OVERLOAD: Status: ACTIVE | Noted: 2019-03-26

## 2019-03-26 NOTE — TELEPHONE ENCOUNTER
Attempted to reach pt through interpretor services for post hospital follow up. No interpretor available.

## 2019-03-27 ENCOUNTER — TELEPHONE (OUTPATIENT)
Dept: OTHER | Facility: CLINIC | Age: 64
End: 2019-03-27

## 2019-03-27 NOTE — TELEPHONE ENCOUNTER
48 hour post op call    ACTIVITY  How are you doing with activity? I am doing well.   Are you continuing to use your IS 3-4 times a day and do you have any shortness of breath. Yes, my breathing is good  Are you weighing yourself daily and has it been stable?. I am 160 lbs, I was 155 when I left the hospital. Denied swelling. Encouraged to call if weight continues to climb    PAIN  How is your pain, what are you doing for your pain? Good, Managed with pain medications    Are you still doing sternal precautions? Yes   Do you hear any clicking when you are moving or taking a deep breath? No     INCISION: It is drying up nicely and no rednesss    ASSISTANCE  Do you have someone at home to help you with your daily activities and transportation needs? My wife      MEDICATIONS  I would like to review your discharge medications and answer any questions you may have.   I am following the doctors instructions. No questions.      Are you on a blood thinner/Coumadin  No    Who is managing your Coumadin dosing/INR? Na      FOLLOW UP       Scheduled for cardiac rehab? 4/2   Scheduled to see our PA or Surgeon? 4/3  Scheduled to see your cardiologist ? [facility] 4/3, Dr. Hernández 5/15  Scheduled to see your primary care physician? NA  Do you have our contact information? Yes    STOPLIGHT TOOL      Were you happy with your care? Yes, satisfied  Could we have done anything better? No    [facility] interpretor services used.

## 2019-04-01 ENCOUNTER — APPOINTMENT (OUTPATIENT)
Dept: GENERAL RADIOLOGY | Facility: CLINIC | Age: 64
End: 2019-04-01
Attending: EMERGENCY MEDICINE
Payer: COMMERCIAL

## 2019-04-01 ENCOUNTER — HOSPITAL ENCOUNTER (EMERGENCY)
Facility: CLINIC | Age: 64
Discharge: HOME | End: 2019-04-01
Attending: EMERGENCY MEDICINE | Admitting: EMERGENCY MEDICINE
Payer: COMMERCIAL

## 2019-04-01 VITALS
DIASTOLIC BLOOD PRESSURE: 72 MMHG | TEMPERATURE: 98.4 F | BODY MASS INDEX: 30.12 KG/M2 | OXYGEN SATURATION: 99 % | HEIGHT: 63 IN | SYSTOLIC BLOOD PRESSURE: 129 MMHG | WEIGHT: 170 LBS | RESPIRATION RATE: 16 BRPM | HEART RATE: 58 BPM

## 2019-04-01 DIAGNOSIS — R05.4 TUSSIVE SYNCOPE: ICD-10-CM

## 2019-04-01 DIAGNOSIS — R55 TUSSIVE SYNCOPE: ICD-10-CM

## 2019-04-01 LAB
ALBUMIN SERPL-MCNC: 2.6 G/DL (ref 3.4–5)
ALP SERPL-CCNC: 208 U/L (ref 40–150)
ALT SERPL W P-5'-P-CCNC: 57 U/L (ref 0–70)
ANION GAP SERPL CALCULATED.3IONS-SCNC: 5 MMOL/L (ref 3–14)
AST SERPL W P-5'-P-CCNC: 29 U/L (ref 0–45)
BASOPHILS # BLD AUTO: 0 10E9/L (ref 0–0.2)
BASOPHILS NFR BLD AUTO: 0.1 %
BILIRUB SERPL-MCNC: 0.3 MG/DL (ref 0.2–1.3)
BUN SERPL-MCNC: 25 MG/DL (ref 7–30)
CALCIUM SERPL-MCNC: 8.8 MG/DL (ref 8.5–10.1)
CHLORIDE SERPL-SCNC: 101 MMOL/L (ref 94–109)
CO2 SERPL-SCNC: 27 MMOL/L (ref 20–32)
CREAT SERPL-MCNC: 1.59 MG/DL (ref 0.66–1.25)
DIFFERENTIAL METHOD BLD: ABNORMAL
EOSINOPHIL # BLD AUTO: 0.1 10E9/L (ref 0–0.7)
EOSINOPHIL NFR BLD AUTO: 1.2 %
ERYTHROCYTE [DISTWIDTH] IN BLOOD BY AUTOMATED COUNT: 15.5 % (ref 10–15)
GFR SERPL CREATININE-BSD FRML MDRD: 46 ML/MIN/{1.73_M2}
GLUCOSE SERPL-MCNC: 297 MG/DL (ref 70–99)
HCT VFR BLD AUTO: 25.1 % (ref 40–53)
HCT VFR BLD CALC: 23 %PCV (ref 40–53)
HGB BLD CALC-MCNC: 7.8 G/DL (ref 13.3–17.7)
HGB BLD-MCNC: 8.2 G/DL (ref 13.3–17.7)
IMM GRANULOCYTES # BLD: 0 10E9/L (ref 0–0.4)
IMM GRANULOCYTES NFR BLD: 0.3 %
LYMPHOCYTES # BLD AUTO: 0.8 10E9/L (ref 0.8–5.3)
LYMPHOCYTES NFR BLD AUTO: 7.1 %
MCH RBC QN AUTO: 25.6 PG (ref 26.5–33)
MCHC RBC AUTO-ENTMCNC: 32.7 G/DL (ref 31.5–36.5)
MCV RBC AUTO: 78 FL (ref 78–100)
MONOCYTES # BLD AUTO: 0.8 10E9/L (ref 0–1.3)
MONOCYTES NFR BLD AUTO: 7.5 %
NEUTROPHILS # BLD AUTO: 8.9 10E9/L (ref 1.6–8.3)
NEUTROPHILS NFR BLD AUTO: 83.8 %
NRBC # BLD AUTO: 0 10*3/UL
NRBC BLD AUTO-RTO: 0 /100
PLATELET # BLD AUTO: 391 10E9/L (ref 150–450)
POTASSIUM BLD-SCNC: 5.1 MMOL/L (ref 3.4–5.3)
POTASSIUM SERPL-SCNC: 5.4 MMOL/L (ref 3.4–5.3)
PROT SERPL-MCNC: 7.4 G/DL (ref 6.8–8.8)
RBC # BLD AUTO: 3.2 10E12/L (ref 4.4–5.9)
SODIUM BLD-SCNC: 134 MMOL/L (ref 133–144)
SODIUM SERPL-SCNC: 133 MMOL/L (ref 133–144)
TROPONIN I SERPL-MCNC: 1.97 UG/L (ref 0–0.04)
TROPONIN I SERPL-MCNC: 2.07 UG/L (ref 0–0.04)
WBC # BLD AUTO: 10.6 10E9/L (ref 4–11)

## 2019-04-01 PROCEDURE — 96360 HYDRATION IV INFUSION INIT: CPT

## 2019-04-01 PROCEDURE — 40000501 ZZHCL STATISTIC HEMATOCRIT ED POCT

## 2019-04-01 PROCEDURE — 99285 EMERGENCY DEPT VISIT HI MDM: CPT | Mod: 25

## 2019-04-01 PROCEDURE — 40000498 ZZHCL STATISTIC POTASSIUM ED POCT

## 2019-04-01 PROCEDURE — 85025 COMPLETE CBC W/AUTO DIFF WBC: CPT | Performed by: EMERGENCY MEDICINE

## 2019-04-01 PROCEDURE — 40000497 ZZHCL STATISTIC SODIUM ED POCT

## 2019-04-01 PROCEDURE — 71046 X-RAY EXAM CHEST 2 VIEWS: CPT

## 2019-04-01 PROCEDURE — 93005 ELECTROCARDIOGRAM TRACING: CPT

## 2019-04-01 PROCEDURE — 80053 COMPREHEN METABOLIC PANEL: CPT | Performed by: EMERGENCY MEDICINE

## 2019-04-01 PROCEDURE — 84484 ASSAY OF TROPONIN QUANT: CPT | Mod: 91 | Performed by: EMERGENCY MEDICINE

## 2019-04-01 PROCEDURE — 25000128 H RX IP 250 OP 636: Performed by: EMERGENCY MEDICINE

## 2019-04-01 PROCEDURE — 96361 HYDRATE IV INFUSION ADD-ON: CPT

## 2019-04-01 RX ADMIN — SODIUM CHLORIDE 500 ML: 9 INJECTION, SOLUTION INTRAVENOUS at 15:56

## 2019-04-01 ASSESSMENT — MIFFLIN-ST. JEOR: SCORE: 1471.24

## 2019-04-01 ASSESSMENT — ENCOUNTER SYMPTOMS
COUGH: 1
SHORTNESS OF BREATH: 0
DIZZINESS: 0
NAUSEA: 0
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
## 2019-04-01 NOTE — ED AVS SNAPSHOT
Emergency Department  [address] [place] [id number]  Phone:  [phone]  Fax:  [phone]                                    Khang Mcelroy   MRN: [id number]    Department:   Emergency Department   Date of Visit:  4/1/2019           After Visit Summary Signature Page    I have received my discharge instructions, and my questions have been answered. I have discussed any challenges I see with this plan with the nurse or doctor.    ..........................................................................................................................................  Patient/Patient Representative Signature      ..........................................................................................................................................  Patient Representative Print Name and Relationship to Patient    ..................................................               ................................................  Date                                   Time    ..........................................................................................................................................  Reviewed by Signature/Title    ...................................................              ..............................................  Date                                               Time          22EPIC Rev 08/18

## 2019-04-01 NOTE — ED NOTES
Bed: ED23  Expected date: 4/1/19  Expected time:   Means of arrival:   Comments:  Dlup516} 63M Syncope

## 2019-04-02 ENCOUNTER — HOSPITAL ENCOUNTER (OUTPATIENT)
Dept: CARDIAC REHAB | Facility: CLINIC | Age: 64
End: 2019-04-02
Attending: STUDENT IN AN ORGANIZED HEALTH CARE EDUCATION/TRAINING PROGRAM
Payer: COMMERCIAL

## 2019-04-02 ENCOUNTER — TELEPHONE (OUTPATIENT)
Dept: CARDIOLOGY | Facility: CLINIC | Age: 64
End: 2019-04-02

## 2019-04-02 DIAGNOSIS — I25.10 CAD IN NATIVE ARTERY: ICD-10-CM

## 2019-04-02 LAB — GLUCOSE BLDC GLUCOMTR-MCNC: 295 MG/DL (ref 70–99)

## 2019-04-02 PROCEDURE — 00000146 ZZHCL STATISTIC GLUCOSE BY METER IP

## 2019-04-02 PROCEDURE — 93798 PHYS/QHP OP CAR RHAB W/ECG: CPT | Performed by: OCCUPATIONAL THERAPIST

## 2019-04-02 PROCEDURE — 40000116 ZZH STATISTIC OP CR VISIT: Performed by: OCCUPATIONAL THERAPIST

## 2019-04-02 PROCEDURE — 93797 PHYS/QHP OP CAR RHAB WO ECG: CPT | Mod: XU | Performed by: OCCUPATIONAL THERAPIST

## 2019-04-02 PROCEDURE — 40000575 ZZH STATISTIC OP CARDIAC VISIT #2: Performed by: OCCUPATIONAL THERAPIST

## 2019-04-02 NOTE — TELEPHONE ENCOUNTER
Left message via  services for patient via his daughter Hannah's number as patient's mailbox was full.    Advised patient not to take metoprolol dose this evening of 4/2/19 and to take 1/2 tablet of metoprolol (12.5 mg) tomorrow morning, 4/3/19. We will see him in clinic tomorrow afternoon.    ELIU Andrews, CCRN-CSC  Pager: [phone]

## 2019-04-03 ENCOUNTER — OFFICE VISIT (OUTPATIENT)
Dept: CARDIOLOGY | Facility: CLINIC | Age: 64
End: 2019-04-03
Payer: COMMERCIAL

## 2019-04-03 VITALS
DIASTOLIC BLOOD PRESSURE: 66 MMHG | SYSTOLIC BLOOD PRESSURE: 119 MMHG | WEIGHT: 174 LBS | HEIGHT: 63 IN | HEART RATE: 58 BPM | BODY MASS INDEX: 30.83 KG/M2

## 2019-04-03 VITALS
DIASTOLIC BLOOD PRESSURE: 77 MMHG | HEIGHT: 63 IN | WEIGHT: 174.2 LBS | BODY MASS INDEX: 30.87 KG/M2 | SYSTOLIC BLOOD PRESSURE: 128 MMHG | HEART RATE: 56 BPM | OXYGEN SATURATION: 100 %

## 2019-04-03 DIAGNOSIS — I21.4 NSTEMI (NON-ST ELEVATED MYOCARDIAL INFARCTION) (H): ICD-10-CM

## 2019-04-03 DIAGNOSIS — R06.83 SNORING: ICD-10-CM

## 2019-04-03 DIAGNOSIS — E11.9 TYPE 2 DIABETES MELLITUS WITHOUT COMPLICATION, WITHOUT LONG-TERM CURRENT USE OF INSULIN (H): ICD-10-CM

## 2019-04-03 DIAGNOSIS — E78.5 HYPERLIPIDEMIA LDL GOAL <70: ICD-10-CM

## 2019-04-03 DIAGNOSIS — I25.10 CAD IN NATIVE ARTERY: ICD-10-CM

## 2019-04-03 DIAGNOSIS — I50.9 CHF (CONGESTIVE HEART FAILURE) (H): ICD-10-CM

## 2019-04-03 DIAGNOSIS — I50.22 CHRONIC SYSTOLIC HEART FAILURE (H): Primary | ICD-10-CM

## 2019-04-03 DIAGNOSIS — Z95.1 STATUS POST CORONARY ARTERY BYPASS GRAFT: ICD-10-CM

## 2019-04-03 DIAGNOSIS — I10 ESSENTIAL HYPERTENSION: ICD-10-CM

## 2019-04-03 DIAGNOSIS — R55 SYNCOPE, UNSPECIFIED SYNCOPE TYPE: ICD-10-CM

## 2019-04-03 DIAGNOSIS — Z95.1 S/P CABG (CORONARY ARTERY BYPASS GRAFT): Primary | ICD-10-CM

## 2019-04-03 LAB
ANION GAP SERPL CALCULATED.3IONS-SCNC: 12.1 MMOL/L (ref 6–17)
BUN SERPL-MCNC: 21 MG/DL (ref 7–30)
CALCIUM SERPL-MCNC: 9.5 MG/DL (ref 8.5–10.5)
CHLORIDE SERPL-SCNC: 101 MMOL/L (ref 98–107)
CO2 SERPL-SCNC: 25 MMOL/L (ref 23–29)
CREAT SERPL-MCNC: 1.65 MG/DL (ref 0.7–1.3)
GFR SERPL CREATININE-BSD FRML MDRD: 43 ML/MIN/{1.73_M2}
GLUCOSE SERPL-MCNC: 282 MG/DL (ref 70–105)
HGB BLD-MCNC: 8.3 G/DL (ref 13.3–17.7)
NT-PROBNP SERPL-MCNC: 2687 PG/ML (ref 0–125)
POTASSIUM SERPL-SCNC: 5.1 MMOL/L (ref 3.5–5.1)
SODIUM SERPL-SCNC: 133 MMOL/L (ref 136–145)
TSH SERPL DL<=0.005 MIU/L-ACNC: 1.03 MU/L (ref 0.4–4)

## 2019-04-03 PROCEDURE — 36415 COLL VENOUS BLD VENIPUNCTURE: CPT | Performed by: NURSE PRACTITIONER

## 2019-04-03 PROCEDURE — 99214 OFFICE O/P EST MOD 30 MIN: CPT | Performed by: NURSE PRACTITIONER

## 2019-04-03 PROCEDURE — 83880 ASSAY OF NATRIURETIC PEPTIDE: CPT | Performed by: NURSE PRACTITIONER

## 2019-04-03 PROCEDURE — 80048 BASIC METABOLIC PNL TOTAL CA: CPT | Performed by: NURSE PRACTITIONER

## 2019-04-03 PROCEDURE — 85018 HEMOGLOBIN: CPT | Performed by: NURSE PRACTITIONER

## 2019-04-03 PROCEDURE — 84443 ASSAY THYROID STIM HORMONE: CPT | Performed by: NURSE PRACTITIONER

## 2019-04-03 RX ORDER — METOPROLOL TARTRATE 25 MG/1
12.5 TABLET, FILM COATED ORAL EVERY 12 HOURS
Qty: 60 TABLET | Refills: 3 | Status: SHIPPED | OUTPATIENT
Start: 2019-04-03 | End: 2019-04-18

## 2019-04-03 RX ORDER — FUROSEMIDE 40 MG
40 TABLET ORAL DAILY
Qty: 90 TABLET | Refills: 1 | Status: SHIPPED | OUTPATIENT
Start: 2019-04-03 | End: 2019-04-09

## 2019-04-03 ASSESSMENT — MIFFLIN-ST. JEOR
SCORE: 1490.3
SCORE: 1489.39
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
## 2019-04-03 NOTE — PATIENT INSTRUCTIONS
1. START furosemide 40mg daily  2. Followup with Viv early next week  3. Get heart monitor placed  4. Sleep medicine referral

## 2019-04-03 NOTE — PROGRESS NOTES
CV Surgery Post Op Follow Up Note:     Assessment/Plan:   On 3/18/19 Mr Mcelroy successfully underwent coronary artery bypass grafting x4, LIMA-LAD, SVG-DIAG, SVG-OM, SVG-PDA; endoscopic harvest left greater saphenous vein by Dr. Jonathon Sanchez    Patient is accompanied to clinic by his wife. Unfortunately, the patient's  was not able to stay for this clinic visit. Since discharge, Mr. Mcelroy has been recovering well. He did have a recent emergency room visit on 4/1 for tussive syncope. He was sent home and had a similar episode the next day during cardiac rehab. He was instructed by our office to hold his pm dose of beta blocker on 4/2 and to take 1/2 tablet (12.5 mg) the am of 4/3. He has not had any further episodes of syncope or near-syncope. He was seen in CORE clinic this morning and it appears that his beta blocker has been adjusted. His vital signs in clinic today are stable. He denies any chest pain, palpitations or lightheadedness.    Mr. Mcelroy's pain is well controlled. He is having some difficulty with sleeping. He was unaware of his ability to use tylenol so I discussed with patient and his wife instructions for tylenol dosing. He does not feel the need to use the oxycodone or robaxin prescribed to him.    Patient has a low EF and follows with CORE clinic. He does appear volume up today in clinic. He is carrying fluid in his BLE but denies c/o dyspnea at rest. He does endorse some SOB with activity. He is 8 kg up from his discharge weight and does have bibasilar crackles. He was started today on scheduled lasix during his CORE visit. Patient continues to work with his IS and is able to reach 1000. I have encouraged him to continue to use this 2-3 times per day.    Patient's incisions are healing well with no erythema or drainage. His sternum appears stable upon examination and he denies sternal popping or clicking.     Postoperative restrictions were reviewed. All of the  patient's questions were answered. Our contact information was given to patient if he should have any further questions/concerns. No further follow-up is needed with us. Handwritten postoperative instructions provided.    Thank you for allowing us to participate in this patient's care.    Subjective:    Per discharge summary:  63 year old male who presented with dyspnea last week. Was noted to have newly reduced EF and rising troponin, up to 50. Cor angio did not reveal an acute lesion, but did show severe multivessel coronary artery disease. He was aggressively managed medically and allowed to recover from his MI. Following discussion of risks and benefits, he has elected to proceed with surgery.    On 3/18/19 Mr Mcelroy successfully underwent Coronary artery bypass grafting x4, LIMA-LAD, SVG-DIAG, SVG-OM, SVG-PDA; endoscopic harvest left greater saphenous vein by Dr. Jonathon Sanchez  Was extubated within 24 hours of surgery. Once he was weaned off hemodynamic stabilizing gtt's, transferred to the surgical floor.   Had some transient hyperglycemia treated with an insulin gtt, transitioned to   sliding scale insulin and is resumed on glipizide and metformin at discharge by the hospitalist service who was consulted to help aid with his diabetes. Pre-op A1C 7.9.  Had some fluid overload treated with diuretic medication. At discharge he is 7 kg below their pre-op weight and is not sent with any diuretics.   Heart rhythm remained stable. He progressed well with cardiac rehab. They are discharged to their home on pod#6 with  the help of their family.     Allergies: No Known Allergies    Medications:   Current Outpatient Medications:      acetaminophen (TYLENOL) 325 MG tablet, Take 2 tablets (650 mg) by mouth every 4 hours as needed for other, Disp: 60 tablet, Rfl: 0     amLODIPine (NORVASC) 10 MG tablet, Take 1 tablet (10 mg) by mouth daily, Disp: 60 tablet, Rfl: 3     aspirin (ASA) 325 MG EC tablet, Take 1 tablet (325  "mg) by mouth daily, Disp: 30 tablet, Rfl: 0     atorvastatin (LIPITOR) 80 MG tablet, Take 1 tablet (80 mg) by mouth every evening, Disp: 30 tablet, Rfl: 3     furosemide (LASIX) 40 MG tablet, Take 1 tablet (40 mg) by mouth daily, Disp: 90 tablet, Rfl: 1     glipiZIDE (GLUCOTROL XL) 5 MG 24 hr tablet, Take 5 mg by mouth daily, Disp: , Rfl:      lisinopril (PRINIVIL/ZESTRIL) 20 MG tablet, Take 1 tablet (20 mg) by mouth daily, Disp: 30 tablet, Rfl: 3     metFORMIN (GLUCOPHAGE) 1000 MG tablet, Take 1,000 mg by mouth 2 times daily (with meals), Disp: , Rfl:      metoprolol tartrate (LOPRESSOR) 25 MG tablet, Take 0.5 tablets (12.5 mg) by mouth every 12 hours, Disp: 60 tablet, Rfl: 3     order for DME, Equipment being ordered: Walker () Treatment Diagnosis: s/p coronary artery bypass, Disp: 1 Device, Rfl: 0     pantoprazole (PROTONIX) 40 MG EC tablet, Take 1 tablet (40 mg) by mouth every morning (before breakfast) for 14 days, Disp: 14 tablet, Rfl: 0     methocarbamol (ROBAXIN) 750 MG tablet, Take 1 tablet (750 mg) by mouth 4 times daily as needed for muscle spasms (Patient not taking: Reported on 4/3/2019), Disp: 30 tablet, Rfl: 0     oxyCODONE (ROXICODONE) 5 MG tablet, Take 1-2 tablets (5-10 mg) by mouth every 3 hours as needed for moderate to severe pain (Patient not taking: Reported on 4/3/2019), Disp: 20 tablet, Rfl: 0     polyethylene glycol (MIRALAX/GLYCOLAX) packet, Take 17 g by mouth daily (Patient not taking: Reported on 4/3/2019), Disp: 7 packet, Rfl: 0     senna-docusate (SENOKOT-S/PERICOLACE) 8.6-50 MG tablet, Take 1 tablet by mouth 2 times daily as needed for constipation (Patient not taking: Reported on 4/3/2019), Disp: 30 tablet, Rfl: 0    Objective:    Physical Exam:   /66   Pulse 58   Ht 1.6 m (5' 3\")   Wt 78.9 kg (174 lb)   BMI 30.82 kg/m    Physical Exam   Constitutional:   Well nourished, well developed, resting comfortably   Neck:   No appreciable JVD  Cardiovascular: S1, S2, regular " rate and rhythm without murmurs, rubs or gallops  Pulmonary/Chest: Bibasilar crackles noted, no wheezes or retractions. No respiratory distress.  Musculoskeletal:  No edema or clubbing   Skin: Skin is warm and dry. Sternal incision CDI, LLE incision CDI  Neurological: Alert and oriented to person, place, and time. Nonfocal exam  Psychiatric:  Normal mood and affect.    DINO Andrews-C, CCRN-CSC  Pager: [phone]

## 2019-04-04 ENCOUNTER — HOSPITAL ENCOUNTER (OUTPATIENT)
Dept: CARDIOLOGY | Facility: CLINIC | Age: 64
Discharge: HOME | End: 2019-04-04
Attending: NURSE PRACTITIONER | Admitting: NURSE PRACTITIONER
Payer: COMMERCIAL

## 2019-04-04 DIAGNOSIS — R55 SYNCOPE, UNSPECIFIED SYNCOPE TYPE: ICD-10-CM

## 2019-04-04 PROCEDURE — 93270 REMOTE 30 DAY ECG REV/REPORT: CPT

## 2019-04-04 PROCEDURE — 93272 ECG/REVIEW INTERPRET ONLY: CPT | Performed by: NURSE PRACTITIONER

## 2019-04-05 ENCOUNTER — HOSPITAL ENCOUNTER (OUTPATIENT)
Dept: CARDIAC REHAB | Facility: CLINIC | Age: 64
End: 2019-04-05
Attending: STUDENT IN AN ORGANIZED HEALTH CARE EDUCATION/TRAINING PROGRAM
Payer: COMMERCIAL

## 2019-04-05 LAB
GLUCOSE BLDC GLUCOMTR-MCNC: 242 MG/DL (ref 70–99)
GLUCOSE BLDC GLUCOMTR-MCNC: 266 MG/DL (ref 70–99)

## 2019-04-05 PROCEDURE — 93798 PHYS/QHP OP CAR RHAB W/ECG: CPT

## 2019-04-05 PROCEDURE — 00000146 ZZHCL STATISTIC GLUCOSE BY METER IP

## 2019-04-05 PROCEDURE — 40000116 ZZH STATISTIC OP CR VISIT

## 2019-04-08 ENCOUNTER — HOSPITAL ENCOUNTER (OUTPATIENT)
Dept: CARDIAC REHAB | Facility: CLINIC | Age: 64
End: 2019-04-08
Attending: STUDENT IN AN ORGANIZED HEALTH CARE EDUCATION/TRAINING PROGRAM
Payer: COMMERCIAL

## 2019-04-08 LAB
GLUCOSE BLDC GLUCOMTR-MCNC: 318 MG/DL (ref 70–99)
GLUCOSE BLDC GLUCOMTR-MCNC: 322 MG/DL (ref 70–99)

## 2019-04-08 PROCEDURE — 00000146 ZZHCL STATISTIC GLUCOSE BY METER IP

## 2019-04-08 PROCEDURE — 40000116 ZZH STATISTIC OP CR VISIT: Performed by: OCCUPATIONAL THERAPIST

## 2019-04-08 PROCEDURE — 93798 PHYS/QHP OP CAR RHAB W/ECG: CPT | Performed by: OCCUPATIONAL THERAPIST

## 2019-04-09 ENCOUNTER — OFFICE VISIT (OUTPATIENT)
Dept: CARDIOLOGY | Facility: CLINIC | Age: 64
End: 2019-04-09
Attending: NURSE PRACTITIONER
Payer: COMMERCIAL

## 2019-04-09 VITALS
OXYGEN SATURATION: 95 % | BODY MASS INDEX: 30.18 KG/M2 | SYSTOLIC BLOOD PRESSURE: 148 MMHG | HEART RATE: 68 BPM | DIASTOLIC BLOOD PRESSURE: 72 MMHG | HEIGHT: 63 IN | WEIGHT: 170.3 LBS

## 2019-04-09 DIAGNOSIS — I10 BENIGN ESSENTIAL HYPERTENSION: ICD-10-CM

## 2019-04-09 DIAGNOSIS — Z95.1 STATUS POST CORONARY ARTERY BYPASS GRAFT: ICD-10-CM

## 2019-04-09 DIAGNOSIS — Z95.1 S/P CABG (CORONARY ARTERY BYPASS GRAFT): ICD-10-CM

## 2019-04-09 DIAGNOSIS — E78.5 HYPERLIPIDEMIA LDL GOAL <70: ICD-10-CM

## 2019-04-09 DIAGNOSIS — I50.22 CHRONIC SYSTOLIC HEART FAILURE (H): ICD-10-CM

## 2019-04-09 DIAGNOSIS — I21.4 NSTEMI (NON-ST ELEVATED MYOCARDIAL INFARCTION) (H): ICD-10-CM

## 2019-04-09 DIAGNOSIS — E11.9 TYPE 2 DIABETES MELLITUS WITHOUT COMPLICATION, WITHOUT LONG-TERM CURRENT USE OF INSULIN (H): ICD-10-CM

## 2019-04-09 DIAGNOSIS — I25.10 CAD IN NATIVE ARTERY: ICD-10-CM

## 2019-04-09 DIAGNOSIS — E87.70 HYPERVOLEMIA, UNSPECIFIED HYPERVOLEMIA TYPE: ICD-10-CM

## 2019-04-09 DIAGNOSIS — I50.22 CHRONIC SYSTOLIC HEART FAILURE (H): Primary | ICD-10-CM

## 2019-04-09 LAB
ANION GAP SERPL CALCULATED.3IONS-SCNC: 13 MMOL/L (ref 6–17)
BUN SERPL-MCNC: 15 MG/DL (ref 7–30)
CALCIUM SERPL-MCNC: 10 MG/DL (ref 8.5–10.5)
CHLORIDE SERPL-SCNC: 96 MMOL/L (ref 98–107)
CHOLEST SERPL-MCNC: 115 MG/DL
CO2 SERPL-SCNC: 28 MMOL/L (ref 23–29)
CREAT SERPL-MCNC: 1.55 MG/DL (ref 0.7–1.3)
GFR SERPL CREATININE-BSD FRML MDRD: 46 ML/MIN/{1.73_M2}
GLUCOSE SERPL-MCNC: 213 MG/DL (ref 70–105)
HDLC SERPL-MCNC: 40 MG/DL
LDLC SERPL CALC-MCNC: 65 MG/DL
NONHDLC SERPL-MCNC: 75 MG/DL
POTASSIUM SERPL-SCNC: 4 MMOL/L (ref 3.5–5.1)
SODIUM SERPL-SCNC: 133 MMOL/L (ref 136–145)
TRIGL SERPL-MCNC: 52 MG/DL

## 2019-04-09 PROCEDURE — 99214 OFFICE O/P EST MOD 30 MIN: CPT | Performed by: NURSE PRACTITIONER

## 2019-04-09 PROCEDURE — 80048 BASIC METABOLIC PNL TOTAL CA: CPT | Performed by: NURSE PRACTITIONER

## 2019-04-09 PROCEDURE — 36415 COLL VENOUS BLD VENIPUNCTURE: CPT | Performed by: NURSE PRACTITIONER

## 2019-04-09 PROCEDURE — 80061 LIPID PANEL: CPT | Performed by: NURSE PRACTITIONER

## 2019-04-09 RX ORDER — FUROSEMIDE 40 MG
40 TABLET ORAL 2 TIMES DAILY
Qty: 180 TABLET | Refills: 1 | Status: SHIPPED | OUTPATIENT
Start: 2019-04-09 | End: 2019-04-25

## 2019-04-09 RX ORDER — PANTOPRAZOLE SODIUM 40 MG/1
40 TABLET, DELAYED RELEASE ORAL DAILY
COMMUNITY
End: 2019-04-25

## 2019-04-09 ASSESSMENT — MIFFLIN-ST. JEOR: SCORE: 1472.61
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
## 2019-04-09 NOTE — PATIENT INSTRUCTIONS
Call CORE nurse for any questions or concerns Mon-Fri 8am-4pm:                                                 #[phone]                                       For concerns after hours:                                               #[phone]     1: Medication changes: INCREASE furosemide to 40mg (1 tabs) 2 x a day  2: Plan from today: Increase water pill, continue monitoring weight at home, followup with Viv weekly for next 2 weeks. Followup with primary care provider for recommendations about diabetes medications.   3: Lab results: see attached; improving kidney function.   Component      Latest Ref Rng & Units 4/9/2019   Sodium      136 - 145 mmol/L 133 (L)   Potassium      3.5 - 5.1 mmol/L 4.0   Chloride      98 - 107 mmol/L 96 (L)   Carbon Dioxide      23 - 29 mmol/L 28   Anion Gap      6 - 17 mmol/L 13   Glucose      70 - 105 mg/dL 213 (H)   Urea Nitrogen      7 - 30 mg/dL 15   Creatinine      0.70 - 1.30 mg/dL 1.55 (H)   GFR Estimate      >60 mL/min/1.73:m2 46 (L)   GFR Estimate If Black      >60 mL/min/1.73:m2 55 (L)   Calcium      8.5 - 10.5 mg/dL 10.0   Cholesterol      <200 mg/dL 115   Triglycerides      <150 mg/dL 52   HDL Cholesterol      >39 mg/dL 40   LDL Cholesterol Calculated      <100 mg/dL 65   Non HDL Cholesterol      <130 mg/dL 75
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
## 2019-04-10 ENCOUNTER — HOSPITAL ENCOUNTER (OUTPATIENT)
Dept: CARDIAC REHAB | Facility: CLINIC | Age: 64
End: 2019-04-10
Attending: STUDENT IN AN ORGANIZED HEALTH CARE EDUCATION/TRAINING PROGRAM
Payer: COMMERCIAL

## 2019-04-10 LAB
GLUCOSE BLDC GLUCOMTR-MCNC: 228 MG/DL (ref 70–99)
GLUCOSE BLDC GLUCOMTR-MCNC: 228 MG/DL (ref 70–99)

## 2019-04-10 PROCEDURE — 00000146 ZZHCL STATISTIC GLUCOSE BY METER IP

## 2019-04-10 PROCEDURE — 93798 PHYS/QHP OP CAR RHAB W/ECG: CPT | Performed by: OCCUPATIONAL THERAPIST

## 2019-04-10 PROCEDURE — 40000116 ZZH STATISTIC OP CR VISIT: Performed by: OCCUPATIONAL THERAPIST

## 2019-04-12 ENCOUNTER — HOSPITAL ENCOUNTER (OUTPATIENT)
Dept: CARDIAC REHAB | Facility: CLINIC | Age: 64
End: 2019-04-12
Attending: STUDENT IN AN ORGANIZED HEALTH CARE EDUCATION/TRAINING PROGRAM
Payer: COMMERCIAL

## 2019-04-12 LAB
GLUCOSE BLDC GLUCOMTR-MCNC: 182 MG/DL (ref 70–99)
GLUCOSE BLDC GLUCOMTR-MCNC: 244 MG/DL (ref 70–99)

## 2019-04-12 PROCEDURE — 00000146 ZZHCL STATISTIC GLUCOSE BY METER IP

## 2019-04-12 PROCEDURE — 40000116 ZZH STATISTIC OP CR VISIT: Performed by: CLINICAL EXERCISE PHYSIOLOGIST

## 2019-04-12 PROCEDURE — 93798 PHYS/QHP OP CAR RHAB W/ECG: CPT | Performed by: CLINICAL EXERCISE PHYSIOLOGIST

## 2019-04-15 ENCOUNTER — HOSPITAL ENCOUNTER (OUTPATIENT)
Dept: CARDIAC REHAB | Facility: CLINIC | Age: 64
End: 2019-04-15
Attending: STUDENT IN AN ORGANIZED HEALTH CARE EDUCATION/TRAINING PROGRAM
Payer: COMMERCIAL

## 2019-04-15 LAB
GLUCOSE BLDC GLUCOMTR-MCNC: 383 MG/DL (ref 70–99)
GLUCOSE BLDC GLUCOMTR-MCNC: 406 MG/DL (ref 70–99)

## 2019-04-15 PROCEDURE — 93798 PHYS/QHP OP CAR RHAB W/ECG: CPT

## 2019-04-15 PROCEDURE — 00000146 ZZHCL STATISTIC GLUCOSE BY METER IP

## 2019-04-15 PROCEDURE — 40000116 ZZH STATISTIC OP CR VISIT

## 2019-04-17 ENCOUNTER — HOSPITAL ENCOUNTER (OUTPATIENT)
Dept: CARDIAC REHAB | Facility: CLINIC | Age: 64
End: 2019-04-17
Attending: STUDENT IN AN ORGANIZED HEALTH CARE EDUCATION/TRAINING PROGRAM
Payer: COMMERCIAL

## 2019-04-17 LAB
GLUCOSE BLDC GLUCOMTR-MCNC: 247 MG/DL (ref 70–99)
GLUCOSE BLDC GLUCOMTR-MCNC: 277 MG/DL (ref 70–99)

## 2019-04-17 PROCEDURE — 40000116 ZZH STATISTIC OP CR VISIT: Performed by: CLINICAL EXERCISE PHYSIOLOGIST

## 2019-04-17 PROCEDURE — 00000146 ZZHCL STATISTIC GLUCOSE BY METER IP

## 2019-04-17 PROCEDURE — 93798 PHYS/QHP OP CAR RHAB W/ECG: CPT | Performed by: CLINICAL EXERCISE PHYSIOLOGIST

## 2019-04-18 ENCOUNTER — OFFICE VISIT (OUTPATIENT)
Dept: CARDIOLOGY | Facility: CLINIC | Age: 64
End: 2019-04-18
Attending: NURSE PRACTITIONER
Payer: COMMERCIAL

## 2019-04-18 VITALS
SYSTOLIC BLOOD PRESSURE: 128 MMHG | OXYGEN SATURATION: 98 % | HEIGHT: 63 IN | DIASTOLIC BLOOD PRESSURE: 68 MMHG | WEIGHT: 162 LBS | HEART RATE: 60 BPM | BODY MASS INDEX: 28.7 KG/M2

## 2019-04-18 DIAGNOSIS — I25.10 CAD IN NATIVE ARTERY: ICD-10-CM

## 2019-04-18 DIAGNOSIS — I50.22 CHRONIC SYSTOLIC HEART FAILURE (H): ICD-10-CM

## 2019-04-18 DIAGNOSIS — E11.9 TYPE 2 DIABETES MELLITUS WITHOUT COMPLICATION, WITHOUT LONG-TERM CURRENT USE OF INSULIN (H): ICD-10-CM

## 2019-04-18 DIAGNOSIS — I10 BENIGN ESSENTIAL HYPERTENSION: ICD-10-CM

## 2019-04-18 DIAGNOSIS — E78.5 HYPERLIPIDEMIA LDL GOAL <70: ICD-10-CM

## 2019-04-18 DIAGNOSIS — I50.22 CHRONIC SYSTOLIC HEART FAILURE (H): Primary | ICD-10-CM

## 2019-04-18 LAB
ANION GAP SERPL CALCULATED.3IONS-SCNC: 10.5 MMOL/L (ref 6–17)
BUN SERPL-MCNC: 19 MG/DL (ref 7–30)
CALCIUM SERPL-MCNC: 9.9 MG/DL (ref 8.5–10.5)
CHLORIDE SERPL-SCNC: 101 MMOL/L (ref 98–107)
CO2 SERPL-SCNC: 30 MMOL/L (ref 23–29)
CREAT SERPL-MCNC: 1.58 MG/DL (ref 0.7–1.3)
GFR SERPL CREATININE-BSD FRML MDRD: 45 ML/MIN/{1.73_M2}
GLUCOSE SERPL-MCNC: 164 MG/DL (ref 70–105)
POTASSIUM SERPL-SCNC: 3.5 MMOL/L (ref 3.5–5.1)
SODIUM SERPL-SCNC: 138 MMOL/L (ref 136–145)

## 2019-04-18 PROCEDURE — 80048 BASIC METABOLIC PNL TOTAL CA: CPT | Performed by: NURSE PRACTITIONER

## 2019-04-18 PROCEDURE — 99204 OFFICE O/P NEW MOD 45 MIN: CPT | Performed by: NURSE PRACTITIONER

## 2019-04-18 PROCEDURE — 36415 COLL VENOUS BLD VENIPUNCTURE: CPT | Performed by: NURSE PRACTITIONER

## 2019-04-18 RX ORDER — METOPROLOL SUCCINATE 25 MG/1
12.5 TABLET, EXTENDED RELEASE ORAL DAILY
Qty: 45 TABLET | Refills: 1 | Status: SHIPPED | OUTPATIENT
Start: 2019-04-18 | End: 2019-05-15

## 2019-04-18 ASSESSMENT — MIFFLIN-ST. JEOR: SCORE: 1434.96
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
## 2019-04-18 NOTE — PATIENT INSTRUCTIONS
Call CORE nurse for any questions or concerns Mon-Fri 8am-4pm:                                                 #[phone]                                       For concerns after hours:                                               #[phone]     1: Medication changes: STOP Metoprolol TARTRATE, START metoprolol SUCCINATE 12.5mg daily (1/2 tablet daily).  2: Plan from today: Son will call CORE clinic weekly with weight update. Follow up with Viv next week.   3: Lab results: see attached; kidney function stable

## 2019-04-19 ENCOUNTER — HOSPITAL ENCOUNTER (OUTPATIENT)
Dept: CARDIAC REHAB | Facility: CLINIC | Age: 64
End: 2019-04-19
Attending: STUDENT IN AN ORGANIZED HEALTH CARE EDUCATION/TRAINING PROGRAM
Payer: COMMERCIAL

## 2019-04-19 LAB
GLUCOSE BLDC GLUCOMTR-MCNC: 235 MG/DL (ref 70–99)
GLUCOSE BLDC GLUCOMTR-MCNC: 289 MG/DL (ref 70–99)

## 2019-04-19 PROCEDURE — 40000116 ZZH STATISTIC OP CR VISIT: Performed by: CLINICAL EXERCISE PHYSIOLOGIST

## 2019-04-19 PROCEDURE — 93798 PHYS/QHP OP CAR RHAB W/ECG: CPT | Performed by: CLINICAL EXERCISE PHYSIOLOGIST

## 2019-04-19 PROCEDURE — 00000146 ZZHCL STATISTIC GLUCOSE BY METER IP

## 2019-04-22 ENCOUNTER — HOSPITAL ENCOUNTER (OUTPATIENT)
Dept: CARDIAC REHAB | Facility: CLINIC | Age: 64
End: 2019-04-22
Attending: STUDENT IN AN ORGANIZED HEALTH CARE EDUCATION/TRAINING PROGRAM
Payer: COMMERCIAL

## 2019-04-22 LAB
GLUCOSE BLDC GLUCOMTR-MCNC: 148 MG/DL (ref 70–99)
GLUCOSE BLDC GLUCOMTR-MCNC: 211 MG/DL (ref 70–99)

## 2019-04-22 PROCEDURE — 00000146 ZZHCL STATISTIC GLUCOSE BY METER IP

## 2019-04-22 PROCEDURE — 40000116 ZZH STATISTIC OP CR VISIT: Performed by: OCCUPATIONAL THERAPIST

## 2019-04-22 PROCEDURE — 93798 PHYS/QHP OP CAR RHAB W/ECG: CPT | Performed by: OCCUPATIONAL THERAPIST

## 2019-04-22 NOTE — ADDENDUM NOTE
Encounter addended by: SpeakerKenan on: 4/22/2019 12:03 PM   Actions taken: Charge Capture section accepted

## 2019-04-24 ENCOUNTER — HOSPITAL ENCOUNTER (OUTPATIENT)
Dept: CARDIAC REHAB | Facility: CLINIC | Age: 64
End: 2019-04-24
Attending: STUDENT IN AN ORGANIZED HEALTH CARE EDUCATION/TRAINING PROGRAM
Payer: COMMERCIAL

## 2019-04-24 LAB
GLUCOSE BLDC GLUCOMTR-MCNC: 193 MG/DL (ref 70–99)
GLUCOSE BLDC GLUCOMTR-MCNC: 238 MG/DL (ref 70–99)

## 2019-04-24 PROCEDURE — 93798 PHYS/QHP OP CAR RHAB W/ECG: CPT | Performed by: CLINICAL EXERCISE PHYSIOLOGIST

## 2019-04-24 PROCEDURE — 40000116 ZZH STATISTIC OP CR VISIT: Performed by: CLINICAL EXERCISE PHYSIOLOGIST

## 2019-04-24 PROCEDURE — 00000146 ZZHCL STATISTIC GLUCOSE BY METER IP

## 2019-04-25 ENCOUNTER — OFFICE VISIT (OUTPATIENT)
Dept: CARDIOLOGY | Facility: CLINIC | Age: 64
End: 2019-04-25
Attending: NURSE PRACTITIONER
Payer: COMMERCIAL

## 2019-04-25 VITALS
DIASTOLIC BLOOD PRESSURE: 70 MMHG | BODY MASS INDEX: 28.88 KG/M2 | HEART RATE: 68 BPM | OXYGEN SATURATION: 100 % | HEIGHT: 63 IN | SYSTOLIC BLOOD PRESSURE: 128 MMHG | WEIGHT: 163 LBS

## 2019-04-25 DIAGNOSIS — Z95.1 STATUS POST CORONARY ARTERY BYPASS GRAFT: ICD-10-CM

## 2019-04-25 DIAGNOSIS — I50.22 CHRONIC SYSTOLIC HEART FAILURE (H): ICD-10-CM

## 2019-04-25 LAB
ANION GAP SERPL CALCULATED.3IONS-SCNC: 9.4 MMOL/L (ref 6–17)
BUN SERPL-MCNC: 22 MG/DL (ref 7–30)
CALCIUM SERPL-MCNC: 9.7 MG/DL (ref 8.5–10.5)
CHLORIDE SERPL-SCNC: 101 MMOL/L (ref 98–107)
CO2 SERPL-SCNC: 26 MMOL/L (ref 23–29)
CREAT SERPL-MCNC: 1.48 MG/DL (ref 0.7–1.3)
GFR SERPL CREATININE-BSD FRML MDRD: 48 ML/MIN/{1.73_M2}
GLUCOSE SERPL-MCNC: 272 MG/DL (ref 70–105)
POTASSIUM SERPL-SCNC: 4.4 MMOL/L (ref 3.5–5.1)
SODIUM SERPL-SCNC: 132 MMOL/L (ref 136–145)

## 2019-04-25 PROCEDURE — 80048 BASIC METABOLIC PNL TOTAL CA: CPT | Performed by: NURSE PRACTITIONER

## 2019-04-25 PROCEDURE — 99214 OFFICE O/P EST MOD 30 MIN: CPT | Performed by: NURSE PRACTITIONER

## 2019-04-25 PROCEDURE — 36415 COLL VENOUS BLD VENIPUNCTURE: CPT | Performed by: NURSE PRACTITIONER

## 2019-04-25 RX ORDER — FUROSEMIDE 40 MG
40 TABLET ORAL 2 TIMES DAILY
Qty: 180 TABLET | Refills: 1 | Status: SHIPPED | OUTPATIENT
Start: 2019-04-25 | End: 2019-06-14

## 2019-04-25 ASSESSMENT — MIFFLIN-ST. JEOR: SCORE: 1439.49

## 2019-04-25 NOTE — PATIENT INSTRUCTIONS
Call CORE nurse for any questions or concerns Mon-Fri 8am-4pm:                                                 #[phone]                                       For concerns after hours:                                               #[phone]     1: Medication changes: none  2: Plan from today:  furosemide (water pill)today down stairs. See Viv in 6 weeks.   3: Lab results: see attached; stable kidney function.   Component      Latest Ref Rng & Units 4/18/2019 4/25/2019   Sodium      136 - 145 mmol/L 138 132 (L)   Potassium      3.5 - 5.1 mmol/L 3.5 4.4   Chloride      98 - 107 mmol/L 101 101   Carbon Dioxide      23 - 29 mmol/L 30 (H) 26   Anion Gap      6 - 17 mmol/L 10.5 9.4   Glucose      70 - 105 mg/dL 164 (H) 272 (H)   Urea Nitrogen      7 - 30 mg/dL 19 22   Creatinine      0.70 - 1.30 mg/dL 1.58 (H) 1.48 (H)   GFR Estimate      >60 mL/min/1.73:m2 45 (L) 48 (L)   GFR Estimate If Black      >60 mL/min/1.73:m2 54 (L) 58 (L)   Calcium      8.5 - 10.5 mg/dL 9.9 9.7
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
## 2019-04-26 ENCOUNTER — HOSPITAL ENCOUNTER (OUTPATIENT)
Dept: CARDIAC REHAB | Facility: CLINIC | Age: 64
End: 2019-04-26
Attending: STUDENT IN AN ORGANIZED HEALTH CARE EDUCATION/TRAINING PROGRAM
Payer: COMMERCIAL

## 2019-04-26 LAB
GLUCOSE BLDC GLUCOMTR-MCNC: 142 MG/DL (ref 70–99)
GLUCOSE BLDC GLUCOMTR-MCNC: 95 MG/DL (ref 70–99)

## 2019-04-26 PROCEDURE — 00000146 ZZHCL STATISTIC GLUCOSE BY METER IP

## 2019-04-26 PROCEDURE — 40000116 ZZH STATISTIC OP CR VISIT

## 2019-04-26 PROCEDURE — 93798 PHYS/QHP OP CAR RHAB W/ECG: CPT

## 2019-04-29 ENCOUNTER — HOSPITAL ENCOUNTER (OUTPATIENT)
Dept: CARDIAC REHAB | Facility: CLINIC | Age: 64
End: 2019-04-29
Attending: STUDENT IN AN ORGANIZED HEALTH CARE EDUCATION/TRAINING PROGRAM
Payer: COMMERCIAL

## 2019-04-29 LAB
GLUCOSE BLDC GLUCOMTR-MCNC: 151 MG/DL (ref 70–99)
GLUCOSE BLDC GLUCOMTR-MCNC: 240 MG/DL (ref 70–99)

## 2019-04-29 PROCEDURE — 93798 PHYS/QHP OP CAR RHAB W/ECG: CPT

## 2019-04-29 PROCEDURE — 00000146 ZZHCL STATISTIC GLUCOSE BY METER IP

## 2019-04-29 PROCEDURE — 40000116 ZZH STATISTIC OP CR VISIT

## 2019-05-01 ENCOUNTER — HOSPITAL ENCOUNTER (OUTPATIENT)
Dept: CARDIAC REHAB | Facility: CLINIC | Age: 64
End: 2019-05-01
Attending: STUDENT IN AN ORGANIZED HEALTH CARE EDUCATION/TRAINING PROGRAM
Payer: COMMERCIAL

## 2019-05-01 LAB
GLUCOSE BLDC GLUCOMTR-MCNC: 106 MG/DL (ref 70–99)
GLUCOSE BLDC GLUCOMTR-MCNC: 209 MG/DL (ref 70–99)

## 2019-05-01 PROCEDURE — 40000116 ZZH STATISTIC OP CR VISIT: Performed by: CLINICAL EXERCISE PHYSIOLOGIST

## 2019-05-01 PROCEDURE — 93798 PHYS/QHP OP CAR RHAB W/ECG: CPT | Performed by: CLINICAL EXERCISE PHYSIOLOGIST

## 2019-05-01 PROCEDURE — 00000146 ZZHCL STATISTIC GLUCOSE BY METER IP

## 2019-05-03 ENCOUNTER — HOSPITAL ENCOUNTER (OUTPATIENT)
Dept: CARDIAC REHAB | Facility: CLINIC | Age: 64
End: 2019-05-03
Attending: STUDENT IN AN ORGANIZED HEALTH CARE EDUCATION/TRAINING PROGRAM
Payer: COMMERCIAL

## 2019-05-03 LAB
GLUCOSE BLDC GLUCOMTR-MCNC: 138 MG/DL (ref 70–99)
GLUCOSE BLDC GLUCOMTR-MCNC: 252 MG/DL (ref 70–99)

## 2019-05-03 PROCEDURE — 00000146 ZZHCL STATISTIC GLUCOSE BY METER IP

## 2019-05-03 PROCEDURE — 93798 PHYS/QHP OP CAR RHAB W/ECG: CPT

## 2019-05-03 PROCEDURE — 40000116 ZZH STATISTIC OP CR VISIT

## 2019-05-06 ENCOUNTER — CARE COORDINATION (OUTPATIENT)
Dept: CARDIOLOGY | Facility: CLINIC | Age: 64
End: 2019-05-06

## 2019-05-06 ENCOUNTER — HOSPITAL ENCOUNTER (OUTPATIENT)
Dept: CARDIAC REHAB | Facility: CLINIC | Age: 64
End: 2019-05-06
Attending: STUDENT IN AN ORGANIZED HEALTH CARE EDUCATION/TRAINING PROGRAM
Payer: COMMERCIAL

## 2019-05-06 LAB
GLUCOSE BLDC GLUCOMTR-MCNC: 194 MG/DL (ref 70–99)
GLUCOSE BLDC GLUCOMTR-MCNC: 99 MG/DL (ref 70–99)

## 2019-05-06 PROCEDURE — 40000116 ZZH STATISTIC OP CR VISIT

## 2019-05-06 PROCEDURE — 93798 PHYS/QHP OP CAR RHAB W/ECG: CPT

## 2019-05-06 PROCEDURE — 00000146 ZZHCL STATISTIC GLUCOSE BY METER IP

## 2019-05-06 NOTE — PROGRESS NOTES
End of service summary for event monitor to Dr Hernández inbox for signature.  Tana Jiménez RN 05/06/19 5:05 PM

## 2019-05-08 ENCOUNTER — HOSPITAL ENCOUNTER (OUTPATIENT)
Dept: CARDIAC REHAB | Facility: CLINIC | Age: 64
End: 2019-05-08
Attending: STUDENT IN AN ORGANIZED HEALTH CARE EDUCATION/TRAINING PROGRAM
Payer: COMMERCIAL

## 2019-05-08 LAB
GLUCOSE BLDC GLUCOMTR-MCNC: 174 MG/DL (ref 70–99)
GLUCOSE BLDC GLUCOMTR-MCNC: 71 MG/DL (ref 70–99)

## 2019-05-08 PROCEDURE — 00000146 ZZHCL STATISTIC GLUCOSE BY METER IP

## 2019-05-08 PROCEDURE — 93798 PHYS/QHP OP CAR RHAB W/ECG: CPT | Performed by: CLINICAL EXERCISE PHYSIOLOGIST

## 2019-05-08 PROCEDURE — 40000116 ZZH STATISTIC OP CR VISIT: Performed by: CLINICAL EXERCISE PHYSIOLOGIST

## 2019-05-10 ENCOUNTER — HOSPITAL ENCOUNTER (OUTPATIENT)
Dept: CARDIAC REHAB | Facility: CLINIC | Age: 64
End: 2019-05-10
Attending: STUDENT IN AN ORGANIZED HEALTH CARE EDUCATION/TRAINING PROGRAM
Payer: COMMERCIAL

## 2019-05-10 LAB
GLUCOSE BLDC GLUCOMTR-MCNC: 117 MG/DL (ref 70–99)
GLUCOSE BLDC GLUCOMTR-MCNC: 233 MG/DL (ref 70–99)

## 2019-05-10 PROCEDURE — 40000116 ZZH STATISTIC OP CR VISIT

## 2019-05-10 PROCEDURE — 93798 PHYS/QHP OP CAR RHAB W/ECG: CPT

## 2019-05-10 PROCEDURE — 00000146 ZZHCL STATISTIC GLUCOSE BY METER IP

## 2019-05-13 ENCOUNTER — HOSPITAL ENCOUNTER (OUTPATIENT)
Dept: CARDIAC REHAB | Facility: CLINIC | Age: 64
End: 2019-05-13
Attending: STUDENT IN AN ORGANIZED HEALTH CARE EDUCATION/TRAINING PROGRAM
Payer: COMMERCIAL

## 2019-05-13 LAB
GLUCOSE BLDC GLUCOMTR-MCNC: 147 MG/DL (ref 70–99)
GLUCOSE BLDC GLUCOMTR-MCNC: 169 MG/DL (ref 70–99)

## 2019-05-13 PROCEDURE — 40000116 ZZH STATISTIC OP CR VISIT

## 2019-05-13 PROCEDURE — 93798 PHYS/QHP OP CAR RHAB W/ECG: CPT

## 2019-05-13 PROCEDURE — 00000146 ZZHCL STATISTIC GLUCOSE BY METER IP

## 2019-05-14 ENCOUNTER — OFFICE VISIT (OUTPATIENT)
Dept: SLEEP MEDICINE | Facility: CLINIC | Age: 64
End: 2019-05-14
Attending: NURSE PRACTITIONER
Payer: COMMERCIAL

## 2019-05-14 VITALS
RESPIRATION RATE: 16 BRPM | OXYGEN SATURATION: 100 % | SYSTOLIC BLOOD PRESSURE: 123 MMHG | WEIGHT: 153 LBS | DIASTOLIC BLOOD PRESSURE: 70 MMHG | HEART RATE: 77 BPM | BODY MASS INDEX: 27.11 KG/M2 | HEIGHT: 63 IN

## 2019-05-14 DIAGNOSIS — R06.83 SNORING: ICD-10-CM

## 2019-05-14 DIAGNOSIS — I50.22 CHRONIC SYSTOLIC HEART FAILURE (H): ICD-10-CM

## 2019-05-14 DIAGNOSIS — R29.818 SUSPECTED SLEEP APNEA: Primary | ICD-10-CM

## 2019-05-14 PROCEDURE — 99204 OFFICE O/P NEW MOD 45 MIN: CPT | Performed by: INTERNAL MEDICINE

## 2019-05-14 ASSESSMENT — MIFFLIN-ST. JEOR: SCORE: 1394.13

## 2019-05-14 NOTE — NURSING NOTE
"Chief Complaint   Patient presents with     Sleep Problem       Initial /70   Pulse 77   Resp 16   Ht 1.6 m (5' 3\")   Wt 69.4 kg (153 lb)   SpO2 100%   BMI 27.10 kg/m   Estimated body mass index is 27.1 kg/m  as calculated from the following:    Height as of this encounter: 1.6 m (5' 3\").    Weight as of this encounter: 69.4 kg (153 lb).    Medication Reconciliation: complete     ESS 4  Neck 40cm  Beatriz Wilson        "

## 2019-05-14 NOTE — PROGRESS NOTES
Sleep Consultation:    Date on this visit: 5/14/2019    Khang Mcelroy is sent by Viv Culp for a sleep consultation regarding snoring and possible sleep apnea.    Primary Physician: Mahi, [facility]     Chief complaint: snoring     Presenting History:     Khang Mcelroy has been sent for an evaluation of sleep disordered breathing by Cardiology. He was seen with a Inés .     Medical history is significant for HTN, CAD, diabetes and heart failure with reduced ejection fraction (35-40%). He was hospitalized in March 2019 with multivessel CAD s/p CABG.     Khang does snore every night. Patient does have a regular bed partner. There is report of snoring, gasping and poor quality of sleep.  He does have witnessed apneas. They never sleep separately.  Patient sleeps on his back. He denies no morning headaches and restless legs. Khang denies any bruxism, sleep walking, sleep talking, dream enactment, sleep paralysis, cataplexy and hypnogogic/hypnopompic hallucinations.    Khang goes to sleep at 9:30 PM during the week. He wakes up at 6:00 AM without an alarm. He falls asleep in 60 minutes.  Khang denies difficulty falling asleep.  He wakes up 1-2 times a night for 10 minutes before falling back to sleep.  Khang wakes up to go to the bathroom.  On weekends, Khang goes to sleep at 9:30 PM.  He wakes up at 6:30 AM without an alarm. He falls asleep in 10 minutes.  Patient gets an average of 7 hours of sleep per night.     Khang denies difficulty breathing through his nose.      Patient's [place] Sleepiness score 4/24 consistent with no daytime sleepiness.      Khang naps 6-7 times per week for  minutes, feels refreshed after naps. He takes no inadvertant naps.  He denies closing eyes, dozing and falling asleep while driving. Patient was counseled on the importance of driving while alert, to pull over if drowsy, or nap before getting into the vehicle if sleepy.      He uses no caffeine but  used to drink caffeinated soda before.    Allergies:    No Known Allergies    Medications:    Current Outpatient Medications   Medication Sig Dispense Refill     acetaminophen (TYLENOL) 325 MG tablet Take 2 tablets (650 mg) by mouth every 4 hours as needed for other 60 tablet 0     amLODIPine (NORVASC) 10 MG tablet Take 1 tablet (10 mg) by mouth daily 60 tablet 3     aspirin (ASA) 325 MG EC tablet Take 1 tablet (325 mg) by mouth daily 30 tablet 0     atorvastatin (LIPITOR) 80 MG tablet Take 1 tablet (80 mg) by mouth every evening 30 tablet 3     furosemide (LASIX) 40 MG tablet Take 1 tablet (40 mg) by mouth 2 times daily 180 tablet 1     glipiZIDE (GLUCOTROL XL) 5 MG 24 hr tablet Take 5 mg by mouth daily       lisinopril (PRINIVIL/ZESTRIL) 20 MG tablet Take 1 tablet (20 mg) by mouth daily 30 tablet 3     metFORMIN (GLUCOPHAGE) 500 MG tablet Take 500 mg by mouth daily (with breakfast)       metoprolol succinate ER (TOPROL-XL) 25 MG 24 hr tablet Take 0.5 tablets (12.5 mg) by mouth daily 45 tablet 1     order for DME Equipment being ordered: Walker ()  Treatment Diagnosis: s/p coronary artery bypass 1 Device 0     polyethylene glycol (MIRALAX/GLYCOLAX) packet Take 17 g by mouth daily 7 packet 0     senna-docusate (SENOKOT-S/PERICOLACE) 8.6-50 MG tablet Take 1 tablet by mouth 2 times daily as needed for constipation 30 tablet 0       Problem List:  Patient Active Problem List    Diagnosis Date Noted     S/P CABG (coronary artery bypass graft) 03/26/2019     Priority: Medium     Fluid overload 03/26/2019     Priority: Medium     Transient hyperglycemia post procedure 03/26/2019     Priority: Medium     CAD in native artery 03/18/2019     Priority: Medium     Type 2 diabetes mellitus (H) 03/11/2019     Priority: Medium     NSTEMI (non-ST elevated myocardial infarction) (H) 03/11/2019     Priority: Medium        Past Medical/Surgical History:  Past Medical History:   Diagnosis Date     Chronic renal insufficiency       Diabetes mellitus with proliferative retinopathy (H)      Hyperlipidemia      Hypertension      Nephrolithiasis      NSTEMI (non-ST elevated myocardial infarction) (H)      Past Surgical History:   Procedure Laterality Date     BYPASS GRAFT ARTERY CORONARY N/A 3/18/2019    Procedure: CORONARY BYPASS GRAFTING X 4 - LIMA TO LAD, SV TO DIAGONAL, PDA, AND OM; ON PUMP WITH NOA; ENDOVEIN HARVEST LEFT LEG;  Surgeon: Adarsh Sanchez MD;  Location:  OR     CV CORONARY ANGIOGRAM N/A 3/12/2019    Procedure: Coronary Angiogram;  Surgeon: Remi Rodriguez MD;  Location:  HEART CARDIAC CATH LAB     CV HEART CATHETERIZATION WITH POSSIBLE INTERVENTION N/A 3/12/2019    Procedure: Heart Catheterization with possible Intervention;  Surgeon: Remi Rodriguez MD;  Location:  HEART CARDIAC CATH LAB     CV LEFT VENTRICULOGRAM N/A 3/12/2019    Procedure: Left Ventricular Angiogram;  Surgeon: Remi Rodriguez MD;  Location:  HEART CARDIAC CATH LAB     LITHOTRIPSY         Social History:  Social History     Socioeconomic History     Marital status:      Spouse name: Not on file     Number of children: Not on file     Years of education: Not on file     Highest education level: Not on file   Occupational History     Not on file   Social Needs     Financial resource strain: Not on file     Food insecurity:     Worry: Not on file     Inability: Not on file     Transportation needs:     Medical: Not on file     Non-medical: Not on file   Tobacco Use     Smoking status: Never Smoker     Smokeless tobacco: Never Used   Substance and Sexual Activity     Alcohol use: No     Frequency: Never     Drug use: No     Sexual activity: Yes     Partners: Female   Lifestyle     Physical activity:     Days per week: Not on file     Minutes per session: Not on file     Stress: Not on file   Relationships     Social connections:     Talks on phone: Not on file     Gets together: Not on file     Attends Pentecostalism service: Not on  "file     Active member of club or organization: Not on file     Attends meetings of clubs or organizations: Not on file     Relationship status: Not on file     Intimate partner violence:     Fear of current or ex partner: Not on file     Emotionally abused: Not on file     Physically abused: Not on file     Forced sexual activity: Not on file   Other Topics Concern     Parent/sibling w/ CABG, MI or angioplasty before 65F 55M? Not Asked   Social History Narrative     Not on file       Family History:  No family history on file.    Review of Systems:  A complete review of systems reviewed by me is negative with the exeption of what has been mentioned in the history of present illness.  CONSTITUTIONAL: NEGATIVE for weight gain/loss, fever, chills, sweats or night sweats, drug allergies.  EYES: NEGATIVE for changes in vision, blind spots, double vision.  ENT: NEGATIVE for ear pain, sore throat, sinus pain, post-nasal drip, runny nose, bloody nose  CARDIAC:  POSITIVE for  swollen legs  NEUROLOGIC: NEGATIVE headaches, weakness or numbness in the arms or legs.  DERMATOLOGIC: NEGATIVE for rashes, new moles or change in mole(s)  PULMONARY: NEGATIVE SOB at rest, SOB with activity, dry cough, productive cough, coughing up blood, wheezing or whistling when breathing.    GASTROINTESTINAL: NEGATIVE for nausea or vomitting, loose or watery stools, fat or grease in stools, constipation, abdominal pain, bowel movements black in color or blood noted.  GENITOURINARY: NEGATIVE for pain during urination, blood in urine, urinating more frequently than usual, irregular menstrual periods.  MUSCULOSKELETAL: NEGATIVE for muscle pain, bone or joint pain, swollen joints.  ENDOCRINE: NEGATIVE for increased thirst or urination, diabetes.  LYMPHATIC: NEGATIVE for swollen lymph nodes, lumps or bumps in the breasts or nipple discharge.    Physical Examination:  Vitals: /70   Pulse 77   Resp 16   Ht 1.6 m (5' 3\")   Wt 69.4 kg (153 lb)   " SpO2 100%   BMI 27.10 kg/m    BMI= Body mass index is 27.1 kg/m .    Neck Cir (cm): 40 cm    [place] Total Score 5/14/2019   Total score - [place] 4          GENERAL APPEARANCE: healthy, alert and no distress  EYES: Eyes grossly normal to inspection, PERRL and conjunctivae and sclerae normal  HENT: nose and mouth without ulcers or lesions, oropharynx crowded, uvula elongated and soft palate dependent  NECK: no adenopathy, no asymmetry, masses, or scars and thyroid normal to palpation  RESP: lungs clear to auscultation - no rales, rhonchi or wheezes  CV: normal S1 S2, no S3 or S4 and no murmur, click or rub  ABDOMEN: soft, nontender, without hepatosplenomegaly or masses and bowel sounds normal  MS:  lower extremity edema bilaterally  NEURO: Normal strength and tone, mentation intact and speech normal  PSYCH: mentation appears normal and affect normal/bright  Mallampati Class: IV.  Tonsillar Stage: 1  hidden by pillars.    Impression/Plan:    1. Possible sleep apnea  2. Heart failure with reduced ejection fraction (35-40%)   3. HTN  4. Diabetes  5. CAD     - Patient is a 63 years old with CAD, HTN and CHF who has symptoms concerning for sleep apnea. An overnight sleep study is recommended for evaluation.     Plan:     1. Split night PSG for assessment of sleep apnea     He will follow up with me in approximately two weeks after his sleep study has been competed to review the results and discuss plan of care.       Polysomnography reviewed.  Obstructive sleep apnea reviewed.  Complications of untreated sleep apnea were reviewed.    Dr. Abdon Bernstein     CC: Viv Culp

## 2019-05-14 NOTE — PATIENT INSTRUCTIONS
Your BMI is Body mass index is 27.1 kg/m .  Weight management is a personal decision.  If you are interested in exploring weight loss strategies, the following discussion covers the approaches that may be successful. Body mass index (BMI) is one way to tell whether you are at a healthy weight, overweight, or obese. It measures your weight in relation to your height.  A BMI of 18.5 to 24.9 is in the healthy range. A person with a BMI of 25 to 29.9 is considered overweight, and someone with a BMI of 30 or greater is considered obese. More than two-thirds of American adults are considered overweight or obese.  Being overweight or obese increases the risk for further weight gain. Excess weight may lead to heart disease and diabetes.  Creating and following plans for healthy eating and physical activity may help you improve your health.  Weight control is part of healthy lifestyle and includes exercise, emotional health, and healthy eating habits. Careful eating habits lifelong are the mainstay of weight control. Though there are significant health benefits from weight loss, long-term weight loss with diet alone may be very difficult to achieve- studies show long-term success with dietary management in less than 10% of people. Attaining a healthy weight may be especially difficult to achieve in those with severe obesity. In some cases, medications, devices and surgical management might be considered.  What can you do?  If you are overweight or obese and are interested in methods for weight loss, you should discuss this with your provider.     Consider reducing daily calorie intake by 500 calories.     Keep a food journal.     Avoiding skipping meals, consider cutting portions instead.    Diet combined with exercise helps maintain muscle while optimizing fat loss. Strength training is particularly important for building and maintaining muscle mass. Exercise helps reduce stress, increase energy, and improves fitness.  Increasing exercise without diet control, however, may not burn enough calories to loose weight.       Start walking three days a week 10-20 minutes at a time    Work towards walking thirty minutes five days a week     Eventually, increase the speed of your walking for 1-2 minutes at time    In addition, we recommend that you review healthy lifestyles and methods for weight loss available through the National Institutes of Health patient information sites:  http://win.niddk.nih.gov/publications/index.htm    And look into health and wellness programs that may be available through your health insurance provider, employer, local community center, or paul club.    Weight management plan: Patient was referred to their PCP to discuss a diet and exercise plan.

## 2019-05-15 ENCOUNTER — OFFICE VISIT (OUTPATIENT)
Dept: CARDIOLOGY | Facility: CLINIC | Age: 64
End: 2019-05-15
Attending: INTERNAL MEDICINE
Payer: COMMERCIAL

## 2019-05-15 ENCOUNTER — CARE COORDINATION (OUTPATIENT)
Dept: CARDIOLOGY | Facility: CLINIC | Age: 64
End: 2019-05-15

## 2019-05-15 VITALS
DIASTOLIC BLOOD PRESSURE: 73 MMHG | WEIGHT: 155 LBS | BODY MASS INDEX: 27.46 KG/M2 | HEART RATE: 75 BPM | SYSTOLIC BLOOD PRESSURE: 133 MMHG | HEIGHT: 63 IN

## 2019-05-15 DIAGNOSIS — I21.4 NSTEMI (NON-ST ELEVATED MYOCARDIAL INFARCTION) (H): ICD-10-CM

## 2019-05-15 DIAGNOSIS — Z95.1 STATUS POST CORONARY ARTERY BYPASS GRAFT: ICD-10-CM

## 2019-05-15 DIAGNOSIS — I25.10 CAD IN NATIVE ARTERY: ICD-10-CM

## 2019-05-15 DIAGNOSIS — I50.22 CHRONIC SYSTOLIC HEART FAILURE (H): ICD-10-CM

## 2019-05-15 PROCEDURE — 99214 OFFICE O/P EST MOD 30 MIN: CPT | Performed by: INTERNAL MEDICINE

## 2019-05-15 PROCEDURE — 93005 ELECTROCARDIOGRAM TRACING: CPT | Performed by: INTERNAL MEDICINE

## 2019-05-15 RX ORDER — METOPROLOL SUCCINATE 25 MG/1
25 TABLET, EXTENDED RELEASE ORAL DAILY
Qty: 90 TABLET | Refills: 3 | Status: SHIPPED | OUTPATIENT
Start: 2019-05-15 | End: 2020-04-06

## 2019-05-15 RX ORDER — UBIDECARENONE 75 MG
100 CAPSULE ORAL DAILY
COMMUNITY
End: 2019-10-01

## 2019-05-15 RX ORDER — LISINOPRIL 20 MG/1
20 TABLET ORAL DAILY
Qty: 90 TABLET | Refills: 3 | Status: SHIPPED | OUTPATIENT
Start: 2019-05-15 | End: 2020-04-06

## 2019-05-15 RX ORDER — ATORVASTATIN CALCIUM 80 MG/1
80 TABLET, FILM COATED ORAL EVERY EVENING
Qty: 90 TABLET | Refills: 3 | Status: SHIPPED | OUTPATIENT
Start: 2019-05-15 | End: 2020-04-06

## 2019-05-15 ASSESSMENT — MIFFLIN-ST. JEOR: SCORE: 1403.08

## 2019-05-15 NOTE — PROGRESS NOTES
HPI and Plan:   Today I had the pleasure of seeing Khang Mcelroy at [facility] in [place]. He is a pleasant 63 year old patient with History of heart failure with reduced ejection fraction and ischemic cardiomyopathy with left ventricular ejection fraction of 35 to 40% and status post coronary artery bypass grafting (LIMA-LAD, SVG-DIAG, SVG-OM, SVG-PDA) on 3/18/19, DM, hypertension, hyperlipidemia, CKD, and anemia who presents to the clinic for a follow-up visit.      I initially saw the patient in March 2019 during his inpatient hospitalization for non-ST segment myocardial infarction.  The patient was subsequently discharged after undergoing coronary artery bypass grafting.  The patient again presented to the ER a few weeks later on 4/1/2019 for episode of syncope.  This was managed by placement of 30-day event monitor and down titration of metoprolol to 12.5 mg.  The review of the event monitor today shows just one episode of sinus bradycardia at 56 bpm but no other pauses or arrhythmias.  During the past core clinic visit on 4/25/2019, it was noted that there was a discrepancy between the medications prescribed from our hospital and what patient was actually taking.  This was due to the fact that he has a primary care physician at [facility] who also had patient on some medications.  In any case, medication reconciliation was performed and reinforcement was provided. It was also noted that patient's weight was up in the setting of noncompliance with Lasix and he was restarted on 40 mg p.o. twice daily of Lasix.  Again, as mentioned above, due to episode of syncope and possible bradycardia during cardiac rehabilitation metoprolol dose was down titrated.    He was seen in the outpatient clinic yesterday and was found to have weight of 153 pounds.  Today, in clinic his weight is 155 pounds which is unchanged from his weight at the time of hospital discharge  on 3/24/2019.  However, the  weight reported by his son over the phone was 136 pounds which is significantly different different from both the clinic measurement, today and yesterday.      Assessment and plan  1.  Ischemic cardiomyopathy with ejection fraction of 35 to 40%  2.  Coronary artery disease status post coronary bypass grafting  3.  Hyperlipidemia  4.  Hypertension  5.  Diabetes    Discussion  I informed the patient that the way to his son is using at home is most likely inaccurate.  I believe 135 pounds is his dry weight and I will try to keep him at this rate.  He is currently on 40 mg of Lasix twice daily and I will continue him on the current dose.  He does not meet criteria for ICD placement as his ejection fraction is over 35%.  I will continue to uptitrate beta-blocker as tolerated.  I will increase the dose from 12.5 to 25 mg as there were no suspicious or worrisome events noted on event monitor.  I will continue him on all other medications at the current dose.   clinically I think he is doing very well.  I will have him come back and see me in 6 months.    Plan  1.  Increase metoprolol to 25 mg p.o. daily.  Advised the patient to check his blood pressure and heart rate at home and decrease the dose back to 12.5 if he notices dizziness or heart rate in 40s.  2.  Return to clinic in 6 months with the follow-up  3.  Limited echogram cardiogram prior to clinic follow-up for assessment of left ventricular ejection fraction.    Thank you for allowing me to participate in the care of Rowdypaulette Navi Hernández MD  Cardiology    Orders Placed This Encounter   Procedures     Follow-Up with Cardiologist     EKG 12-lead complete w/read - Clinics (performed today)     Echocardiogram Complete       Orders Placed This Encounter   Medications     cyanocobalamin (VITAMIN B-12) 100 MCG tablet     Sig: Take 100 mcg by mouth daily     lisinopril (PRINIVIL/ZESTRIL) 20 MG tablet     Sig: Take 1 tablet (20 mg) by mouth daily      Dispense:  90 tablet     Refill:  3     metoprolol succinate ER (TOPROL-XL) 25 MG 24 hr tablet     Sig: Take 1 tablet (25 mg) by mouth daily     Dispense:  90 tablet     Refill:  3     atorvastatin (LIPITOR) 80 MG tablet     Sig: Take 1 tablet (80 mg) by mouth every evening     Dispense:  90 tablet     Refill:  3       Medications Discontinued During This Encounter   Medication Reason     lisinopril (PRINIVIL/ZESTRIL) 20 MG tablet      metoprolol succinate ER (TOPROL-XL) 25 MG 24 hr tablet      atorvastatin (LIPITOR) 80 MG tablet        Encounter Diagnoses   Name Primary?     NSTEMI (non-ST elevated myocardial infarction) (H)      CAD in native artery      Status post coronary artery bypass graft      Chronic systolic heart failure (H)        CURRENT MEDICATIONS:  Current Outpatient Medications   Medication Sig Dispense Refill     acetaminophen (TYLENOL) 325 MG tablet Take 2 tablets (650 mg) by mouth every 4 hours as needed for other 60 tablet 0     amLODIPine (NORVASC) 10 MG tablet Take 1 tablet (10 mg) by mouth daily (Patient taking differently: Take 5 mg by mouth daily Pt takes 1/2 tab (5mg) daily) 60 tablet 3     aspirin (ASA) 325 MG EC tablet Take 1 tablet (325 mg) by mouth daily 30 tablet 0     atorvastatin (LIPITOR) 80 MG tablet Take 1 tablet (80 mg) by mouth every evening 90 tablet 3     cyanocobalamin (VITAMIN B-12) 100 MCG tablet Take 100 mcg by mouth daily       furosemide (LASIX) 40 MG tablet Take 1 tablet (40 mg) by mouth 2 times daily 180 tablet 1     glipiZIDE (GLUCOTROL XL) 5 MG 24 hr tablet Take 5 mg by mouth daily       lisinopril (PRINIVIL/ZESTRIL) 20 MG tablet Take 1 tablet (20 mg) by mouth daily 90 tablet 3     metFORMIN (GLUCOPHAGE) 500 MG tablet Take 500 mg by mouth daily (with breakfast)       metoprolol succinate ER (TOPROL-XL) 25 MG 24 hr tablet Take 1 tablet (25 mg) by mouth daily 90 tablet 3     polyethylene glycol (MIRALAX/GLYCOLAX) packet Take 17 g by mouth daily 7 packet 0      senna-docusate (SENOKOT-S/PERICOLACE) 8.6-50 MG tablet Take 1 tablet by mouth 2 times daily as needed for constipation 30 tablet 0     order for DME Equipment being ordered: Walker ()  Treatment Diagnosis: s/p coronary artery bypass 1 Device 0       ALLERGIES   No Known Allergies    PAST MEDICAL HISTORY:  Past Medical History:   Diagnosis Date     Chronic renal insufficiency      Diabetes mellitus with proliferative retinopathy (H)      Hyperlipidemia      Hypertension      Nephrolithiasis      NSTEMI (non-ST elevated myocardial infarction) (H)        PAST SURGICAL HISTORY:  Past Surgical History:   Procedure Laterality Date     BYPASS GRAFT ARTERY CORONARY N/A 3/18/2019    Procedure: CORONARY BYPASS GRAFTING X 4 - LIMA TO LAD, SV TO DIAGONAL, PDA, AND OM; ON PUMP WITH NOA; ENDOVEIN HARVEST LEFT LEG;  Surgeon: Adarsh Sanchez MD;  Location:  OR     CV CORONARY ANGIOGRAM N/A 3/12/2019    Procedure: Coronary Angiogram;  Surgeon: Remi Rodriguez MD;  Location:  HEART CARDIAC CATH LAB     CV HEART CATHETERIZATION WITH POSSIBLE INTERVENTION N/A 3/12/2019    Procedure: Heart Catheterization with possible Intervention;  Surgeon: Remi Rodriguez MD;  Location:  HEART CARDIAC CATH LAB     CV LEFT VENTRICULOGRAM N/A 3/12/2019    Procedure: Left Ventricular Angiogram;  Surgeon: Remi Rodriguez MD;  Location:  HEART CARDIAC CATH LAB     LITHOTRIPSY         FAMILY HISTORY:  History reviewed. No pertinent family history.    SOCIAL HISTORY:  Social History     Socioeconomic History     Marital status:      Spouse name: None     Number of children: None     Years of education: None     Highest education level: None   Occupational History     None   Social Needs     Financial resource strain: None     Food insecurity:     Worry: None     Inability: None     Transportation needs:     Medical: None     Non-medical: None   Tobacco Use     Smoking status: Never Smoker     Smokeless tobacco:  "Never Used   Substance and Sexual Activity     Alcohol use: No     Frequency: Never     Drug use: No     Sexual activity: Yes     Partners: Female   Lifestyle     Physical activity:     Days per week: None     Minutes per session: None     Stress: None   Relationships     Social connections:     Talks on phone: None     Gets together: None     Attends Jain service: None     Active member of club or organization: None     Attends meetings of clubs or organizations: None     Relationship status: None     Intimate partner violence:     Fear of current or ex partner: None     Emotionally abused: None     Physically abused: None     Forced sexual activity: None   Other Topics Concern     Parent/sibling w/ CABG, MI or angioplasty before 65F 55M? Not Asked   Social History Narrative     None       Review of Systems:  Skin:  Negative       Eyes:  Positive for glasses    ENT:  Negative      Respiratory:  Positive for cough     Cardiovascular:  Negative      Gastroenterology: Negative      Genitourinary:  Negative      Musculoskeletal:  Negative      Neurologic:  Positive for headaches    Psychiatric:  Negative      Heme/Lymph/Imm:  Negative      Endocrine:  Negative        Physical Exam:  Vitals: /73   Pulse 75   Ht 1.6 m (5' 2.99\")   Wt 70.3 kg (155 lb)   BMI 27.46 kg/m    Constitutional: awake, alert, no distress  Skin: Warm and dry to touch  Head: Normocephalic, atraumatic  Eyes: Conjunctivae and lids unremarkable, sclera white  ENT: No pallor or cyanosis  Neck: Carotid pulses are full and equal bilaterally.  Respiratory: Normal breath sounds, clear to auscultation, no use of sensory muscles, no wheezing or crepts  Cardiac: Regular rate and rhythm, S1-S2 normal.  No murmurs gallops or rubs.  Pulses full and equal bilaterally in all 4 extremities.  No pedal edema.   Abdomen: soft and nontender.  Extremities and musculoskeletal: No gross motor deficit  Neurological.  Affect normal  Psych: Alert and oriented " x3    Recent Lab Results:  LIPID RESULTS:  Lab Results   Component Value Date    CHOL 115 04/09/2019    HDL 40 04/09/2019    LDL 65 04/09/2019    TRIG 52 04/09/2019       LIVER ENZYME RESULTS:  Lab Results   Component Value Date    AST 29 04/01/2019    ALT 57 04/01/2019       CBC RESULTS:  Lab Results   Component Value Date    WBC 10.6 04/01/2019    RBC 3.20 (L) 04/01/2019    HGB 8.3 (L) 04/03/2019    HCT 25.1 (L) 04/01/2019    MCV 78 04/01/2019    MCH 25.6 (L) 04/01/2019    MCHC 32.7 04/01/2019    RDW 15.5 (H) 04/01/2019     04/01/2019       BMP RESULTS:  Lab Results   Component Value Date     (L) 04/25/2019    POTASSIUM 4.4 04/25/2019    CHLORIDE 101 04/25/2019    CO2 26 04/25/2019    ANIONGAP 9.4 04/25/2019     (H) 04/25/2019    BUN 22 04/25/2019    CR 1.48 (H) 04/25/2019    GFRESTIMATED 48 (L) 04/25/2019    GFRESTBLACK 58 (L) 04/25/2019    MYKE 9.7 04/25/2019        A1C RESULTS:  Lab Results   Component Value Date    A1C 7.9 (H) 03/11/2019       INR RESULTS:  Lab Results   Component Value Date    INR 1.54 (H) 03/18/2019    INR 1.79 (H) 03/18/2019       [facility]  [address] [place] [address]    All medical records were reviewed in detail and discussed with the patient. Greater than 35 mins were spent with the patient, 50% of this time was spent on counseling and coordination of care.  After visit summary was printed and given to the patient.

## 2019-05-15 NOTE — LETTER
5/15/2019    Mpho Brian Castro MD  [facility] [address]  [facility] [address]    RE: Khang Mcelroy       Dear Colleague,    I had the pleasure of seeing Khang Mcelroy in the [facility].    A Kyler  from   services was not available for today's OV.  An  via our phone services was offered, pt and his wife declined.  Stated it was not needed.    Miguel Alberto    HPI and Plan:   Today I had the pleasure of seeing Khang Mcelroy at [facility] in [place]. He is a pleasant 63 year old patient with History of heart failure with reduced ejection fraction and ischemic cardiomyopathy with left ventricular ejection fraction of 35 to 40% and status post coronary artery bypass grafting (LIMA-LAD, SVG-DIAG, SVG-OM, SVG-PDA) on 3/18/19, DM, hypertension, hyperlipidemia, CKD, and anemia who presents to the clinic for a follow-up visit.      I initially saw the patient in March 2019 during his inpatient hospitalization for non-ST segment myocardial infarction.  The patient was subsequently discharged after undergoing coronary artery bypass grafting.  The patient again presented to the ER a few weeks later on 4/1/2019 for episode of syncope.  This was managed by placement of 30-day event monitor and down titration of metoprolol to 12.5 mg.  The review of the event monitor today shows just one episode of sinus bradycardia at 56 bpm but no other pauses or arrhythmias.  During the past core clinic visit on 4/25/2019, it was noted that there was a discrepancy between the medications prescribed from our hospital and what patient was actually taking.  This was due to the fact that he has a primary care physician at [facility] who also had patient on some medications.  In any case, medication reconciliation was performed and reinforcement was provided. It was also noted that patient's weight was up in the setting of  noncompliance with Lasix and he was restarted on 40 mg p.o. twice daily of Lasix.  Again, as mentioned above, due to episode of syncope and possible bradycardia during cardiac rehabilitation metoprolol dose was down titrated.    He was seen in the outpatient clinic yesterday and was found to have weight of 153 pounds.  Today, in clinic his weight is 155 pounds which is unchanged from his weight at the time of hospital discharge  on 3/24/2019.  However, the weight reported by his son over the phone was 136 pounds which is significantly different different from both the clinic measurement, today and yesterday.      Assessment and plan  1.  Ischemic cardiomyopathy with ejection fraction of 35 to 40%  2.  Coronary artery disease status post coronary bypass grafting  3.  Hyperlipidemia  4.  Hypertension  5.  Diabetes    Discussion  I informed the patient that the way to his son is using at home is most likely inaccurate.  I believe 135 pounds is his dry weight and I will try to keep him at this rate.  He is currently on 40 mg of Lasix twice daily and I will continue him on the current dose.  He does not meet criteria for ICD placement as his ejection fraction is over 35%.  I will continue to uptitrate beta-blocker as tolerated.  I will increase the dose from 12.5 to 25 mg as there were no suspicious or worrisome events noted on event monitor.  I will continue him on all other medications at the current dose.   clinically I think he is doing very well.  I will have him come back and see me in 6 months.    Plan  1.  Increase metoprolol to 25 mg p.o. daily.  Advised the patient to check his blood pressure and heart rate at home and decrease the dose back to 12.5 if he notices dizziness or heart rate in 40s.  2.  Return to clinic in 6 months with the follow-up  3.  Limited echogram cardiogram prior to clinic follow-up for assessment of left ventricular ejection fraction.    Thank you for allowing me to participate in the  care of [facility]    Bib Hernández MD  Cardiology    Orders Placed This Encounter   Procedures     Follow-Up with Cardiologist     EKG 12-lead complete w/read - Clinics (performed today)     Echocardiogram Complete       Orders Placed This Encounter   Medications     cyanocobalamin (VITAMIN B-12) 100 MCG tablet     Sig: Take 100 mcg by mouth daily     lisinopril (PRINIVIL/ZESTRIL) 20 MG tablet     Sig: Take 1 tablet (20 mg) by mouth daily     Dispense:  90 tablet     Refill:  3     metoprolol succinate ER (TOPROL-XL) 25 MG 24 hr tablet     Sig: Take 1 tablet (25 mg) by mouth daily     Dispense:  90 tablet     Refill:  3     atorvastatin (LIPITOR) 80 MG tablet     Sig: Take 1 tablet (80 mg) by mouth every evening     Dispense:  90 tablet     Refill:  3       Medications Discontinued During This Encounter   Medication Reason     lisinopril (PRINIVIL/ZESTRIL) 20 MG tablet      metoprolol succinate ER (TOPROL-XL) 25 MG 24 hr tablet      atorvastatin (LIPITOR) 80 MG tablet        Encounter Diagnoses   Name Primary?     NSTEMI (non-ST elevated myocardial infarction) (H)      CAD in native artery      Status post coronary artery bypass graft      Chronic systolic heart failure (H)        CURRENT MEDICATIONS:  Current Outpatient Medications   Medication Sig Dispense Refill     acetaminophen (TYLENOL) 325 MG tablet Take 2 tablets (650 mg) by mouth every 4 hours as needed for other 60 tablet 0     amLODIPine (NORVASC) 10 MG tablet Take 1 tablet (10 mg) by mouth daily (Patient taking differently: Take 5 mg by mouth daily Pt takes 1/2 tab (5mg) daily) 60 tablet 3     aspirin (ASA) 325 MG EC tablet Take 1 tablet (325 mg) by mouth daily 30 tablet 0     atorvastatin (LIPITOR) 80 MG tablet Take 1 tablet (80 mg) by mouth every evening 90 tablet 3     cyanocobalamin (VITAMIN B-12) 100 MCG tablet Take 100 mcg by mouth daily       furosemide (LASIX) 40 MG tablet Take 1 tablet (40 mg) by mouth 2 times daily 180 tablet 1      glipiZIDE (GLUCOTROL XL) 5 MG 24 hr tablet Take 5 mg by mouth daily       lisinopril (PRINIVIL/ZESTRIL) 20 MG tablet Take 1 tablet (20 mg) by mouth daily 90 tablet 3     metFORMIN (GLUCOPHAGE) 500 MG tablet Take 500 mg by mouth daily (with breakfast)       metoprolol succinate ER (TOPROL-XL) 25 MG 24 hr tablet Take 1 tablet (25 mg) by mouth daily 90 tablet 3     polyethylene glycol (MIRALAX/GLYCOLAX) packet Take 17 g by mouth daily 7 packet 0     senna-docusate (SENOKOT-S/PERICOLACE) 8.6-50 MG tablet Take 1 tablet by mouth 2 times daily as needed for constipation 30 tablet 0     order for DME Equipment being ordered: Walker ()  Treatment Diagnosis: s/p coronary artery bypass 1 Device 0       ALLERGIES   No Known Allergies    PAST MEDICAL HISTORY:  Past Medical History:   Diagnosis Date     Chronic renal insufficiency      Diabetes mellitus with proliferative retinopathy (H)      Hyperlipidemia      Hypertension      Nephrolithiasis      NSTEMI (non-ST elevated myocardial infarction) (H)        PAST SURGICAL HISTORY:  Past Surgical History:   Procedure Laterality Date     BYPASS GRAFT ARTERY CORONARY N/A 3/18/2019    Procedure: CORONARY BYPASS GRAFTING X 4 - LIMA TO LAD, SV TO DIAGONAL, PDA, AND OM; ON PUMP WITH NOA; ENDOVEIN HARVEST LEFT LEG;  Surgeon: Adarsh Sanchez MD;  Location:  OR     CV CORONARY ANGIOGRAM N/A 3/12/2019    Procedure: Coronary Angiogram;  Surgeon: Remi Rodriguez MD;  Location:  HEART CARDIAC CATH LAB     CV HEART CATHETERIZATION WITH POSSIBLE INTERVENTION N/A 3/12/2019    Procedure: Heart Catheterization with possible Intervention;  Surgeon: Remi Rodriguez MD;  Location:  HEART CARDIAC CATH LAB     CV LEFT VENTRICULOGRAM N/A 3/12/2019    Procedure: Left Ventricular Angiogram;  Surgeon: Remi Rodriguez MD;  Location:  HEART CARDIAC CATH LAB     LITHOTRIPSY         FAMILY HISTORY:  History reviewed. No pertinent family history.    SOCIAL HISTORY:  Social  "History     Socioeconomic History     Marital status:      Spouse name: None     Number of children: None     Years of education: None     Highest education level: None   Occupational History     None   Social Needs     Financial resource strain: None     Food insecurity:     Worry: None     Inability: None     Transportation needs:     Medical: None     Non-medical: None   Tobacco Use     Smoking status: Never Smoker     Smokeless tobacco: Never Used   Substance and Sexual Activity     Alcohol use: No     Frequency: Never     Drug use: No     Sexual activity: Yes     Partners: Female   Lifestyle     Physical activity:     Days per week: None     Minutes per session: None     Stress: None   Relationships     Social connections:     Talks on phone: None     Gets together: None     Attends Alevism service: None     Active member of club or organization: None     Attends meetings of clubs or organizations: None     Relationship status: None     Intimate partner violence:     Fear of current or ex partner: None     Emotionally abused: None     Physically abused: None     Forced sexual activity: None   Other Topics Concern     Parent/sibling w/ CABG, MI or angioplasty before 65F 55M? Not Asked   Social History Narrative     None       Review of Systems:  Skin:  Negative       Eyes:  Positive for glasses    ENT:  Negative      Respiratory:  Positive for cough     Cardiovascular:  Negative      Gastroenterology: Negative      Genitourinary:  Negative      Musculoskeletal:  Negative      Neurologic:  Positive for headaches    Psychiatric:  Negative      Heme/Lymph/Imm:  Negative      Endocrine:  Negative        Physical Exam:  Vitals: /73   Pulse 75   Ht 1.6 m (5' 2.99\")   Wt 70.3 kg (155 lb)   BMI 27.46 kg/m     Constitutional: awake, alert, no distress  Skin: Warm and dry to touch  Head: Normocephalic, atraumatic  Eyes: Conjunctivae and lids unremarkable, sclera white  ENT: No pallor or cyanosis  Neck: " Carotid pulses are full and equal bilaterally.  Respiratory: Normal breath sounds, clear to auscultation, no use of sensory muscles, no wheezing or crepts  Cardiac: Regular rate and rhythm, S1-S2 normal.  No murmurs gallops or rubs.  Pulses full and equal bilaterally in all 4 extremities.  No pedal edema.   Abdomen: soft and nontender.  Extremities and musculoskeletal: No gross motor deficit  Neurological.  Affect normal  Psych: Alert and oriented x3    Recent Lab Results:  LIPID RESULTS:  Lab Results   Component Value Date    CHOL 115 04/09/2019    HDL 40 04/09/2019    LDL 65 04/09/2019    TRIG 52 04/09/2019       LIVER ENZYME RESULTS:  Lab Results   Component Value Date    AST 29 04/01/2019    ALT 57 04/01/2019       CBC RESULTS:  Lab Results   Component Value Date    WBC 10.6 04/01/2019    RBC 3.20 (L) 04/01/2019    HGB 8.3 (L) 04/03/2019    HCT 25.1 (L) 04/01/2019    MCV 78 04/01/2019    MCH 25.6 (L) 04/01/2019    MCHC 32.7 04/01/2019    RDW 15.5 (H) 04/01/2019     04/01/2019       BMP RESULTS:  Lab Results   Component Value Date     (L) 04/25/2019    POTASSIUM 4.4 04/25/2019    CHLORIDE 101 04/25/2019    CO2 26 04/25/2019    ANIONGAP 9.4 04/25/2019     (H) 04/25/2019    BUN 22 04/25/2019    CR 1.48 (H) 04/25/2019    GFRESTIMATED 48 (L) 04/25/2019    GFRESTBLACK 58 (L) 04/25/2019    MYKE 9.7 04/25/2019        A1C RESULTS:  Lab Results   Component Value Date    A1C 7.9 (H) 03/11/2019       INR RESULTS:  Lab Results   Component Value Date    INR 1.54 (H) 03/18/2019    INR 1.79 (H) 03/18/2019       [facility]  [address] [place] [address]    All medical records were reviewed in detail and discussed with the patient. Greater than 35 mins were spent with the patient, 50% of this time was spent on counseling and coordination of care.  After visit summary was printed and given to the patient.    Thank you for allowing me to participate in the care of your  patient.    Sincerely,     Bib Hernández MD     [facility]

## 2019-05-15 NOTE — PROGRESS NOTES
A Kyler  from   services was not available for today's OV.  An  via our phone services was offered, pt and his wife declined.  Stated it was not needed.    Miguel Alberto

## 2019-05-15 NOTE — PROGRESS NOTES
Spoke with both patient and wife to get verbal permission to speak to son Fadia about medications changes.  After visit Dr. Hernández kept all medication doses the same except for metoprolol XL from 12.5mg to 25mg.  Left detailed message with the change and asked to have Fadia return the call to to verify he received the message and if any questions.  [phone] Team 3 nurse line

## 2019-05-15 NOTE — PROGRESS NOTES
Pt's son, Fadia, called to report that pt's home weight yesterday was 136#. He wanted to know if pt should continue taking furosemide 40 mg BID. Pt's home weight at 4/25 OV with Viv was reported as 162#. He then said pt is at an OV right now, and it looks like he is actually here with  for an OV. I updated the clinical support staff on pt's home weight yesterday. Pt's son would like to be called after the OV with an update on any medication changes that are made today, as he helps pt with his meds. He can be reached at [phone]. Will forward update to 's nursing team. Brenda SMITH May 15, 2019, 10:13 AM

## 2019-05-17 ENCOUNTER — HOSPITAL ENCOUNTER (OUTPATIENT)
Dept: CARDIAC REHAB | Facility: CLINIC | Age: 64
End: 2019-05-17
Attending: STUDENT IN AN ORGANIZED HEALTH CARE EDUCATION/TRAINING PROGRAM
Payer: COMMERCIAL

## 2019-05-17 LAB
GLUCOSE BLDC GLUCOMTR-MCNC: 107 MG/DL (ref 70–99)
GLUCOSE BLDC GLUCOMTR-MCNC: 195 MG/DL (ref 70–99)

## 2019-05-17 PROCEDURE — 00000146 ZZHCL STATISTIC GLUCOSE BY METER IP

## 2019-05-17 PROCEDURE — 40000116 ZZH STATISTIC OP CR VISIT

## 2019-05-17 PROCEDURE — 93798 PHYS/QHP OP CAR RHAB W/ECG: CPT

## 2019-05-20 ENCOUNTER — HOSPITAL ENCOUNTER (OUTPATIENT)
Dept: CARDIAC REHAB | Facility: CLINIC | Age: 64
End: 2019-05-20
Attending: STUDENT IN AN ORGANIZED HEALTH CARE EDUCATION/TRAINING PROGRAM
Payer: COMMERCIAL

## 2019-05-20 LAB
GLUCOSE BLDC GLUCOMTR-MCNC: 130 MG/DL (ref 70–99)
GLUCOSE BLDC GLUCOMTR-MCNC: 192 MG/DL (ref 70–99)

## 2019-05-20 PROCEDURE — 40000116 ZZH STATISTIC OP CR VISIT

## 2019-05-20 PROCEDURE — 93798 PHYS/QHP OP CAR RHAB W/ECG: CPT

## 2019-05-20 PROCEDURE — 00000146 ZZHCL STATISTIC GLUCOSE BY METER IP

## 2019-05-22 ENCOUNTER — HOSPITAL ENCOUNTER (OUTPATIENT)
Dept: CARDIAC REHAB | Facility: CLINIC | Age: 64
End: 2019-05-22
Attending: STUDENT IN AN ORGANIZED HEALTH CARE EDUCATION/TRAINING PROGRAM
Payer: COMMERCIAL

## 2019-05-22 LAB
GLUCOSE BLDC GLUCOMTR-MCNC: 143 MG/DL (ref 70–99)
GLUCOSE BLDC GLUCOMTR-MCNC: 57 MG/DL (ref 70–99)

## 2019-05-22 PROCEDURE — 93798 PHYS/QHP OP CAR RHAB W/ECG: CPT | Performed by: CLINICAL EXERCISE PHYSIOLOGIST

## 2019-05-22 PROCEDURE — 40000116 ZZH STATISTIC OP CR VISIT: Performed by: CLINICAL EXERCISE PHYSIOLOGIST

## 2019-05-22 PROCEDURE — 00000146 ZZHCL STATISTIC GLUCOSE BY METER IP

## 2019-05-24 ENCOUNTER — HOSPITAL ENCOUNTER (OUTPATIENT)
Dept: CARDIAC REHAB | Facility: CLINIC | Age: 64
End: 2019-05-24
Attending: STUDENT IN AN ORGANIZED HEALTH CARE EDUCATION/TRAINING PROGRAM
Payer: COMMERCIAL

## 2019-05-24 LAB
GLUCOSE BLDC GLUCOMTR-MCNC: 103 MG/DL (ref 70–99)
GLUCOSE BLDC GLUCOMTR-MCNC: 201 MG/DL (ref 70–99)

## 2019-05-24 PROCEDURE — 93798 PHYS/QHP OP CAR RHAB W/ECG: CPT

## 2019-05-24 PROCEDURE — 40000116 ZZH STATISTIC OP CR VISIT

## 2019-05-24 PROCEDURE — 00000146 ZZHCL STATISTIC GLUCOSE BY METER IP

## 2019-05-29 ENCOUNTER — HOSPITAL ENCOUNTER (OUTPATIENT)
Dept: CARDIAC REHAB | Facility: CLINIC | Age: 64
End: 2019-05-29
Attending: STUDENT IN AN ORGANIZED HEALTH CARE EDUCATION/TRAINING PROGRAM
Payer: COMMERCIAL

## 2019-05-29 LAB
GLUCOSE BLDC GLUCOMTR-MCNC: 109 MG/DL (ref 70–99)
GLUCOSE BLDC GLUCOMTR-MCNC: 185 MG/DL (ref 70–99)

## 2019-05-29 PROCEDURE — 93798 PHYS/QHP OP CAR RHAB W/ECG: CPT | Performed by: OCCUPATIONAL THERAPIST

## 2019-05-29 PROCEDURE — 40000116 ZZH STATISTIC OP CR VISIT: Performed by: OCCUPATIONAL THERAPIST

## 2019-05-29 PROCEDURE — 00000146 ZZHCL STATISTIC GLUCOSE BY METER IP

## 2019-05-31 ENCOUNTER — HOSPITAL ENCOUNTER (OUTPATIENT)
Dept: CARDIAC REHAB | Facility: CLINIC | Age: 64
End: 2019-05-31
Attending: STUDENT IN AN ORGANIZED HEALTH CARE EDUCATION/TRAINING PROGRAM
Payer: COMMERCIAL

## 2019-05-31 PROCEDURE — 40000116 ZZH STATISTIC OP CR VISIT

## 2019-05-31 PROCEDURE — 00000146 ZZHCL STATISTIC GLUCOSE BY METER IP

## 2019-05-31 PROCEDURE — 93798 PHYS/QHP OP CAR RHAB W/ECG: CPT

## 2019-06-03 ENCOUNTER — HOSPITAL ENCOUNTER (OUTPATIENT)
Dept: CARDIAC REHAB | Facility: CLINIC | Age: 64
End: 2019-06-03
Attending: STUDENT IN AN ORGANIZED HEALTH CARE EDUCATION/TRAINING PROGRAM
Payer: COMMERCIAL

## 2019-06-03 LAB
GLUCOSE BLDC GLUCOMTR-MCNC: 137 MG/DL (ref 70–99)
GLUCOSE BLDC GLUCOMTR-MCNC: 67 MG/DL (ref 70–99)

## 2019-06-03 PROCEDURE — 40000116 ZZH STATISTIC OP CR VISIT

## 2019-06-03 PROCEDURE — 00000146 ZZHCL STATISTIC GLUCOSE BY METER IP

## 2019-06-03 PROCEDURE — 93798 PHYS/QHP OP CAR RHAB W/ECG: CPT

## 2019-06-06 ENCOUNTER — CARE COORDINATION (OUTPATIENT)
Dept: CARDIOLOGY | Facility: CLINIC | Age: 64
End: 2019-06-06

## 2019-06-06 NOTE — PROGRESS NOTES
Pt's son called to report that his weight today was 155# and he feels good. He denied any issues with shortness of breath or weight gain. There was a big discrepancy between home scale and our scale last clinic visit. Pt's home weight has remained stable since that time. Brenda SMITH June 6, 2019, 3:52 PM

## 2019-06-14 ENCOUNTER — OFFICE VISIT (OUTPATIENT)
Dept: CARDIOLOGY | Facility: CLINIC | Age: 64
End: 2019-06-14
Attending: NURSE PRACTITIONER
Payer: COMMERCIAL

## 2019-06-14 VITALS
BODY MASS INDEX: 28.08 KG/M2 | DIASTOLIC BLOOD PRESSURE: 78 MMHG | OXYGEN SATURATION: 100 % | SYSTOLIC BLOOD PRESSURE: 150 MMHG | WEIGHT: 158.5 LBS | HEART RATE: 61 BPM | HEIGHT: 63 IN

## 2019-06-14 DIAGNOSIS — I50.22 CHRONIC SYSTOLIC HEART FAILURE (H): ICD-10-CM

## 2019-06-14 DIAGNOSIS — E78.5 HYPERLIPIDEMIA LDL GOAL <70: ICD-10-CM

## 2019-06-14 DIAGNOSIS — I10 ESSENTIAL HYPERTENSION: ICD-10-CM

## 2019-06-14 DIAGNOSIS — I50.22 CHRONIC SYSTOLIC HEART FAILURE (H): Primary | ICD-10-CM

## 2019-06-14 DIAGNOSIS — I25.10 CAD IN NATIVE ARTERY: ICD-10-CM

## 2019-06-14 DIAGNOSIS — Z95.1 STATUS POST CORONARY ARTERY BYPASS GRAFT: ICD-10-CM

## 2019-06-14 DIAGNOSIS — E11.9 TYPE 2 DIABETES MELLITUS WITHOUT COMPLICATION, WITHOUT LONG-TERM CURRENT USE OF INSULIN (H): ICD-10-CM

## 2019-06-14 DIAGNOSIS — I21.4 NSTEMI (NON-ST ELEVATED MYOCARDIAL INFARCTION) (H): ICD-10-CM

## 2019-06-14 LAB
ALBUMIN SERPL-MCNC: 3.3 G/DL (ref 3.4–5)
ALP SERPL-CCNC: 149 U/L (ref 40–150)
ALT SERPL W P-5'-P-CCNC: 39 U/L (ref 0–70)
ANION GAP SERPL CALCULATED.3IONS-SCNC: 9 MMOL/L (ref 3–14)
AST SERPL W P-5'-P-CCNC: 27 U/L (ref 0–45)
BASOPHILS # BLD AUTO: 0 10E9/L (ref 0–0.2)
BASOPHILS NFR BLD AUTO: 0.1 %
BILIRUB SERPL-MCNC: 0.3 MG/DL (ref 0.2–1.3)
BUN SERPL-MCNC: 28 MG/DL (ref 7–30)
CALCIUM SERPL-MCNC: 8.8 MG/DL (ref 8.5–10.1)
CHLORIDE SERPL-SCNC: 105 MMOL/L (ref 94–109)
CO2 SERPL-SCNC: 25 MMOL/L (ref 20–32)
CREAT SERPL-MCNC: 1.27 MG/DL (ref 0.66–1.25)
DIFFERENTIAL METHOD BLD: ABNORMAL
EOSINOPHIL # BLD AUTO: 0.3 10E9/L (ref 0–0.7)
EOSINOPHIL NFR BLD AUTO: 3 %
ERYTHROCYTE [DISTWIDTH] IN BLOOD BY AUTOMATED COUNT: 18.2 % (ref 10–15)
GFR SERPL CREATININE-BSD FRML MDRD: 60 ML/MIN/{1.73_M2}
GLUCOSE SERPL-MCNC: 99 MG/DL (ref 70–99)
HCT VFR BLD AUTO: 30.7 % (ref 40–53)
HGB BLD-MCNC: 9.9 G/DL (ref 13.3–17.7)
IMM GRANULOCYTES # BLD: 0 10E9/L (ref 0–0.4)
IMM GRANULOCYTES NFR BLD: 0.2 %
LYMPHOCYTES # BLD AUTO: 1.7 10E9/L (ref 0.8–5.3)
LYMPHOCYTES NFR BLD AUTO: 20.2 %
MCH RBC QN AUTO: 24.4 PG (ref 26.5–33)
MCHC RBC AUTO-ENTMCNC: 32.2 G/DL (ref 31.5–36.5)
MCV RBC AUTO: 76 FL (ref 78–100)
MONOCYTES # BLD AUTO: 0.8 10E9/L (ref 0–1.3)
MONOCYTES NFR BLD AUTO: 9.1 %
NEUTROPHILS # BLD AUTO: 5.7 10E9/L (ref 1.6–8.3)
NEUTROPHILS NFR BLD AUTO: 67.4 %
NRBC # BLD AUTO: 0 10*3/UL
NRBC BLD AUTO-RTO: 0 /100
PLATELET # BLD AUTO: 298 10E9/L (ref 150–450)
POTASSIUM SERPL-SCNC: 4.1 MMOL/L (ref 3.4–5.3)
PROT SERPL-MCNC: 8.3 G/DL (ref 6.8–8.8)
RBC # BLD AUTO: 4.06 10E12/L (ref 4.4–5.9)
SODIUM SERPL-SCNC: 139 MMOL/L (ref 133–144)
WBC # BLD AUTO: 8.4 10E9/L (ref 4–11)

## 2019-06-14 PROCEDURE — 99214 OFFICE O/P EST MOD 30 MIN: CPT | Performed by: NURSE PRACTITIONER

## 2019-06-14 PROCEDURE — 80053 COMPREHEN METABOLIC PANEL: CPT | Performed by: INTERNAL MEDICINE

## 2019-06-14 PROCEDURE — 85025 COMPLETE CBC W/AUTO DIFF WBC: CPT | Performed by: INTERNAL MEDICINE

## 2019-06-14 PROCEDURE — 36415 COLL VENOUS BLD VENIPUNCTURE: CPT | Performed by: INTERNAL MEDICINE

## 2019-06-14 RX ORDER — AMLODIPINE BESYLATE 10 MG/1
10 TABLET ORAL DAILY
Qty: 90 TABLET | Refills: 3 | Status: SHIPPED | OUTPATIENT
Start: 2019-06-14 | End: 2021-05-07 | Stop reason: ALTCHOICE

## 2019-06-14 RX ORDER — FUROSEMIDE 40 MG
40 TABLET ORAL DAILY
Qty: 180 TABLET | Refills: 1 | Status: SHIPPED | OUTPATIENT
Start: 2019-06-14 | End: 2019-09-16

## 2019-06-14 ASSESSMENT — MIFFLIN-ST. JEOR: SCORE: 1418.92

## 2019-06-14 NOTE — PATIENT INSTRUCTIONS
1. INCREASE amlodipine to 10mg (1 tablet) daily  2. Monitor blood pressure daily, have son call clinic [phone] in 2 weeks and report blood pressure readings  3. Follow up with Viv in 3 months, sooner as needed.   4. Please call with any additional questions/concerns [phone].  5. Limit Dion/Kassy Kumar

## 2019-06-14 NOTE — PROGRESS NOTES
Cardiology Clinic Progress Note  Khang Mcelroy MRN# [id number]   YOB: 1955 Age: 63 year old   Primary Cardiologist: Dr. Hernández Reason for visit: CORE follow up            Assessment and Plan:   Khang Mcelroy is a very pleasant 63 year old male with a history of HFrEF, ischemic cardiomyopathy, LVEF 35-40% per echocardiogram 3/12/19, coronary artery disease s/p CABG x 4 (LIMA-LAD, SVG-DIAG, SVG-OM, SVG-PDA) on 3/18/19, DM, hypertension, hyperlipidemia, CKD, and anemia. Patient here today for CORE followup.      1.  Chronic systolic heart failure/HFrEF, ischemic cardiomyopathy - LVEF 35-40% 3/12/19, patient appears compensated and euvolemic on exam. Weight stable in clinic and at home. Labs today continue to show improved kidney function with a creatinine of 1.27.    - NYHA class III, stage C   - Etiology : ischemic   - Fluid status : euvolemic   - Diuretic regimen : furosemide 40mg daily PO    - Ischemic evaluation : coronary angiogram 3/12/19, s/p CABG 3/18/19   - Guideline directed medical therapy    - Betablocker: metoprolol succinate 25mg daily    - ACEI/ARB/ARNI: lisinopril 20mg daily    - Aldactone antagonist: will consider in the future based on blood pressure and renal function   - Sudden cardiac death prophylaxis : n/a EF> 35%   - Plan for repeat echocardiogram in November prior to f/u with Dr. Hernández   - Sleep apnea : sleep medicine referral, has sleep study scheduled 6/21/19   - Reinforced HF education, advised patient to limit meals at [employer], currently eating meals daily.     2. Coronary artery disease s/p CABG x 4 (LIMA-LAD, SVG-DIAG, SVG-OM, SVG-PDA) on 3/18/19, stable no signs/symptoms of myocardial ischemia.    - Continue aspirin, statin and betablocker therapy.   - Reinforced lifestyle modifications    3. Hx of syncope - presented to the ED 4/1/19 after syncopal event, felt to be secondary to coughing, but patient was also noted to be bradycardic so metoprolol  was decreased at that time.  No recurrent syncopal events.   - 30 day event monitor - one event recorded, heart rate 56.    - Tolerating increased dose of metoprolol.     4. Hypertension - Elevated, wife also notes elevated at home. Amlodipine increased to 10mg daily.    - Son to call clinic in 2 weeks to report home blood pressure readings.     5. Hyperlipidemia - need to get a lipid panel. LDL goal <70    6. Diabetes mellitus - hgbA1c 7.9 3/11/19, counseled patient on the importance of good glycemic control.     7. High probably obstructive sleep apnea - wife noting apneic periods and snoring at night.    - sleep medicine referral - sleep study scheduled 6/21/19    8. Medication Compliance - Patient brought pill bottles to clinic, appears patient is taking all medications as prescribed with no discrepancies noted today. Praise given. Reinforced the importance of taking medications daily as prescribed.         Changes today: INCREASE amlodipine 10mg daily     Follow up plan:     CORE followup with me in 3 months    Followup with Dr. Hernández in November with limited echocardiogram prior.     Son to call clinic in 2 weeks to report blood pressure readings.         History of Presenting Illness:    Khang Mcelroy is a very pleasant 63 year old male with a history of HFrEF, ischemic cardiomyopathy, LVEF 35-40% per echocardiogram 3/12/19, coronary artery disease s/p CABG x 4 (LIMA-LAD, SVG-DIAG, SVG-OM, SVG-PDA) on 3/18/19, DM, hypertension, hyperlipidemia, CKD, and anemia.     Hospitalized 3/11/19-3/24/19 presented with acute hypoxemic and hypercapnic respiratory failure secondary to NSTEMI, found to have multivessel coronary artery disease, s/p CABG x 4 (LIMA-LAD, SVG-DIAG, SVG-OM, SVG-PDA) on 3/18/19. Troponin peaked at 53. Echocardiogram 3/12/19 showed an LVEF 35-40%, grade III or advanced diastolic dysfunction, with multiple wall motion abnormalities.     ED 4/1/19 related to tussive syncope, noted to have a  coughing spell and shortly after became unresponsive and slumped over. EKG was unremarkable. Metoprolol decreased. No recurrent events.     Patient was seen by Dr. Hernández on 5/15/19, patient was doing well and appeared euvolemic. Metoprolol succinate was increased to 25mg daily. Appears since this visit primary care provider has decreased furosemide dosage to 40mg daily.     Patient is here today for CORE followup. Patient is loatian speaking,  present. Patient reports feeling good. Monitoring weights daily a home. Clinic weight today stable at 158#. Weight at home today was 156#. Denies lower extremity edema. Denies abdominal distention/bloating. Denies shortness of breath at rest. Denies exertional dyspnea with walking, states walking 5-6 blocks. Able to complete ADLs and IADLs independently, which is a continued improvement. Sleeping good. Denies orthopnea, sleeping with HOB elevated. Denies PND. Cough significantly improved. Denies fever/chills.Patient denies chest pain or chest tightness. Denies dizziness, lightheadedness or other presyncopal symptoms. Denies tachycardia or palpitations. Denies syncope.     Labs today show stable kidney function, creatinine 1.27 and ectrolytes stable. Hemoglobin 9.9. Blood pressure 158/76, recheck 150/78 and HR 61 in clinic today. Monitoring BP at home, report blood pressures at home upper 130s-150s systolically.     Appetite good. Enjoys rice soup and fried rice. Wife does the cooking. Not adding additional salt. Eating fast foods [employer] every morning. Walking daily for exercise, walking 5-6 blocks with no difficulty. Denies tobacco or alcohol use.         Recent Hospitalizations   3/11/19-3/24/19 presented with acute hypoxemic and hypercapnic respiratory failure secondary to NSTEMI, found to have multivessel coronary artery disease, s/p CABG x 4 (LIMA-LAD, SVG-DIAG, SVG-OM, SVG-PDA) on 3/18/19        Social History    , Loatian speaking,  children living with them (english speaking).   Social History     Socioeconomic History     Marital status:      Spouse name: Not on file     Number of children: Not on file     Years of education: Not on file     Highest education level: Not on file   Occupational History     Not on file   Social Needs     Financial resource strain: Not on file     Food insecurity:     Worry: Not on file     Inability: Not on file     Transportation needs:     Medical: Not on file     Non-medical: Not on file   Tobacco Use     Smoking status: Never Smoker     Smokeless tobacco: Never Used   Substance and Sexual Activity     Alcohol use: No     Frequency: Never     Drug use: No     Sexual activity: Yes     Partners: Female   Lifestyle     Physical activity:     Days per week: Not on file     Minutes per session: Not on file     Stress: Not on file   Relationships     Social connections:     Talks on phone: Not on file     Gets together: Not on file     Attends [employer] service: Not on file     Active member of club or organization: Not on file     Attends meetings of clubs or organizations: Not on file     Relationship status: Not on file     Intimate partner violence:     Fear of current or ex partner: Not on file     Emotionally abused: Not on file     Physically abused: Not on file     Forced sexual activity: Not on file   Other Topics Concern     Parent/sibling w/ CABG, MI or angioplasty before 65F 55M? Not Asked   Social History Narrative     Not on file            Review of Systems:   Skin:  Negative     Eyes:  Positive for glasses  ENT:  Negative    Respiratory:  Positive for cough;dyspnea on exertion  Cardiovascular:  Negative edema;Positive for;chest pain;fatigue  Gastroenterology: Negative    Genitourinary:  Negative    Musculoskeletal:  Negative    Neurologic:  Positive for headaches  Psychiatric:  Negative    Heme/Lymph/Imm:  Negative    Endocrine:  Negative           Physical Exam:   Vitals: /78   Pulse  "61   Ht 1.6 m (5' 2.99\")   Wt 71.9 kg (158 lb 8 oz)   SpO2 100%   BMI 28.09 kg/m     Wt Readings from Last 4 Encounters:   06/14/19 71.9 kg (158 lb 8 oz)   05/15/19 70.3 kg (155 lb)   05/14/19 69.4 kg (153 lb)   04/25/19 73.9 kg (163 lb)     GEN: well nourished, in no acute distress.  HEENT:  Pupils equal, round. Sclerae nonicteric.   NECK: Supple, no masses appreciated. No JVD appreciated on exam.   C/V:  Regular rate and rhythm, no murmur, rub or gallop.    RESP: Respirations are unlabored. Clear to auscultation bilaterally, crackles in bases.   GI: Abdomen soft, nontender.  EXTREM: No lower extremity edema  NEURO: Alert and oriented, cooperative.  SKIN: Warm and dry.        Data:   ECHO 3/12/19  The visual ejection fraction is estimated at 35-40%.  Left ventricular systolic function is mild to moderately reduced.  There are regional wall motion abnormalities as specified.  Right ventricular systolic pressure is elevated, consistent with mild to  moderate pulmonary hypertension.  The ascending aorta is Mildly dilated.  Moderate left pleural effusion  The study was technically difficult.    Cardiac catheterization 3/12/19  1. Patient with multiple sausage links like stenoses through the mid and distal right coronary artery. There is a 70-80% stenosis in the continuation of the right coronary artery just beyond the posterior descending artery.   2. Mid LAD to Dist LAD lesion is 90% stenosed. The lesion is segmental and serial. There is a long distal LAD stenosis extending all the way through the apex.   3. Prox LAD to Mid LAD lesion is 45% stenosed.   4. Prox Cx lesion is 85% stenosed.   5. The ejection fraction is calculated to be 35%. LVEDP 31mmHg.  6. Mid LM lesion is 40% stenosed.    Plan   Maximal medical management.  Surgical consultation.  If patient is turned down by surgery we could consider multivessel intervention.    LIVER ENZYME RESULTS:  Lab Results   Component Value Date    AST 27 06/14/2019    " ALT 39 06/14/2019     CBC RESULTS:  Lab Results   Component Value Date    WBC 8.4 06/14/2019    RBC 4.06 (L) 06/14/2019    HGB 9.9 (L) 06/14/2019    HCT 30.7 (L) 06/14/2019    MCV 76 (L) 06/14/2019    MCH 24.4 (L) 06/14/2019    MCHC 32.2 06/14/2019    RDW 18.2 (H) 06/14/2019     06/14/2019     BMP RESULTS:  Lab Results   Component Value Date     06/14/2019    POTASSIUM 4.1 06/14/2019    CHLORIDE 105 06/14/2019    CO2 25 06/14/2019    ANIONGAP 9 06/14/2019    GLC 99 06/14/2019    BUN 28 06/14/2019    CR 1.27 (H) 06/14/2019    GFRESTIMATED 60 (L) 06/14/2019    GFRESTBLACK 70 06/14/2019    MYKE 8.8 06/14/2019      A1C RESULTS:  Lab Results   Component Value Date    A1C 7.9 (H) 03/11/2019     INR RESULTS:  Lab Results   Component Value Date    INR 1.54 (H) 03/18/2019    INR 1.79 (H) 03/18/2019            Medications     Current Outpatient Medications   Medication Sig Dispense Refill     acetaminophen (TYLENOL) 325 MG tablet Take 2 tablets (650 mg) by mouth every 4 hours as needed for other 60 tablet 0     amLODIPine (NORVASC) 10 MG tablet Take 1 tablet (10 mg) by mouth daily 90 tablet 3     aspirin (ASA) 325 MG EC tablet Take 1 tablet (325 mg) by mouth daily 30 tablet 0     atorvastatin (LIPITOR) 80 MG tablet Take 1 tablet (80 mg) by mouth every evening 90 tablet 3     cyanocobalamin (VITAMIN B-12) 100 MCG tablet Take 100 mcg by mouth daily       furosemide (LASIX) 40 MG tablet Take 1 tablet (40 mg) by mouth daily 180 tablet 1     glipiZIDE (GLUCOTROL XL) 5 MG 24 hr tablet Take 5 mg by mouth daily       lisinopril (PRINIVIL/ZESTRIL) 20 MG tablet Take 1 tablet (20 mg) by mouth daily 90 tablet 3     metFORMIN (GLUCOPHAGE) 500 MG tablet Take 500 mg by mouth daily (with breakfast)       metoprolol succinate ER (TOPROL-XL) 25 MG 24 hr tablet Take 1 tablet (25 mg) by mouth daily 90 tablet 3     order for DME Equipment being ordered: Walker ()  Treatment Diagnosis: s/p coronary artery bypass 1 Device 0           Past Medical History     Past Medical History:   Diagnosis Date     Chronic renal insufficiency      Diabetes mellitus with proliferative retinopathy (H)      Hyperlipidemia      Hypertension      Nephrolithiasis      NSTEMI (non-ST elevated myocardial infarction) (H)      Past Surgical History:   Procedure Laterality Date     BYPASS GRAFT ARTERY CORONARY N/A 3/18/2019    Procedure: CORONARY BYPASS GRAFTING X 4 - LIMA TO LAD, SV TO DIAGONAL, PDA, AND OM; ON PUMP WITH NOA; ENDOVEIN HARVEST LEFT LEG;  Surgeon: Adarsh Sanchez MD;  Location:  OR     CV CORONARY ANGIOGRAM N/A 3/12/2019    Procedure: Coronary Angiogram;  Surgeon: Remi Rodriguez MD;  Location:  HEART CARDIAC CATH LAB     CV HEART CATHETERIZATION WITH POSSIBLE INTERVENTION N/A 3/12/2019    Procedure: Heart Catheterization with possible Intervention;  Surgeon: Remi Rodriguez MD;  Location:  HEART CARDIAC CATH LAB     CV LEFT VENTRICULOGRAM N/A 3/12/2019    Procedure: Left Ventricular Angiogram;  Surgeon: Remi Rodriguez MD;  Location: [facility] CARDIAC CATH LAB     LITHOTRIPSY              Allergies   Patient has no known allergies.        MICHELE Bravo [facility]  Pager: [phone]

## 2019-06-14 NOTE — LETTER
6/14/2019    Mpho Brian Castro MD  [facility] [address]  [facility] [address]    RE: Khang Mcelroy       Dear Colleague,    I had the pleasure of seeing Khang Mcelroy in the [facility].    Cardiology Clinic Progress Note  Khang Mcelroy MRN# [id number]   YOB: 1955 Age: 63 year old   Primary Cardiologist: Dr. Hernández Reason for visit: CORE follow up            Assessment and Plan:   Khang Mcelroy is a very pleasant 63 year old male with a history of HFrEF, ischemic cardiomyopathy, LVEF 35-40% per echocardiogram 3/12/19, coronary artery disease s/p CABG x 4 (LIMA-LAD, SVG-DIAG, SVG-OM, SVG-PDA) on 3/18/19, DM, hypertension, hyperlipidemia, CKD, and anemia. Patient here today for CORE followup.      1.  Chronic systolic heart failure/HFrEF, ischemic cardiomyopathy - LVEF 35-40% 3/12/19, patient appears compensated and euvolemic on exam. Weight stable in clinic and at home. Labs today continue to show improved kidney function with a creatinine of 1.27.    - NYHA class III, stage C   - Etiology : ischemic   - Fluid status : euvolemic   - Diuretic regimen : furosemide 40mg daily PO    - Ischemic evaluation : coronary angiogram 3/12/19, s/p CABG 3/18/19   - Guideline directed medical therapy    - Betablocker: metoprolol succinate 25mg daily    - ACEI/ARB/ARNI: lisinopril 20mg daily    - Aldactone antagonist: will consider in the future based on blood pressure and renal function   - Sudden cardiac death prophylaxis : n/a EF> 35%   - Plan for repeat echocardiogram in November prior to f/u with Dr. Hernández   - Sleep apnea : sleep medicine referral, has sleep study scheduled 6/21/19   - Reinforced HF education, advised patient to limit meals at [employer], currently eating meals daily.     2. Coronary artery disease s/p CABG x 4 (LIMA-LAD, SVG-DIAG, SVG-OM, SVG-PDA) on 3/18/19, stable no signs/symptoms of myocardial ischemia.    -  Continue aspirin, statin and betablocker therapy.   - Reinforced lifestyle modifications    3. Hx of syncope - presented to the ED 4/1/19 after syncopal event, felt to be secondary to coughing, but patient was also noted to be bradycardic so metoprolol was decreased at that time.  No recurrent syncopal events.   - 30 day event monitor - one event recorded, heart rate 56.    - Tolerating increased dose of metoprolol.     4. Hypertension - Elevated, wife also notes elevated at home. Amlodipine increased to 10mg daily.    - Son to call clinic in 2 weeks to report home blood pressure readings.     5. Hyperlipidemia - need to get a lipid panel. LDL goal <70    6. Diabetes mellitus - hgbA1c 7.9 3/11/19, counseled patient on the importance of good glycemic control.     7. High probably obstructive sleep apnea - wife noting apneic periods and snoring at night.    - sleep medicine referral - sleep study scheduled 6/21/19    8. Medication Compliance - Patient brought pill bottles to clinic, appears patient is taking all medications as prescribed with no discrepancies noted today. Praise given. Reinforced the importance of taking medications daily as prescribed.         Changes today: INCREASE amlodipine 10mg daily     Follow up plan:     CORE followup with me in 3 months    Followup with Dr. Hernández in November with limited echocardiogram prior.     Son to call clinic in 2 weeks to report blood pressure readings.         History of Presenting Illness:    Khang Mcelroy is a very pleasant 63 year old male with a history of HFrEF, ischemic cardiomyopathy, LVEF 35-40% per echocardiogram 3/12/19, coronary artery disease s/p CABG x 4 (LIMA-LAD, SVG-DIAG, SVG-OM, SVG-PDA) on 3/18/19, DM, hypertension, hyperlipidemia, CKD, and anemia.     Hospitalized 3/11/19-3/24/19 presented with acute hypoxemic and hypercapnic respiratory failure secondary to NSTEMI, found to have multivessel coronary artery disease, s/p CABG x 4 (LIMA-LAD,  SVG-DIAG, SVG-OM, SVG-PDA) on 3/18/19. Troponin peaked at 53. Echocardiogram 3/12/19 showed an LVEF 35-40%, grade III or advanced diastolic dysfunction, with multiple wall motion abnormalities.     ED 4/1/19 related to tussive syncope, noted to have a coughing spell and shortly after became unresponsive and slumped over. EKG was unremarkable. Metoprolol decreased. No recurrent events.     Patient was seen by Dr. Hernández on 5/15/19, patient was doing well and appeared euvolemic. Metoprolol succinate was increased to 25mg daily. Appears since this visit primary care provider has decreased furosemide dosage to 40mg daily.     Patient is here today for CORE followup. Patient is loatian speaking,  present. Patient reports feeling good. Monitoring weights daily a home. Clinic weight today stable at 158#. Weight at home today was 156#. Denies lower extremity edema. Denies abdominal distention/bloating. Denies shortness of breath at rest. Denies exertional dyspnea with walking, states walking 5-6 blocks. Able to complete ADLs and IADLs independently, which is a continued improvement. Sleeping good. Denies orthopnea, sleeping with HOB elevated. Denies PND. Cough significantly improved. Denies fever/chills.Patient denies chest pain or chest tightness. Denies dizziness, lightheadedness or other presyncopal symptoms. Denies tachycardia or palpitations. Denies syncope.     Labs today show stable kidney function, creatinine 1.27 and ectrolytes stable. Hemoglobin 9.9. Blood pressure 158/76, recheck 150/78 and HR 61 in clinic today. Monitoring BP at home, report blood pressures at home upper 130s-150s systolically.     Appetite good. Enjoys rice soup and fried rice. Wife does the cooking. Not adding additional salt. Eating fast foods [employer] every morning. Walking daily for exercise, walking 5-6 blocks with no difficulty. Denies tobacco or alcohol use.         Recent Hospitalizations   3/11/19-3/24/19  presented with acute hypoxemic and hypercapnic respiratory failure secondary to NSTEMI, found to have multivessel coronary artery disease, s/p CABG x 4 (LIMA-LAD, SVG-DIAG, SVG-OM, SVG-PDA) on 3/18/19        Social History    , Loatian speaking, children living with them (english speaking).   Social History     Socioeconomic History     Marital status:      Spouse name: Not on file     Number of children: Not on file     Years of education: Not on file     Highest education level: Not on file   Occupational History     Not on file   Social Needs     Financial resource strain: Not on file     Food insecurity:     Worry: Not on file     Inability: Not on file     Transportation needs:     Medical: Not on file     Non-medical: Not on file   Tobacco Use     Smoking status: Never Smoker     Smokeless tobacco: Never Used   Substance and Sexual Activity     Alcohol use: No     Frequency: Never     Drug use: No     Sexual activity: Yes     Partners: Female   Lifestyle     Physical activity:     Days per week: Not on file     Minutes per session: Not on file     Stress: Not on file   Relationships     Social connections:     Talks on phone: Not on file     Gets together: Not on file     Attends [employer]alian service: Not on file     Active member of club or organization: Not on file     Attends meetings of clubs or organizations: Not on file     Relationship status: Not on file     Intimate partner violence:     Fear of current or ex partner: Not on file     Emotionally abused: Not on file     Physically abused: Not on file     Forced sexual activity: Not on file   Other Topics Concern     Parent/sibling w/ CABG, MI or angioplasty before 65F 55M? Not Asked   Social History Narrative     Not on file            Review of Systems:   Skin:  Negative     Eyes:  Positive for glasses  ENT:  Negative    Respiratory:  Positive for cough;dyspnea on exertion  Cardiovascular:  Negative edema;Positive for;chest  "pain;fatigue  Gastroenterology: Negative    Genitourinary:  Negative    Musculoskeletal:  Negative    Neurologic:  Positive for headaches  Psychiatric:  Negative    Heme/Lymph/Imm:  Negative    Endocrine:  Negative           Physical Exam:   Vitals: /78   Pulse 61   Ht 1.6 m (5' 2.99\")   Wt 71.9 kg (158 lb 8 oz)   SpO2 100%   BMI 28.09 kg/m      Wt Readings from Last 4 Encounters:   06/14/19 71.9 kg (158 lb 8 oz)   05/15/19 70.3 kg (155 lb)   05/14/19 69.4 kg (153 lb)   04/25/19 73.9 kg (163 lb)     GEN: well nourished, in no acute distress.  HEENT:  Pupils equal, round. Sclerae nonicteric.   NECK: Supple, no masses appreciated. No JVD appreciated on exam.   C/V:  Regular rate and rhythm, no murmur, rub or gallop.    RESP: Respirations are unlabored. Clear to auscultation bilaterally, crackles in bases.   GI: Abdomen soft, nontender.  EXTREM: No lower extremity edema  NEURO: Alert and oriented, cooperative.  SKIN: Warm and dry.        Data:   ECHO 3/12/19  The visual ejection fraction is estimated at 35-40%.  Left ventricular systolic function is mild to moderately reduced.  There are regional wall motion abnormalities as specified.  Right ventricular systolic pressure is elevated, consistent with mild to  moderate pulmonary hypertension.  The ascending aorta is Mildly dilated.  Moderate left pleural effusion  The study was technically difficult.    Cardiac catheterization 3/12/19  1. Patient with multiple sausage links like stenoses through the mid and distal right coronary artery. There is a 70-80% stenosis in the continuation of the right coronary artery just beyond the posterior descending artery.   2. Mid LAD to Dist LAD lesion is 90% stenosed. The lesion is segmental and serial. There is a long distal LAD stenosis extending all the way through the apex.   3. Prox LAD to Mid LAD lesion is 45% stenosed.   4. Prox Cx lesion is 85% stenosed.   5. The ejection fraction is calculated to be 35%. LVEDP " 31mmHg.  6. Mid LM lesion is 40% stenosed.    Plan   Maximal medical management.  Surgical consultation.  If patient is turned down by surgery we could consider multivessel intervention.    LIVER ENZYME RESULTS:  Lab Results   Component Value Date    AST 27 06/14/2019    ALT 39 06/14/2019     CBC RESULTS:  Lab Results   Component Value Date    WBC 8.4 06/14/2019    RBC 4.06 (L) 06/14/2019    HGB 9.9 (L) 06/14/2019    HCT 30.7 (L) 06/14/2019    MCV 76 (L) 06/14/2019    MCH 24.4 (L) 06/14/2019    MCHC 32.2 06/14/2019    RDW 18.2 (H) 06/14/2019     06/14/2019     BMP RESULTS:  Lab Results   Component Value Date     06/14/2019    POTASSIUM 4.1 06/14/2019    CHLORIDE 105 06/14/2019    CO2 25 06/14/2019    ANIONGAP 9 06/14/2019    GLC 99 06/14/2019    BUN 28 06/14/2019    CR 1.27 (H) 06/14/2019    GFRESTIMATED 60 (L) 06/14/2019    GFRESTBLACK 70 06/14/2019    MYKE 8.8 06/14/2019      A1C RESULTS:  Lab Results   Component Value Date    A1C 7.9 (H) 03/11/2019     INR RESULTS:  Lab Results   Component Value Date    INR 1.54 (H) 03/18/2019    INR 1.79 (H) 03/18/2019            Medications     Current Outpatient Medications   Medication Sig Dispense Refill     acetaminophen (TYLENOL) 325 MG tablet Take 2 tablets (650 mg) by mouth every 4 hours as needed for other 60 tablet 0     amLODIPine (NORVASC) 10 MG tablet Take 1 tablet (10 mg) by mouth daily 90 tablet 3     aspirin (ASA) 325 MG EC tablet Take 1 tablet (325 mg) by mouth daily 30 tablet 0     atorvastatin (LIPITOR) 80 MG tablet Take 1 tablet (80 mg) by mouth every evening 90 tablet 3     cyanocobalamin (VITAMIN B-12) 100 MCG tablet Take 100 mcg by mouth daily       furosemide (LASIX) 40 MG tablet Take 1 tablet (40 mg) by mouth daily 180 tablet 1     glipiZIDE (GLUCOTROL XL) 5 MG 24 hr tablet Take 5 mg by mouth daily       lisinopril (PRINIVIL/ZESTRIL) 20 MG tablet Take 1 tablet (20 mg) by mouth daily 90 tablet 3     metFORMIN (GLUCOPHAGE) 500 MG tablet Take  500 mg by mouth daily (with breakfast)       metoprolol succinate ER (TOPROL-XL) 25 MG 24 hr tablet Take 1 tablet (25 mg) by mouth daily 90 tablet 3     order for DME Equipment being ordered: Walker ()  Treatment Diagnosis: s/p coronary artery bypass 1 Device 0          Past Medical History     Past Medical History:   Diagnosis Date     Chronic renal insufficiency      Diabetes mellitus with proliferative retinopathy (H)      Hyperlipidemia      Hypertension      Nephrolithiasis      NSTEMI (non-ST elevated myocardial infarction) (H)      Past Surgical History:   Procedure Laterality Date     BYPASS GRAFT ARTERY CORONARY N/A 3/18/2019    Procedure: CORONARY BYPASS GRAFTING X 4 - LIMA TO LAD, SV TO DIAGONAL, PDA, AND OM; ON PUMP WITH NOA; ENDOVEIN HARVEST LEFT LEG;  Surgeon: Adarsh Sanchez MD;  Location:  OR     CV CORONARY ANGIOGRAM N/A 3/12/2019    Procedure: Coronary Angiogram;  Surgeon: Remi Rodriguez MD;  Location:  HEART CARDIAC CATH LAB     CV HEART CATHETERIZATION WITH POSSIBLE INTERVENTION N/A 3/12/2019    Procedure: Heart Catheterization with possible Intervention;  Surgeon: Remi Rodriguez MD;  Location:  HEART CARDIAC CATH LAB     CV LEFT VENTRICULOGRAM N/A 3/12/2019    Procedure: Left Ventricular Angiogram;  Surgeon: Remi Rodriguez MD;  Location: [facility] CARDIAC CATH LAB     LITHOTRIPSY              Allergies   Patient has no known allergies.        MICHELE Bravo CNP  [facility]  Pager: [phone]      Thank you for allowing me to participate in the care of your patient.      Sincerely,     MICHELE Bravo CNP     [facility]

## 2019-06-21 ENCOUNTER — THERAPY VISIT (OUTPATIENT)
Dept: SLEEP MEDICINE | Facility: CLINIC | Age: 64
End: 2019-06-21
Payer: COMMERCIAL

## 2019-06-21 DIAGNOSIS — I50.22 CHRONIC SYSTOLIC HEART FAILURE (H): ICD-10-CM

## 2019-06-21 DIAGNOSIS — R29.818 SUSPECTED SLEEP APNEA: ICD-10-CM

## 2019-06-21 DIAGNOSIS — R06.83 SNORING: ICD-10-CM

## 2019-06-21 DIAGNOSIS — G47.33 OSA (OBSTRUCTIVE SLEEP APNEA): ICD-10-CM

## 2019-06-21 PROCEDURE — 95810 POLYSOM 6/> YRS 4/> PARAM: CPT | Performed by: INTERNAL MEDICINE

## 2019-06-24 PROBLEM — G47.33 OSA (OBSTRUCTIVE SLEEP APNEA): Status: ACTIVE | Noted: 2019-06-24

## 2019-06-24 NOTE — PROCEDURES
" SLEEP STUDY INTERPRETATION  DIAGNOSTIC POLYSOMNOGRAPHY REPORT      Patient: DESTINEY HARRIS  YOB: 1955  Study Date: 6/21/2019  MRN: [id number]  Referring Provider: ENEDINA HIRSCH CNP  Ordering Provider: GABRIELE PATEL MD    Indications for Polysomnography: The patient is a 63 y old Male who is 5' 3\" and weighs 153.0 lbs. His BMI is 27.1, [place] sleepiness scale 4.0 and neck circumference is 40.0 cm. Relevant medical history includes HTN, CAD, diabetes and heart failure with reduced ejection fraction (35-40%). A diagnostic polysomnogram was performed to evaluate for sleep apnea.    Polysomnogram Data: A full night polysomnogram recorded the standard physiologic parameters including EEG, EOG, EMG, ECG, nasal and oral airflow. Respiratory parameters of chest and abdominal movements were recorded with respiratory inductance plethysmography. Oxygen saturation was recorded by pulse oximetry. Hypopnea scoring rule used: 1B 4%.    Sleep Architecture: Sleep was fragmented.   The total recording time of the polysomnogram was 496.7 minutes. The total sleep time was 212.5 minutes. Sleep latency was normal at 17.2 minutes without the use of a sleep aid. REM latency was 64.0 minutes. Arousal index was increased at 35.6 arousals per hour. Sleep efficiency was decreased at 42.8%. Wake after sleep onset was 245.0 minutes. The patient spent 21.9% of total sleep time in Stage N1, 62.1% in Stage N2, 0.0% in Stage N3, and 16.0% in REM. Time in REM supine was 34.0 minutes.    Respiration: Severe obstructive sleep apnea and associated oxygen desaturations.     Events ? The polysomnogram revealed a presence of 27 obstructive, 8 central, and - mixed apneas resulting in an apnea index of 9.9 events per hour. There were 73 obstructive hypopneas and - central hypopneas resulting in an obstructive hypopnea index of 20.6 and central hypopnea index of - events per hour. The combined apnea/hypopnea index was 30.5 events per hour " (central apnea/hypopnea index was 2.3 events per hour). The REM AHI was 56.5 events per hour. The supine AHI was 31.5 events per hour. The RERA index was 4.2 events per hour.  The RDI was 34.7 events per hour.    Snoring - was reported as mild.    Respiratory rate and pattern - was notable for normal respiratory rate and pattern.    Sustained Sleep Associated Hypoventilation - Transcutaneous carbon dioxide monitoring was not used, however significant hypoventilation was not suggested by oximetry.    Sleep Associated Hypoxemia - (Greater than 5 minutes O2 sat at or below 88%) was not present. Baseline oxygen saturation was 95.5%. Lowest oxygen saturation was 68.7%. Time spent less than or equal to 88% was 3.7 minutes. Time spent less than or equal to 89% was 4.5 minutes.    Movement Activity: There was no significant movement abnormality.     Periodic Limb Activity - There were 6 PLMs during the entire study. The PLM index was 1.7 movements per hour. The PLM Arousal Index was 0.8 per hour.    REM EMG Activity - Excessive transient/sustained muscle activity was not present.    Nocturnal Behavior - Abnormal sleep related behaviors were not noted during/arising out of NREM / REM sleep.     Bruxism - None apparent.    Cardiac Summary: Sinus rhythm.   The average pulse rate was 67.1 bpm. The minimum pulse rate was 56.9 bpm while the maximum pulse rate was 78.1 bpm.  Arrhythmias were not noted.    Assessment:     This sleep study shows severe obstructive sleep apnea with associated oxygen desaturations and sleep fragmentation.     Recommendations:    CPAP therapy is the treatment of choice for severe obstructive sleep apnea. Treatment could be empirically initiated with Auto?titrating PAP therapy with a range of 5 to 15 cmH2O. Recommend clinical follow up with sleep management team.    Diagnostic Codes:   Obstructive Sleep Apnea G47.33  Repetitive Intrusions Into Sleep F51.8    6/21/2019 [place] [facility] Sleep Study  (153.0 lbs) - AHI 30.5, RDI 34.7, Supine AHI 31.5, REM AHI 56.5, Low O2 68.7%, Time Spent ?88% 3.7 minutes / Time Spent ?89% 4.5 minutes.     _____________________________________   Electronically Signed By: Abdon Bernstein MD 06/24/2019

## 2019-06-25 LAB — SLPCOMP: NORMAL

## 2019-07-08 ENCOUNTER — OFFICE VISIT (OUTPATIENT)
Dept: SLEEP MEDICINE | Facility: CLINIC | Age: 64
End: 2019-07-08
Payer: COMMERCIAL

## 2019-07-08 VITALS
DIASTOLIC BLOOD PRESSURE: 67 MMHG | HEIGHT: 63 IN | BODY MASS INDEX: 28.17 KG/M2 | SYSTOLIC BLOOD PRESSURE: 148 MMHG | OXYGEN SATURATION: 100 % | HEART RATE: 65 BPM | RESPIRATION RATE: 16 BRPM | WEIGHT: 159 LBS

## 2019-07-08 DIAGNOSIS — G47.33 OSA (OBSTRUCTIVE SLEEP APNEA): Primary | ICD-10-CM

## 2019-07-08 PROCEDURE — 99214 OFFICE O/P EST MOD 30 MIN: CPT | Performed by: INTERNAL MEDICINE

## 2019-07-08 ASSESSMENT — MIFFLIN-ST. JEOR: SCORE: 1421.19

## 2019-07-08 NOTE — NURSING NOTE
"No chief complaint on file.      Initial /67   Pulse 65   Resp 16   Ht 1.6 m (5' 2.99\")   Wt 72.1 kg (159 lb)   SpO2 100%   BMI 28.17 kg/m   Estimated body mass index is 28.17 kg/m  as calculated from the following:    Height as of this encounter: 1.6 m (5' 2.99\").    Weight as of this encounter: 72.1 kg (159 lb).    Medication Reconciliation: complete    Beverly Miller MA    "

## 2019-07-08 NOTE — PATIENT INSTRUCTIONS
Your BMI is Body mass index is 28.17 kg/m .  Weight management is a personal decision.  If you are interested in exploring weight loss strategies, the following discussion covers the approaches that may be successful. Body mass index (BMI) is one way to tell whether you are at a healthy weight, overweight, or obese. It measures your weight in relation to your height.  A BMI of 18.5 to 24.9 is in the healthy range. A person with a BMI of 25 to 29.9 is considered overweight, and someone with a BMI of 30 or greater is considered obese. More than two-thirds of American adults are considered overweight or obese.  Being overweight or obese increases the risk for further weight gain. Excess weight may lead to heart disease and diabetes.  Creating and following plans for healthy eating and physical activity may help you improve your health.  Weight control is part of healthy lifestyle and includes exercise, emotional health, and healthy eating habits. Careful eating habits lifelong are the mainstay of weight control. Though there are significant health benefits from weight loss, long-term weight loss with diet alone may be very difficult to achieve- studies show long-term success with dietary management in less than 10% of people. Attaining a healthy weight may be especially difficult to achieve in those with severe obesity. In some cases, medications, devices and surgical management might be considered.  What can you do?  If you are overweight or obese and are interested in methods for weight loss, you should discuss this with your provider.     Consider reducing daily calorie intake by 500 calories.     Keep a food journal.     Avoiding skipping meals, consider cutting portions instead.    Diet combined with exercise helps maintain muscle while optimizing fat loss. Strength training is particularly important for building and maintaining muscle mass. Exercise helps reduce stress, increase energy, and improves fitness.  Increasing exercise without diet control, however, may not burn enough calories to loose weight.       Start walking three days a week 10-20 minutes at a time    Work towards walking thirty minutes five days a week     Eventually, increase the speed of your walking for 1-2 minutes at time    In addition, we recommend that you review healthy lifestyles and methods for weight loss available through the National Institutes of Health patient information sites:  http://win.niddk.nih.gov/publications/index.htm    And look into health and wellness programs that may be available through your health insurance provider, employer, local community center, or paul club.    Weight management plan: Patient was referred to their PCP to discuss a diet and exercise plan.  Sinh to follow up with Primary Care provider regarding elevated blood pressure.

## 2019-07-08 NOTE — PROGRESS NOTES
Sleep Study Follow-Up Visit:    Date on this visit: 7/8/2019    Khang Mcelroy comes in today for follow-up of his sleep study done on 6/21/2019 at the [facility] for possible sleep apnea.    Sleep latency 17 minutes without Ambien.  REM achieved.   REM latency 64 minutes.  Sleep efficiency 42.8%. Total sleep time 212.5 minutes.    Sleep architecture:  Stage 1, 21.9% (5%), stage 2, 62% (45-55%), stage 3, 0% (15-20%), stage REM, 16% (20-25%).  AHI was 30.5, with desaturations. RDI 34.7.  REM AHI 56.5, consistent with severe REM CHALO.  Supine AHI 31.5, consistent with severe SUPINE CHALO.  Periodic Limb Movement Index 1.7/hour.       These findings were reviewed with patient and wife through an .     Past medical/surgical history, family history, social history, medications and allergies were reviewed.      Problem List:  Patient Active Problem List    Diagnosis Date Noted     CHALO (obstructive sleep apnea) 06/24/2019     Priority: Medium     6/21/2019 [place] [facility] Sleep Study (153.0 lbs) - AHI 30.5, RDI 34.7, Supine AHI 31.5, REM AHI 56.5, Low O2 68.7%, Time Spent ?88% 3.7 minutes / Time Spent ?89% 4.5 minutes.       Essential hypertension 06/14/2019     Priority: Medium     Hyperlipidemia LDL goal <70 06/14/2019     Priority: Medium     S/P CABG (coronary artery bypass graft) 03/26/2019     Priority: Medium     Fluid overload 03/26/2019     Priority: Medium     Transient hyperglycemia post procedure 03/26/2019     Priority: Medium     CAD in native artery 03/18/2019     Priority: Medium     Type 2 diabetes mellitus (H) 03/11/2019     Priority: Medium     NSTEMI (non-ST elevated myocardial infarction) (H) 03/11/2019     Priority: Medium        Impression/Plan:    1. Obstructive sleep apnea, severe  2. Systolic heart failure  3. Hypertension   4. Coronary artery disease     - Patient was counseled regarding severe sleep apnea, consequences of untreated disease and management  options. CPAP is the treatment of choice for severe sleep apnea. Patient is open to treatment.     Plan:     1. Start auto PAP therapy 5-12 cm H2O     He will follow up with me in about 7 week(s).     Twenty-five minutes spent with patient, all of which were spent face-to-face counseling, consulting, coordinating plan of care.      Dr. Abdon Bernstein     CC: Amari Castro

## 2019-07-11 ENCOUNTER — CARE COORDINATION (OUTPATIENT)
Dept: CARDIOLOGY | Facility: CLINIC | Age: 64
End: 2019-07-11

## 2019-07-11 NOTE — PROGRESS NOTES
I received a message from pt's son with a list of BPS: 133/74, HR 65, 135/35, HR 63, 130/72, HR 61, 134/70, HR 64, 127/70, HR 62. Pt's BP was higher at most recent OV with [place] so he was told to monitor BPs at home. I left message with pt's son to let him know BPs sound better, but I would like an update on his weight and how he is feeling too. Also, not sure if these readings were daily readings or multiple readings from one day.  Brenda SMITH July 11, 2019, 11:29 AM

## 2019-07-23 ENCOUNTER — TELEPHONE (OUTPATIENT)
Dept: SLEEP MEDICINE | Facility: CLINIC | Age: 64
End: 2019-07-23

## 2019-07-23 DIAGNOSIS — G47.33 OSA (OBSTRUCTIVE SLEEP APNEA): ICD-10-CM

## 2019-07-23 NOTE — TELEPHONE ENCOUNTER
Left patient's daughter Hannah a voicemail to call [facility] to schedule appointment.

## 2019-07-30 NOTE — TELEPHONE ENCOUNTER
No response from patient's daughter. Left patient's son Detavong a voicemail to call [facility] to schedule appointment.

## 2019-07-30 NOTE — ADDENDUM NOTE
Encounter addended by: Damien Hernandez EP on: 7/30/2019 1:21 PM   Actions taken: Episode resolved, Sign clinical note

## 2019-07-30 NOTE — PROGRESS NOTES
Cardiac Rehab Discharge Summary  Khang Ribeirogphabrayden  CABGx4  63 year old    Reason for discharge:    Medicare G-code: Patient did not attend a final scheduled session prior to discharge. Unable to determine discharge functional status.  All goals and outcomes met, no further needs identified.    Progress towards goals:  Goals met    Recommendation(s):    Continue home exercise program. Patient completed recommended amount of rehab sessions and then did not complete a formal discharge.    Physician cosignature/electronic signature indicates agreements with the ITP document and approval of discharge.

## 2019-09-16 ENCOUNTER — OFFICE VISIT (OUTPATIENT)
Dept: CARDIOLOGY | Facility: CLINIC | Age: 64
End: 2019-09-16
Attending: NURSE PRACTITIONER
Payer: COMMERCIAL

## 2019-09-16 VITALS
SYSTOLIC BLOOD PRESSURE: 136 MMHG | HEART RATE: 60 BPM | OXYGEN SATURATION: 99 % | WEIGHT: 163 LBS | BODY MASS INDEX: 28.88 KG/M2 | HEIGHT: 63 IN | DIASTOLIC BLOOD PRESSURE: 64 MMHG

## 2019-09-16 DIAGNOSIS — N17.9 ACUTE RENAL FAILURE, UNSPECIFIED ACUTE RENAL FAILURE TYPE (H): ICD-10-CM

## 2019-09-16 DIAGNOSIS — I50.22 CHRONIC SYSTOLIC HEART FAILURE (H): Primary | ICD-10-CM

## 2019-09-16 DIAGNOSIS — I50.22 CHRONIC SYSTOLIC HEART FAILURE (H): ICD-10-CM

## 2019-09-16 DIAGNOSIS — Z95.1 STATUS POST CORONARY ARTERY BYPASS GRAFT: ICD-10-CM

## 2019-09-16 DIAGNOSIS — I25.10 CAD IN NATIVE ARTERY: ICD-10-CM

## 2019-09-16 DIAGNOSIS — I10 ESSENTIAL HYPERTENSION: ICD-10-CM

## 2019-09-16 LAB
ANION GAP SERPL CALCULATED.3IONS-SCNC: 12.6 MMOL/L (ref 6–17)
BUN SERPL-MCNC: 34 MG/DL (ref 7–30)
CALCIUM SERPL-MCNC: 9.8 MG/DL (ref 8.5–10.5)
CHLORIDE SERPL-SCNC: 98 MMOL/L (ref 98–107)
CO2 SERPL-SCNC: 27 MMOL/L (ref 23–29)
CREAT SERPL-MCNC: 1.74 MG/DL (ref 0.7–1.3)
GFR SERPL CREATININE-BSD FRML MDRD: 40 ML/MIN/{1.73_M2}
GLUCOSE SERPL-MCNC: 147 MG/DL (ref 70–105)
POTASSIUM SERPL-SCNC: 4.6 MMOL/L (ref 3.5–5.1)
SODIUM SERPL-SCNC: 133 MMOL/L (ref 136–145)

## 2019-09-16 PROCEDURE — 99214 OFFICE O/P EST MOD 30 MIN: CPT | Performed by: NURSE PRACTITIONER

## 2019-09-16 PROCEDURE — 80048 BASIC METABOLIC PNL TOTAL CA: CPT | Performed by: INTERNAL MEDICINE

## 2019-09-16 PROCEDURE — 36415 COLL VENOUS BLD VENIPUNCTURE: CPT | Performed by: INTERNAL MEDICINE

## 2019-09-16 RX ORDER — FUROSEMIDE 40 MG
20 TABLET ORAL DAILY
Qty: 180 TABLET | Refills: 1 | Status: SHIPPED | OUTPATIENT
Start: 2019-09-16 | End: 2019-11-21

## 2019-09-16 ASSESSMENT — MIFFLIN-ST. JEOR: SCORE: 1439.49

## 2019-09-16 NOTE — PROGRESS NOTES
Cardiology Clinic Progress Note  Khang Mcelroy MRN# [id number]   YOB: 1955 Age: 63 year old   Primary Cardiologist: Dr. Hernández Reason for visit: CORE follow up            Assessment and Plan:   Khang Mcelroy is a very pleasant 63 year old male with a history of HFrEF, ischemic cardiomyopathy, LVEF 35-40% per echocardiogram 3/12/19, coronary artery disease s/p CABG x 4 (LIMA-LAD, SVG-DIAG, SVG-OM, SVG-PDA) on 3/18/19, DM, hypertension, hyperlipidemia, CKD, and anemia. Patient here today for CORE followup.      1.  Chronic systolic heart failure/HFrEF, ischemic cardiomyopathy - LVEF 35-40% 3/12/19, patient appears compensated and euvolemic on exam. Weight stable in clinic and at home. Labs today continue to show worsening kidney function, creatinine increased from 1.27 -> 1.74, BUN increased 28-> 34, sodium low at 133. Plan to decrease diuretic regimen today, maybe able to stop diuretic all together at follow up.    - NYHA class III, stage C   - Etiology : ischemic   - Fluid status : euvolemic   - Diuretic regimen : DECREASE furosemide 20mg daily PO    - Ischemic evaluation : coronary angiogram 3/12/19, s/p CABG 3/18/19   - Guideline directed medical therapy    - Betablocker: metoprolol succinate 25mg daily    - ACEI/ARB/ARNI: lisinopril 20mg daily    - Aldactone antagonist: will consider in the future based on blood pressure and renal function   - Sudden cardiac death prophylaxis : n/a EF> 35%   - Plan for repeat echocardiogram in November prior to f/u with Dr. Hernández   - Sleep apnea : sleep medicine referral, has sleep study scheduled 6/21/19   - Reinforced HF education, reviewed low sodium diet and 2L fluid restriction, praise given for decreased fast food consumption.     2. Coronary artery disease s/p CABG x 4 (LIMA-LAD, SVG-DIAG, SVG-OM, SVG-PDA) on 3/18/19, stable no signs/symptoms of myocardial ischemia.    - Continue aspirin, statin and betablocker therapy.   - Reinforced lifestyle  modifications    3. Hx of syncope - presented to the ED 4/1/19 after syncopal event, felt to be secondary to coughing, but patient was also noted to be bradycardic so metoprolol was decreased at that time.  No recurrent syncopal events.   - 30 day event monitor - one event recorded, heart rate 56.    - Tolerating increased dose of metoprolol.     4. Hypertension - controlled, clinic recheck today was 136/64, readings at home 120-130s systolically.   - Counseled on lifestyle modifications to help manage blood pressure.     5. Hyperlipidemia - need to get a lipid panel. LDL goal <70    6. Diabetes mellitus - hgbA1c 7.9 3/11/19, counseled patient on the importance of good glycemic control.     7. Obstructive sleep apnea - Sleep study completed in June, recommended starting CPAP     8. Medication Compliance - Patient brought pill bottles to clinic, appears patient is taking all medications as prescribed with no discrepancies noted today. Praise given. Reinforced the importance of taking medications daily as prescribed. Had wife write furosemide change recommendations directly on pill bottle.         Changes today: DECREASE furosemide to 20mg daily     Follow up plan:     CORE followup with me in 2-3 weeks with labs prior.     Followup with Dr. Hernández in November with limited echocardiogram prior.         History of Presenting Illness:    Khang Mcelroy is a very pleasant 63 year old male with a history of HFrEF, ischemic cardiomyopathy, LVEF 35-40% per echocardiogram 3/12/19, coronary artery disease s/p CABG x 4 (LIMA-LAD, SVG-DIAG, SVG-OM, SVG-PDA) on 3/18/19, DM, hypertension, hyperlipidemia, CKD, and anemia.     Hospitalized 3/11/19-3/24/19 presented with acute hypoxemic and hypercapnic respiratory failure secondary to NSTEMI, found to have multivessel coronary artery disease, s/p CABG x 4 (LIMA-LAD, SVG-DIAG, SVG-OM, SVG-PDA) on 3/18/19. Troponin peaked at 53. Echocardiogram 3/12/19 showed an LVEF 35-40%, grade  III or advanced diastolic dysfunction, with multiple wall motion abnormalities.     ED 4/1/19 related to tussive syncope, noted to have a coughing spell and shortly after became unresponsive and slumped over. EKG was unremarkable. Metoprolol decreased. No recurrent events.     Patient was seen by Dr. Hernández on 5/15/19, patient was doing well and appeared euvolemic. Metoprolol succinate was increased to 25mg daily. Appears since this visit primary care provider has decreased furosemide dosage to 40mg daily. During last CORE visit amlodipine was increased to 10mg daily due to elevated blood pressure. Patient was euvolemic on exam and continued on furosemide 40mg daily.     Sleep study completed in June 2019 showed severe obstructive sleep apnea, recommended starting CPAP therapy.     Patient is here today for CORE followup. Patient is loatian speaking,  present. Patient reports feeling good. Monitoring weights occasionally at home. Weight at home [id number]#, stable since last clinic visit. Home weight today 161#. Denies lower extremity edema. Denies abdominal distention/bloating. Denies shortness of breath at rest. Denies exertional dyspnea with walking or stairs, states walking 10 blocks. Able to complete ADLs and IADLs independently, which is a continued improvement. Sleeping good. Denies orthopnea, sleeping with HOB elevated. Denies PND. Patient denies chest pain or chest tightness. Denies dizziness, lightheadedness or other presyncopal symptoms. Denies tachycardia or palpitations. Denies syncope.     Labs today show worsening kidney function, creatinine increased from 1.27->1.74 today. Sodium low at 133. Potassium 4.6.  Blood pressure 148/80, recheck 136/64 and HR 60 in clinic today. Monitoring BP at home, report blood pressures at home upper 120-130/60 systolically.     Appetite good. Enjoys rice soup and fried rice. Wife does the cooking, trying to limit sodium use. Not adding additional salt.  Eating fast foods [employer] 1 time per week. Walking daily for exercise, walking 10 blocks with no difficulty, sometimes 2 x a day, this is double since visit in June. Denies tobacco or alcohol use.         Recent Hospitalizations   3/11/19-3/24/19 presented with acute hypoxemic and hypercapnic respiratory failure secondary to NSTEMI, found to have multivessel coronary artery disease, s/p CABG x 4 (LIMA-LAD, SVG-DIAG, SVG-OM, SVG-PDA) on 3/18/19        Social History    , Loatian speaking, children living with them (english speaking).   Social History     Socioeconomic History     Marital status:      Spouse name: Not on file     Number of children: Not on file     Years of education: Not on file     Highest education level: Not on file   Occupational History     Not on file   Social Needs     Financial resource strain: Not on file     Food insecurity:     Worry: Not on file     Inability: Not on file     Transportation needs:     Medical: Not on file     Non-medical: Not on file   Tobacco Use     Smoking status: Never Smoker     Smokeless tobacco: Never Used   Substance and Sexual Activity     Alcohol use: No     Frequency: Never     Drug use: No     Sexual activity: Yes     Partners: Female   Lifestyle     Physical activity:     Days per week: Not on file     Minutes per session: Not on file     Stress: Not on file   Relationships     Social connections:     Talks on phone: Not on file     Gets together: Not on file     Attends Orthodox service: Not on file     Active member of club or organization: Not on file     Attends meetings of clubs or organizations: Not on file     Relationship status: Not on file     Intimate partner violence:     Fear of current or ex partner: Not on file     Emotionally abused: Not on file     Physically abused: Not on file     Forced sexual activity: Not on file   Other Topics Concern     Parent/sibling w/ CABG, MI or angioplasty before 65F 55M? Not Asked  "  Social History Narrative     Not on file            Review of Systems:   Skin:  Negative     Eyes:  Positive for glasses  ENT:  Negative    Respiratory:  Positive for cough;dyspnea on exertion  Cardiovascular:  Negative fatigue;Positive for  Gastroenterology: Negative    Genitourinary:  Negative    Musculoskeletal:  Negative    Neurologic:  Positive for headaches  Psychiatric:  Negative    Heme/Lymph/Imm:  Negative    Endocrine:  Negative           Physical Exam:   Vitals: /64   Pulse 60   Ht 1.6 m (5' 3\")   Wt 73.9 kg (163 lb)   SpO2 99%   BMI 28.87 kg/m     Wt Readings from Last 4 Encounters:   09/16/19 73.9 kg (163 lb)   07/08/19 72.1 kg (159 lb)   06/14/19 71.9 kg (158 lb 8 oz)   05/15/19 70.3 kg (155 lb)     GEN: well nourished, in no acute distress.  HEENT:  Pupils equal, round. Sclerae nonicteric.   NECK: Supple, no masses appreciated. No JVD appreciated on exam.   C/V:  Regular rate and rhythm, no murmur, rub or gallop.    RESP: Respirations are unlabored. Clear to auscultation bilaterally, crackles in bases.   GI: Abdomen soft, nontender.  EXTREM: No lower extremity edema  NEURO: Alert and oriented, cooperative.  SKIN: Warm and dry.        Data:   ECHO 3/12/19  The visual ejection fraction is estimated at 35-40%.  Left ventricular systolic function is mild to moderately reduced.  There are regional wall motion abnormalities as specified.  Right ventricular systolic pressure is elevated, consistent with mild to  moderate pulmonary hypertension.  The ascending aorta is Mildly dilated.  Moderate left pleural effusion  The study was technically difficult.    Cardiac catheterization 3/12/19  1. Patient with multiple sausage links like stenoses through the mid and distal right coronary artery. There is a 70-80% stenosis in the continuation of the right coronary artery just beyond the posterior descending artery.   2. Mid LAD to Dist LAD lesion is 90% stenosed. The lesion is segmental and serial. " There is a long distal LAD stenosis extending all the way through the apex.   3. Prox LAD to Mid LAD lesion is 45% stenosed.   4. Prox Cx lesion is 85% stenosed.   5. The ejection fraction is calculated to be 35%. LVEDP 31mmHg.  6. Mid LM lesion is 40% stenosed.    Plan   Maximal medical management.  Surgical consultation.  If patient is turned down by surgery we could consider multivessel intervention.    LIVER ENZYME RESULTS:  Lab Results   Component Value Date    AST 27 06/14/2019    ALT 39 06/14/2019     CBC RESULTS:  Lab Results   Component Value Date    WBC 8.4 06/14/2019    RBC 4.06 (L) 06/14/2019    HGB 9.9 (L) 06/14/2019    HCT 30.7 (L) 06/14/2019    MCV 76 (L) 06/14/2019    MCH 24.4 (L) 06/14/2019    MCHC 32.2 06/14/2019    RDW 18.2 (H) 06/14/2019     06/14/2019     BMP RESULTS:  Lab Results   Component Value Date     (L) 09/16/2019    POTASSIUM 4.6 09/16/2019    CHLORIDE 98 09/16/2019    CO2 27 09/16/2019    ANIONGAP 12.6 09/16/2019     (H) 09/16/2019    BUN 34 (H) 09/16/2019    CR 1.74 (H) 09/16/2019    GFRESTIMATED 40 (L) 09/16/2019    GFRESTBLACK 49 (L) 09/16/2019    MYKE 9.8 09/16/2019      A1C RESULTS:  Lab Results   Component Value Date    A1C 7.9 (H) 03/11/2019     INR RESULTS:  Lab Results   Component Value Date    INR 1.54 (H) 03/18/2019    INR 1.79 (H) 03/18/2019            Medications     Current Outpatient Medications   Medication Sig Dispense Refill     acetaminophen (TYLENOL) 325 MG tablet Take 2 tablets (650 mg) by mouth every 4 hours as needed for other 60 tablet 0     amLODIPine (NORVASC) 10 MG tablet Take 1 tablet (10 mg) by mouth daily 90 tablet 3     aspirin (ASA) 325 MG EC tablet Take 1 tablet (325 mg) by mouth daily 30 tablet 0     atorvastatin (LIPITOR) 80 MG tablet Take 1 tablet (80 mg) by mouth every evening 90 tablet 3     cyanocobalamin (VITAMIN B-12) 100 MCG tablet Take 100 mcg by mouth daily       furosemide (LASIX) 40 MG tablet Take 0.5 tablets (20 mg) by  mouth daily 180 tablet 1     glipiZIDE (GLUCOTROL XL) 5 MG 24 hr tablet Take 5 mg by mouth daily       lisinopril (PRINIVIL/ZESTRIL) 20 MG tablet Take 1 tablet (20 mg) by mouth daily 90 tablet 3     metFORMIN (GLUCOPHAGE) 500 MG tablet Take 500 mg by mouth daily (with breakfast)       metoprolol succinate ER (TOPROL-XL) 25 MG 24 hr tablet Take 1 tablet (25 mg) by mouth daily 90 tablet 3     order for DME Equipment being ordered: Walker ()  Treatment Diagnosis: s/p coronary artery bypass 1 Device 0          Past Medical History     Past Medical History:   Diagnosis Date     Chronic renal insufficiency      Diabetes mellitus with proliferative retinopathy (H)      Hyperlipidemia      Hypertension      Nephrolithiasis      NSTEMI (non-ST elevated myocardial infarction) (H)      Past Surgical History:   Procedure Laterality Date     BYPASS GRAFT ARTERY CORONARY N/A 3/18/2019    Procedure: CORONARY BYPASS GRAFTING X 4 - LIMA TO LAD, SV TO DIAGONAL, PDA, AND OM; ON PUMP WITH NOA; ENDOVEIN HARVEST LEFT LEG;  Surgeon: Adarsh Sanchez MD;  Location:  OR     CV CORONARY ANGIOGRAM N/A 3/12/2019    Procedure: Coronary Angiogram;  Surgeon: Remi Rodriguez MD;  Location:  HEART CARDI[facility] CATH LAB     CV HEART CATHETERIZATION WITH POSSIBLE INTERVENTION N/A 3/12/2019    Procedure: Heart Catheterization with possible Intervention;  Surgeon: Remi Rodriguez MD;  Location:  HEART CARDI[facility] CATH LAB     CV LEFT VENTRICULOGRAM N/A 3/12/2019    Procedure: Left Ventricular Angiogram;  Surgeon: Remi Rodriguez MD;  Location:  HEART CARDIAC CATH LAB     LITHOTRIPSY              Allergies   Patient has no known allergies.        MIHCELE Bravo [facility]  Pager: [phone]

## 2019-09-16 NOTE — PATIENT INSTRUCTIONS
Call CORE nurse for any questions or concerns Mon-Fri 8am-4pm:                                                [phone]                                       For concerns after hours:                                                 [phone]     Medication changes: DECREASE furosemide to 20mg daily. Monitor weight daily. Call if weight increases 2-3 pounds.   Plan from today: Follow up with Viv in 2-3 weeks. Follow up with Dr. Hernández in November with echocardiogram (ultrasound of heart).   Lab results: see attached; worsening kidney function today, decreasing water pill (furosemide) to help.   Component      Latest Ref Rng & Units 6/14/2019 9/16/2019   Sodium      136 - 145 mmol/L 139 133 (L)   Potassium      3.5 - 5.1 mmol/L 4.1 4.6   Chloride      98 - 107 mmol/L 105 98   Carbon Dioxide      23 - 29 mmol/L 25 27   Anion Gap      6 - 17 mmol/L 9 12.6   Glucose      70 - 105 mg/dL 99 147 (H)   Urea Nitrogen      7 - 30 mg/dL 28 34 (H)   Creatinine      0.70 - 1.30 mg/dL 1.27 (H) 1.74 (H)   GFR Estimate      >60 mL/min/1.73:m2 60 (L) 40 (L)   GFR Estimate If Black      >60 mL/min/1.73:m2 70 49 (L)   Calcium      8.5 - 10.5 mg/dL 8.8 9.8   Bilirubin Total      0.2 - 1.3 mg/dL 0.3    Albumin      3.4 - 5.0 g/dL 3.3 (L)    Protein Total      6.8 - 8.8 g/dL 8.3    Alkaline Phosphatase      40 - 150 U/L 149    ALT      0 - 70 U/L 39    AST      0 - 45 U/L 27

## 2019-09-16 NOTE — LETTER
9/16/2019    Mpho Brian Castro MD  [facility] [address]  [facility] [address]    RE: Khang Mcelroy       Dear Colleague,    I had the pleasure of seeing Khang Mcelroy in the [facility].    Cardiology Clinic Progress Note  Khang Mcelroy MRN# [id number]   YOB: 1955 Age: 63 year old   Primary Cardiologist: Dr. Hernández Reason for visit: CORE follow up            Assessment and Plan:   Khang Mcelroy is a very pleasant 63 year old male with a history of HFrEF, ischemic cardiomyopathy, LVEF 35-40% per echocardiogram 3/12/19, coronary artery disease s/p CABG x 4 (LIMA-LAD, SVG-DIAG, SVG-OM, SVG-PDA) on 3/18/19, DM, hypertension, hyperlipidemia, CKD, and anemia. Patient here today for CORE followup.      1.  Chronic systolic heart failure/HFrEF, ischemic cardiomyopathy - LVEF 35-40% 3/12/19, patient appears compensated and euvolemic on exam. Weight stable in clinic and at home. Labs today continue to show worsening kidney function, creatinine increased from 1.27 -> 1.74, BUN increased 28-> 34, sodium low at 133. Plan to decrease diuretic regimen today, maybe able to stop diuretic all together at follow up.    - NYHA class III, stage C   - Etiology : ischemic   - Fluid status : euvolemic   - Diuretic regimen : DECREASE furosemide 20mg daily PO    - Ischemic evaluation : coronary angiogram 3/12/19, s/p CABG 3/18/19   - Guideline directed medical therapy    - Betablocker: metoprolol succinate 25mg daily    - ACEI/ARB/ARNI: lisinopril 20mg daily    - Aldactone antagonist: will consider in the future based on blood pressure and renal function   - Sudden cardiac death prophylaxis : n/a EF> 35%   - Plan for repeat echocardiogram in November prior to f/u with Dr. Hernández   - Sleep apnea : sleep medicine referral, has sleep study scheduled 6/21/19   - Reinforced HF education, reviewed low sodium diet and 2L fluid restriction, praise given for  decreased fast food consumption.     2. Coronary artery disease s/p CABG x 4 (LIMA-LAD, SVG-DIAG, SVG-OM, SVG-PDA) on 3/18/19, stable no signs/symptoms of myocardial ischemia.    - Continue aspirin, statin and betablocker therapy.   - Reinforced lifestyle modifications    3. Hx of syncope - presented to the ED 4/1/19 after syncopal event, felt to be secondary to coughing, but patient was also noted to be bradycardic so metoprolol was decreased at that time.  No recurrent syncopal events.   - 30 day event monitor - one event recorded, heart rate 56.    - Tolerating increased dose of metoprolol.     4. Hypertension - controlled, clinic recheck today was 136/64, readings at home 120-130s systolically.   - Counseled on lifestyle modifications to help manage blood pressure.     5. Hyperlipidemia - need to get a lipid panel. LDL goal <70    6. Diabetes mellitus - hgbA1c 7.9 3/11/19, counseled patient on the importance of good glycemic control.     7. Obstructive sleep apnea - Sleep study completed in June, recommended starting CPAP     8. Medication Compliance - Patient brought pill bottles to clinic, appears patient is taking all medications as prescribed with no discrepancies noted today. Praise given. Reinforced the importance of taking medications daily as prescribed. Had wife write furosemide change recommendations directly on pill bottle.         Changes today: DECREASE furosemide to 20mg daily     Follow up plan:     CORE followup with me in 2-3 weeks with labs prior.     Followup with Dr. Hernández in November with limited echocardiogram prior.         History of Presenting Illness:    Khang Mcelroy is a very pleasant 63 year old male with a history of HFrEF, ischemic cardiomyopathy, LVEF 35-40% per echocardiogram 3/12/19, coronary artery disease s/p CABG x 4 (LIMA-LAD, SVG-DIAG, SVG-OM, SVG-PDA) on 3/18/19, DM, hypertension, hyperlipidemia, CKD, and anemia.     Hospitalized 3/11/19-3/24/19 presented with acute  hypoxemic and hypercapnic respiratory failure secondary to NSTEMI, found to have multivessel coronary artery disease, s/p CABG x 4 (LIMA-LAD, SVG-DIAG, SVG-OM, SVG-PDA) on 3/18/19. Troponin peaked at 53. Echocardiogram 3/12/19 showed an LVEF 35-40%, grade III or advanced diastolic dysfunction, with multiple wall motion abnormalities.     ED 4/1/19 related to tussive syncope, noted to have a coughing spell and shortly after became unresponsive and slumped over. EKG was unremarkable. Metoprolol decreased. No recurrent events.     Patient was seen by Dr. Hernández on 5/15/19, patient was doing well and appeared euvolemic. Metoprolol succinate was increased to 25mg daily. Appears since this visit primary care provider has decreased furosemide dosage to 40mg daily. During last CORE visit amlodipine was increased to 10mg daily due to elevated blood pressure. Patient was euvolemic on exam and continued on furosemide 40mg daily.     Sleep study completed in June 2019 showed severe obstructive sleep apnea, recommended starting CPAP therapy.     Patient is here today for CORE followup. Patient is loatian speaking,  present. Patient reports feeling good. Monitoring weights occasionally at home. Weight at home [id number] stable since last clinic visit. Home weight today 161#. Denies lower extremity edema. Denies abdominal distention/bloating. Denies shortness of breath at rest. Denies exertional dyspnea with walking or stairs, states walking 10 blocks. Able to complete ADLs and IADLs independently, which is a continued improvement. Sleeping good. Denies orthopnea, sleeping with HOB elevated. Denies PND. Patient denies chest pain or chest tightness. Denies dizziness, lightheadedness or other presyncopal symptoms. Denies tachycardia or palpitations. Denies syncope.     Labs today show worsening kidney function, creatinine increased from 1.27->1.74 today. Sodium low at 133. Potassium 4.6.  Blood pressure 148/80,  recheck 136/64 and HR 60 in clinic today. Monitoring BP at home, report blood pressures at home upper 120-130/60 systolically.     Appetite good. Enjoys rice soup and fried rice. Wife does the cooking, trying to limit sodium use. Not adding additional salt. Eating fast foods [employer] 1 time per week. Walking daily for exercise, walking 10 blocks with no difficulty, sometimes 2 x a day, this is double since visit in June. Denies tobacco or alcohol use.         Recent Hospitalizations   3/11/19-3/24/19 presented with acute hypoxemic and hypercapnic respiratory failure secondary to NSTEMI, found to have multivessel coronary artery disease, s/p CABG x 4 (LIMA-LAD, SVG-DIAG, SVG-OM, SVG-PDA) on 3/18/19        Social History    , Loatian speaking, children living with them (english speaking).   Social History     Socioeconomic History     Marital status:      Spouse name: Not on file     Number of children: Not on file     Years of education: Not on file     Highest education level: Not on file   Occupational History     Not on file   Social Needs     Financial resource strain: Not on file     Food insecurity:     Worry: Not on file     Inability: Not on file     Transportation needs:     Medical: Not on file     Non-medical: Not on file   Tobacco Use     Smoking status: Never Smoker     Smokeless tobacco: Never Used   Substance and Sexual Activity     Alcohol use: No     Frequency: Never     Drug use: No     Sexual activity: Yes     Partners: Female   Lifestyle     Physical activity:     Days per week: Not on file     Minutes per session: Not on file     Stress: Not on file   Relationships     Social connections:     Talks on phone: Not on file     Gets together: Not on file     Attends Sikh service: Not on file     Active member of club or organization: Not on file     Attends meetings of clubs or organizations: Not on file     Relationship status: Not on file     Intimate partner  "violence:     Fear of current or ex partner: Not on file     Emotionally abused: Not on file     Physically abused: Not on file     Forced sexual activity: Not on file   Other Topics Concern     Parent/sibling w/ CABG, MI or angioplasty before 65F 55M? Not Asked   Social History Narrative     Not on file            Review of Systems:   Skin:  Negative     Eyes:  Positive for glasses  ENT:  Negative    Respiratory:  Positive for cough;dyspnea on exertion  Cardiovascular:  Negative fatigue;Positive for  Gastroenterology: Negative    Genitourinary:  Negative    Musculoskeletal:  Negative    Neurologic:  Positive for headaches  Psychiatric:  Negative    Heme/Lymph/Imm:  Negative    Endocrine:  Negative           Physical Exam:   Vitals: /64   Pulse 60   Ht 1.6 m (5' 3\")   Wt 73.9 kg (163 lb)   SpO2 99%   BMI 28.87 kg/m      Wt Readings from Last 4 Encounters:   09/16/19 73.9 kg (163 lb)   07/08/19 72.1 kg (159 lb)   06/14/19 71.9 kg (158 lb 8 oz)   05/15/19 70.3 kg (155 lb)     GEN: well nourished, in no acute distress.  HEENT:  Pupils equal, round. Sclerae nonicteric.   NECK: Supple, no masses appreciated. No JVD appreciated on exam.   C/V:  Regular rate and rhythm, no murmur, rub or gallop.    RESP: Respirations are unlabored. Clear to auscultation bilaterally, crackles in bases.   GI: Abdomen soft, nontender.  EXTREM: No lower extremity edema  NEURO: Alert and oriented, cooperative.  SKIN: Warm and dry.        Data:   ECHO 3/12/19  The visual ejection fraction is estimated at 35-40%.  Left ventricular systolic function is mild to moderately reduced.  There are regional wall motion abnormalities as specified.  Right ventricular systolic pressure is elevated, consistent with mild to  moderate pulmonary hypertension.  The ascending aorta is Mildly dilated.  Moderate left pleural effusion  The study was technically difficult.    Cardiac catheterization 3/12/19  1. Patient with multiple sausage links like " stenoses through the mid and distal right coronary artery. There is a 70-80% stenosis in the continuation of the right coronary artery just beyond the posterior descending artery.   2. Mid LAD to Dist LAD lesion is 90% stenosed. The lesion is segmental and serial. There is a long distal LAD stenosis extending all the way through the apex.   3. Prox LAD to Mid LAD lesion is 45% stenosed.   4. Prox Cx lesion is 85% stenosed.   5. The ejection fraction is calculated to be 35%. LVEDP 31mmHg.  6. Mid LM lesion is 40% stenosed.    Plan   Maximal medical management.  Surgical consultation.  If patient is turned down by surgery we could consider multivessel intervention.    LIVER ENZYME RESULTS:  Lab Results   Component Value Date    AST 27 06/14/2019    ALT 39 06/14/2019     CBC RESULTS:  Lab Results   Component Value Date    WBC 8.4 06/14/2019    RBC 4.06 (L) 06/14/2019    HGB 9.9 (L) 06/14/2019    HCT 30.7 (L) 06/14/2019    MCV 76 (L) 06/14/2019    MCH 24.4 (L) 06/14/2019    MCHC 32.2 06/14/2019    RDW 18.2 (H) 06/14/2019     06/14/2019     BMP RESULTS:  Lab Results   Component Value Date     (L) 09/16/2019    POTASSIUM 4.6 09/16/2019    CHLORIDE 98 09/16/2019    CO2 27 09/16/2019    ANIONGAP 12.6 09/16/2019     (H) 09/16/2019    BUN 34 (H) 09/16/2019    CR 1.74 (H) 09/16/2019    GFRESTIMATED 40 (L) 09/16/2019    GFRESTBLACK 49 (L) 09/16/2019    MYKE 9.8 09/16/2019      A1C RESULTS:  Lab Results   Component Value Date    A1C 7.9 (H) 03/11/2019     INR RESULTS:  Lab Results   Component Value Date    INR 1.54 (H) 03/18/2019    INR 1.79 (H) 03/18/2019            Medications     Current Outpatient Medications   Medication Sig Dispense Refill     acetaminophen (TYLENOL) 325 MG tablet Take 2 tablets (650 mg) by mouth every 4 hours as needed for other 60 tablet 0     amLODIPine (NORVASC) 10 MG tablet Take 1 tablet (10 mg) by mouth daily 90 tablet 3     aspirin (ASA) 325 MG EC tablet Take 1 tablet (325 mg) by  mouth daily 30 tablet 0     atorvastatin (LIPITOR) 80 MG tablet Take 1 tablet (80 mg) by mouth every evening 90 tablet 3     cyanocobalamin (VITAMIN B-12) 100 MCG tablet Take 100 mcg by mouth daily       furosemide (LASIX) 40 MG tablet Take 0.5 tablets (20 mg) by mouth daily 180 tablet 1     glipiZIDE (GLUCOTROL XL) 5 MG 24 hr tablet Take 5 mg by mouth daily       lisinopril (PRINIVIL/ZESTRIL) 20 MG tablet Take 1 tablet (20 mg) by mouth daily 90 tablet 3     metFORMIN (GLUCOPHAGE) 500 MG tablet Take 500 mg by mouth daily (with breakfast)       metoprolol succinate ER (TOPROL-XL) 25 MG 24 hr tablet Take 1 tablet (25 mg) by mouth daily 90 tablet 3     order for DME Equipment being ordered: Walker ()  Treatment Diagnosis: s/p coronary artery bypass 1 Device 0          Past Medical History     Past Medical History:   Diagnosis Date     Chronic renal insufficiency      Diabetes mellitus with proliferative retinopathy (H)      Hyperlipidemia      Hypertension      Nephrolithiasis      NSTEMI (non-ST elevated myocardial infarction) (H)      Past Surgical History:   Procedure Laterality Date     BYPASS GRAFT ARTERY CORONARY N/A 3/18/2019    Procedure: CORONARY BYPASS GRAFTING X 4 - LIMA TO LAD, SV TO DIAGONAL, PDA, AND OM; ON PUMP WITH NOA; ENDOVEIN HARVEST LEFT LEG;  Surgeon: Adarsh Sanchez MD;  Location:  OR     CV CORONARY ANGIOGRAM N/A 3/12/2019    Procedure: Coronary Angiogram;  Surgeon: Remi Rodriguez MD;  Location:  HEART CARDIAC CATH LAB     CV HEART CATHETERIZATION WITH POSSIBLE INTERVENTION N/A 3/12/2019    Procedure: Heart Catheterization with possible Intervention;  Surgeon: Remi Rodriguez MD;  Location:  HEART CARDIAC CATH LAB     CV LEFT VENTRICULOGRAM N/A 3/12/2019    Procedure: Left Ventricular Angiogram;  Surgeon: Remi Rodriguez MD;  Location:  HEART CARDIAC CATH LAB     LITHOTRIPSY              Allergies   Patient has no known allergies.        MICHELE Bravo  CNP  [facility]  Pager: [phone]      Thank you for allowing me to participate in the care of your patient.    Sincerely,     MICHELE Bravo CNP     [facility]

## 2019-10-01 ENCOUNTER — OFFICE VISIT (OUTPATIENT)
Dept: CARDIOLOGY | Facility: CLINIC | Age: 64
End: 2019-10-01
Attending: NURSE PRACTITIONER
Payer: COMMERCIAL

## 2019-10-01 VITALS
WEIGHT: 166 LBS | OXYGEN SATURATION: 100 % | HEART RATE: 60 BPM | SYSTOLIC BLOOD PRESSURE: 144 MMHG | HEIGHT: 63 IN | BODY MASS INDEX: 29.41 KG/M2 | DIASTOLIC BLOOD PRESSURE: 76 MMHG

## 2019-10-01 DIAGNOSIS — E78.5 HYPERLIPIDEMIA LDL GOAL <70: ICD-10-CM

## 2019-10-01 DIAGNOSIS — I50.22 CHRONIC SYSTOLIC HEART FAILURE (H): ICD-10-CM

## 2019-10-01 DIAGNOSIS — I50.22 CHRONIC SYSTOLIC HEART FAILURE (H): Primary | ICD-10-CM

## 2019-10-01 DIAGNOSIS — I25.10 CAD IN NATIVE ARTERY: ICD-10-CM

## 2019-10-01 DIAGNOSIS — I10 ESSENTIAL HYPERTENSION: ICD-10-CM

## 2019-10-01 LAB
ANION GAP SERPL CALCULATED.3IONS-SCNC: 13.1 MMOL/L (ref 6–17)
BUN SERPL-MCNC: 29 MG/DL (ref 7–30)
CALCIUM SERPL-MCNC: 9.8 MG/DL (ref 8.5–10.5)
CHLORIDE SERPL-SCNC: 103 MMOL/L (ref 98–107)
CO2 SERPL-SCNC: 25 MMOL/L (ref 23–29)
CREAT SERPL-MCNC: 1.68 MG/DL (ref 0.7–1.3)
GFR SERPL CREATININE-BSD FRML MDRD: 42 ML/MIN/{1.73_M2}
GLUCOSE SERPL-MCNC: 134 MG/DL (ref 70–105)
POTASSIUM SERPL-SCNC: 4.1 MMOL/L (ref 3.5–5.1)
SODIUM SERPL-SCNC: 137 MMOL/L (ref 136–145)

## 2019-10-01 PROCEDURE — 99214 OFFICE O/P EST MOD 30 MIN: CPT | Performed by: NURSE PRACTITIONER

## 2019-10-01 PROCEDURE — 80048 BASIC METABOLIC PNL TOTAL CA: CPT | Performed by: NURSE PRACTITIONER

## 2019-10-01 PROCEDURE — 36415 COLL VENOUS BLD VENIPUNCTURE: CPT | Performed by: NURSE PRACTITIONER

## 2019-10-01 ASSESSMENT — MIFFLIN-ST. JEOR: SCORE: 1453.1

## 2019-10-01 NOTE — PROGRESS NOTES
Cardiology Clinic Progress Note  Khang Mcelroy MRN# [id number]   YOB: 1955 Age: 63 year old   Primary Cardiologist: Dr. Hernández Reason for visit: CORE follow up            Assessment and Plan:   Khang Mcelroy is a very pleasant 63 year old male with a history of HFrEF, ischemic cardiomyopathy, LVEF 35-40% per echocardiogram 3/12/19, coronary artery disease s/p CABG x 4 (LIMA-LAD, SVG-DIAG, SVG-OM, SVG-PDA) on 3/18/19, DM, hypertension, hyperlipidemia, CKD, and anemia. Patient here today for CORE followup.      1.  Chronic systolic heart failure/HFrEF, ischemic cardiomyopathy - LVEF 35-40% 3/12/19, patient appears compensated and slightly volume up on exam with worsening lower extremity edema, weight also noted to slowly increase, up 3# on clinic scale. This is since his furosemide was decreased to 20mg daily 2 weeks ago Labs today continue to show slight improvement in kidney function, creatinine 1.68, today which is overall stable. Looked back in Care Everywhere and patient with CKD, creatinine of 1.6 noted in 2016, appears that overall patients kidney function has been stable. Plan to increase furosemide back to 40mg daily and continue to closely monitor kidney function.    - NYHA class III, stage C   - Etiology : ischemic   - Fluid status : euvolemic   - Diuretic regimen : INCREASE furosemide to 40mg daily PO    - Ischemic evaluation : coronary angiogram 3/12/19, s/p CABG 3/18/19   - Guideline directed medical therapy    - Betablocker: metoprolol succinate 25mg daily    - ACEI/ARB/ARNI: lisinopril 20mg daily    - Aldactone antagonist: will consider in the future based on blood pressure and renal function   - Sudden cardiac death prophylaxis : n/a EF> 35%   - Plan for repeat echocardiogram in November prior to f/u with Dr. Hernández   - Sleep apnea : sleep medicine referral, has sleep study scheduled 6/21/19   - Reinforced HF education, reviewed low sodium diet and 2L fluid restriction, praise  given for decreased fast food consumption.     2. Coronary artery disease s/p CABG x 4 (LIMA-LAD, SVG-DIAG, SVG-OM, SVG-PDA) on 3/18/19, stable no signs/symptoms of myocardial ischemia.    - Continue aspirin, statin and betablocker therapy.   - Reinforced lifestyle modifications    3. Hx of syncope - presented to the ED 4/1/19 after syncopal event, felt to be secondary to coughing, but patient was also noted to be bradycardic so metoprolol was decreased at that time.  No recurrent syncopal events.   - 30 day event monitor - one event recorded, heart rate 56.    - Tolerating increased dose of metoprolol.     4. Hypertension - controlled, slightly elevated in clinic, furosemide being increased back to 40mg daily, otherwise continue current medication regimen.    - Counseled on lifestyle modifications to help manage blood pressure.     5. Hyperlipidemia - need to get a lipid panel, will check at f/u appt. LDL goal <70    6. Chronic kidney disease - looking back in Care Everywhere creatinine 1.6 in 2016, appears as of recently baseline has been around 1.5. Creatinine today is 1.68 which is overall stable. Will continue to monitor, may need to consider decreasing his lisinopril, but this is likely chronic kidney disease related to DM/HTN.    - Patient has follow up with his PCP tomorrow.     7. Diabetes mellitus - hgbA1c 7.9 3/11/19, counseled patient on the importance of good glycemic control. Patient has been working with PCP on better blood sugar control.     8. Obstructive sleep apnea - Sleep study completed in June, recommended starting CPAP     9. Medication Compliance - Patient brought pill bottles to clinic, appears patient is taking all medications as prescribed with no discrepancies noted today. Praise given. Reinforced the importance of taking medications daily as prescribed. Had wife write furosemide change recommendations directly on pill bottle.         Changes today: INCREASE furosemide to 40mg daily      Follow up plan:     CORE followup with me in 2 weeks with labs prior (BMP, lipids and ALT)    Followup with Dr. Hernández in November with limited echocardiogram prior        History of Presenting Illness:    Khang Mcelroy is a very pleasant 63 year old male with a history of HFrEF, ischemic cardiomyopathy, LVEF 35-40% per echocardiogram 3/12/19, coronary artery disease s/p CABG x 4 (LIMA-LAD, SVG-DIAG, SVG-OM, SVG-PDA) on 3/18/19, DM, hypertension, hyperlipidemia, CKD, and anemia.     Hospitalized 3/11/19-3/24/19 presented with acute hypoxemic and hypercapnic respiratory failure secondary to NSTEMI, found to have multivessel coronary artery disease, s/p CABG x 4 (LIMA-LAD, SVG-DIAG, SVG-OM, SVG-PDA) on 3/18/19. Troponin peaked at 53. Echocardiogram 3/12/19 showed an LVEF 35-40%, grade III or advanced diastolic dysfunction, with multiple wall motion abnormalities.     Patient has followed with Dr. Hernández and CORE clinic since his above hospitalization. Sleep study completed in June 2019 showed severe obstructive sleep apnea, recommended starting CPAP therapy. During last CORE visit worsening kidney function was noted his furosemide was decreased to 20mg daily.     Patient is here today for CORE followup. Patient is loatian speaking, utilized phones for interpreting. Patient reports feeling good. Monitoring weights at home. Weight at home today 163#, which has slowly increased. Clinic weight up 3# since last visit. Patient doesn't note and specific changes but lower extremity edema noted to be worse. Denies abdominal distention/bloating. Denies shortness of breath at rest. Denies exertional dyspnea with walking or stairs, states walking 10 blocks. Able to complete ADLs and IADLs independently, which is a continued improvement. Sleeping good. Denies orthopnea, sleeping with HOB elevated. Denies PND. Patient denies chest pain or chest tightness. Denies dizziness, lightheadedness or other presyncopal symptoms.  Denies tachycardia or palpitations. Denies syncope.     Labs today show slight improvement in kidney function, patient does have chronic kidney disease, when looking in care everywhere creatinine 1.6 in 2016, appears baseline has been around 1.5. Today creatinine is 1.68, electrolytes stable. Blood pressure 144/74 and HR 60 in clinic today.     Appetite good. Enjoys rice soup and fried rice. Wife does the cooking, trying to limit sodium use. Not adding additional salt. Eating fast foods [employer] 2 times per week. Walking daily for exercise, walking 10 blocks with no difficulty, walking 2 x per day. Denies tobacco or alcohol use.         Recent Hospitalizations   3/11/19-3/24/19 presented with acute hypoxemic and hypercapnic respiratory failure secondary to NSTEMI, found to have multivessel coronary artery disease, s/p CABG x 4 (LIMA-LAD, SVG-DIAG, SVG-OM, SVG-PDA) on 3/18/19        Social History    , Loatian speaking, children living with them (english speaking).   Social History     Socioeconomic History     Marital status:      Spouse name: Not on file     Number of children: Not on file     Years of education: Not on file     Highest education level: Not on file   Occupational History     Not on file   Social Needs     Financial resource strain: Not on file     Food insecurity:     Worry: Not on file     Inability: Not on file     Transportation needs:     Medical: Not on file     Non-medical: Not on file   Tobacco Use     Smoking status: Never Smoker     Smokeless tobacco: Never Used   Substance and Sexual Activity     Alcohol use: No     Frequency: Never     Drug use: No     Sexual activity: Yes     Partners: Female   Lifestyle     Physical activity:     Days per week: Not on file     Minutes per session: Not on file     Stress: Not on file   Relationships     Social connections:     Talks on phone: Not on file     Gets together: Not on file     Attends [employer] service: Not on file  "    Active member of club or organization: Not on file     Attends meetings of clubs or organizations: Not on file     Relationship status: Not on file     Intimate partner violence:     Fear of current or ex partner: Not on file     Emotionally abused: Not on file     Physically abused: Not on file     Forced sexual activity: Not on file   Other Topics Concern     Parent/sibling w/ CABG, MI or angioplasty before 65F 55M? Not Asked   Social History Narrative     Not on file            Review of Systems:   Skin:  Negative     Eyes:  Positive for glasses  ENT:  Negative    Respiratory:  Positive for dyspnea on exertion  Cardiovascular:  Negative fatigue;Positive for  Gastroenterology: Negative    Genitourinary:  Negative    Musculoskeletal:  Negative    Neurologic:  Positive for headaches  Psychiatric:  Negative    Heme/Lymph/Imm:  Negative    Endocrine:  Negative           Physical Exam:   Vitals: BP (!) 144/76   Pulse 60   Ht 1.6 m (5' 3\")   Wt 75.3 kg (166 lb)   SpO2 100%   BMI 29.41 kg/m     Wt Readings from Last 4 Encounters:   10/01/19 75.3 kg (166 lb)   09/16/19 73.9 kg (163 lb)   07/08/19 72.1 kg (159 lb)   06/14/19 71.9 kg (158 lb 8 oz)     GEN: well nourished, in no acute distress.  HEENT:  Pupils equal, round. Sclerae nonicteric.   NECK: Supple, no masses appreciated. No JVD appreciated on exam.   C/V:  Regular rate and rhythm, no murmur, rub or gallop.    RESP: Respirations are unlabored. Clear to auscultation bilaterally, crackles in bases.   GI: Abdomen soft, nontender.  EXTREM: +1 bilateral lower extremity edema  NEURO: Alert and oriented, cooperative.  SKIN: Warm and dry.        Data:   ECHO 3/12/19  The visual ejection fraction is estimated at 35-40%.  Left ventricular systolic function is mild to moderately reduced.  There are regional wall motion abnormalities as specified.  Right ventricular systolic pressure is elevated, consistent with mild to  moderate pulmonary hypertension.  The ascending " aorta is Mildly dilated.  Moderate left pleural effusion  The study was technically difficult.    Cardiac catheterization 3/12/19  1. Patient with multiple sausage links like stenoses through the mid and distal right coronary artery. There is a 70-80% stenosis in the continuation of the right coronary artery just beyond the posterior descending artery.   2. Mid LAD to Dist LAD lesion is 90% stenosed. The lesion is segmental and serial. There is a long distal LAD stenosis extending all the way through the apex.   3. Prox LAD to Mid LAD lesion is 45% stenosed.   4. Prox Cx lesion is 85% stenosed.   5. The ejection fraction is calculated to be 35%. LVEDP 31mmHg.  6. Mid LM lesion is 40% stenosed.    Plan   Maximal medical management.  Surgical consultation.  If patient is turned down by surgery we could consider multivessel intervention.    LIVER ENZYME RESULTS:  Lab Results   Component Value Date    AST 27 06/14/2019    ALT 39 06/14/2019     CBC RESULTS:  Lab Results   Component Value Date    WBC 8.4 06/14/2019    RBC 4.06 (L) 06/14/2019    HGB 9.9 (L) 06/14/2019    HCT 30.7 (L) 06/14/2019    MCV 76 (L) 06/14/2019    MCH 24.4 (L) 06/14/2019    MCHC 32.2 06/14/2019    RDW 18.2 (H) 06/14/2019     06/14/2019     BMP RESULTS:  Lab Results   Component Value Date     10/01/2019    POTASSIUM 4.1 10/01/2019    CHLORIDE 103 10/01/2019    CO2 25 10/01/2019    ANIONGAP 13.1 10/01/2019     (H) 10/01/2019    BUN 29 10/01/2019    CR 1.68 (H) 10/01/2019    GFRESTIMATED 42 (L) 10/01/2019    GFRESTBLACK 51 (L) 10/01/2019    MYKE 9.8 10/01/2019      A1C RESULTS:  Lab Results   Component Value Date    A1C 7.9 (H) 03/11/2019     INR RESULTS:  Lab Results   Component Value Date    INR 1.54 (H) 03/18/2019    INR 1.79 (H) 03/18/2019            Medications     Current Outpatient Medications   Medication Sig Dispense Refill     acetaminophen (TYLENOL) 325 MG tablet Take 2 tablets (650 mg) by mouth every 4 hours as needed  for other 60 tablet 0     amLODIPine (NORVASC) 10 MG tablet Take 1 tablet (10 mg) by mouth daily 90 tablet 3     aspirin (ASA) 325 MG EC tablet Take 1 tablet (325 mg) by mouth daily 30 tablet 0     atorvastatin (LIPITOR) 80 MG tablet Take 1 tablet (80 mg) by mouth every evening 90 tablet 3     furosemide (LASIX) 40 MG tablet Take 0.5 tablets (20 mg) by mouth daily 180 tablet 1     glipiZIDE (GLUCOTROL XL) 5 MG 24 hr tablet Take 5 mg by mouth daily       lisinopril (PRINIVIL/ZESTRIL) 20 MG tablet Take 1 tablet (20 mg) by mouth daily 90 tablet 3     metFORMIN (GLUCOPHAGE) 500 MG tablet Take 1,000 mg by mouth 2 times daily (with meals)        metoprolol succinate ER (TOPROL-XL) 25 MG 24 hr tablet Take 1 tablet (25 mg) by mouth daily 90 tablet 3     order for DME Equipment being ordered: Walker ()  Treatment Diagnosis: s/p coronary artery bypass 1 Device 0          Past Medical History     Past Medical History:   Diagnosis Date     Chronic renal insufficiency      Diabetes mellitus with proliferative retinopathy (H)      Hyperlipidemia      Hypertension      Nephrolithiasis      NSTEMI (non-ST elevated myocardial infarction) (H)      Past Surgical History:   Procedure Laterality Date     BYPASS GRAFT ARTERY CORONARY N/A 3/18/2019    Procedure: CORONARY BYPASS GRAFTING X 4 - LIMA TO LAD, SV TO DIAGONAL, PDA, AND OM; ON PUMP WITH NOA; ENDOVEIN HARVEST LEFT LEG;  Surgeon: Adarsh Sanchez MD;  Location:  OR     CV CORONARY ANGIOGRAM N/A 3/12/2019    Procedure: Coronary Angiogram;  Surgeon: Remi Rodriguez MD;  Location:  HE[facility]ATH [facility]     CV HEART CATHETERIZATION WITH POSSIBLE INTERVENTION N/A 3/12/2019    Procedure: Heart Catheterization with possible Intervention;  Surgeon: Remi Rodriguez MD;  Location:  HE[facility]ARD[facility] CATH LAB     CV LEFT VENTRICULOGRAM N/A 3/12/2019    Procedure: Left Ventricular Angiogram;  Surgeon: Remi Rodriguez MD;  Location:  HEART CARDI[facility] CATH LAB      LITHOTRIPSY              Allergies   Patient has no known allergies.        MICHELE Bravo [facility]  Pager: [phone]

## 2019-10-01 NOTE — PATIENT INSTRUCTIONS
Call CORE nurse for any questions or concerns Mon-Fri 8am-4pm:                   [phone]          For concerns after hours:                      [phone]     Medication changes: INCREASE furosemide to 1 tablet (40mg daily) due to increased swelling and slight increase in weight.   Plan from today: Follow up with Viv in 2 weeks with blood work prior.   Lab results: see attached;   Component      Latest Ref Rng & Units 9/16/2019 10/1/2019   Sodium      136 - 145 mmol/L 133 (L) 137   Potassium      3.5 - 5.1 mmol/L 4.6 4.1   Chloride      98 - 107 mmol/L 98 103   Carbon Dioxide      23 - 29 mmol/L 27 25   Anion Gap      6 - 17 mmol/L 12.6 13.1   Glucose      70 - 105 mg/dL 147 (H) 134 (H)   Urea Nitrogen      7 - 30 mg/dL 34 (H) 29   Creatinine      0.70 - 1.30 mg/dL 1.74 (H) 1.68 (H)   GFR Estimate      >60 mL/min/1.73:m2 40 (L) 42 (L)   GFR Estimate If Black      >60 mL/min/1.73:m2 49 (L) 51 (L)   Calcium      8.5 - 10.5 mg/dL 9.8 9.8

## 2019-10-01 NOTE — LETTER
10/1/2019    Mpho Brian Castro MD  [facility] [address]  [facility] [address]    RE: Khang Mcelroy       Dear Colleague,    I had the pleasure of seeing Khang Mcelroy in the [facility].    Cardiology Clinic Progress Note  Khang Mcelroy MRN# [id number]   YOB: 1955 Age: 63 year old   Primary Cardiologist: Dr. Hernández Reason for visit: CORE follow up            Assessment and Plan:   Khang Mcelroy is a very pleasant 63 year old male with a history of HFrEF, ischemic cardiomyopathy, LVEF 35-40% per echocardiogram 3/12/19, coronary artery disease s/p CABG x 4 (LIMA-LAD, SVG-DIAG, SVG-OM, SVG-PDA) on 3/18/19, DM, hypertension, hyperlipidemia, CKD, and anemia. Patient here today for CORE followup.      1.  Chronic systolic heart failure/HFrEF, ischemic cardiomyopathy - LVEF 35-40% 3/12/19, patient appears compensated and slightly volume up on exam with worsening lower extremity edema, weight also noted to slowly increase, up 3# on clinic scale. This is since his furosemide was decreased to 20mg daily 2 weeks ago Labs today continue to show slight improvement in kidney function, creatinine 1.68, today which is overall stable. Looked back in Care Everywhere and patient with CKD, creatinine of 1.6 noted in 2016, appears that overall patients kidney function has been stable. Plan to increase furosemide back to 40mg daily and continue to closely monitor kidney function.    - NYHA class III, stage C   - Etiology : ischemic   - Fluid status : euvolemic   - Diuretic regimen : INCREASE furosemide to 40mg daily PO    - Ischemic evaluation : coronary angiogram 3/12/19, s/p CABG 3/18/19   - Guideline directed medical therapy    - Betablocker: metoprolol succinate 25mg daily    - ACEI/ARB/ARNI: lisinopril 20mg daily    - Aldactone antagonist: will consider in the future based on blood pressure and renal function   - Sudden cardiac death prophylaxis  : n/a EF> 35%   - Plan for repeat echocardiogram in November prior to f/u with Dr. Hernández   - Sleep apnea : sleep medicine referral, has sleep study scheduled 6/21/19   - Reinforced HF education, reviewed low sodium diet and 2L fluid restriction, praise given for decreased fast food consumption.     2. Coronary artery disease s/p CABG x 4 (LIMA-LAD, SVG-DIAG, SVG-OM, SVG-PDA) on 3/18/19, stable no signs/symptoms of myocardial ischemia.    - Continue aspirin, statin and betablocker therapy.   - Reinforced lifestyle modifications    3. Hx of syncope - presented to the ED 4/1/19 after syncopal event, felt to be secondary to coughing, but patient was also noted to be bradycardic so metoprolol was decreased at that time.  No recurrent syncopal events.   - 30 day event monitor - one event recorded, heart rate 56.    - Tolerating increased dose of metoprolol.     4. Hypertension - controlled, slightly elevated in clinic, furosemide being increased back to 40mg daily, otherwise continue current medication regimen.    - Counseled on lifestyle modifications to help manage blood pressure.     5. Hyperlipidemia - need to get a lipid panel, will check at f/u appt. LDL goal <70    6. Chronic kidney disease - looking back in Care Everywhere creatinine 1.6 in 2016, appears as of recently baseline has been around 1.5. Creatinine today is 1.68 which is overall stable. Will continue to monitor, may need to consider decreasing his lisinopril, but this is likely chronic kidney disease related to DM/HTN.    - Patient has follow up with his PCP tomorrow.     7. Diabetes mellitus - hgbA1c 7.9 3/11/19, counseled patient on the importance of good glycemic control. Patient has been working with PCP on better blood sugar control.     8. Obstructive sleep apnea - Sleep study completed in June, recommended starting CPAP     9. Medication Compliance - Patient brought pill bottles to clinic, appears patient is taking all medications as prescribed  with no discrepancies noted today. Praise given. Reinforced the importance of taking medications daily as prescribed. Had wife write furosemide change recommendations directly on pill bottle.         Changes today: INCREASE furosemide to 40mg daily     Follow up plan:     CORE followup with me in 2 weeks with labs prior (BMP, lipids and ALT)    Followup with Dr. Hernández in November with limited echocardiogram prior        History of Presenting Illness:    Khang Mcelroy is a very pleasant 63 year old male with a history of HFrEF, ischemic cardiomyopathy, LVEF 35-40% per echocardiogram 3/12/19, coronary artery disease s/p CABG x 4 (LIMA-LAD, SVG-DIAG, SVG-OM, SVG-PDA) on 3/18/19, DM, hypertension, hyperlipidemia, CKD, and anemia.     Hospitalized 3/11/19-3/24/19 presented with acute hypoxemic and hypercapnic respiratory failure secondary to NSTEMI, found to have multivessel coronary artery disease, s/p CABG x 4 (LIMA-LAD, SVG-DIAG, SVG-OM, SVG-PDA) on 3/18/19. Troponin peaked at 53. Echocardiogram 3/12/19 showed an LVEF 35-40%, grade III or advanced diastolic dysfunction, with multiple wall motion abnormalities.     Patient has followed with Dr. Hernández and CORE clinic since his above hospitalization. Sleep study completed in June 2019 showed severe obstructive sleep apnea, recommended starting CPAP therapy. During last CORE visit worsening kidney function was noted his furosemide was decreased to 20mg daily.     Patient is here today for CORE followup. Patient is loatian speaking, utilized phones for interpreting. Patient reports feeling good. Monitoring weights at home. Weight at home today 163#, which has slowly increased. Clinic weight up 3# since last visit. Patient doesn't note and specific changes but lower extremity edema noted to be worse. Denies abdominal distention/bloating. Denies shortness of breath at rest. Denies exertional dyspnea with walking or stairs, states walking 10 blocks. Able to complete  ADLs and IADLs independently, which is a continued improvement. Sleeping good. Denies orthopnea, sleeping with HOB elevated. Denies PND. Patient denies chest pain or chest tightness. Denies dizziness, lightheadedness or other presyncopal symptoms. Denies tachycardia or palpitations. Denies syncope.     Labs today show slight improvement in kidney function, patient does have chronic kidney disease, when looking in care everywhere creatinine 1.6 in 2016, appears baseline has been around 1.5. Today creatinine is 1.68, electrolytes stable. Blood pressure 144/74 and HR 60 in clinic today.     Appetite good. Enjoys rice soup and fried rice. Wife does the cooking, trying to limit sodium use. Not adding additional salt. Eating fast foods [employer] 2 times per week. Walking daily for exercise, walking 10 blocks with no difficulty, walking 2 x per day. Denies tobacco or alcohol use.         Recent Hospitalizations   3/11/19-3/24/19 presented with acute hypoxemic and hypercapnic respiratory failure secondary to NSTEMI, found to have multivessel coronary artery disease, s/p CABG x 4 (LIMA-LAD, SVG-DIAG, SVG-OM, SVG-PDA) on 3/18/19        Social History    , Loatian speaking, children living with them (english speaking).   Social History     Socioeconomic History     Marital status:      Spouse name: Not on file     Number of children: Not on file     Years of education: Not on file     Highest education level: Not on file   Occupational History     Not on file   Social Needs     Financial resource strain: Not on file     Food insecurity:     Worry: Not on file     Inability: Not on file     Transportation needs:     Medical: Not on file     Non-medical: Not on file   Tobacco Use     Smoking status: Never Smoker     Smokeless tobacco: Never Used   Substance and Sexual Activity     Alcohol use: No     Frequency: Never     Drug use: No     Sexual activity: Yes     Partners: Female   Lifestyle     Physical  "activity:     Days per week: Not on file     Minutes per session: Not on file     Stress: Not on file   Relationships     Social connections:     Talks on phone: Not on file     Gets together: Not on file     Attends Latter day service: Not on file     Active member of club or organization: Not on file     Attends meetings of clubs or organizations: Not on file     Relationship status: Not on file     Intimate partner violence:     Fear of current or ex partner: Not on file     Emotionally abused: Not on file     Physically abused: Not on file     Forced sexual activity: Not on file   Other Topics Concern     Parent/sibling w/ CABG, MI or angioplasty before 65F 55M? Not Asked   Social History Narrative     Not on file            Review of Systems:   Skin:  Negative     Eyes:  Positive for glasses  ENT:  Negative    Respiratory:  Positive for dyspnea on exertion  Cardiovascular:  Negative fatigue;Positive for  Gastroenterology: Negative    Genitourinary:  Negative    Musculoskeletal:  Negative    Neurologic:  Positive for headaches  Psychiatric:  Negative    Heme/Lymph/Imm:  Negative    Endocrine:  Negative           Physical Exam:   Vitals: BP (!) 144/76   Pulse 60   Ht 1.6 m (5' 3\")   Wt 75.3 kg (166 lb)   SpO2 100%   BMI 29.41 kg/m      Wt Readings from Last 4 Encounters:   10/01/19 75.3 kg (166 lb)   09/16/19 73.9 kg (163 lb)   07/08/19 72.1 kg (159 lb)   06/14/19 71.9 kg (158 lb 8 oz)     GEN: well nourished, in no acute distress.  HEENT:  Pupils equal, round. Sclerae nonicteric.   NECK: Supple, no masses appreciated. No JVD appreciated on exam.   C/V:  Regular rate and rhythm, no murmur, rub or gallop.    RESP: Respirations are unlabored. Clear to auscultation bilaterally, crackles in bases.   GI: Abdomen soft, nontender.  EXTREM: +1 bilateral lower extremity edema  NEURO: Alert and oriented, cooperative.  SKIN: Warm and dry.        Data:   ECHO 3/12/19  The visual ejection fraction is estimated at " 35-40%.  Left ventricular systolic function is mild to moderately reduced.  There are regional wall motion abnormalities as specified.  Right ventricular systolic pressure is elevated, consistent with mild to  moderate pulmonary hypertension.  The ascending aorta is Mildly dilated.  Moderate left pleural effusion  The study was technically difficult.    Cardiac catheterization 3/12/19  1. Patient with multiple sausage links like stenoses through the mid and distal right coronary artery. There is a 70-80% stenosis in the continuation of the right coronary artery just beyond the posterior descending artery.   2. Mid LAD to Dist LAD lesion is 90% stenosed. The lesion is segmental and serial. There is a long distal LAD stenosis extending all the way through the apex.   3. Prox LAD to Mid LAD lesion is 45% stenosed.   4. Prox Cx lesion is 85% stenosed.   5. The ejection fraction is calculated to be 35%. LVEDP 31mmHg.  6. Mid LM lesion is 40% stenosed.    Plan   Maximal medical management.  Surgical consultation.  If patient is turned down by surgery we could consider multivessel intervention.    LIVER ENZYME RESULTS:  Lab Results   Component Value Date    AST 27 06/14/2019    ALT 39 06/14/2019     CBC RESULTS:  Lab Results   Component Value Date    WBC 8.4 06/14/2019    RBC 4.06 (L) 06/14/2019    HGB 9.9 (L) 06/14/2019    HCT 30.7 (L) 06/14/2019    MCV 76 (L) 06/14/2019    MCH 24.4 (L) 06/14/2019    MCHC 32.2 06/14/2019    RDW 18.2 (H) 06/14/2019     06/14/2019     BMP RESULTS:  Lab Results   Component Value Date     10/01/2019    POTASSIUM 4.1 10/01/2019    CHLORIDE 103 10/01/2019    CO2 25 10/01/2019    ANIONGAP 13.1 10/01/2019     (H) 10/01/2019    BUN 29 10/01/2019    CR 1.68 (H) 10/01/2019    GFRESTIMATED 42 (L) 10/01/2019    GFRESTBLACK 51 (L) 10/01/2019    MYKE 9.8 10/01/2019      A1C RESULTS:  Lab Results   Component Value Date    A1C 7.9 (H) 03/11/2019     INR RESULTS:  Lab Results   Component  Value Date    INR 1.54 (H) 03/18/2019    INR 1.79 (H) 03/18/2019            Medications     Current Outpatient Medications   Medication Sig Dispense Refill     acetaminophen (TYLENOL) 325 MG tablet Take 2 tablets (650 mg) by mouth every 4 hours as needed for other 60 tablet 0     amLODIPine (NORVASC) 10 MG tablet Take 1 tablet (10 mg) by mouth daily 90 tablet 3     aspirin (ASA) 325 MG EC tablet Take 1 tablet (325 mg) by mouth daily 30 tablet 0     atorvastatin (LIPITOR) 80 MG tablet Take 1 tablet (80 mg) by mouth every evening 90 tablet 3     furosemide (LASIX) 40 MG tablet Take 0.5 tablets (20 mg) by mouth daily 180 tablet 1     glipiZIDE (GLUCOTROL XL) 5 MG 24 hr tablet Take 5 mg by mouth daily       lisinopril (PRINIVIL/ZESTRIL) 20 MG tablet Take 1 tablet (20 mg) by mouth daily 90 tablet 3     metFORMIN (GLUCOPHAGE) 500 MG tablet Take 1,000 mg by mouth 2 times daily (with meals)        metoprolol succinate ER (TOPROL-XL) 25 MG 24 hr tablet Take 1 tablet (25 mg) by mouth daily 90 tablet 3     order for DME Equipment being ordered: Walker ()  Treatment Diagnosis: s/p coronary artery bypass 1 Device 0          Past Medical History     Past Medical History:   Diagnosis Date     Chronic renal insufficiency      Diabetes mellitus with proliferative retinopathy (H)      Hyperlipidemia      Hypertension      Nephrolithiasis      NSTEMI (non-ST elevated myocardial infarction) (H)      Past Surgical History:   Procedure Laterality Date     BYPASS GRAFT ARTERY CORONARY N/A 3/18/2019    Procedure: CORONARY BYPASS GRAFTING X 4 - LIMA TO LAD, SV TO DIAGONAL, PDA, AND OM; ON PUMP WITH NOA; ENDOVEIN HARVEST LEFT LEG;  Surgeon: Adarsh Sanchez MD;  Location:  OR     CV CORONARY ANGIOGRAM N/A 3/12/2019    Procedure: Coronary Angiogram;  Surgeon: Remi Rodriguez MD;  Location:  HEART CARDIAC CATH LAB     CV HEART CATHETERIZATION WITH POSSIBLE INTERVENTION N/A 3/12/2019    Procedure: Heart Catheterization with  possible Intervention;  Surgeon: Remi Rodriguez MD;  Location: [facility] CARDIAC CATH LAB     CV LEFT VENTRICULOGRAM N/A 3/12/2019    Procedure: Left Ventricular Angiogram;  Surgeon: Remi Rodriguez MD;  Location: [facility] CARDIAC CATH LAB     LITHOTRIPSY              Allergies   Patient has no known allergies.        MICHELE Bravo CNP  [facility]  Pager: [phone]      Thank you for allowing me to participate in the care of your patient.    Sincerely,     MICHELE Bravo CNP     [facility]

## 2019-10-07 ENCOUNTER — DOCUMENTATION ONLY (OUTPATIENT)
Dept: SLEEP MEDICINE | Facility: CLINIC | Age: 64
End: 2019-10-07
Payer: COMMERCIAL

## 2019-10-07 DIAGNOSIS — G47.33 OSA (OBSTRUCTIVE SLEEP APNEA): ICD-10-CM

## 2019-10-07 NOTE — PROGRESS NOTES
Patient was offered choice of vendor and chose [facility].  Patient Khang Mcelroy was set up at [place] on October 7, 2019. Patient received a [employer] DreamStation Auto. Pressures were set at 5-12 cm H2O.   Patient s ramp is 5 cm H2O for Auto and FLEX/EPR is A Flex, 2.  Patient received a Resmed Mask name: AIRFIT F20  Full Face mask size Medium, heated tubing and heated humidifier.  Patient is enrolled in the STM Program and does need to meet compliance. Patient has a follow up on 12/26/2019 with Dr. Bernstein.    Connor Mcfarlane

## 2019-10-11 ENCOUNTER — DOCUMENTATION ONLY (OUTPATIENT)
Dept: SLEEP MEDICINE | Facility: CLINIC | Age: 64
End: 2019-10-11

## 2019-10-11 DIAGNOSIS — G47.33 OSA (OBSTRUCTIVE SLEEP APNEA): ICD-10-CM

## 2019-10-11 NOTE — PROGRESS NOTES
3 DAY STM VISIT    Diagnostic AHI: 30.5   PSG    Patient contacted for 3 day STM visit  Message left for patient to return call    Replacement device: No     Device type: Auto-CPAP  PAP settings from order::  CPAP min 5 cm  H20       CPAP max 12 cm  H20         Mask type:    Nasal Mask     Device settings from machine      Min CPAP 5            Max CPAP 12         Assessment: Nightly usage over four hours.  Action plan: Pt to have f/u 14 day STM visit.  Patient has a follow up visit scheduled:   yes within 31-90 days of set up.    Total time spent on remote patient monitoring data analysis and patient contact today:   10 minutes

## 2019-10-17 ENCOUNTER — OFFICE VISIT (OUTPATIENT)
Dept: CARDIOLOGY | Facility: CLINIC | Age: 64
End: 2019-10-17
Attending: NURSE PRACTITIONER
Payer: COMMERCIAL

## 2019-10-17 VITALS
BODY MASS INDEX: 28.53 KG/M2 | HEIGHT: 63 IN | WEIGHT: 161 LBS | HEART RATE: 60 BPM | DIASTOLIC BLOOD PRESSURE: 64 MMHG | SYSTOLIC BLOOD PRESSURE: 128 MMHG

## 2019-10-17 DIAGNOSIS — I25.10 CAD IN NATIVE ARTERY: ICD-10-CM

## 2019-10-17 DIAGNOSIS — I50.22 CHRONIC SYSTOLIC HEART FAILURE (H): Primary | ICD-10-CM

## 2019-10-17 DIAGNOSIS — E78.5 HYPERLIPIDEMIA LDL GOAL <70: ICD-10-CM

## 2019-10-17 DIAGNOSIS — G47.33 OSA (OBSTRUCTIVE SLEEP APNEA): ICD-10-CM

## 2019-10-17 DIAGNOSIS — I10 ESSENTIAL HYPERTENSION: ICD-10-CM

## 2019-10-17 DIAGNOSIS — I50.22 CHRONIC SYSTOLIC HEART FAILURE (H): ICD-10-CM

## 2019-10-17 DIAGNOSIS — N18.9 CHRONIC KIDNEY DISEASE, UNSPECIFIED CKD STAGE: ICD-10-CM

## 2019-10-17 DIAGNOSIS — Z95.1 S/P CABG (CORONARY ARTERY BYPASS GRAFT): ICD-10-CM

## 2019-10-17 LAB
ALT SERPL W P-5'-P-CCNC: 22 U/L (ref 5–30)
ANION GAP SERPL CALCULATED.3IONS-SCNC: 15 MMOL/L (ref 6–17)
BUN SERPL-MCNC: 28 MG/DL (ref 7–30)
CALCIUM SERPL-MCNC: 9.9 MG/DL (ref 8.5–10.5)
CHLORIDE SERPL-SCNC: 103 MMOL/L (ref 98–107)
CHOLEST SERPL-MCNC: 126 MG/DL
CO2 SERPL-SCNC: 24 MMOL/L (ref 23–29)
CREAT SERPL-MCNC: 1.56 MG/DL (ref 0.7–1.3)
GFR SERPL CREATININE-BSD FRML MDRD: 45 ML/MIN/{1.73_M2}
GLUCOSE SERPL-MCNC: 193 MG/DL (ref 70–105)
HDLC SERPL-MCNC: 47 MG/DL
LDLC SERPL CALC-MCNC: 69 MG/DL
NONHDLC SERPL-MCNC: 79 MG/DL
POTASSIUM SERPL-SCNC: 4 MMOL/L (ref 3.5–5.1)
SODIUM SERPL-SCNC: 138 MMOL/L (ref 136–145)
TRIGL SERPL-MCNC: 48 MG/DL

## 2019-10-17 PROCEDURE — 84460 ALANINE AMINO (ALT) (SGPT): CPT | Performed by: NURSE PRACTITIONER

## 2019-10-17 PROCEDURE — 80048 BASIC METABOLIC PNL TOTAL CA: CPT | Performed by: NURSE PRACTITIONER

## 2019-10-17 PROCEDURE — 36415 COLL VENOUS BLD VENIPUNCTURE: CPT | Performed by: NURSE PRACTITIONER

## 2019-10-17 PROCEDURE — 99214 OFFICE O/P EST MOD 30 MIN: CPT | Performed by: NURSE PRACTITIONER

## 2019-10-17 PROCEDURE — 80061 LIPID PANEL: CPT | Performed by: NURSE PRACTITIONER

## 2019-10-17 ASSESSMENT — MIFFLIN-ST. JEOR: SCORE: 1430.42

## 2019-10-17 NOTE — LETTER
10/17/2019    Mpho Brian Castro MD  [facility] [address]  [facility] [address]    RE: Khang Mcelroy       Dear Colleague,    I had the pleasure of seeing Khang Mcelroy in the [facility].    Cardiology Clinic Progress Note  Khang Mcelroy MRN# [id number]   YOB: 1955 Age: 63 year old   Primary Cardiologist: Dr. Hernández Reason for visit: CORE follow up            Assessment and Plan:   Khang Mcelroy is a very pleasant 63 year old male with a history of HFrEF, ischemic cardiomyopathy, LVEF 35-40% per echocardiogram 3/12/19, coronary artery disease s/p CABG x 4 (LIMA-LAD, SVG-DIAG, SVG-OM, SVG-PDA) on 3/18/19, DM, hypertension, hyperlipidemia, CKD, and anemia. Patient here today for CORE followup.      1.  Chronic systolic heart failure/HFrEF, ischemic cardiomyopathy - LVEF 35-40% 3/12/19, patient appears compensated and euvolemic,  HF symptoms are well controlled. Weight down 5# in past 2 weeks on clinic scale, after resuming increased dosage of furosemide, kidney function also with continued improvement creatinine 1.56 today, BUN stable at 28. Plan to continue current diuretic regimen. Patient scheduled to have echocardiogram next month and f/u with Dr. Hernández.   - NYHA class III, stage C   - Etiology : ischemic   - Fluid status : euvolemic   - Diuretic regimen : furosemide to 40mg daily PO    - Ischemic evaluation : coronary angiogram 3/12/19, s/p CABG 3/18/19   - Guideline directed medical therapy    - Betablocker: metoprolol succinate 25mg daily    - ACEI/ARB/ARNI: lisinopril 20mg daily    - Aldactone antagonist: will consider in the future based on blood pressure and renal function   - Sudden cardiac death prophylaxis : n/a EF> 35%   - Plan for repeat echocardiogram in November prior to f/u with Dr. Hernández   - Sleep apnea : compliant with CPAP   - Reinforced HF education, reviewed low sodium diet and 2L fluid restriction, praise  given for decreased fast food consumption, reviewed with to call CORE clinic.     2. Coronary artery disease s/p CABG x 4 (LIMA-LAD, SVG-DIAG, SVG-OM, SVG-PDA) on 3/18/19, stable no signs/symptoms of myocardial ischemia.    - Continue aspirin, statin and betablocker therapy.   - Reinforced lifestyle modifications    3. Hx of syncope - presented to the ED 4/1/19 after syncopal event, felt to be secondary to coughing, but patient was also noted to be bradycardic so metoprolol was decreased at that time.  No recurrent syncopal events.   - 30 day event monitor - one event recorded, heart rate 56.    - Tolerating increased dose of metoprolol.     4. Hypertension - controlled, continue current medication regimen.    - Counseled on lifestyle modifications to help manage blood pressure.     5. Hyperlipidemia - stable, LDL goal <70, lipid panel today showed total cholesterol 126, HDL 47, LDL 69 and triglycerides 48.    - Continue atorvastatin    6. Chronic kidney disease - looking back in Care Everywhere creatinine 1.6 in 2016, appears as of recently baseline has been around 1.5. Creatinine today improved to 1.56 which is overall stable. Will continue to monitor    7. Diabetes mellitus - hgbA1c 7.9 3/11/19, counseled patient on the importance of good glycemic control. Patient has been working with PCP on better blood sugar control.     8. Obstructive sleep apnea - Sleep study completed in June, compliant with CPAP    9. Medication Compliance - Patient brought pill bottles to clinic, appears patient is taking all medications as prescribed with no discrepancies noted today. Praise given. Reinforced the importance of taking medications daily as prescribed.         Changes today: none    Follow up plan:       Followup with Dr. Hernández in November with limited echocardiogram prior        History of Presenting Illness:    Khang Mcelroy is a very pleasant 63 year old male with a history of HFrEF, ischemic cardiomyopathy, LVEF  35-40% per echocardiogram 3/12/19, coronary artery disease s/p CABG x 4 (LIMA-LAD, SVG-DIAG, SVG-OM, SVG-PDA) on 3/18/19, DM, hypertension, hyperlipidemia, CKD, and anemia.     Hospitalized 3/11/19-3/24/19 presented with acute hypoxemic and hypercapnic respiratory failure secondary to NSTEMI, found to have multivessel coronary artery disease, s/p CABG x 4 (LIMA-LAD, SVG-DIAG, SVG-OM, SVG-PDA) on 3/18/19. Troponin peaked at 53. Echocardiogram 3/12/19 showed an LVEF 35-40%, grade III or advanced diastolic dysfunction, with multiple wall motion abnormalities.     Patient has followed with Dr. Hernández and CORE clinic since his above hospitalization. Sleep study completed in June 2019 showed severe obstructive sleep apnea, recommended starting CPAP therapy. During CORE visit in September worsening kidney function was noted his furosemide was decreased to 20mg daily, at follow up 2 weeks later there was slight improvement in kidney function but lower extremity edema had returned and weight was trending up (up 3# on clinic scale). Furosemide was increased back to 40mg daily.     Patient is here today for CORE followup. Patient is loatian speaking,  present. Patient reports feeling good. Monitoring weights at home. Weight at home today 163#, clinic weight down 5# over past 2 weeks, weight in clinic today 161#. Denies lower extremity edema. Denies abdominal distention/bloating. Denies shortness of breath at rest. Denies exertional dyspnea with walking or stairs, states walking 10 blocks. Able to complete ADLs and IADLs independently, which is a continued improvement.  Sleeping good. Denies orthopnea, sleeping with HOB elevated. Denies PND. Using CPAP nightly. Patient denies chest pain or chest tightness. Denies dizziness, lightheadedness or other presyncopal symptoms. Denies tachycardia or palpitations. Denies syncope.     Labs today show improvement in kidney function with increase in furosemide, creatinine 1.56  today which is stable. Electrolytes stable. When looking in care everywhere creatinine 1.6 in 2016, appears baseline has been around 1.5. Blood pressure 128/64 and HR 60 in clinic today. Checking blood pressure daily at home, states typically 130s systolically, states rarely in 140s.     Appetite good. Enjoys rice soup and fried rice. Wife does the cooking, trying to limit sodium use. Not adding additional salt. Eating fast foods [employer] 1 time per week. Walking daily for exercise, walking 10 blocks with no difficulty, most days walking for an hour nonstop. Denies tobacco or alcohol use.         Recent Hospitalizations   3/11/19-3/24/19 presented with acute hypoxemic and hypercapnic respiratory failure secondary to NSTEMI, found to have multivessel coronary artery disease, s/p CABG x 4 (LIMA-LAD, SVG-DIAG, SVG-OM, SVG-PDA) on 3/18/19        Social History    , Loatian speaking, children living with them (english speaking).   Social History     Socioeconomic History     Marital status:      Spouse name: Not on file     Number of children: Not on file     Years of education: Not on file     Highest education level: Not on file   Occupational History     Not on file   Social Needs     Financial resource strain: Not on file     Food insecurity:     Worry: Not on file     Inability: Not on file     Transportation needs:     Medical: Not on file     Non-medical: Not on file   Tobacco Use     Smoking status: Never Smoker     Smokeless tobacco: Never Used   Substance and Sexual Activity     Alcohol use: No     Frequency: Never     Drug use: No     Sexual activity: Yes     Partners: Female   Lifestyle     Physical activity:     Days per week: Not on file     Minutes per session: Not on file     Stress: Not on file   Relationships     Social connections:     Talks on phone: Not on file     Gets together: Not on file     Attends Gnosticism service: Not on file     Active member of club or  "organization: Not on file     Attends meetings of clubs or organizations: Not on file     Relationship status: Not on file     Intimate partner violence:     Fear of current or ex partner: Not on file     Emotionally abused: Not on file     Physically abused: Not on file     Forced sexual activity: Not on file   Other Topics Concern     Parent/sibling w/ CABG, MI or angioplasty before 65F 55M? Not Asked   Social History Narrative     Not on file            Review of Systems:   Skin:  Negative     Eyes:  Positive for glasses  ENT:  Negative    Respiratory:  Positive for dyspnea on exertion;sleep apnea;CPAP  Cardiovascular:  Negative fatigue;Positive for  Gastroenterology: Negative    Genitourinary:  Negative    Musculoskeletal:  Negative    Neurologic:  Positive for headaches  Psychiatric:  Negative    Heme/Lymph/Imm:  Negative    Endocrine:  Positive for diabetes         Physical Exam:   Vitals: /64   Pulse 60   Ht 1.6 m (5' 3\")   Wt 73 kg (161 lb)   BMI 28.52 kg/m      Wt Readings from Last 4 Encounters:   10/17/19 73 kg (161 lb)   10/01/19 75.3 kg (166 lb)   09/16/19 73.9 kg (163 lb)   07/08/19 72.1 kg (159 lb)     GEN: well nourished, in no acute distress.  HEENT:  Pupils equal, round. Sclerae nonicteric.   NECK: Supple, no masses appreciated. No JVD appreciated on exam.   C/V:  Regular rate and rhythm, no murmur, rub or gallop.    RESP: Respirations are unlabored. Clear to auscultation bilaterally, crackles in bases.   GI: Abdomen soft, nontender.  EXTREM: No lower extremity edema  NEURO: Alert and oriented, cooperative.  SKIN: Warm and dry.        Data:   ECHO 3/12/19  The visual ejection fraction is estimated at 35-40%.  Left ventricular systolic function is mild to moderately reduced.  There are regional wall motion abnormalities as specified.  Right ventricular systolic pressure is elevated, consistent with mild to  moderate pulmonary hypertension.  The ascending aorta is Mildly dilated.  Moderate " left pleural effusion  The study was technically difficult.    Cardiac catheterization 3/12/19  1. Patient with multiple sausage links like stenoses through the mid and distal right coronary artery. There is a 70-80% stenosis in the continuation of the right coronary artery just beyond the posterior descending artery.   2. Mid LAD to Dist LAD lesion is 90% stenosed. The lesion is segmental and serial. There is a long distal LAD stenosis extending all the way through the apex.   3. Prox LAD to Mid LAD lesion is 45% stenosed.   4. Prox Cx lesion is 85% stenosed.   5. The ejection fraction is calculated to be 35%. LVEDP 31mmHg.  6. Mid LM lesion is 40% stenosed.    Plan   Maximal medical management.  Surgical consultation.  If patient is turned down by surgery we could consider multivessel intervention.    LIVER ENZYME RESULTS:  Lab Results   Component Value Date    AST 27 06/14/2019    ALT 22 10/17/2019     CBC RESULTS:  Lab Results   Component Value Date    WBC 8.4 06/14/2019    RBC 4.06 (L) 06/14/2019    HGB 9.9 (L) 06/14/2019    HCT 30.7 (L) 06/14/2019    MCV 76 (L) 06/14/2019    MCH 24.4 (L) 06/14/2019    MCHC 32.2 06/14/2019    RDW 18.2 (H) 06/14/2019     06/14/2019     BMP RESULTS:  Lab Results   Component Value Date     10/17/2019    POTASSIUM 4.0 10/17/2019    CHLORIDE 103 10/17/2019    CO2 24 10/17/2019    ANIONGAP 15 10/17/2019     (H) 10/17/2019    BUN 28 10/17/2019    CR 1.56 (H) 10/17/2019    GFRESTIMATED 45 (L) 10/17/2019    GFRESTBLACK 55 (L) 10/17/2019    MYKE 9.9 10/17/2019      A1C RESULTS:  Lab Results   Component Value Date    A1C 7.9 (H) 03/11/2019     INR RESULTS:  Lab Results   Component Value Date    INR 1.54 (H) 03/18/2019    INR 1.79 (H) 03/18/2019            Medications     Current Outpatient Medications   Medication Sig Dispense Refill     acetaminophen (TYLENOL) 325 MG tablet Take 2 tablets (650 mg) by mouth every 4 hours as needed for other 60 tablet 0     amLODIPine  (NORVASC) 10 MG tablet Take 1 tablet (10 mg) by mouth daily 90 tablet 3     aspirin (ASA) 325 MG EC tablet Take 1 tablet (325 mg) by mouth daily 30 tablet 0     atorvastatin (LIPITOR) 80 MG tablet Take 1 tablet (80 mg) by mouth every evening 90 tablet 3     furosemide (LASIX) 40 MG tablet Take 0.5 tablets (20 mg) by mouth daily 180 tablet 1     glipiZIDE (GLUCOTROL XL) 5 MG 24 hr tablet Take 5 mg by mouth daily       lisinopril (PRINIVIL/ZESTRIL) 20 MG tablet Take 1 tablet (20 mg) by mouth daily 90 tablet 3     metFORMIN (GLUCOPHAGE) 500 MG tablet Take 1,000 mg by mouth 2 times daily (with meals)        metoprolol succinate ER (TOPROL-XL) 25 MG 24 hr tablet Take 1 tablet (25 mg) by mouth daily 90 tablet 3     order for DME Equipment being ordered: Walker ()  Treatment Diagnosis: s/p coronary artery bypass 1 Device 0          Past Medical History     Past Medical History:   Diagnosis Date     Chronic renal insufficiency      Diabetes mellitus with proliferative retinopathy (H)      Hyperlipidemia      Hypertension      Nephrolithiasis      NSTEMI (non-ST elevated myocardial infarction) (H)      Past Surgical History:   Procedure Laterality Date     BYPASS GRAFT ARTERY CORONARY N/A 3/18/2019    Procedure: CORONARY BYPASS GRAFTING X 4 - LIMA TO LAD, SV TO DIAGONAL, PDA, AND OM; ON PUMP WITH NOA; ENDOVEIN HARVEST LEFT LEG;  Surgeon: Adarsh Sanchez MD;  Location:  OR     CV CORONARY ANGIOGRAM N/A 3/12/2019    Procedure: Coronary Angiogram;  Surgeon: Remi Rodriguez MD;  Location:  HEART CARDIAC CATH LAB     CV HEART CATHETERIZATION WITH POSSIBLE INTERVENTION N/A 3/12/2019    Procedure: Heart Catheterization with possible Intervention;  Surgeon: Remi Rodriguez MD;  Location:  HEART CARDIAC CATH LAB     CV LEFT VENTRICULOGRAM N/A 3/12/2019    Procedure: Left Ventricular Angiogram;  Surgeon: Remi Rodriguez MD;  Location:  HEART CARDIAC CATH LAB     LITHOTRIPSY              Allergies    Patient has no known allergies.        MICHELE Bravo CNP  [facility]  Pager: [phone]    Thank you for allowing me to participate in the care of your patient.    Sincerely,     MICHELE Bravo CNP     [facility]

## 2019-10-17 NOTE — PATIENT INSTRUCTIONS
1. No medication changes  2. Continue to monitor weight and limit sodium intake  3. Follow up with Dr. Hernández as scheduled in November, echocardiogram (heart ultrasound) on 11/18/19  4. Please call with any additional questions/concerns [phone]

## 2019-10-17 NOTE — PROGRESS NOTES
Cardiology Clinic Progress Note  Khang Mcelroy MRN# [id number]   YOB: 1955 Age: 63 year old   Primary Cardiologist: Dr. Hernández Reason for visit: CORE follow up            Assessment and Plan:   Khang Mcelroy is a very pleasant 63 year old male with a history of HFrEF, ischemic cardiomyopathy, LVEF 35-40% per echocardiogram 3/12/19, coronary artery disease s/p CABG x 4 (LIMA-LAD, SVG-DIAG, SVG-OM, SVG-PDA) on 3/18/19, DM, hypertension, hyperlipidemia, CKD, and anemia. Patient here today for CORE followup.      1.  Chronic systolic heart failure/HFrEF, ischemic cardiomyopathy - LVEF 35-40% 3/12/19, patient appears compensated and euvolemic,  HF symptoms are well controlled. Weight down 5# in past 2 weeks on clinic scale, after resuming increased dosage of furosemide, kidney function also with continued improvement creatinine 1.56 today, BUN stable at 28. Plan to continue current diuretic regimen. Patient scheduled to have echocardiogram next month and f/u with Dr. Hernández.   - NYHA class III, stage C   - Etiology : ischemic   - Fluid status : euvolemic   - Diuretic regimen : furosemide to 40mg daily PO    - Ischemic evaluation : coronary angiogram 3/12/19, s/p CABG 3/18/19   - Guideline directed medical therapy    - Betablocker: metoprolol succinate 25mg daily    - ACEI/ARB/ARNI: lisinopril 20mg daily    - Aldactone antagonist: will consider in the future based on blood pressure and renal function   - Sudden cardiac death prophylaxis : n/a EF> 35%   - Plan for repeat echocardiogram in November prior to f/u with Dr. Hernández   - Sleep apnea : compliant with CPAP   - Reinforced HF education, reviewed low sodium diet and 2L fluid restriction, praise given for decreased fast food consumption, reviewed with to call CORE clinic.     2. Coronary artery disease s/p CABG x 4 (LIMA-LAD, SVG-DIAG, SVG-OM, SVG-PDA) on 3/18/19, stable no signs/symptoms of myocardial ischemia.    - Continue aspirin, statin and  betablocker therapy.   - Reinforced lifestyle modifications    3. Hx of syncope - presented to the ED 4/1/19 after syncopal event, felt to be secondary to coughing, but patient was also noted to be bradycardic so metoprolol was decreased at that time.  No recurrent syncopal events.   - 30 day event monitor - one event recorded, heart rate 56.    - Tolerating increased dose of metoprolol.     4. Hypertension - controlled, continue current medication regimen.    - Counseled on lifestyle modifications to help manage blood pressure.     5. Hyperlipidemia - stable, LDL goal <70, lipid panel today showed total cholesterol 126, HDL 47, LDL 69 and triglycerides 48.    - Continue atorvastatin    6. Chronic kidney disease - looking back in Care Everywhere creatinine 1.6 in 2016, appears as of recently baseline has been around 1.5. Creatinine today improved to 1.56 which is overall stable. Will continue to monitor    7. Diabetes mellitus - hgbA1c 7.9 3/11/19, counseled patient on the importance of good glycemic control. Patient has been working with PCP on better blood sugar control.     8. Obstructive sleep apnea - Sleep study completed in June, compliant with CPAP    9. Medication Compliance - Patient brought pill bottles to clinic, appears patient is taking all medications as prescribed with no discrepancies noted today. Praise given. Reinforced the importance of taking medications daily as prescribed.         Changes today: none    Follow up plan:       Followup with Dr. Hernández in November with limited echocardiogram prior        History of Presenting Illness:    Khang Mcelroy is a very pleasant 63 year old male with a history of HFrEF, ischemic cardiomyopathy, LVEF 35-40% per echocardiogram 3/12/19, coronary artery disease s/p CABG x 4 (LIMA-LAD, SVG-DIAG, SVG-OM, SVG-PDA) on 3/18/19, DM, hypertension, hyperlipidemia, CKD, and anemia.     Hospitalized 3/11/19-3/24/19 presented with acute hypoxemic and hypercapnic  respiratory failure secondary to NSTEMI, found to have multivessel coronary artery disease, s/p CABG x 4 (LIMA-LAD, SVG-DIAG, SVG-OM, SVG-PDA) on 3/18/19. Troponin peaked at 53. Echocardiogram 3/12/19 showed an LVEF 35-40%, grade III or advanced diastolic dysfunction, with multiple wall motion abnormalities.     Patient has followed with Dr. Hernández and CORE clinic since his above hospitalization. Sleep study completed in June 2019 showed severe obstructive sleep apnea, recommended starting CPAP therapy. During CORE visit in September worsening kidney function was noted his furosemide was decreased to 20mg daily, at follow up 2 weeks later there was slight improvement in kidney function but lower extremity edema had returned and weight was trending up (up 3# on clinic scale). Furosemide was increased back to 40mg daily.     Patient is here today for CORE followup. Patient is loatian speaking,  present. Patient reports feeling good. Monitoring weights at home. Weight at home today 163#, clinic weight down 5# over past 2 weeks, weight in clinic today 161#. Denies lower extremity edema. Denies abdominal distention/bloating. Denies shortness of breath at rest. Denies exertional dyspnea with walking or stairs, states walking 10 blocks. Able to complete ADLs and IADLs independently, which is a continued improvement.  Sleeping good. Denies orthopnea, sleeping with HOB elevated. Denies PND. Using CPAP nightly. Patient denies chest pain or chest tightness. Denies dizziness, lightheadedness or other presyncopal symptoms. Denies tachycardia or palpitations. Denies syncope.     Labs today show improvement in kidney function with increase in furosemide, creatinine 1.56 today which is stable. Electrolytes stable. When looking in care everywhere creatinine 1.6 in 2016, appears baseline has been around 1.5. Blood pressure 128/64 and HR 60 in clinic today. Checking blood pressure daily at home, states typically 130s  systolically, states rarely in 140s.     Appetite good. Enjoys rice soup and fried rice. Wife does the cooking, trying to limit sodium use. Not adding additional salt. Eating fast foods [employer] 1 time per week. Walking daily for exercise, walking 10 blocks with no difficulty, most days walking for an hour nonstop. Denies tobacco or alcohol use.         Recent Hospitalizations   3/11/19-3/24/19 presented with acute hypoxemic and hypercapnic respiratory failure secondary to NSTEMI, found to have multivessel coronary artery disease, s/p CABG x 4 (LIMA-LAD, SVG-DIAG, SVG-OM, SVG-PDA) on 3/18/19        Social History    , Loatian speaking, children living with them (english speaking).   Social History     Socioeconomic History     Marital status:      Spouse name: Not on file     Number of children: Not on file     Years of education: Not on file     Highest education level: Not on file   Occupational History     Not on file   Social Needs     Financial resource strain: Not on file     Food insecurity:     Worry: Not on file     Inability: Not on file     Transportation needs:     Medical: Not on file     Non-medical: Not on file   Tobacco Use     Smoking status: Never Smoker     Smokeless tobacco: Never Used   Substance and Sexual Activity     Alcohol use: No     Frequency: Never     Drug use: No     Sexual activity: Yes     Partners: Female   Lifestyle     Physical activity:     Days per week: Not on file     Minutes per session: Not on file     Stress: Not on file   Relationships     Social connections:     Talks on phone: Not on file     Gets together: Not on file     Attends [employer] service: Not on file     Active member of club or organization: Not on file     Attends meetings of clubs or organizations: Not on file     Relationship status: Not on file     Intimate partner violence:     Fear of current or ex partner: Not on file     Emotionally abused: Not on file     Physically abused:  "Not on file     Forced sexual activity: Not on file   Other Topics Concern     Parent/sibling w/ CABG, MI or angioplasty before 65F 55M? Not Asked   Social History Narrative     Not on file            Review of Systems:   Skin:  Negative     Eyes:  Positive for glasses  ENT:  Negative    Respiratory:  Positive for dyspnea on exertion;sleep apnea;CPAP  Cardiovascular:  Negative fatigue;Positive for  Gastroenterology: Negative    Genitourinary:  Negative    Musculoskeletal:  Negative    Neurologic:  Positive for headaches  Psychiatric:  Negative    Heme/Lymph/Imm:  Negative    Endocrine:  Positive for diabetes         Physical Exam:   Vitals: /64   Pulse 60   Ht 1.6 m (5' 3\")   Wt 73 kg (161 lb)   BMI 28.52 kg/m     Wt Readings from Last 4 Encounters:   10/17/19 73 kg (161 lb)   10/01/19 75.3 kg (166 lb)   09/16/19 73.9 kg (163 lb)   07/08/19 72.1 kg (159 lb)     GEN: well nourished, in no acute distress.  HEENT:  Pupils equal, round. Sclerae nonicteric.   NECK: Supple, no masses appreciated. No JVD appreciated on exam.   C/V:  Regular rate and rhythm, no murmur, rub or gallop.    RESP: Respirations are unlabored. Clear to auscultation bilaterally, crackles in bases.   GI: Abdomen soft, nontender.  EXTREM: No lower extremity edema  NEURO: Alert and oriented, cooperative.  SKIN: Warm and dry.        Data:   ECHO 3/12/19  The visual ejection fraction is estimated at 35-40%.  Left ventricular systolic function is mild to moderately reduced.  There are regional wall motion abnormalities as specified.  Right ventricular systolic pressure is elevated, consistent with mild to  moderate pulmonary hypertension.  The ascending aorta is Mildly dilated.  Moderate left pleural effusion  The study was technically difficult.    Cardiac catheterization 3/12/19  1. Patient with multiple sausage links like stenoses through the mid and distal right coronary artery. There is a 70-80% stenosis in the continuation of the right " coronary artery just beyond the posterior descending artery.   2. Mid LAD to Dist LAD lesion is 90% stenosed. The lesion is segmental and serial. There is a long distal LAD stenosis extending all the way through the apex.   3. Prox LAD to Mid LAD lesion is 45% stenosed.   4. Prox Cx lesion is 85% stenosed.   5. The ejection fraction is calculated to be 35%. LVEDP 31mmHg.  6. Mid LM lesion is 40% stenosed.    Plan   Maximal medical management.  Surgical consultation.  If patient is turned down by surgery we could consider multivessel intervention.    LIVER ENZYME RESULTS:  Lab Results   Component Value Date    AST 27 06/14/2019    ALT 22 10/17/2019     CBC RESULTS:  Lab Results   Component Value Date    WBC 8.4 06/14/2019    RBC 4.06 (L) 06/14/2019    HGB 9.9 (L) 06/14/2019    HCT 30.7 (L) 06/14/2019    MCV 76 (L) 06/14/2019    MCH 24.4 (L) 06/14/2019    MCHC 32.2 06/14/2019    RDW 18.2 (H) 06/14/2019     06/14/2019     BMP RESULTS:  Lab Results   Component Value Date     10/17/2019    POTASSIUM 4.0 10/17/2019    CHLORIDE 103 10/17/2019    CO2 24 10/17/2019    ANIONGAP 15 10/17/2019     (H) 10/17/2019    BUN 28 10/17/2019    CR 1.56 (H) 10/17/2019    GFRESTIMATED 45 (L) 10/17/2019    GFRESTBLACK 55 (L) 10/17/2019    MYKE 9.9 10/17/2019      A1C RESULTS:  Lab Results   Component Value Date    A1C 7.9 (H) 03/11/2019     INR RESULTS:  Lab Results   Component Value Date    INR 1.54 (H) 03/18/2019    INR 1.79 (H) 03/18/2019            Medications     Current Outpatient Medications   Medication Sig Dispense Refill     acetaminophen (TYLENOL) 325 MG tablet Take 2 tablets (650 mg) by mouth every 4 hours as needed for other 60 tablet 0     amLODIPine (NORVASC) 10 MG tablet Take 1 tablet (10 mg) by mouth daily 90 tablet 3     aspirin (ASA) 325 MG EC tablet Take 1 tablet (325 mg) by mouth daily 30 tablet 0     atorvastatin (LIPITOR) 80 MG tablet Take 1 tablet (80 mg) by mouth every evening 90 tablet 3      furosemide (LASIX) 40 MG tablet Take 0.5 tablets (20 mg) by mouth daily 180 tablet 1     glipiZIDE (GLUCOTROL XL) 5 MG 24 hr tablet Take 5 mg by mouth daily       lisinopril (PRINIVIL/ZESTRIL) 20 MG tablet Take 1 tablet (20 mg) by mouth daily 90 tablet 3     metFORMIN (GLUCOPHAGE) 500 MG tablet Take 1,000 mg by mouth 2 times daily (with meals)        metoprolol succinate ER (TOPROL-XL) 25 MG 24 hr tablet Take 1 tablet (25 mg) by mouth daily 90 tablet 3     order for DME Equipment being ordered: Walker ()  Treatment Diagnosis: s/p coronary artery bypass 1 Device 0          Past Medical History     Past Medical History:   Diagnosis Date     Chronic renal insufficiency      Diabetes mellitus with proliferative retinopathy (H)      Hyperlipidemia      Hypertension      Nephrolithiasis      NSTEMI (non-ST elevated myocardial infarction) (H)      Past Surgical History:   Procedure Laterality Date     BYPASS GRAFT ARTERY CORONARY N/A 3/18/2019    Procedure: CORONARY BYPASS GRAFTING X 4 - LIMA TO LAD, SV TO DIAGONAL, PDA, AND OM; ON PUMP WITH NOA; ENDOVEIN HARVEST LEFT LEG;  Surgeon: Adarsh Sanchez MD;  Location:  OR     CV CORONARY ANGIOGRAM N/A 3/12/2019    Procedure: Coronary Angiogram;  Surgeon: Remi Rodriguez MD;  Location:  HEART CARDIAC CATH LAB     CV HEART CATHETERIZATION WITH POSSIBLE INTERVENTION N/A 3/12/2019    Procedure: Heart Catheterization with possible Intervention;  Surgeon: Remi Rodriguez MD;  Location: [facility] CARDIAC CATH LAB     CV LEFT VENTRICULOGRAM N/A 3/12/2019    Procedure: Left Ventricular Angiogram;  Surgeon: Remi Rodriguez MD;  Location: [facility] CARDIAC CATH LAB     LITHOTRIPSY              Allergies   Patient has no known allergies.        MICHELE Bravo [facility]  Pager: [phone]

## 2019-10-22 ENCOUNTER — DOCUMENTATION ONLY (OUTPATIENT)
Dept: SLEEP MEDICINE | Facility: CLINIC | Age: 64
End: 2019-10-22

## 2019-10-22 DIAGNOSIS — G47.33 OSA (OBSTRUCTIVE SLEEP APNEA): ICD-10-CM

## 2019-10-22 NOTE — PROGRESS NOTES
14 DAY STM VISIT    Diagnostic AHI: 30.5   PSG      Message left for patient to return call     Assessment: Pt not meeting objective benchmarks for leak      Action plan: waiting for patient to return call.  and pt to have 30 day STM visit.    Device type: Auto-CPAP    PAP settings: CPAP min 5 cm  H20       CPAP max 12 cm  H20           90th % [id number] cm  H20    Mask type:  Nasal Mask    Objective measures: 14 day rolling measures         Compliance  100 %     % of night spent in large leak  11 % last  upload      AHI 4.38   last  upload      Average number of minutes 340          Objective measure goal  Compliance   Goal >70%  Leak   Goal < 10%  AHI  Goal < 5  Usage  Goal >240      Total time spent on remote patient monitoring data analysis and patient contact today:   10 minutes

## 2019-11-07 ENCOUNTER — DOCUMENTATION ONLY (OUTPATIENT)
Dept: SLEEP MEDICINE | Facility: CLINIC | Age: 64
End: 2019-11-07
Payer: COMMERCIAL

## 2019-11-07 DIAGNOSIS — G47.33 OSA (OBSTRUCTIVE SLEEP APNEA): ICD-10-CM

## 2019-11-07 NOTE — PROGRESS NOTES
30 DAY [facility] VISIT    Diagnostic AHI: 30.5    PSG      Subjective measures:   Pts daughter reports that he is sleeping better.  She will work with him on mask fit.     Assessment: Pt not meeting objective benchmarks for leak    Action plan:   Patient has scheduled a follow up visit with Dr. Bernstein on 12/26/2019  Device type: Auto-CPAP  PAP settings: CPAP min 5 cm  H20     CPAP max 12 cm  H20          90th % [id number] cm  H20    Mask type:  Nasal Mask    Objective measures: 14 day rolling measures         Compliance  100 %     % of night spent in large leak  17 % last  upload      AHI 4.86   last  upload      Average number of minutes 445          Objective measure goal  Compliance   Goal >70%  Leak   Goal < 10%  AHI  Goal < 5  Usage  Goal >240      Total time spent on remote patient monitoring data analysis and patient contact today:   11 minutes      Total contact/remote patient monitoring for last 30 days:  31 min

## 2019-11-18 ENCOUNTER — HOSPITAL ENCOUNTER (OUTPATIENT)
Dept: CARDIOLOGY | Facility: CLINIC | Age: 64
Discharge: HOME | End: 2019-11-18
Attending: INTERNAL MEDICINE | Admitting: INTERNAL MEDICINE
Payer: COMMERCIAL

## 2019-11-18 DIAGNOSIS — I21.4 NSTEMI (NON-ST ELEVATED MYOCARDIAL INFARCTION) (H): ICD-10-CM

## 2019-11-18 DIAGNOSIS — Z95.1 STATUS POST CORONARY ARTERY BYPASS GRAFT: ICD-10-CM

## 2019-11-18 PROCEDURE — 40000264 ECHOCARDIOGRAM COMPLETE

## 2019-11-18 PROCEDURE — 93306 TTE W/DOPPLER COMPLETE: CPT | Mod: 26 | Performed by: INTERNAL MEDICINE

## 2019-11-18 PROCEDURE — 25500064 ZZH RX 255 OP 636: Performed by: INTERNAL MEDICINE

## 2019-11-18 RX ADMIN — HUMAN ALBUMIN MICROSPHERES AND PERFLUTREN 3 ML: 10; .22 INJECTION, SOLUTION INTRAVENOUS at 10:45

## 2019-11-21 ENCOUNTER — OFFICE VISIT (OUTPATIENT)
Dept: CARDIOLOGY | Facility: CLINIC | Age: 64
End: 2019-11-21
Attending: INTERNAL MEDICINE
Payer: COMMERCIAL

## 2019-11-21 VITALS
HEART RATE: 64 BPM | WEIGHT: 167 LBS | DIASTOLIC BLOOD PRESSURE: 58 MMHG | HEIGHT: 63 IN | BODY MASS INDEX: 29.59 KG/M2 | SYSTOLIC BLOOD PRESSURE: 134 MMHG

## 2019-11-21 DIAGNOSIS — I21.4 NSTEMI (NON-ST ELEVATED MYOCARDIAL INFARCTION) (H): ICD-10-CM

## 2019-11-21 DIAGNOSIS — I50.22 CHRONIC SYSTOLIC HEART FAILURE (H): Primary | ICD-10-CM

## 2019-11-21 DIAGNOSIS — Z95.1 STATUS POST CORONARY ARTERY BYPASS GRAFT: ICD-10-CM

## 2019-11-21 PROCEDURE — 99214 OFFICE O/P EST MOD 30 MIN: CPT | Performed by: INTERNAL MEDICINE

## 2019-11-21 RX ORDER — FUROSEMIDE 40 MG
40 TABLET ORAL DAILY
Qty: 180 TABLET | Refills: 1 | Status: SHIPPED | OUTPATIENT
Start: 2019-11-21 | End: 2020-10-27

## 2019-11-21 ASSESSMENT — MIFFLIN-ST. JEOR: SCORE: 1457.64
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
## 2019-11-21 NOTE — PATIENT INSTRUCTIONS
Increase the dose of lasix to 60 mg po daily (1.5 pills) for 1 week, then change the dose to 1 tablet daily (40 mg).

## 2020-01-27 ENCOUNTER — OFFICE VISIT (OUTPATIENT)
Dept: SLEEP MEDICINE | Facility: CLINIC | Age: 65
End: 2020-01-27
Payer: COMMERCIAL

## 2020-01-27 VITALS
DIASTOLIC BLOOD PRESSURE: 72 MMHG | SYSTOLIC BLOOD PRESSURE: 133 MMHG | RESPIRATION RATE: 16 BRPM | OXYGEN SATURATION: 100 % | HEART RATE: 61 BPM | HEIGHT: 63 IN | WEIGHT: 167 LBS | BODY MASS INDEX: 29.59 KG/M2

## 2020-01-27 DIAGNOSIS — G47.33 OSA (OBSTRUCTIVE SLEEP APNEA): Primary | ICD-10-CM

## 2020-01-27 PROCEDURE — 99213 OFFICE O/P EST LOW 20 MIN: CPT | Performed by: INTERNAL MEDICINE

## 2020-01-27 ASSESSMENT — MIFFLIN-ST. JEOR: SCORE: 1452.64

## 2020-01-27 NOTE — PATIENT INSTRUCTIONS
Your Body mass index is 29.58 kg/m .  Weight management is a personal decision.  If you are interested in exploring weight loss strategies, the following discussion covers the approaches that may be successful. Body mass index (BMI) is one way to tell whether you are at a healthy weight, overweight, or obese. It measures your weight in relation to your height.  A BMI of 18.5 to 24.9 is in the healthy range. A person with a BMI of 25 to 29.9 is considered overweight, and someone with a BMI of 30 or greater is considered obese. More than two-thirds of American adults are considered overweight or obese.  Being overweight or obese increases the risk for further weight gain. Excess weight may lead to heart disease and diabetes.  Creating and following plans for healthy eating and physical activity may help you improve your health.  Weight control is part of healthy lifestyle and includes exercise, emotional health, and healthy eating habits. Careful eating habits lifelong are the mainstay of weight control. Though there are significant health benefits from weight loss, long-term weight loss with diet alone may be very difficult to achieve- studies show long-term success with dietary management in less than 10% of people. Attaining a healthy weight may be especially difficult to achieve in those with severe obesity. In some cases, medications, devices and surgical management might be considered.  What can you do?  If you are overweight or obese and are interested in methods for weight loss, you should discuss this with your provider.     Consider reducing daily calorie intake by 500 calories.     Keep a food journal.     Avoiding skipping meals, consider cutting portions instead.    Diet combined with exercise helps maintain muscle while optimizing fat loss. Strength training is particularly important for building and maintaining muscle mass. Exercise helps reduce stress, increase energy, and improves fitness.  Increasing exercise without diet control, however, may not burn enough calories to loose weight.       Start walking three days a week 10-20 minutes at a time    Work towards walking thirty minutes five days a week     Eventually, increase the speed of your walking for 1-2 minutes at time    In addition, we recommend that you review healthy lifestyles and methods for weight loss available through the National Institutes of Health patient information sites:  http://win.niddk.nih.gov/publications/index.htm    And look into health and wellness programs that may be available through your health insurance provider, employer, local community center, or [employer] club.      Khang to follow up with Primary Care provider regarding elevated blood pressure.

## 2020-01-27 NOTE — NURSING NOTE
"Chief Complaint   Patient presents with     CPAP Follow Up     Follow up goldie        Initial BP (!) 144/75   Pulse 61   Resp 16   Ht 1.6 m (5' 3\")   Wt 75.8 kg (167 lb)   SpO2 100%   BMI 29.58 kg/m   Estimated body mass index is 29.58 kg/m  as calculated from the following:    Height as of this encounter: 1.6 m (5' 3\").    Weight as of this encounter: 75.8 kg (167 lb).    Medication Reconciliation: complete    ESS 2    Beverly Miller MA      "

## 2020-01-27 NOTE — PROGRESS NOTES
Obstructive Sleep Apnea - PAP Follow-Up Visit:    Chief Complaint   Patient presents with     CPAP Follow Up     Follow up chalo        Khang Mcelroy comes in today for follow-up of their severe sleep apnea, managed with CPAP.     PSG on 6/21/2019 showed an apnea hypopnea index of 30.5 per hour.     Overall, he rates the experience with PAP as 10 (0 poor, 10 great). The mask is comfortable. The mask is not leaking.  He is not snoring with the mask on. He is not having gasp arousals.  He is not having significant oral/nasal dryness. The pressure settings are comfortable.     He uses full-face mask.     Bedtime is typically 9 pm. Usually it takes about 10 minutes to fall asleep with the mask on. Wake time is typically 5 am.  Patient is using PAP therapy 6 hours per night. The patient is usually getting 7 hours of sleep per night.    He does feel rested in the morning.    Anderson Sleepiness Scale: 2/24    Respironics    Auto-PAP 5-12 cmH2O download:  30/30 total days of use. 0 nonuse days.  Average use 5 hours 59 minutes per day.  97% days with >4 hours use.  Large leak 14 mins per day average. CPAP 90% pressure 11.8cm. AHI 3.0        Reviewed by team: Tobacco  Allergies       Reviewed by provider:        Problem List:  Patient Active Problem List    Diagnosis Date Noted     Chronic systolic heart failure (H) 10/01/2019     Priority: Medium     CHALO (obstructive sleep apnea) 06/24/2019     Priority: Medium     6/21/2019 [facility] Sleep Study (153.0 lbs) - AHI 30.5, RDI 34.7, Supine AHI 31.5, REM AHI 56.5, Low O2 68.7%, Time Spent ?88% 3.7 minutes / Time Spent ?89% 4.5 minutes.       Essential hypertension 06/14/2019     Priority: Medium     Hyperlipidemia LDL goal <70 06/14/2019     Priority: Medium     S/P CABG (coronary artery bypass graft) 03/26/2019     Priority: Medium     Fluid overload 03/26/2019     Priority: Medium     Transient hyperglycemia post procedure 03/26/2019     Priority: Medium      "CAD in native artery 03/18/2019     Priority: Medium     Type 2 diabetes mellitus (H) 03/11/2019     Priority: Medium     NSTEMI (non-ST elevated myocardial infarction) (H) 03/11/2019     Priority: Medium          BP (!) 144/75   Pulse 61   Resp 16   Ht 1.6 m (5' 3\")   Wt 75.8 kg (167 lb)   SpO2 100%   BMI 29.58 kg/m      Impression/Plan:     Severe sleep apnea.     -  Tolerating PAP well. Daytime symptoms are improved. Regular compliance and normal AHI is demonstrated on downloald.    Plan:     1. Continue auto PAP therapy     Khang Ribeiropamelavaleri will follow up in about 1 year(s).       Abdon Bernstein MD, MD    CC:  Amari Castro,   "

## 2020-03-02 ENCOUNTER — OFFICE VISIT (OUTPATIENT)
Dept: CARDIOLOGY | Facility: CLINIC | Age: 65
End: 2020-03-02
Attending: INTERNAL MEDICINE
Payer: COMMERCIAL

## 2020-03-02 VITALS
BODY MASS INDEX: 30.69 KG/M2 | DIASTOLIC BLOOD PRESSURE: 70 MMHG | SYSTOLIC BLOOD PRESSURE: 147 MMHG | WEIGHT: 173.2 LBS | HEIGHT: 63 IN | HEART RATE: 58 BPM

## 2020-03-02 DIAGNOSIS — E78.5 HYPERLIPIDEMIA LDL GOAL <70: ICD-10-CM

## 2020-03-02 DIAGNOSIS — I25.10 CAD IN NATIVE ARTERY: ICD-10-CM

## 2020-03-02 DIAGNOSIS — Z95.1 STATUS POST CORONARY ARTERY BYPASS GRAFT: ICD-10-CM

## 2020-03-02 DIAGNOSIS — I50.32 CHRONIC DIASTOLIC HEART FAILURE (H): ICD-10-CM

## 2020-03-02 DIAGNOSIS — I10 ESSENTIAL HYPERTENSION: ICD-10-CM

## 2020-03-02 DIAGNOSIS — N18.30 CKD (CHRONIC KIDNEY DISEASE) STAGE 3, GFR 30-59 ML/MIN (H): ICD-10-CM

## 2020-03-02 DIAGNOSIS — E11.00 TYPE 2 DIABETES MELLITUS WITH HYPEROSMOLARITY WITHOUT COMA, WITHOUT LONG-TERM CURRENT USE OF INSULIN (H): ICD-10-CM

## 2020-03-02 DIAGNOSIS — G47.33 OSA (OBSTRUCTIVE SLEEP APNEA): ICD-10-CM

## 2020-03-02 DIAGNOSIS — I50.22 CHRONIC SYSTOLIC HEART FAILURE (H): Primary | ICD-10-CM

## 2020-03-02 PROCEDURE — 99215 OFFICE O/P EST HI 40 MIN: CPT | Performed by: NURSE PRACTITIONER

## 2020-03-02 RX ORDER — GLIPIZIDE 5 MG/1
5 TABLET ORAL
COMMUNITY
End: 2021-01-28

## 2020-03-02 RX ORDER — SPIRONOLACTONE 25 MG/1
25 TABLET ORAL DAILY
Qty: 45 TABLET | Refills: 1 | Status: SHIPPED | OUTPATIENT
Start: 2020-03-02 | End: 2020-07-15

## 2020-03-02 ASSESSMENT — MIFFLIN-ST. JEOR: SCORE: 1480.76

## 2020-03-02 NOTE — LETTER
3/2/2020    Mpho Brian Castro MD  [facility] [address]  [facility] [address]    RE: Khang Mcelroy       Dear Colleague,    I had the pleasure of seeing Khang Mcelroy in the [facility].    Cardiology Clinic Progress Note  Khang Mcelroy MRN# [id number]   YOB: 1955 Age: 64 year old   Primary Cardiologist: Dr. Hernández Reason for visit: CORE follow up            Assessment and Plan:   Khang Mcelroy is a very pleasant 63 year old male with a history of HFrEF, ischemic cardiomyopathy, LVEF 35-40% per echocardiogram 3/12/19, coronary artery disease s/p CABG x 4 (LIMA-LAD, SVG-DIAG, SVG-OM, SVG-PDA) on 3/18/19, DM, hypertension, hyperlipidemia, CKD, and anemia. Patient here today for CORE followup.      1.  Combined chronic systolic heart failure/HFrEF and diastolic heart failure, ischemic cardiomyopathy - LVEF 35-40% 3/12/19 repeat echocardiogram in November 2019 showed slight improvement EF 40-45% and grade III diastolic dysfunction, patient appears compensated and slightly volume up, worsening lower extremity edema, otherwise HF symptoms are controlled. Weight up 3# in past 3 months on clinic scale, patient notes slight weight gain over past 2 weeks. This appears to likely be secondary to dietary indiscretions, noting he has been eating more. Labs from PCP visit on 2/10/20 showed overall stable kidney function with a creatinine of 1.45 and stable electrolytes. Plan to start spironolactone 25mg daily to help with volume status, blood pressure and optimize HF medications. Patient scheduled for close CORE follow up in 1 week to monitor kidney function.    - NYHA class III, stage C   - Etiology : ischemic   - Fluid status : slightly volume up   - Diuretic regimen : furosemide to 40mg daily PO    - Ischemic evaluation : coronary angiogram 3/12/19, s/p CABG 3/18/19   - Guideline directed medical therapy    - Betablocker: metoprolol succinate  25mg daily    - ACEI/ARB/ARNI: lisinopril 20mg daily    - Aldactone antagonist: START spironolactone 25mg daily    - Sudden cardiac death prophylaxis : n/a EF> 35%   - Sleep apnea : compliant with CPAP   - Reinforced HF education, reviewed low sodium diet and 2L fluid restriction, praise given for decreased fast food consumption, reviewed with to call CORE clinic.     2. Coronary artery disease s/p CABG x 4 (LIMA-LAD, SVG-DIAG, SVG-OM, SVG-PDA) on 3/18/19, stable no signs/symptoms of myocardial ischemia.    - Continue aspirin, statin and betablocker therapy.   - Reinforced lifestyle modifications    3. Hypertension - slightly elevated, plan to start spironolactone today to optimize HF therapy and help control blood pressure.     - Counseled on lifestyle modifications to help manage blood pressure.     4. Hyperlipidemia - stable, LDL goal <70, lipid panel today showed total cholesterol 126, HDL 47, LDL 69 and triglycerides 48.    - Continue atorvastatin    5. Chronic kidney disease - looking back in Care Everywhere creatinine 1.6 in 2016, appears as of recently baseline has been around 1.5. Creatinine during most recent PCP visit on 2/10 1.45, which is overall stable. Will continue to monitor    6. Diabetes mellitus - hgbA1c 6.3 2/10/20, counseled patient on the importance of good glycemic control. Continue management per PCP.     7. Obstructive sleep apnea - Sleep study completed in June, compliant with CPAP        Changes today: START spironolactone 25mg daily     Follow up plan:     CORE follow up in 1 week with BMP/BNP prior to closely monitor kidney's after starting spironolactone.         History of Presenting Illness:    Khang Mcelroy is a very pleasant 63 year old male with a history of HFrEF, ischemic cardiomyopathy, LVEF 35-40% per echocardiogram 3/12/19, coronary artery disease s/p CABG x 4 (LIMA-LAD, SVG-DIAG, SVG-OM, SVG-PDA) on 3/18/19, DM, hypertension, hyperlipidemia, CKD, and anemia.      Hospitalized 3/11/19-3/24/19 presented with acute hypoxemic and hypercapnic respiratory failure secondary to NSTEMI, found to have multivessel coronary artery disease, s/p CABG x 4 (LIMA-LAD, SVG-DIAG, SVG-OM, SVG-PDA) on 3/18/19. Troponin peaked at 53. Echocardiogram 3/12/19 showed an LVEF 35-40%, grade III or advanced diastolic dysfunction, with multiple wall motion abnormalities.     Patient has followed with Dr. Hernández and CORE clinic since his above hospitalization. Sleep study completed in June 2019 showed severe obstructive sleep apnea, recommended starting CPAP therapy. Patients diuretic dosage continues to fluctuate. During most recent visit with Dr. Hernández in November 2019 at which point patient was felt to be slightly volume up, furosemide was increased to 60mg daily x 1 week and 3 month CORE follow-up recommended with consideration of starting spironolactone and changing lisinopril to Entresto. Echocardiogram completed prior to this visit showed mildly reduced systolic function, EF 40-45%, RV normal size/function, grade III diastolic dysfunction and no significant valvular abnormalities.     Patient saw PCP 2/10/20, patient was doing well, nephrology referral placed. Kidney function was noted to be improved with a creatinine of 1.45.     Patient is here today for CORE followup. Patient is loatian speaking,  present. Patient reports feeling good, has noted a slight weight increase and worsening lower extremity edema the past 2 weeks. Monitoring weights at home. Weight has been ranging 167-168# which is up from 164#, clinic weight today 173#, which is up from 167#. Denies abdominal distention/bloating. Denies shortness of breath at rest. Denies exertional dyspnea with walking or stairs, states walking 10 blocks. Able to complete ADLs and IADLs independently, which is a continued improvement.  Sleeping good. Using CPAP. Denies orthopnea, sleeping with HOB elevated. Denies PND. Patient denies  chest pain or chest tightness. Denies dizziness, lightheadedness or other presyncopal symptoms. Denies tachycardia or palpitations. Denies syncope.     Labs from PCP visit 2/10/20 show stable kidney function, creatinine 1.45 which is improved from prior readings. Electrolytes stable. Looking back at prior readings, baseline creatinine appears to be around 1.5. Blood pressure 147/70 and HR 58 in clinic today.     Appetite good. Enjoys rice soup and fried rice. Wife does the cooking, trying to limit sodium use. Not adding additional salt. Eating fast foods [employer] 1 time per week. Walking daily for exercise, walking 10 blocks with no difficulty, most days walking for an 45 mins nonstop. Denies tobacco or alcohol use.         Recent Hospitalizations   3/11/19-3/24/19 presented with acute hypoxemic and hypercapnic respiratory failure secondary to NSTEMI, found to have multivessel coronary artery disease, s/p CABG x 4 (LIMA-LAD, SVG-DIAG, SVG-OM, SVG-PDA) on 3/18/19        Social History    , Loatian speaking, children living with them (english speaking).   Social History     Socioeconomic History     Marital status:      Spouse name: Not on file     Number of children: Not on file     Years of education: Not on file     Highest education level: Not on file   Occupational History     Not on file   Social Needs     Financial resource strain: Not on file     Food insecurity:     Worry: Not on file     Inability: Not on file     Transportation needs:     Medical: Not on file     Non-medical: Not on file   Tobacco Use     Smoking status: Never Smoker     Smokeless tobacco: Never Used   Substance and Sexual Activity     Alcohol use: No     Frequency: Never     Drug use: No     Sexual activity: Yes     Partners: Female   Lifestyle     Physical activity:     Days per week: Not on file     Minutes per session: Not on file     Stress: Not on file   Relationships     Social connections:     Talks on  "phone: Not on file     Gets together: Not on file     Attends Alevism service: Not on file     Active member of club or organization: Not on file     Attends meetings of clubs or organizations: Not on file     Relationship status: Not on file     Intimate partner violence:     Fear of current or ex partner: Not on file     Emotionally abused: Not on file     Physically abused: Not on file     Forced sexual activity: Not on file   Other Topics Concern     Parent/sibling w/ CABG, MI or angioplasty before 65F 55M? No   Social History Narrative     Not on file            Review of Systems:   Skin:  Negative     Eyes:  Positive for glasses  ENT:  Negative    Respiratory:  Positive for dyspnea on exertion;sleep apnea;CPAP  Cardiovascular:  Negative fatigue;Positive for;edema  Gastroenterology: Negative    Genitourinary:  Negative    Musculoskeletal:  Negative    Neurologic:  Positive for headaches  Psychiatric:  Negative    Heme/Lymph/Imm:  Negative    Endocrine:  Positive for diabetes         Physical Exam:   Vitals: BP (!) 147/70   Pulse 58   Ht 1.6 m (5' 3\")   Wt 78.6 kg (173 lb 3.2 oz)   BMI 30.68 kg/m      Wt Readings from Last 4 Encounters:   03/02/20 78.6 kg (173 lb 3.2 oz)   01/27/20 75.8 kg (167 lb)   11/21/19 75.8 kg (167 lb)   10/17/19 73 kg (161 lb)     GEN: well nourished, in no acute distress.  HEENT:  Pupils equal, round. Sclerae nonicteric.   NECK: Supple, no masses appreciated.   C/V:  Regular rate and rhythm, no murmur, rub or gallop.    RESP: Respirations are unlabored. Clear to auscultation bilaterally, crackles in bases.   GI: Abdomen soft, nontender.  EXTREM: +1 bilateral lower extremity edema  NEURO: Alert and oriented, cooperative.  SKIN: Warm and dry.        Data:   ECHO 11/18/19  Left ventricular systolic function is mildly reduced. LVEF is estimated at 40-  45%. Basal to apical inferior hypokinesis.  The right ventricle is normal in structure, function and size.  No significant valvular " abnormalities.     This study was compared to a prior TTE from 3/12/2019. Global LV function  appears mildly improved.    Cardiac catheterization 3/12/19  1. Patient with multiple sausage links like stenoses through the mid and distal right coronary artery. There is a 70-80% stenosis in the continuation of the right coronary artery just beyond the posterior descending artery.   2. Mid LAD to Dist LAD lesion is 90% stenosed. The lesion is segmental and serial. There is a long distal LAD stenosis extending all the way through the apex.   3. Prox LAD to Mid LAD lesion is 45% stenosed.   4. Prox Cx lesion is 85% stenosed.   5. The ejection fraction is calculated to be 35%. LVEDP 31mmHg.  6. Mid LM lesion is 40% stenosed.    Plan   Maximal medical management.  Surgical consultation.  If patient is turned down by surgery we could consider multivessel intervention.    LIVER ENZYME RESULTS:  Lab Results   Component Value Date    AST 27 06/14/2019    ALT 22 10/17/2019     CBC RESULTS:  Lab Results   Component Value Date    WBC 8.4 06/14/2019    RBC 4.06 (L) 06/14/2019    HGB 9.9 (L) 06/14/2019    HCT 30.7 (L) 06/14/2019    MCV 76 (L) 06/14/2019    MCH 24.4 (L) 06/14/2019    MCHC 32.2 06/14/2019    RDW 18.2 (H) 06/14/2019     06/14/2019     BMP RESULTS:  Lab Results   Component Value Date     10/17/2019    POTASSIUM 4.0 10/17/2019    CHLORIDE 103 10/17/2019    CO2 24 10/17/2019    ANIONGAP 15 10/17/2019     (H) 10/17/2019    BUN 28 10/17/2019    CR 1.56 (H) 10/17/2019    GFRESTIMATED 45 (L) 10/17/2019    GFRESTBLACK 55 (L) 10/17/2019    MYKE 9.9 10/17/2019      A1C RESULTS:  Lab Results   Component Value Date    A1C 7.9 (H) 03/11/2019     INR RESULTS:  Lab Results   Component Value Date    INR 1.54 (H) 03/18/2019    INR 1.79 (H) 03/18/2019            Medications     Current Outpatient Medications   Medication Sig Dispense Refill     acetaminophen (TYLENOL) 325 MG tablet Take 2 tablets (650 mg) by mouth  every 4 hours as needed for other 60 tablet 0     amLODIPine (NORVASC) 10 MG tablet Take 1 tablet (10 mg) by mouth daily 90 tablet 3     aspirin (ASA) 325 MG EC tablet Take 1 tablet (325 mg) by mouth daily 30 tablet 0     atorvastatin (LIPITOR) 80 MG tablet Take 1 tablet (80 mg) by mouth every evening 90 tablet 3     furosemide (LASIX) 40 MG tablet Take 1 tablet (40 mg) by mouth daily 180 tablet 1     glipiZIDE (GLUCOTROL) 5 MG tablet Take 5 mg by mouth 2 times daily (before meals)       lisinopril (PRINIVIL/ZESTRIL) 20 MG tablet Take 1 tablet (20 mg) by mouth daily 90 tablet 3     metFORMIN (GLUCOPHAGE) 500 MG tablet Take 1,000 mg by mouth 2 times daily (with meals)        metoprolol succinate ER (TOPROL-XL) 25 MG 24 hr tablet Take 1 tablet (25 mg) by mouth daily 90 tablet 3     order for DME Equipment being ordered: Walker ()  Treatment Diagnosis: s/p coronary artery bypass 1 Device 0     spironolactone (ALDACTONE) 25 MG tablet Take 1 tablet (25 mg) by mouth daily 45 tablet 1          Past Medical History     Past Medical History:   Diagnosis Date     Chronic renal insufficiency      Diabetes mellitus with proliferative retinopathy (H)      Hyperlipidemia      Hypertension      Nephrolithiasis      NSTEMI (non-ST elevated myocardial infarction) (H)      Past Surgical History:   Procedure Laterality Date     BYPASS GRAFT ARTERY CORONARY N/A 3/18/2019    Procedure: CORONARY BYPASS GRAFTING X 4 - LIMA TO LAD, SV TO DIAGONAL, PDA, AND OM; ON PUMP WITH NOA; ENDOVEIN HARVEST LEFT LEG;  Surgeon: Adarsh Sanchez MD;  Location:  OR     CV CORONARY ANGIOGRAM N/A 3/12/2019    Procedure: Coronary Angiogram;  Surgeon: Reim Rodriguez MD;  Location:  HEART CARDIAC CATH LAB     CV HEART CATHETERIZATION WITH POSSIBLE INTERVENTION N/A 3/12/2019    Procedure: Heart Catheterization with possible Intervention;  Surgeon: Remi Rodriguez MD;  Location:  HEART CARDIAC CATH LAB     CV LEFT VENTRICULOGRAM N/A  3/12/2019    Procedure: Left Ventricular Angiogram;  Surgeon: Remi Rodriguez MD;  Location: [facility] CARDIAC CATH LAB     LITHOTRIPSY              Allergies   Patient has no known allergies.        MICHELE Bravo CNP  [facility]  Pager: [phone]      Thank you for allowing me to participate in the care of your patient.    Sincerely,     MICHELE Bravo CNP     [facility]

## 2020-03-02 NOTE — PATIENT INSTRUCTIONS
Call CORE nurse for any questions or concerns Mon-Fri 8am-4pm:                                                [phone]                                       For concerns after hours:                                                 [phone]     Medication changes: START spironolactone 25mg daily (1 tablet daily).    - ALL other medications stay the same  Plan from today:    - Follow up with Viv in 1 week.   Lab results: see attached; labs from PCP earlier this month show stable kidney function.

## 2020-03-02 NOTE — PROGRESS NOTES
Cardiology Clinic Progress Note  Khang Mcelroy MRN# [id number]   YOB: 1955 Age: 64 year old   Primary Cardiologist: Dr. Hernández Reason for visit: CORE follow up            Assessment and Plan:   Khang Mcelroy is a very pleasant 63 year old male with a history of HFrEF, ischemic cardiomyopathy, LVEF 35-40% per echocardiogram 3/12/19, coronary artery disease s/p CABG x 4 (LIMA-LAD, SVG-DIAG, SVG-OM, SVG-PDA) on 3/18/19, DM, hypertension, hyperlipidemia, CKD, and anemia. Patient here today for CORE followup.      1.  Combined chronic systolic heart failure/HFrEF and diastolic heart failure, ischemic cardiomyopathy - LVEF 35-40% 3/12/19 repeat echocardiogram in November 2019 showed slight improvement EF 40-45% and grade III diastolic dysfunction, patient appears compensated and slightly volume up, worsening lower extremity edema, otherwise HF symptoms are controlled. Weight up 3# in past 3 months on clinic scale, patient notes slight weight gain over past 2 weeks. This appears to likely be secondary to dietary indiscretions, noting he has been eating more. Labs from PCP visit on 2/10/20 showed overall stable kidney function with a creatinine of 1.45 and stable electrolytes. Plan to start spironolactone 25mg daily to help with volume status, blood pressure and optimize HF medications. Patient scheduled for close CORE follow up in 1 week to monitor kidney function.    - NYHA class III, stage C   - Etiology : ischemic   - Fluid status : slightly volume up   - Diuretic regimen : furosemide to 40mg daily PO    - Ischemic evaluation : coronary angiogram 3/12/19, s/p CABG 3/18/19   - Guideline directed medical therapy    - Betablocker: metoprolol succinate 25mg daily    - ACEI/ARB/ARNI: lisinopril 20mg daily    - Aldactone antagonist: START spironolactone 25mg daily    - Sudden cardiac death prophylaxis : n/a EF> 35%   - Sleep apnea : compliant with CPAP   - Reinforced HF education, reviewed low sodium  diet and 2L fluid restriction, praise given for decreased fast food consumption, reviewed with to call CORE clinic.     2. Coronary artery disease s/p CABG x 4 (LIMA-LAD, SVG-DIAG, SVG-OM, SVG-PDA) on 3/18/19, stable no signs/symptoms of myocardial ischemia.    - Continue aspirin, statin and betablocker therapy.   - Reinforced lifestyle modifications    3. Hypertension - slightly elevated, plan to start spironolactone today to optimize HF therapy and help control blood pressure.     - Counseled on lifestyle modifications to help manage blood pressure.     4. Hyperlipidemia - stable, LDL goal <70, lipid panel today showed total cholesterol 126, HDL 47, LDL 69 and triglycerides 48.    - Continue atorvastatin    5. Chronic kidney disease - looking back in Care Everywhere creatinine 1.6 in 2016, appears as of recently baseline has been around 1.5. Creatinine during most recent PCP visit on 2/10 1.45, which is overall stable. Will continue to monitor    6. Diabetes mellitus - hgbA1c 6.3 2/10/20, counseled patient on the importance of good glycemic control. Continue management per PCP.     7. Obstructive sleep apnea - Sleep study completed in June, compliant with CPAP        Changes today: START spironolactone 25mg daily     Follow up plan:     CORE follow up in 1 week with BMP/BNP prior to closely monitor kidney's after starting spironolactone.         History of Presenting Illness:    Khang Mcelroy is a very pleasant 63 year old male with a history of HFrEF, ischemic cardiomyopathy, LVEF 35-40% per echocardiogram 3/12/19, coronary artery disease s/p CABG x 4 (LIMA-LAD, SVG-DIAG, SVG-OM, SVG-PDA) on 3/18/19, DM, hypertension, hyperlipidemia, CKD, and anemia.     Hospitalized 3/11/19-3/24/19 presented with acute hypoxemic and hypercapnic respiratory failure secondary to NSTEMI, found to have multivessel coronary artery disease, s/p CABG x 4 (LIMA-LAD, SVG-DIAG, SVG-OM, SVG-PDA) on 3/18/19. Troponin peaked at 53.  Echocardiogram 3/12/19 showed an LVEF 35-40%, grade III or advanced diastolic dysfunction, with multiple wall motion abnormalities.     Patient has followed with Dr. Hernández and CORE clinic since his above hospitalization. Sleep study completed in June 2019 showed severe obstructive sleep apnea, recommended starting CPAP therapy. Patients diuretic dosage continues to fluctuate. During most recent visit with Dr. Hernández in November 2019 at which point patient was felt to be slightly volume up, furosemide was increased to 60mg daily x 1 week and 3 month CORE follow-up recommended with consideration of starting spironolactone and changing lisinopril to Entresto. Echocardiogram completed prior to this visit showed mildly reduced systolic function, EF 40-45%, RV normal size/function, grade III diastolic dysfunction and no significant valvular abnormalities.     Patient saw PCP 2/10/20, patient was doing well, nephrology referral placed. Kidney function was noted to be improved with a creatinine of 1.45.     Patient is here today for CORE followup. Patient is loatian speaking,  present. Patient reports feeling good, has noted a slight weight increase and worsening lower extremity edema the past 2 weeks. Monitoring weights at home. Weight has been ranging 167-168# which is up from 164#, clinic weight today 173#, which is up from 167#. Denies abdominal distention/bloating. Denies shortness of breath at rest. Denies exertional dyspnea with walking or stairs, states walking 10 blocks. Able to complete ADLs and IADLs independently, which is a continued improvement.  Sleeping good. Using CPAP. Denies orthopnea, sleeping with HOB elevated. Denies PND. Patient denies chest pain or chest tightness. Denies dizziness, lightheadedness or other presyncopal symptoms. Denies tachycardia or palpitations. Denies syncope.     Labs from PCP visit 2/10/20 show stable kidney function, creatinine 1.45 which is improved from prior  readings. Electrolytes stable. Looking back at prior readings, baseline creatinine appears to be around 1.5. Blood pressure 147/70 and HR 58 in clinic today.     Appetite good. Enjoys rice soup and fried rice. Wife does the cooking, trying to limit sodium use. Not adding additional salt. Eating fast foods [employer] 1 time per week. Walking daily for exercise, walking 10 blocks with no difficulty, most days walking for an 45 mins nonstop. Denies tobacco or alcohol use.         Recent Hospitalizations   3/11/19-3/24/19 presented with acute hypoxemic and hypercapnic respiratory failure secondary to NSTEMI, found to have multivessel coronary artery disease, s/p CABG x 4 (LIMA-LAD, SVG-DIAG, SVG-OM, SVG-PDA) on 3/18/19        Social History    , Loatian speaking, children living with them (english speaking).   Social History     Socioeconomic History     Marital status:      Spouse name: Not on file     Number of children: Not on file     Years of education: Not on file     Highest education level: Not on file   Occupational History     Not on file   Social Needs     Financial resource strain: Not on file     Food insecurity:     Worry: Not on file     Inability: Not on file     Transportation needs:     Medical: Not on file     Non-medical: Not on file   Tobacco Use     Smoking status: Never Smoker     Smokeless tobacco: Never Used   Substance and Sexual Activity     Alcohol use: No     Frequency: Never     Drug use: No     Sexual activity: Yes     Partners: Female   Lifestyle     Physical activity:     Days per week: Not on file     Minutes per session: Not on file     Stress: Not on file   Relationships     Social connections:     Talks on phone: Not on file     Gets together: Not on file     Attends Jainism service: Not on file     Active member of club or organization: Not on file     Attends meetings of clubs or organizations: Not on file     Relationship status: Not on file     Intimate  "partner violence:     Fear of current or ex partner: Not on file     Emotionally abused: Not on file     Physically abused: Not on file     Forced sexual activity: Not on file   Other Topics Concern     Parent/sibling w/ CABG, MI or angioplasty before 65F 55M? No   Social History Narrative     Not on file            Review of Systems:   Skin:  Negative     Eyes:  Positive for glasses  ENT:  Negative    Respiratory:  Positive for dyspnea on exertion;sleep apnea;CPAP  Cardiovascular:  Negative fatigue;Positive for;edema  Gastroenterology: Negative    Genitourinary:  Negative    Musculoskeletal:  Negative    Neurologic:  Positive for headaches  Psychiatric:  Negative    Heme/Lymph/Imm:  Negative    Endocrine:  Positive for diabetes         Physical Exam:   Vitals: BP (!) 147/70   Pulse 58   Ht 1.6 m (5' 3\")   Wt 78.6 kg (173 lb 3.2 oz)   BMI 30.68 kg/m     Wt Readings from Last 4 Encounters:   03/02/20 78.6 kg (173 lb 3.2 oz)   01/27/20 75.8 kg (167 lb)   11/21/19 75.8 kg (167 lb)   10/17/19 73 kg (161 lb)     GEN: well nourished, in no acute distress.  HEENT:  Pupils equal, round. Sclerae nonicteric.   NECK: Supple, no masses appreciated.   C/V:  Regular rate and rhythm, no murmur, rub or gallop.    RESP: Respirations are unlabored. Clear to auscultation bilaterally, crackles in bases.   GI: Abdomen soft, nontender.  EXTREM: +1 bilateral lower extremity edema  NEURO: Alert and oriented, cooperative.  SKIN: Warm and dry.        Data:   ECHO 11/18/19  Left ventricular systolic function is mildly reduced. LVEF is estimated at 40-  45%. Basal to apical inferior hypokinesis.  The right ventricle is normal in structure, function and size.  No significant valvular abnormalities.     This study was compared to a prior TTE from 3/12/2019. Global LV function  appears mildly improved.    Cardiac catheterization 3/12/19  1. Patient with multiple sausage links like stenoses through the mid and distal right coronary artery. " There is a 70-80% stenosis in the continuation of the right coronary artery just beyond the posterior descending artery.   2. Mid LAD to Dist LAD lesion is 90% stenosed. The lesion is segmental and serial. There is a long distal LAD stenosis extending all the way through the apex.   3. Prox LAD to Mid LAD lesion is 45% stenosed.   4. Prox Cx lesion is 85% stenosed.   5. The ejection fraction is calculated to be 35%. LVEDP 31mmHg.  6. Mid LM lesion is 40% stenosed.    Plan   Maximal medical management.  Surgical consultation.  If patient is turned down by surgery we could consider multivessel intervention.    LIVER ENZYME RESULTS:  Lab Results   Component Value Date    AST 27 06/14/2019    ALT 22 10/17/2019     CBC RESULTS:  Lab Results   Component Value Date    WBC 8.4 06/14/2019    RBC 4.06 (L) 06/14/2019    HGB 9.9 (L) 06/14/2019    HCT 30.7 (L) 06/14/2019    MCV 76 (L) 06/14/2019    MCH 24.4 (L) 06/14/2019    MCHC 32.2 06/14/2019    RDW 18.2 (H) 06/14/2019     06/14/2019     BMP RESULTS:  Lab Results   Component Value Date     10/17/2019    POTASSIUM 4.0 10/17/2019    CHLORIDE 103 10/17/2019    CO2 24 10/17/2019    ANIONGAP 15 10/17/2019     (H) 10/17/2019    BUN 28 10/17/2019    CR 1.56 (H) 10/17/2019    GFRESTIMATED 45 (L) 10/17/2019    GFRESTBLACK 55 (L) 10/17/2019    MYKE 9.9 10/17/2019      A1C RESULTS:  Lab Results   Component Value Date    A1C 7.9 (H) 03/11/2019     INR RESULTS:  Lab Results   Component Value Date    INR 1.54 (H) 03/18/2019    INR 1.79 (H) 03/18/2019            Medications     Current Outpatient Medications   Medication Sig Dispense Refill     acetaminophen (TYLENOL) 325 MG tablet Take 2 tablets (650 mg) by mouth every 4 hours as needed for other 60 tablet 0     amLODIPine (NORVASC) 10 MG tablet Take 1 tablet (10 mg) by mouth daily 90 tablet 3     aspirin (ASA) 325 MG EC tablet Take 1 tablet (325 mg) by mouth daily 30 tablet 0     atorvastatin (LIPITOR) 80 MG tablet Take  1 tablet (80 mg) by mouth every evening 90 tablet 3     furosemide (LASIX) 40 MG tablet Take 1 tablet (40 mg) by mouth daily 180 tablet 1     glipiZIDE (GLUCOTROL) 5 MG tablet Take 5 mg by mouth 2 times daily (before meals)       lisinopril (PRINIVIL/ZESTRIL) 20 MG tablet Take 1 tablet (20 mg) by mouth daily 90 tablet 3     metFORMIN (GLUCOPHAGE) 500 MG tablet Take 1,000 mg by mouth 2 times daily (with meals)        metoprolol succinate ER (TOPROL-XL) 25 MG 24 hr tablet Take 1 tablet (25 mg) by mouth daily 90 tablet 3     order for DME Equipment being ordered: Walker ()  Treatment Diagnosis: s/p coronary artery bypass 1 Device 0     spironolactone (ALDACTONE) 25 MG tablet Take 1 tablet (25 mg) by mouth daily 45 tablet 1          Past Medical History     Past Medical History:   Diagnosis Date     Chronic renal insufficiency      Diabetes mellitus with proliferative retinopathy (H)      Hyperlipidemia      Hypertension      Nephrolithiasis      NSTEMI (non-ST elevated myocardial infarction) (H)      Past Surgical History:   Procedure Laterality Date     BYPASS GRAFT ARTERY CORONARY N/A 3/18/2019    Procedure: CORONARY BYPASS GRAFTING X 4 - LIMA TO LAD, SV TO DIAGONAL, PDA, AND OM; ON PUMP WITH NOA; ENDOVEIN HARVEST LEFT LEG;  Surgeon: Adarsh Sanchez MD;  Location:  OR     CV CORONARY ANGIOGRAM N/A 3/12/2019    Procedure: Coronary Angiogram;  Surgeon: Remi Rodriguez MD;  Location:  [facility]     CV HEART CATHETERIZATION WITH POSSIBLE INTERVENTION N/A 3/12/2019    Procedure: Heart Catheterization with possible Intervention;  Surgeon: Remi Rodriguez MD;  Location:  HE[facility]ATH [facility]     CV LEFT VENTRICULOGRAM N/A 3/12/2019    Procedure: Left Ventricular Angiogram;  Surgeon: Remi Rodriguez MD;  Location:  HEART [facility]ARD[facility] CATH LAB     LITHOTRIPSY              Allergies   Patient has no known allergies.        [facility]  Pager:  [phone]

## 2020-03-10 ENCOUNTER — OFFICE VISIT (OUTPATIENT)
Dept: CARDIOLOGY | Facility: CLINIC | Age: 65
End: 2020-03-10
Attending: NURSE PRACTITIONER
Payer: COMMERCIAL

## 2020-03-10 VITALS
WEIGHT: 168 LBS | DIASTOLIC BLOOD PRESSURE: 74 MMHG | HEIGHT: 63 IN | SYSTOLIC BLOOD PRESSURE: 161 MMHG | HEART RATE: 60 BPM | BODY MASS INDEX: 29.77 KG/M2

## 2020-03-10 DIAGNOSIS — I25.10 CAD IN NATIVE ARTERY: ICD-10-CM

## 2020-03-10 DIAGNOSIS — N18.30 CKD (CHRONIC KIDNEY DISEASE) STAGE 3, GFR 30-59 ML/MIN (H): ICD-10-CM

## 2020-03-10 DIAGNOSIS — I10 ESSENTIAL HYPERTENSION: ICD-10-CM

## 2020-03-10 DIAGNOSIS — I50.22 CHRONIC SYSTOLIC HEART FAILURE (H): ICD-10-CM

## 2020-03-10 DIAGNOSIS — G47.33 OSA (OBSTRUCTIVE SLEEP APNEA): ICD-10-CM

## 2020-03-10 DIAGNOSIS — I50.22 CHRONIC SYSTOLIC HEART FAILURE (H): Primary | ICD-10-CM

## 2020-03-10 DIAGNOSIS — E78.5 HYPERLIPIDEMIA LDL GOAL <70: ICD-10-CM

## 2020-03-10 LAB
ANION GAP SERPL CALCULATED.3IONS-SCNC: 15.4 MMOL/L (ref 6–17)
BUN SERPL-MCNC: 35 MG/DL (ref 7–30)
CALCIUM SERPL-MCNC: 9.9 MG/DL (ref 8.5–10.5)
CHLORIDE SERPL-SCNC: 106 MMOL/L (ref 98–107)
CO2 SERPL-SCNC: 23 MMOL/L (ref 23–29)
CREAT SERPL-MCNC: 1.61 MG/DL (ref 0.7–1.3)
GFR SERPL CREATININE-BSD FRML MDRD: 44 ML/MIN/{1.73_M2}
GLUCOSE SERPL-MCNC: 122 MG/DL (ref 70–105)
NT-PROBNP SERPL-MCNC: 1793 PG/ML (ref 0–125)
POTASSIUM SERPL-SCNC: 4.4 MMOL/L (ref 3.5–5.1)
SODIUM SERPL-SCNC: 140 MMOL/L (ref 136–145)

## 2020-03-10 PROCEDURE — 80048 BASIC METABOLIC PNL TOTAL CA: CPT | Performed by: INTERNAL MEDICINE

## 2020-03-10 PROCEDURE — 83880 ASSAY OF NATRIURETIC PEPTIDE: CPT | Performed by: INTERNAL MEDICINE

## 2020-03-10 PROCEDURE — 36415 COLL VENOUS BLD VENIPUNCTURE: CPT | Performed by: INTERNAL MEDICINE

## 2020-03-10 PROCEDURE — 99214 OFFICE O/P EST MOD 30 MIN: CPT | Performed by: NURSE PRACTITIONER

## 2020-03-10 ASSESSMENT — MIFFLIN-ST. JEOR: SCORE: 1457.17

## 2020-03-10 NOTE — PROGRESS NOTES
Cardiology Clinic Progress Note  Khang Mcelroy MRN# [id number]   YOB: 1955 Age: 64 year old   Primary Cardiologist: Dr. Hernández Reason for visit: CORE follow up            Assessment and Plan:   Khang Mcelroy is a very pleasant 63 year old male with a history of HFrEF, ischemic cardiomyopathy, LVEF 35-40% per echocardiogram 3/12/19, coronary artery disease s/p CABG x 4 (LIMA-LAD, SVG-DIAG, SVG-OM, SVG-PDA) on 3/18/19, DM, hypertension, hyperlipidemia, CKD, and anemia. Patient here today for CORE followup.      1.  Combined chronic systolic heart failure/HFrEF and diastolic heart failure, ischemic cardiomyopathy - LVEF 35-40% 3/12/19 repeat echocardiogram in November 2019 showed slight improvement EF 40-45% and grade III diastolic dysfunction, during CORE visit last week, patients weight was up 6# with worsening lower extremity edema. He was continued on furosemide 40mg daily and started on spironolactone 25mg daily. Since this patient weight is down 5# with improving lower extremity edema. Patient appears compensated and slightly volume up, but improved from last week.  Patient continues to have multiple dietary indiscretions with sodium, eating fast food and wife using fish sauce and soy sauce with cooking. Labs today show overall stable kidney function with a creatinine of 1.61 and stable electrolytes. Plan to continue current medication regimen with continued close CORE follow up.    - NYHA class III, stage C   - Etiology : ischemic   - Fluid status : slightly volume up, but improved.    - Dry weight: likely ~ 163-165# (clinic scale)   - Diuretic regimen : furosemide to 40mg daily PO    - Ischemic evaluation : coronary angiogram 3/12/19, s/p CABG 3/18/19   - Guideline directed medical therapy    - Betablocker: metoprolol succinate 25mg daily    - ACEI/ARB/ARNI: lisinopril 20mg daily    - Aldactone antagonist: spironolactone 25mg daily    - Sudden cardiac death prophylaxis : n/a EF> 35%   -  Sleep apnea : compliant with CPAP   - Reinforced HF education, reviewed low sodium diet and 2L fluid restriction, reviewed with to call CORE clinic. Advised to call with 2# weight gain overnight or 3-5# weight gain during week.     2. Coronary artery disease s/p CABG x 4 (LIMA-LAD, SVG-DIAG, SVG-OM, SVG-PDA) on 3/18/19, stable no signs/symptoms of myocardial ischemia.    - Continue aspirin, statin and betablocker therapy.   - Reinforced lifestyle modifications    3. Hypertension - elevated, patient did not take his medications today, unable to assess the affect of adding spironolactone to regimen.    - Plan to continue current medications    - Advised to take all medications as ordered prior to next clinic visit so able to accurate assess his blood pressure control.    - Counseled on lifestyle modifications to help manage blood pressure.     4. Hyperlipidemia - stable, LDL goal <70, lipid panel today showed total cholesterol 126, HDL 47, LDL 69 and triglycerides 48.    - Continue atorvastatin    5. Chronic kidney disease - looking back in Care Everywhere creatinine 1.6 in 2016, baseline 1.4-1.7. Labs today show creatinine of 1.61, which is overall stable. Will continue to monitor    6. Diabetes mellitus - hgbA1c 6.3 2/10/20, counseled patient on the importance of good glycemic control. Continue management per PCP.     7. Obstructive sleep apnea - compliant with CPAP        Changes today: none    Follow up plan:     CORE follow up in 1 month with BMP prior.       History of Presenting Illness:    Khang Mcelroy is a very pleasant 63 year old male with a history of HFrEF, ischemic cardiomyopathy, LVEF 35-40% per echocardiogram 3/12/19, coronary artery disease s/p CABG x 4 (LIMA-LAD, SVG-DIAG, SVG-OM, SVG-PDA) on 3/18/19, DM, hypertension, hyperlipidemia, CKD, and anemia.     Hospitalized 3/11/19-3/24/19 presented with acute hypoxemic and hypercapnic respiratory failure secondary to NSTEMI, found to have multivessel  coronary artery disease, s/p CABG x 4 (LIMA-LAD, SVG-DIAG, SVG-OM, SVG-PDA) on 3/18/19. Troponin peaked at 53. Echocardiogram 3/12/19 showed an LVEF 35-40%, grade III or advanced diastolic dysfunction, with multiple wall motion abnormalities.     Patient has followed with Dr. Hernández and CORE clinic since his above hospitalization. Sleep study completed in June 2019 showed severe obstructive sleep apnea, recommended starting CPAP therapy. Patients diuretic dosage continues to fluctuate. During visit with Dr. Hernández in November 2019 patient was felt to be slightly volume up, furosemide was increased to 60mg daily x 1 week and 3 month CORE follow-up recommended with consideration of starting spironolactone and changing lisinopril to Entresto. Echocardiogram completed prior to this visit showed mildly reduced systolic function, EF 40-45%, RV normal size/function, grade III diastolic dysfunction and no significant valvular abnormalities. I saw patient last week at which point his weight was up 6# with worsening lower extremity edema. I continued furosemide at 40mg daily and started spironolactone 25mg daily with close CORE follow up.     Patient is here today for CORE followup. Patient is loatian speaking,  present. Patient reports feeling good, has noted some improvement in lower extremity edema since initiation of spironolactone. Monitoring weights at home, reports weights have been decreasing, doesn't bring weight loss but recalls weight has been ranging 165-166# which is down approximately 3# since last clinic visit. Clinic weight is down 5# in past week, 173 -> 168# today. Denies abdominal distention/bloating. Denies shortness of breath at rest. Denies exertional dyspnea with walking or stairs, states walking 10 blocks. Able to complete ADLs and IADLs independently, which is a continued improvement.  Sleeping good. Using CPAP. Denies orthopnea, sleeping with HOB elevated. Denies PND. Patient denies chest  pain or chest tightness. Denies dizziness, lightheadedness or other presyncopal symptoms. Denies tachycardia or palpitations. Denies syncope.     Labs from today show stable kidney function, creatinine 1.61. Electrolytes stable, potassium 4.4. Baseline creatinine 1.5-1.7. Blood pressure 161/74 and HR 60 in clinic today. Did not take medications today, so unable to assess affect of spironolactone on blood pressure control. Not monitoring blood pressure at home.     Appetite good. Enjoys rice soup and fried rice. Wife does the cooking, trying to limit sodium use. Not adding additional salt. Using fish sauce and soy sauce. Eating fast foods [employer] 1 time per week, states none this past week. Walking daily for exercise, walking 10 blocks with no difficulty, most days walking for an 45 mins nonstop. Denies tobacco or alcohol use.         Recent Hospitalizations   3/11/19-3/24/19 presented with acute hypoxemic and hypercapnic respiratory failure secondary to NSTEMI, found to have multivessel coronary artery disease, s/p CABG x 4 (LIMA-LAD, SVG-DIAG, SVG-OM, SVG-PDA) on 3/18/19        Social History    , Loatian speaking, children living with them (english speaking).   Social History     Socioeconomic History     Marital status:      Spouse name: Not on file     Number of children: Not on file     Years of education: Not on file     Highest education level: Not on file   Occupational History     Not on file   Social Needs     Financial resource strain: Not on file     Food insecurity     Worry: Not on file     Inability: Not on file     Transportation needs     Medical: Not on file     Non-medical: Not on file   Tobacco Use     Smoking status: Never Smoker     Smokeless tobacco: Never Used   Substance and Sexual Activity     Alcohol use: No     Frequency: Never     Drug use: No     Sexual activity: Yes     Partners: Female   Lifestyle     Physical activity     Days per week: Not on file      "Minutes per session: Not on file     Stress: Not on file   Relationships     Social connections     Talks on phone: Not on file     Gets together: Not on file     Attends Jewish service: Not on file     Active member of club or organization: Not on file     Attends meetings of clubs or organizations: Not on file     Relationship status: Not on file     Intimate partner violence     Fear of current or ex partner: Not on file     Emotionally abused: Not on file     Physically abused: Not on file     Forced sexual activity: Not on file   Other Topics Concern     Parent/sibling w/ CABG, MI or angioplasty before 65F 55M? No   Social History Narrative     Not on file            Review of Systems:   Skin:  Negative     Eyes:  Positive for glasses  ENT:  Negative    Respiratory:  Positive for dyspnea on exertion;sleep apnea;CPAP  Cardiovascular:  Negative fatigue;Positive for;edema  Gastroenterology: Negative    Genitourinary:  Negative    Musculoskeletal:  Negative    Neurologic:  Positive for headaches  Psychiatric:  Negative    Heme/Lymph/Imm:  Negative    Endocrine:  Positive for diabetes         Physical Exam:   Vitals: BP (!) 161/74   Pulse 60   Ht 1.6 m (5' 3\")   Wt 76.2 kg (168 lb)   BMI 29.76 kg/m     Wt Readings from Last 4 Encounters:   03/10/20 76.2 kg (168 lb)   03/02/20 78.6 kg (173 lb 3.2 oz)   01/27/20 75.8 kg (167 lb)   11/21/19 75.8 kg (167 lb)     GEN: well nourished, in no acute distress.  HEENT:  Pupils equal, round. Sclerae nonicteric.   NECK: Supple, no masses appreciated.   C/V:  Regular rate and rhythm, no murmur, rub or gallop.    RESP: Respirations are unlabored. Clear to auscultation bilaterally, crackles in bases.   GI: Abdomen soft, nontender.  EXTREM: Trace to +1 bilateral lower extremity edema  NEURO: Alert and oriented, cooperative.  SKIN: Warm and dry.        Data:   ECHO 11/18/19  Left ventricular systolic function is mildly reduced. LVEF is estimated at 40-  45%. Basal to apical " inferior hypokinesis.  The right ventricle is normal in structure, function and size.  No significant valvular abnormalities.     This study was compared to a prior TTE from 3/12/2019. Global LV function  appears mildly improved.    Cardiac catheterization 3/12/19  1. Patient with multiple sausage links like stenoses through the mid and distal right coronary artery. There is a 70-80% stenosis in the continuation of the right coronary artery just beyond the posterior descending artery.   2. Mid LAD to Dist LAD lesion is 90% stenosed. The lesion is segmental and serial. There is a long distal LAD stenosis extending all the way through the apex.   3. Prox LAD to Mid LAD lesion is 45% stenosed.   4. Prox Cx lesion is 85% stenosed.   5. The ejection fraction is calculated to be 35%. LVEDP 31mmHg.  6. Mid LM lesion is 40% stenosed.    Plan   Maximal medical management.  Surgical consultation.  If patient is turned down by surgery we could consider multivessel intervention.    LIVER ENZYME RESULTS:  Lab Results   Component Value Date    AST 27 06/14/2019    ALT 22 10/17/2019     CBC RESULTS:  Lab Results   Component Value Date    WBC 8.4 06/14/2019    RBC 4.06 (L) 06/14/2019    HGB 9.9 (L) 06/14/2019    HCT 30.7 (L) 06/14/2019    MCV 76 (L) 06/14/2019    MCH 24.4 (L) 06/14/2019    MCHC 32.2 06/14/2019    RDW 18.2 (H) 06/14/2019     06/14/2019     BMP RESULTS:  Lab Results   Component Value Date     03/10/2020    POTASSIUM 4.4 03/10/2020    CHLORIDE 106 03/10/2020    CO2 23 03/10/2020    ANIONGAP 15.4 03/10/2020     (H) 03/10/2020    BUN 35 (H) 03/10/2020    CR 1.61 (H) 03/10/2020    GFRESTIMATED 44 (L) 03/10/2020    GFRESTBLACK 53 (L) 03/10/2020    MYKE 9.9 03/10/2020      A1C RESULTS:  Lab Results   Component Value Date    A1C 7.9 (H) 03/11/2019     INR RESULTS:  Lab Results   Component Value Date    INR 1.54 (H) 03/18/2019    INR 1.79 (H) 03/18/2019            Medications     Current Outpatient  Medications   Medication Sig Dispense Refill     acetaminophen (TYLENOL) 325 MG tablet Take 2 tablets (650 mg) by mouth every 4 hours as needed for other 60 tablet 0     amLODIPine (NORVASC) 10 MG tablet Take 1 tablet (10 mg) by mouth daily 90 tablet 3     aspirin (ASA) 325 MG EC tablet Take 1 tablet (325 mg) by mouth daily 30 tablet 0     atorvastatin (LIPITOR) 80 MG tablet Take 1 tablet (80 mg) by mouth every evening 90 tablet 3     furosemide (LASIX) 40 MG tablet Take 1 tablet (40 mg) by mouth daily 180 tablet 1     glipiZIDE (GLUCOTROL) 5 MG tablet Take 5 mg by mouth 2 times daily (before meals)       lisinopril (PRINIVIL/ZESTRIL) 20 MG tablet Take 1 tablet (20 mg) by mouth daily 90 tablet 3     metFORMIN (GLUCOPHAGE) 500 MG tablet Take 1,000 mg by mouth 2 times daily (with meals)        metoprolol succinate ER (TOPROL-XL) 25 MG 24 hr tablet Take 1 tablet (25 mg) by mouth daily 90 tablet 3     order for DME Equipment being ordered: Walker ()  Treatment Diagnosis: s/p coronary artery bypass 1 Device 0     spironolactone (ALDACTONE) 25 MG tablet Take 1 tablet (25 mg) by mouth daily 45 tablet 1          Past Medical History     Past Medical History:   Diagnosis Date     Chronic renal insufficiency      Diabetes mellitus with proliferative retinopathy (H)      Hyperlipidemia      Hypertension      Nephrolithiasis      NSTEMI (non-ST elevated myocardial infarction) (H)      Past Surgical History:   Procedure Laterality Date     BYPASS GRAFT ARTERY CORONARY N/A 3/18/2019    Procedure: CORONARY BYPASS GRAFTING X 4 - LIMA TO LAD, SV TO DIAGONAL, PDA, AND OM; ON PUMP WITH NOA; ENDOVEIN HARVEST LEFT LEG;  Surgeon: Adarsh Sanchez MD;  Location:  OR     CV CORONARY ANGIOGRAM N/A 3/12/2019    Procedure: Coronary Angiogram;  Surgeon: Remi Rodriguez MD;  Location:  HEART CARDIAC CATH LAB     CV HEART CATHETERIZATION WITH POSSIBLE INTERVENTION N/A 3/12/2019    Procedure: Heart Catheterization with  possible Intervention;  Surgeon: Remi Rodriguez MD;  Location: [facility] CARDIAC CATH LAB     CV LEFT VENTRICULOGRAM N/A 3/12/2019    Procedure: Left Ventricular Angiogram;  Surgeon: Remi Rodriguez MD;  Location: [facility] CARDIAC CATH LAB     LITHOTRIPSY              Allergies   Patient has no known allergies.        MICHELE Bravo [facility]  Pager: [phone]

## 2020-03-10 NOTE — LETTER
3/10/2020    Mpho Brian Castro MD  Formerly [facility] [address]  [facility] [address]    RE: Khang Mcelroy       Dear Colleague,    I had the pleasure of seeing Khang Mcelroy in the [facility].    Cardiology Clinic Progress Note  Khang Mcelroy MRN# [id number]   YOB: 1955 Age: 64 year old   Primary Cardiologist: Dr. Hernández Reason for visit: CORE follow up            Assessment and Plan:   Khang Mcelroy is a very pleasant 63 year old male with a history of HFrEF, ischemic cardiomyopathy, LVEF 35-40% per echocardiogram 3/12/19, coronary artery disease s/p CABG x 4 (LIMA-LAD, SVG-DIAG, SVG-OM, SVG-PDA) on 3/18/19, DM, hypertension, hyperlipidemia, CKD, and anemia. Patient here today for CORE followup.      1.  Combined chronic systolic heart failure/HFrEF and diastolic heart failure, ischemic cardiomyopathy - LVEF 35-40% 3/12/19 repeat echocardiogram in November 2019 showed slight improvement EF 40-45% and grade III diastolic dysfunction, during CORE visit last week, patients weight was up 6# with worsening lower extremity edema. He was continued on furosemide 40mg daily and started on spironolactone 25mg daily. Since this patient weight is down 5# with improving lower extremity edema. Patient appears compensated and slightly volume up, but improved from last week.  Patient continues to have multiple dietary indiscretions with sodium, eating fast food and wife using fish sauce and soy sauce with cooking. Labs today show overall stable kidney function with a creatinine of 1.61 and stable electrolytes. Plan to continue current medication regimen with continued close CORE follow up.    - NYHA class III, stage C   - Etiology : ischemic   - Fluid status : slightly volume up, but improved.    - Dry weight: likely ~ 163-165# (clinic scale)   - Diuretic regimen : furosemide to 40mg daily PO    - Ischemic evaluation : coronary angiogram 3/12/19, s/p  CABG 3/18/19   - Guideline directed medical therapy    - Betablocker: metoprolol succinate 25mg daily    - ACEI/ARB/ARNI: lisinopril 20mg daily    - Aldactone antagonist: spironolactone 25mg daily    - Sudden cardiac death prophylaxis : n/a EF> 35%   - Sleep apnea : compliant with CPAP   - Reinforced HF education, reviewed low sodium diet and 2L fluid restriction, reviewed with to call CORE clinic. Advised to call with 2# weight gain overnight or 3-5# weight gain during week.     2. Coronary artery disease s/p CABG x 4 (LIMA-LAD, SVG-DIAG, SVG-OM, SVG-PDA) on 3/18/19, stable no signs/symptoms of myocardial ischemia.    - Continue aspirin, statin and betablocker therapy.   - Reinforced lifestyle modifications    3. Hypertension - elevated, patient did not take his medications today, unable to assess the affect of adding spironolactone to regimen.    - Plan to continue current medications    - Advised to take all medications as ordered prior to next clinic visit so able to accurate assess his blood pressure control.    - Counseled on lifestyle modifications to help manage blood pressure.     4. Hyperlipidemia - stable, LDL goal <70, lipid panel today showed total cholesterol 126, HDL 47, LDL 69 and triglycerides 48.    - Continue atorvastatin    5. Chronic kidney disease - looking back in Care Everywhere creatinine 1.6 in 2016, baseline 1.4-1.7. Labs today show creatinine of 1.61, which is overall stable. Will continue to monitor    6. Diabetes mellitus - hgbA1c 6.3 2/10/20, counseled patient on the importance of good glycemic control. Continue management per PCP.     7. Obstructive sleep apnea - compliant with CPAP        Changes today: none    Follow up plan:     CORE follow up in 1 month with BMP prior.       History of Presenting Illness:    Khang Mcelroy is a very pleasant 63 year old male with a history of HFrEF, ischemic cardiomyopathy, LVEF 35-40% per echocardiogram 3/12/19, coronary artery disease s/p  CABG x 4 (LIMA-LAD, SVG-DIAG, SVG-OM, SVG-PDA) on 3/18/19, DM, hypertension, hyperlipidemia, CKD, and anemia.     Hospitalized 3/11/19-3/24/19 presented with acute hypoxemic and hypercapnic respiratory failure secondary to NSTEMI, found to have multivessel coronary artery disease, s/p CABG x 4 (LIMA-LAD, SVG-DIAG, SVG-OM, SVG-PDA) on 3/18/19. Troponin peaked at 53. Echocardiogram 3/12/19 showed an LVEF 35-40%, grade III or advanced diastolic dysfunction, with multiple wall motion abnormalities.     Patient has followed with Dr. Hernández and CORE clinic since his above hospitalization. Sleep study completed in June 2019 showed severe obstructive sleep apnea, recommended starting CPAP therapy. Patients diuretic dosage continues to fluctuate. During visit with Dr. Hernández in November 2019 patient was felt to be slightly volume up, furosemide was increased to 60mg daily x 1 week and 3 month CORE follow-up recommended with consideration of starting spironolactone and changing lisinopril to Entresto. Echocardiogram completed prior to this visit showed mildly reduced systolic function, EF 40-45%, RV normal size/function, grade III diastolic dysfunction and no significant valvular abnormalities. I saw patient last week at which point his weight was up 6# with worsening lower extremity edema. I continued furosemide at 40mg daily and started spironolactone 25mg daily with close CORE follow up.     Patient is here today for CORE followup. Patient is loatian speaking,  present. Patient reports feeling good, has noted some improvement in lower extremity edema since initiation of spironolactone. Monitoring weights at home, reports weights have been decreasing, doesn't bring weight loss but recalls weight has been ranging 165-166# which is down approximately 3# since last clinic visit. Clinic weight is down 5# in past week, 173 -> 168# today. Denies abdominal distention/bloating. Denies shortness of breath at rest. Denies  exertional dyspnea with walking or stairs, states walking 10 blocks. Able to complete ADLs and IADLs independently, which is a continued improvement.  Sleeping good. Using CPAP. Denies orthopnea, sleeping with HOB elevated. Denies PND. Patient denies chest pain or chest tightness. Denies dizziness, lightheadedness or other presyncopal symptoms. Denies tachycardia or palpitations. Denies syncope.     Labs from today show stable kidney function, creatinine 1.61. Electrolytes stable, potassium 4.4. Baseline creatinine 1.5-1.7. Blood pressure 161/74 and HR 60 in clinic today. Did not take medications today, so unable to assess affect of spironolactone on blood pressure control. Not monitoring blood pressure at home.     Appetite good. Enjoys rice soup and fried rice. Wife does the cooking, trying to limit sodium use. Not adding additional salt. Using fish sauce and soy sauce. Eating fast foods [employer] 1 time per week, states none this past week. Walking daily for exercise, walking 10 blocks with no difficulty, most days walking for an 45 mins nonstop. Denies tobacco or alcohol use.         Recent Hospitalizations   3/11/19-3/24/19 presented with acute hypoxemic and hypercapnic respiratory failure secondary to NSTEMI, found to have multivessel coronary artery disease, s/p CABG x 4 (LIMA-LAD, SVG-DIAG, SVG-OM, SVG-PDA) on 3/18/19        Social History    , Loatian speaking, children living with them (english speaking).   Social History     Socioeconomic History     Marital status:      Spouse name: Not on file     Number of children: Not on file     Years of education: Not on file     Highest education level: Not on file   Occupational History     Not on file   Social Needs     Financial resource strain: Not on file     Food insecurity     Worry: Not on file     Inability: Not on file     Transportation needs     Medical: Not on file     Non-medical: Not on file   Tobacco Use     Smoking  "status: Never Smoker     Smokeless tobacco: Never Used   Substance and Sexual Activity     Alcohol use: No     Frequency: Never     Drug use: No     Sexual activity: Yes     Partners: Female   Lifestyle     Physical activity     Days per week: Not on file     Minutes per session: Not on file     Stress: Not on file   Relationships     Social connections     Talks on phone: Not on file     Gets together: Not on file     Attends [employer] service: Not on file     Active member of club or organization: Not on file     Attends meetings of clubs or organizations: Not on file     Relationship status: Not on file     Intimate partner violence     Fear of current or ex partner: Not on file     Emotionally abused: Not on file     Physically abused: Not on file     Forced sexual activity: Not on file   Other Topics Concern     Parent/sibling w/ CABG, MI or angioplasty before 65F 55M? No   Social History Narrative     Not on file            Review of Systems:   Skin:  Negative     Eyes:  Positive for glasses  ENT:  Negative    Respiratory:  Positive for dyspnea on exertion;sleep apnea;CPAP  Cardiovascular:  Negative fatigue;Positive for;edema  Gastroenterology: Negative    Genitourinary:  Negative    Musculoskeletal:  Negative    Neurologic:  Positive for headaches  Psychiatric:  Negative    Heme/Lymph/Imm:  Negative    Endocrine:  Positive for diabetes         Physical Exam:   Vitals: BP (!) 161/74   Pulse 60   Ht 1.6 m (5' 3\")   Wt 76.2 kg (168 lb)   BMI 29.76 kg/m     Wt Readings from Last 4 Encounters:   03/10/20 76.2 kg (168 lb)   03/02/20 78.6 kg (173 lb 3.2 oz)   01/27/20 75.8 kg (167 lb)   11/21/19 75.8 kg (167 lb)     GEN: well nourished, in no acute distress.  HEENT:  Pupils equal, round. Sclerae nonicteric.   NECK: Supple, no masses appreciated.   C/V:  Regular rate and rhythm, no murmur, rub or gallop.    RESP: Respirations are unlabored. Clear to auscultation bilaterally, crackles in bases.   GI: Abdomen soft, " nontender.  EXTREM: Trace to +1 bilateral lower extremity edema  NEURO: Alert and oriented, cooperative.  SKIN: Warm and dry.        Data:   ECHO 11/18/19  Left ventricular systolic function is mildly reduced. LVEF is estimated at 40-  45%. Basal to apical inferior hypokinesis.  The right ventricle is normal in structure, function and size.  No significant valvular abnormalities.     This study was compared to a prior TTE from 3/12/2019. Global LV function  appears mildly improved.    Cardiac catheterization 3/12/19  1. Patient with multiple sausage links like stenoses through the mid and distal right coronary artery. There is a 70-80% stenosis in the continuation of the right coronary artery just beyond the posterior descending artery.   2. Mid LAD to Dist LAD lesion is 90% stenosed. The lesion is segmental and serial. There is a long distal LAD stenosis extending all the way through the apex.   3. Prox LAD to Mid LAD lesion is 45% stenosed.   4. Prox Cx lesion is 85% stenosed.   5. The ejection fraction is calculated to be 35%. LVEDP 31mmHg.  6. Mid LM lesion is 40% stenosed.    Plan   Maximal medical management.  Surgical consultation.  If patient is turned down by surgery we could consider multivessel intervention.    LIVER ENZYME RESULTS:  Lab Results   Component Value Date    AST 27 06/14/2019    ALT 22 10/17/2019     CBC RESULTS:  Lab Results   Component Value Date    WBC 8.4 06/14/2019    RBC 4.06 (L) 06/14/2019    HGB 9.9 (L) 06/14/2019    HCT 30.7 (L) 06/14/2019    MCV 76 (L) 06/14/2019    MCH 24.4 (L) 06/14/2019    MCHC 32.2 06/14/2019    RDW 18.2 (H) 06/14/2019     06/14/2019     BMP RESULTS:  Lab Results   Component Value Date     03/10/2020    POTASSIUM 4.4 03/10/2020    CHLORIDE 106 03/10/2020    CO2 23 03/10/2020    ANIONGAP 15.4 03/10/2020     (H) 03/10/2020    BUN 35 (H) 03/10/2020    CR 1.61 (H) 03/10/2020    GFRESTIMATED 44 (L) 03/10/2020    GFRESTBLACK 53 (L) 03/10/2020     MYKE 9.9 03/10/2020      A1C RESULTS:  Lab Results   Component Value Date    A1C 7.9 (H) 03/11/2019     INR RESULTS:  Lab Results   Component Value Date    INR 1.54 (H) 03/18/2019    INR 1.79 (H) 03/18/2019            Medications     Current Outpatient Medications   Medication Sig Dispense Refill     acetaminophen (TYLENOL) 325 MG tablet Take 2 tablets (650 mg) by mouth every 4 hours as needed for other 60 tablet 0     amLODIPine (NORVASC) 10 MG tablet Take 1 tablet (10 mg) by mouth daily 90 tablet 3     aspirin (ASA) 325 MG EC tablet Take 1 tablet (325 mg) by mouth daily 30 tablet 0     atorvastatin (LIPITOR) 80 MG tablet Take 1 tablet (80 mg) by mouth every evening 90 tablet 3     furosemide (LASIX) 40 MG tablet Take 1 tablet (40 mg) by mouth daily 180 tablet 1     glipiZIDE (GLUCOTROL) 5 MG tablet Take 5 mg by mouth 2 times daily (before meals)       lisinopril (PRINIVIL/ZESTRIL) 20 MG tablet Take 1 tablet (20 mg) by mouth daily 90 tablet 3     metFORMIN (GLUCOPHAGE) 500 MG tablet Take 1,000 mg by mouth 2 times daily (with meals)        metoprolol succinate ER (TOPROL-XL) 25 MG 24 hr tablet Take 1 tablet (25 mg) by mouth daily 90 tablet 3     order for DME Equipment being ordered: Walker ()  Treatment Diagnosis: s/p coronary artery bypass 1 Device 0     spironolactone (ALDACTONE) 25 MG tablet Take 1 tablet (25 mg) by mouth daily 45 tablet 1          Past Medical History     Past Medical History:   Diagnosis Date     Chronic renal insufficiency      Diabetes mellitus with proliferative retinopathy (H)      Hyperlipidemia      Hypertension      Nephrolithiasis      NSTEMI (non-ST elevated myocardial infarction) (H)      Past Surgical History:   Procedure Laterality Date     BYPASS GRAFT ARTERY CORONARY N/A 3/18/2019    Procedure: CORONARY BYPASS GRAFTING X 4 - LIMA TO LAD, SV TO DIAGONAL, PDA, AND OM; ON PUMP WITH NOA; ENDOVEIN HARVEST LEFT LEG;  Surgeon: Adarsh Sanchez MD;  Location:  OR     CV  CORONARY ANGIOGRAM N/A 3/12/2019    Procedure: Coronary Angiogram;  Surgeon: Remi Rodriguez MD;  Location:  HEART CARDIAC CATH LAB     CV HEART CATHETERIZATION WITH POSSIBLE INTERVENTION N/A 3/12/2019    Procedure: Heart Catheterization with possible Intervention;  Surgeon: Remi Rodriguez MD;  Location: [facility] CARDIAC CATH LAB     CV LEFT VENTRICULOGRAM N/A 3/12/2019    Procedure: Left Ventricular Angiogram;  Surgeon: Remi Rodriguez MD;  Location: [facility] CARDIAC CATH LAB     LITHOTRIPSY              Allergies   Patient has no known allergies.        MICHELE Bravo CNP  [facility]  Pager: [phone]      Thank you for allowing me to participate in the care of your patient.    Sincerely,     MICHELE Bravo CNP     [facility]

## 2020-03-10 NOTE — PATIENT INSTRUCTIONS
Call CORE nurse for any questions or concerns Mon-Fri 8am-4pm:                                                #[phone]                                       For concerns after hours:                                               #[phone]     1: Medication changes: NONE  2: Plan from today:    - Follow up with Viv in 1 month with labs prior   - Continue monitoring weight, call if increasing.   3: Lab results: see attached; show stable kidney function and electrolytes.   Component      Latest Ref Rng & Units 3/10/2020   Sodium      136 - 145 mmol/L 140   Potassium      3.5 - 5.1 mmol/L 4.4   Chloride      98 - 107 mmol/L 106   Carbon Dioxide      23 - 29 mmol/L 23   Anion Gap      6 - 17 mmol/L 15.4   Glucose      70 - 105 mg/dL 122 (H)   Urea Nitrogen      7 - 30 mg/dL 35 (H)   Creatinine      0.70 - 1.30 mg/dL 1.61 (H)   GFR Estimate      >60 mL/min/1.73:m2 44 (L)   GFR Estimate If Black      >60 mL/min/1.73:m2 53 (L)   Calcium      8.5 - 10.5 mg/dL 9.9

## 2020-04-06 DIAGNOSIS — Z95.1 STATUS POST CORONARY ARTERY BYPASS GRAFT: ICD-10-CM

## 2020-04-06 DIAGNOSIS — I50.22 CHRONIC SYSTOLIC HEART FAILURE (H): ICD-10-CM

## 2020-04-06 RX ORDER — METOPROLOL SUCCINATE 25 MG/1
25 TABLET, EXTENDED RELEASE ORAL DAILY
Qty: 90 TABLET | Refills: 1 | Status: SHIPPED | OUTPATIENT
Start: 2020-04-06 | End: 2020-09-25

## 2020-04-06 RX ORDER — ATORVASTATIN CALCIUM 80 MG/1
80 TABLET, FILM COATED ORAL EVERY EVENING
Qty: 90 TABLET | Refills: 1 | Status: SHIPPED | OUTPATIENT
Start: 2020-04-06 | End: 2020-10-27

## 2020-04-06 RX ORDER — LISINOPRIL 20 MG/1
20 TABLET ORAL DAILY
Qty: 90 TABLET | Refills: 1 | Status: SHIPPED | OUTPATIENT
Start: 2020-04-06 | End: 2020-09-25

## 2020-04-10 DIAGNOSIS — I50.22 CHRONIC SYSTOLIC HEART FAILURE (H): Primary | ICD-10-CM

## 2020-06-22 ENCOUNTER — TELEPHONE (OUTPATIENT)
Dept: CARDIOLOGY | Facility: CLINIC | Age: 65
End: 2020-06-22

## 2020-07-07 ENCOUNTER — TELEPHONE (OUTPATIENT)
Dept: CARDIOLOGY | Facility: CLINIC | Age: 65
End: 2020-07-07

## 2020-07-07 DIAGNOSIS — I50.22 CHRONIC SYSTOLIC HEART FAILURE (H): Primary | ICD-10-CM

## 2020-07-07 NOTE — TELEPHONE ENCOUNTER
I called Hannah to verify she had scheduled her Dad's appointment 7/24. She said she did schedule it. She said if patient gets a call from someone speaking English he won't answer as he does not speak English. The  needs to call the patient. She said he doesn't see very well so he misread the date for one of his prior appointments. He didn't answer the phone. I made a note for the pre-appointment staff to call the  prior to the visit.

## 2020-07-15 DIAGNOSIS — I50.22 CHRONIC SYSTOLIC HEART FAILURE (H): ICD-10-CM

## 2020-07-15 RX ORDER — SPIRONOLACTONE 25 MG/1
25 TABLET ORAL DAILY
Qty: 90 TABLET | Refills: 0 | Status: SHIPPED | OUTPATIENT
Start: 2020-07-15 | End: 2020-10-27

## 2020-07-24 ENCOUNTER — OFFICE VISIT (OUTPATIENT)
Dept: CARDIOLOGY | Facility: CLINIC | Age: 65
End: 2020-07-24
Attending: NURSE PRACTITIONER
Payer: COMMERCIAL

## 2020-07-24 VITALS
DIASTOLIC BLOOD PRESSURE: 75 MMHG | HEIGHT: 63 IN | WEIGHT: 167.4 LBS | BODY MASS INDEX: 29.66 KG/M2 | SYSTOLIC BLOOD PRESSURE: 140 MMHG | HEART RATE: 61 BPM

## 2020-07-24 DIAGNOSIS — I50.22 CHRONIC SYSTOLIC HEART FAILURE (H): ICD-10-CM

## 2020-07-24 PROCEDURE — 99214 OFFICE O/P EST MOD 30 MIN: CPT | Performed by: INTERNAL MEDICINE

## 2020-07-24 ASSESSMENT — MIFFLIN-ST. JEOR: SCORE: 1454.45

## 2020-07-24 NOTE — PROGRESS NOTES
HPI and Plan:   Today I had the pleasure of seeing Khang Mcelroy at [facility]. He is a pleasant 64 year old patient with a history of HFrEF 2/2 ischemic cardiomyopathy and EF of 35 to 40%, CAD status post coronary artery bypass grafting (LIMA-LAD, SVG-DIAG, SVG-OM, SVG-PDA) on 3/18/19, DM, hypertension, hyperlipidemia, sleep apnea, CKD, and anemia who presents to the clinic for a follow-up visit.  The history was obtained with the help of an .     The patient also follows up with core heart failure clinic.  His most recent visit was in 3/2020.  He was started on spironolactone.  A follow-up blood work and clinic visit a week later showed improvement in volume status and normal labs.  He was scheduled to see them back in 3 months but that visit was pushed out due to COVID-19.  He is today in the clinic for a follow-up visit.  He reports doing well and is able to bike and jog for a total of 10 blocks without any difficulty.  He also reports that the lower extremity edema has gotten significantly better and that he does not have any shortness of breath when lying flat.  His last echocardiogram was in November 2019 which showed mildly reduced ejection fraction of 45% which was an improvement from his prior echocardiogram.  He had grade 3 diastolic dysfunction and no significant RV or valve disease.  He has not had any further echocardiograms since.      He continues to be on optimal goal directed medical therapy.  His weight has been stable at around 167 pounds. He denies orthopnea, PND, dyspnea on exertion.  He also denies chest pain or chest pressure.  He takes all his medications as prescribed.       Assessment and plan    1.  Ischemic cardiomyopathy with ejection fraction of 45%  2.  Heart failure with reduced ejection fraction, NYHA class II  3.  Chronic diastolic heart failure, grade 3 diastolic dysfunction  4.  Obstructive sleep apnea  5.  Hypertension  6.   Hyperlipidemia  7.  Type 2 diabetes  7.  CKD  8.  Anemia    The patient's condition is overall improved since his last clinic visit.  His lower extremity edema has near completely resolved, weight is stable and he is asymptomatic.  Additionally, he is able to ride and bike for a total of 10 blocks which is an improvement.  At this time I do not feel very strongly about switching him from lisinopril to Entresto since he is very stable.  I would however, repeat an echocardiogram and if his ejection fraction is persistently lower will think about making that change.  If we decide to switch him to Entresto I would decrease the dose of Lasix and continue him on the current dose of spironolactone.  For now, I will continue him on the current dose of Lasix and spironolactone.  The patient understand that he has to give us a call if he becomes symptomatic again.  I also reinforced that he continues to use CPAP.  I will have him come back and see me in 6 months with repeat transthoracic echocardiogram.    Plan  1.  Continue all current medications  2.  Follow-up with me in 6 months with repeat transthoracic echocardiogram.    Thank you for allowing me to participate in the care of Khang Mcelroy    This note was completed in part using Dragon voice recognition software. Although reviewed after completion, some word and grammatical errors may occur.    Bib Hernández MD  Cardiology    Orders Placed This Encounter   Procedures     Follow-Up with Cardiologist     Echocardiogram Limited       No orders of the defined types were placed in this encounter.      There are no discontinued medications.    Encounter Diagnosis   Name Primary?     Chronic systolic heart failure (H)        CURRENT MEDICATIONS:  Current Outpatient Medications   Medication Sig Dispense Refill     acetaminophen (TYLENOL) 325 MG tablet Take 2 tablets (650 mg) by mouth every 4 hours as needed for other 60 tablet 0     amLODIPine (NORVASC) 10 MG tablet Take  1 tablet (10 mg) by mouth daily 90 tablet 3     aspirin (ASA) 325 MG EC tablet Take 1 tablet (325 mg) by mouth daily 30 tablet 0     atorvastatin (LIPITOR) 80 MG tablet Take 1 tablet (80 mg) by mouth every evening 90 tablet 1     furosemide (LASIX) 40 MG tablet Take 1 tablet (40 mg) by mouth daily 180 tablet 1     glipiZIDE (GLUCOTROL) 5 MG tablet Take 5 mg by mouth 2 times daily (before meals)       lisinopril (ZESTRIL) 20 MG tablet Take 1 tablet (20 mg) by mouth daily 90 tablet 1     metFORMIN (GLUCOPHAGE) 500 MG tablet Take 1,000 mg by mouth 2 times daily (with meals)        metoprolol succinate ER (TOPROL-XL) 25 MG 24 hr tablet Take 1 tablet (25 mg) by mouth daily 90 tablet 1     spironolactone (ALDACTONE) 25 MG tablet Take 1 tablet (25 mg) by mouth daily 90 tablet 0     order for DME Equipment being ordered: Walker ()  Treatment Diagnosis: s/p coronary artery bypass 1 Device 0       ALLERGIES   No Known Allergies    PAST MEDICAL HISTORY:  Past Medical History:   Diagnosis Date     CAD in native artery 3/18/2019     Chronic renal insufficiency      Chronic systolic heart failure (H) 10/1/2019     Diabetes mellitus with proliferative retinopathy (H)      Essential hypertension 6/14/2019     Hyperlipidemia LDL goal <70 6/14/2019     Nephrolithiasis      NSTEMI (non-ST elevated myocardial infarction) (H)      CHALO (obstructive sleep apnea) 6/24/2019 6/21/2019 [facility] Study (153.0 lbs) - AHI 30.5, RDI 34.7, Supine AHI 31.5, REM AHI 56.5, Low O2 68.7%, Time Spent ?88% 3.7 minutes / Time Spent ?89% 4.5 minutes.     S/P CABG (coronary artery bypass graft) 3/26/2019       PAST SURGICAL HISTORY:  Past Surgical History:   Procedure Laterality Date     BYPASS GRAFT ARTERY CORONARY N/A 3/18/2019    Procedure: CORONARY BYPASS GRAFTING X 4 - LIMA TO LAD, SV TO DIAGONAL, PDA, AND OM; ON PUMP WITH NOA; ENDOVEIN HARVEST LEFT LEG;  Surgeon: Adarsh Sanchez MD;  Location: [facility] OR     CV CORONARY  ANGIOGRAM N/A 3/12/2019    Procedure: Coronary Angiogram;  Surgeon: Remi Rodriguez MD;  Location:  HEART CARDIAC CATH LAB     CV HEART CATHETERIZATION WITH POSSIBLE INTERVENTION N/A 3/12/2019    Procedure: Heart Catheterization with possible Intervention;  Surgeon: Remi Rodriguez MD;  Location:  HEART CARDIAC CATH LAB     CV LEFT VENTRICULOGRAM N/A 3/12/2019    Procedure: Left Ventricular Angiogram;  Surgeon: Remi Rodriguez MD;  Location:  HEART CARDIAC CATH LAB     LITHOTRIPSY         FAMILY HISTORY:  Family History   Problem Relation Age of Onset     Other - See Comments Mother         giving birth     Other - See Comments Father         old age       SOCIAL HISTORY:  Social History     Socioeconomic History     Marital status:      Spouse name: None     Number of children: None     Years of education: None     Highest education level: None   Occupational History     None   Social Needs     Financial resource strain: None     Food insecurity     Worry: None     Inability: None     Transportation needs     Medical: None     Non-medical: None   Tobacco Use     Smoking status: Never Smoker     Smokeless tobacco: Never Used   Substance and Sexual Activity     Alcohol use: No     Frequency: Never     Drug use: No     Sexual activity: Yes     Partners: Female   Lifestyle     Physical activity     Days per week: None     Minutes per session: None     Stress: None   Relationships     Social connections     Talks on phone: None     Gets together: None     Attends Church service: None     Active member of club or organization: None     Attends meetings of clubs or organizations: None     Relationship status: None     Intimate partner violence     Fear of current or ex partner: None     Emotionally abused: None     Physically abused: None     Forced sexual activity: None   Other Topics Concern     Parent/sibling w/ CABG, MI or angioplasty before 65F 55M? No   Social History Narrative      "None       Review of Systems:  Skin:  Negative       Eyes:  Positive for glasses    ENT:  Negative      Respiratory:  Positive for sleep apnea;CPAP     Cardiovascular:  Negative      Gastroenterology: Negative      Genitourinary:  Negative      Musculoskeletal:  Negative      Neurologic:  Negative      Psychiatric:  Negative      Heme/Lymph/Imm:  Negative      Endocrine:  Positive for diabetes      Physical Exam:  Vitals: BP (!) 140/75   Pulse 61   Ht 1.6 m (5' 3\")   Wt 75.9 kg (167 lb 6.4 oz)   BMI 29.65 kg/m    Constitutional: awake, alert, no distress  Skin: Warm and dry to touch  Head: Normocephalic, atraumatic  Eyes: Conjunctivae and lids unremarkable, sclera white  ENT: No pallor or cyanosis  Respiratory: Normal breath sounds, clear to auscultation  Cardiac: Regular rate and rhythm, S1-S2 normal.  No murmurs gallops or rubs.  Trace b/l pedal edema.   Extremities and musculoskeletal: No gross motor deficit  Neurological.  Affect normal  Psych: Alert and oriented x3    Recent Lab Results:  LIPID RESULTS:  Lab Results   Component Value Date    CHOL 126 10/17/2019    HDL 47 10/17/2019    LDL 69 10/17/2019    TRIG 48 10/17/2019       LIVER ENZYME RESULTS:  Lab Results   Component Value Date    AST 27 06/14/2019    ALT 22 10/17/2019       CBC RESULTS:  Lab Results   Component Value Date    WBC 8.4 06/14/2019    RBC 4.06 (L) 06/14/2019    HGB 9.9 (L) 06/14/2019    HCT 30.7 (L) 06/14/2019    MCV 76 (L) 06/14/2019    MCH 24.4 (L) 06/14/2019    MCHC 32.2 06/14/2019    RDW 18.2 (H) 06/14/2019     06/14/2019       BMP RESULTS:  Lab Results   Component Value Date     03/10/2020    POTASSIUM 4.4 03/10/2020    CHLORIDE 106 03/10/2020    CO2 23 03/10/2020    ANIONGAP 15.4 03/10/2020     (H) 03/10/2020    BUN 35 (H) 03/10/2020    CR 1.61 (H) 03/10/2020    GFRESTIMATED 44 (L) 03/10/2020    GFRESTBLACK 53 (L) 03/10/2020    MYKE 9.9 03/10/2020        A1C RESULTS:  Lab Results   Component Value Date    A1C " 7.9 (H) 03/11/2019       INR RESULTS:  Lab Results   Component Value Date    INR 1.54 (H) 03/18/2019    INR 1.79 (H) 03/18/2019       MICHELE Pope CNP  [address]  [place] [id number]    All medical records were reviewed in detail and discussed with the patient. Greater than 30 mins were spent with the patient, 50% of this time was spent on counseling and coordination of care.  After visit summary was printed and given to the patient.

## 2020-07-24 NOTE — LETTER
7/24/2020    Mpho Brian Castro MD  [facility] [address]  [facility] [address]    RE: Khang Mcelroy       Dear Colleague,    I had the pleasure of seeing Khang Mcelroy in the [facility].    HPI and Plan:   Today I had the pleasure of seeing Khang Mcelroy at [facility]. He is a pleasant 64 year old patient with a history of HFrEF 2/2 ischemic cardiomyopathy and EF of 35 to 40%, CAD status post coronary artery bypass grafting (LIMA-LAD, SVG-DIAG, SVG-OM, SVG-PDA) on 3/18/19, DM, hypertension, hyperlipidemia, sleep apnea, CKD, and anemia who presents to the clinic for a follow-up visit.  The history was obtained with the help of an .     The patient also follows up with core heart failure clinic.  His most recent visit was in 3/2020.  He was started on spironolactone.  A follow-up blood work and clinic visit a week later showed improvement in volume status and normal labs.  He was scheduled to see them back in 3 months but that visit was pushed out due to COVID-19.  He is today in the clinic for a follow-up visit.  He reports doing well and is able to bike and jog for a total of 10 blocks without any difficulty.  He also reports that the lower extremity edema has gotten significantly better and that he does not have any shortness of breath when lying flat.  His last echocardiogram was in November 2019 which showed mildly reduced ejection fraction of 45% which was an improvement from his prior echocardiogram.  He had grade 3 diastolic dysfunction and no significant RV or valve disease.  He has not had any further echocardiograms since.      He continues to be on optimal goal directed medical therapy.  His weight has been stable at around 167 pounds. He denies orthopnea, PND, dyspnea on exertion.  He also denies chest pain or chest pressure.  He takes all his medications as prescribed.       Assessment and plan    1.   Ischemic cardiomyopathy with ejection fraction of 45%  2.  Heart failure with reduced ejection fraction, NYHA class II  3.  Chronic diastolic heart failure, grade 3 diastolic dysfunction  4.  Obstructive sleep apnea  5.  Hypertension  6.  Hyperlipidemia  7.  Type 2 diabetes  7.  CKD  8.  Anemia    The patient's condition is overall improved since his last clinic visit.  His lower extremity edema has near completely resolved, weight is stable and he is asymptomatic.  Additionally, he is able to ride and bike for a total of 10 blocks which is an improvement.  At this time I do not feel very strongly about switching him from lisinopril to Entresto since he is very stable.  I would however, repeat an echocardiogram and if his ejection fraction is persistently lower will think about making that change.  If we decide to switch him to Entresto I would decrease the dose of Lasix and continue him on the current dose of spironolactone.  For now, I will continue him on the current dose of Lasix and spironolactone.  The patient understand that he has to give us a call if he becomes symptomatic again.  I also reinforced that he continues to use CPAP.  I will have him come back and see me in 6 months with repeat transthoracic echocardiogram.    Plan  1.  Continue all current medications  2.  Follow-up with me in 6 months with repeat transthoracic echocardiogram.    Thank you for allowing me to participate in the care of Khang Ribeiropamelavaleri    This note was completed in part using Dragon voice recognition software. Although reviewed after completion, some word and grammatical errors may occur.    Bib Hernández MD  Cardiology    Orders Placed This Encounter   Procedures     Follow-Up with Cardiologist     Echocardiogram Limited       No orders of the defined types were placed in this encounter.      There are no discontinued medications.    Encounter Diagnosis   Name Primary?     Chronic systolic heart failure (H)        CURRENT  MEDICATIONS:  Current Outpatient Medications   Medication Sig Dispense Refill     acetaminophen (TYLENOL) 325 MG tablet Take 2 tablets (650 mg) by mouth every 4 hours as needed for other 60 tablet 0     amLODIPine (NORVASC) 10 MG tablet Take 1 tablet (10 mg) by mouth daily 90 tablet 3     aspirin (ASA) 325 MG EC tablet Take 1 tablet (325 mg) by mouth daily 30 tablet 0     atorvastatin (LIPITOR) 80 MG tablet Take 1 tablet (80 mg) by mouth every evening 90 tablet 1     furosemide (LASIX) 40 MG tablet Take 1 tablet (40 mg) by mouth daily 180 tablet 1     glipiZIDE (GLUCOTROL) 5 MG tablet Take 5 mg by mouth 2 times daily (before meals)       lisinopril (ZESTRIL) 20 MG tablet Take 1 tablet (20 mg) by mouth daily 90 tablet 1     metFORMIN (GLUCOPHAGE) 500 MG tablet Take 1,000 mg by mouth 2 times daily (with meals)        metoprolol succinate ER (TOPROL-XL) 25 MG 24 hr tablet Take 1 tablet (25 mg) by mouth daily 90 tablet 1     spironolactone (ALDACTONE) 25 MG tablet Take 1 tablet (25 mg) by mouth daily 90 tablet 0     order for DME Equipment being ordered: Walker ()  Treatment Diagnosis: s/p coronary artery bypass 1 Device 0       ALLERGIES   No Known Allergies    PAST MEDICAL HISTORY:  Past Medical History:   Diagnosis Date     CAD in native artery 3/18/2019     Chronic renal insufficiency      Chronic systolic heart failure (H) 10/1/2019     Diabetes mellitus with proliferative retinopathy (H)      Essential hypertension 6/14/2019     Hyperlipidemia LDL goal <70 6/14/2019     Nephrolithiasis      NSTEMI (non-ST elevated myocardial infarction) (H)      CHALO (obstructive sleep apnea) 6/24/2019 6/21/2019 [facility] Sleep Study (153.0 lbs) - AHI 30.5, RDI 34.7, Supine AHI 31.5, REM AHI 56.5, Low O2 68.7%, Time Spent ?88% 3.7 minutes / Time Spent ?89% 4.5 minutes.     S/P CABG (coronary artery bypass graft) 3/26/2019       PAST SURGICAL HISTORY:  Past Surgical History:   Procedure Laterality Date     BYPASS  GRAFT ARTERY CORONARY N/A 3/18/2019    Procedure: CORONARY BYPASS GRAFTING X 4 - LIMA TO LAD, SV TO DIAGONAL, PDA, AND OM; ON PUMP WITH NOA; ENDOVEIN HARVEST LEFT LEG;  Surgeon: Adarsh Sanchez MD;  Location:  OR     CV CORONARY ANGIOGRAM N/A 3/12/2019    Procedure: Coronary Angiogram;  Surgeon: Remi Rodriguez MD;  Location:  HEART CARDIAC CATH LAB     CV HEART CATHETERIZATION WITH POSSIBLE INTERVENTION N/A 3/12/2019    Procedure: Heart Catheterization with possible Intervention;  Surgeon: Remi Rodriguez MD;  Location:  HEART CARDIAC CATH LAB     CV LEFT VENTRICULOGRAM N/A 3/12/2019    Procedure: Left Ventricular Angiogram;  Surgeon: Remi Rodriguez MD;  Location:  HEART CARDIAC CATH LAB     LITHOTRIPSY         FAMILY HISTORY:  Family History   Problem Relation Age of Onset     Other - See Comments Mother         giving birth     Other - See Comments Father         old age       SOCIAL HISTORY:  Social History     Socioeconomic History     Marital status:      Spouse name: None     Number of children: None     Years of education: None     Highest education level: None   Occupational History     None   Social Needs     Financial resource strain: None     Food insecurity     Worry: None     Inability: None     Transportation needs     Medical: None     Non-medical: None   Tobacco Use     Smoking status: Never Smoker     Smokeless tobacco: Never Used   Substance and Sexual Activity     Alcohol use: No     Frequency: Never     Drug use: No     Sexual activity: Yes     Partners: Female   Lifestyle     Physical activity     Days per week: None     Minutes per session: None     Stress: None   Relationships     Social connections     Talks on phone: None     Gets together: None     Attends [employer] service: None     Active member of club or organization: None     Attends meetings of clubs or organizations: None     Relationship status: None     Intimate partner violence     Fear of  "current or ex partner: None     Emotionally abused: None     Physically abused: None     Forced sexual activity: None   Other Topics Concern     Parent/sibling w/ CABG, MI or angioplasty before 65F 55M? No   Social History Narrative     None       Review of Systems:  Skin:  Negative       Eyes:  Positive for glasses    ENT:  Negative      Respiratory:  Positive for sleep apnea;CPAP     Cardiovascular:  Negative      Gastroenterology: Negative      Genitourinary:  Negative      Musculoskeletal:  Negative      Neurologic:  Negative      Psychiatric:  Negative      Heme/Lymph/Imm:  Negative      Endocrine:  Positive for diabetes      Physical Exam:  Vitals: BP (!) 140/75   Pulse 61   Ht 1.6 m (5' 3\")   Wt 75.9 kg (167 lb 6.4 oz)   BMI 29.65 kg/m    Constitutional: awake, alert, no distress  Skin: Warm and dry to touch  Head: Normocephalic, atraumatic  Eyes: Conjunctivae and lids unremarkable, sclera white  ENT: No pallor or cyanosis  Respiratory: Normal breath sounds, clear to auscultation  Cardiac: Regular rate and rhythm, S1-S2 normal.  No murmurs gallops or rubs.  Trace b/l pedal edema.   Extremities and musculoskeletal: No gross motor deficit  Neurological.  Affect normal  Psych: Alert and oriented x3    Recent Lab Results:  LIPID RESULTS:  Lab Results   Component Value Date    CHOL 126 10/17/2019    HDL 47 10/17/2019    LDL 69 10/17/2019    TRIG 48 10/17/2019       LIVER ENZYME RESULTS:  Lab Results   Component Value Date    AST 27 06/14/2019    ALT 22 10/17/2019       CBC RESULTS:  Lab Results   Component Value Date    WBC 8.4 06/14/2019    RBC 4.06 (L) 06/14/2019    HGB 9.9 (L) 06/14/2019    HCT 30.7 (L) 06/14/2019    MCV 76 (L) 06/14/2019    MCH 24.4 (L) 06/14/2019    MCHC 32.2 06/14/2019    RDW 18.2 (H) 06/14/2019     06/14/2019       BMP RESULTS:  Lab Results   Component Value Date     03/10/2020    POTASSIUM 4.4 03/10/2020    CHLORIDE 106 03/10/2020    CO2 23 03/10/2020    ANIONGAP 15.4 " 03/10/2020     (H) 03/10/2020    BUN 35 (H) 03/10/2020    CR 1.61 (H) 03/10/2020    GFRESTIMATED 44 (L) 03/10/2020    GFRESTBLACK 53 (L) 03/10/2020    MYKE 9.9 03/10/2020        A1C RESULTS:  Lab Results   Component Value Date    A1C 7.9 (H) 03/11/2019       INR RESULTS:  Lab Results   Component Value Date    INR 1.54 (H) 03/18/2019    INR 1.79 (H) 03/18/2019       All medical records were reviewed in detail and discussed with the patient. Greater than 30 mins were spent with the patient, 50% of this time was spent on counseling and coordination of care.  After visit summary was printed and given to the patient.      Thank you for allowing me to participate in the care of your patient.    Sincerely,     Bib Hernández MD     [facility]

## 2020-09-25 DIAGNOSIS — I50.22 CHRONIC SYSTOLIC HEART FAILURE (H): ICD-10-CM

## 2020-09-25 DIAGNOSIS — Z95.1 STATUS POST CORONARY ARTERY BYPASS GRAFT: ICD-10-CM

## 2020-09-25 RX ORDER — LISINOPRIL 20 MG/1
20 TABLET ORAL DAILY
Qty: 90 TABLET | Refills: 0 | Status: SHIPPED | OUTPATIENT
Start: 2020-09-25 | End: 2020-12-29

## 2020-09-25 RX ORDER — METOPROLOL SUCCINATE 25 MG/1
25 TABLET, EXTENDED RELEASE ORAL DAILY
Qty: 90 TABLET | Refills: 0 | Status: SHIPPED | OUTPATIENT
Start: 2020-09-25 | End: 2020-12-29

## 2020-10-27 DIAGNOSIS — Z95.1 STATUS POST CORONARY ARTERY BYPASS GRAFT: ICD-10-CM

## 2020-10-27 DIAGNOSIS — I50.22 CHRONIC SYSTOLIC HEART FAILURE (H): ICD-10-CM

## 2020-10-27 RX ORDER — FUROSEMIDE 40 MG
40 TABLET ORAL DAILY
Qty: 180 TABLET | Refills: 0 | Status: SHIPPED | OUTPATIENT
Start: 2020-10-27 | End: 2021-01-28

## 2020-10-27 RX ORDER — ATORVASTATIN CALCIUM 80 MG/1
80 TABLET, FILM COATED ORAL EVERY EVENING
Qty: 90 TABLET | Refills: 0 | Status: SHIPPED | OUTPATIENT
Start: 2020-10-27 | End: 2021-01-25

## 2020-10-27 RX ORDER — SPIRONOLACTONE 25 MG/1
25 TABLET ORAL DAILY
Qty: 90 TABLET | Refills: 0 | Status: SHIPPED | OUTPATIENT
Start: 2020-10-27 | End: 2021-01-25

## 2020-12-29 DIAGNOSIS — Z95.1 STATUS POST CORONARY ARTERY BYPASS GRAFT: ICD-10-CM

## 2020-12-29 DIAGNOSIS — I50.22 CHRONIC SYSTOLIC HEART FAILURE (H): ICD-10-CM

## 2020-12-29 RX ORDER — METOPROLOL SUCCINATE 25 MG/1
25 TABLET, EXTENDED RELEASE ORAL DAILY
Qty: 90 TABLET | Refills: 0 | Status: SHIPPED | OUTPATIENT
Start: 2020-12-29 | End: 2021-03-29

## 2020-12-29 RX ORDER — LISINOPRIL 20 MG/1
20 TABLET ORAL DAILY
Qty: 90 TABLET | Refills: 0 | Status: SHIPPED | OUTPATIENT
Start: 2020-12-29 | End: 2021-03-29

## 2021-01-21 ENCOUNTER — HOSPITAL ENCOUNTER (OUTPATIENT)
Dept: CARDIOLOGY | Facility: CLINIC | Age: 66
Discharge: HOME | End: 2021-01-21
Attending: INTERNAL MEDICINE | Admitting: INTERNAL MEDICINE
Payer: COMMERCIAL

## 2021-01-21 DIAGNOSIS — I50.22 CHRONIC SYSTOLIC HEART FAILURE (H): ICD-10-CM

## 2021-01-21 LAB
ANION GAP SERPL CALCULATED.3IONS-SCNC: 5 MMOL/L (ref 3–14)
BUN SERPL-MCNC: 42 MG/DL (ref 7–30)
CALCIUM SERPL-MCNC: 9.1 MG/DL (ref 8.5–10.1)
CHLORIDE SERPL-SCNC: 103 MMOL/L (ref 94–109)
CO2 SERPL-SCNC: 26 MMOL/L (ref 20–32)
CREAT SERPL-MCNC: 1.98 MG/DL (ref 0.66–1.25)
GFR SERPL CREATININE-BSD FRML MDRD: 35 ML/MIN/{1.73_M2}
GLUCOSE SERPL-MCNC: 208 MG/DL (ref 70–99)
POTASSIUM SERPL-SCNC: 4.9 MMOL/L (ref 3.4–5.3)
SODIUM SERPL-SCNC: 134 MMOL/L (ref 133–144)

## 2021-01-21 PROCEDURE — 80048 BASIC METABOLIC PNL TOTAL CA: CPT | Performed by: INTERNAL MEDICINE

## 2021-01-21 PROCEDURE — 93321 DOPPLER ECHO F-UP/LMTD STD: CPT | Mod: 26 | Performed by: INTERNAL MEDICINE

## 2021-01-21 PROCEDURE — 93325 DOPPLER ECHO COLOR FLOW MAPG: CPT | Mod: 26 | Performed by: INTERNAL MEDICINE

## 2021-01-21 PROCEDURE — 93325 DOPPLER ECHO COLOR FLOW MAPG: CPT

## 2021-01-21 PROCEDURE — 36415 COLL VENOUS BLD VENIPUNCTURE: CPT | Performed by: INTERNAL MEDICINE

## 2021-01-21 PROCEDURE — 93308 TTE F-UP OR LMTD: CPT | Mod: 26 | Performed by: INTERNAL MEDICINE

## 2021-01-22 ENCOUNTER — TELEPHONE (OUTPATIENT)
Dept: CARDIOLOGY | Facility: CLINIC | Age: 66
End: 2021-01-22

## 2021-01-22 DIAGNOSIS — I50.22 CHRONIC SYSTOLIC HEART FAILURE (H): Primary | ICD-10-CM

## 2021-01-22 NOTE — TELEPHONE ENCOUNTER
Contacted patient [facility]  will hold lasix for 3 days and repeat BMP before next visit.  Due to language barrier and transportation patient would like to come in same day 1- an hour before OV for repeat BMP.  Referral to Nephrology entered in Epic      If dehydrated please have him hold lasix for 3 days. Repeat labs before next visit. Nephrology referral.   Component      Latest Ref Rng & Units 1/21/2021   Sodium      133 - 144 mmol/L 134   Potassium      3.4 - 5.3 mmol/L 4.9   Chloride      94 - 109 mmol/L 103   Carbon Dioxide      20 - 32 mmol/L 26   Anion Gap      3 - 14 mmol/L 5   Glucose      70 - 99 mg/dL 208 (H)   Urea Nitrogen      7 - 30 mg/dL 42 (H)   Creatinine      0.66 - 1.25 mg/dL 1.98 (H)   GFR Estimate      >60 mL/min/1.73:m2 35 (L)   GFR Estimate If Black      >60 mL/min/1.73:m2 40 (L)   Calcium      8.5 - 10.1 mg/dL 9.1

## 2021-01-25 DIAGNOSIS — I50.22 CHRONIC SYSTOLIC HEART FAILURE (H): ICD-10-CM

## 2021-01-25 DIAGNOSIS — Z95.1 STATUS POST CORONARY ARTERY BYPASS GRAFT: ICD-10-CM

## 2021-01-25 RX ORDER — SPIRONOLACTONE 25 MG/1
25 TABLET ORAL DAILY
Qty: 90 TABLET | Refills: 0 | Status: SHIPPED | OUTPATIENT
Start: 2021-01-25 | End: 2021-04-26

## 2021-01-25 RX ORDER — ATORVASTATIN CALCIUM 80 MG/1
80 TABLET, FILM COATED ORAL EVERY EVENING
Qty: 90 TABLET | Refills: 0 | Status: SHIPPED | OUTPATIENT
Start: 2021-01-25 | End: 2021-04-26

## 2021-01-27 NOTE — TELEPHONE ENCOUNTER
RECORDS RECEIVED FROM: Internal   DATE RECEIVED: 03.31.2021    NOTES STATUS DETAILS   OFFICE NOTE from referring provider Internal 01.22.2021 Bib Hernández MD   OFFICE NOTE from other specialist  Internal 03.10.2020 Viv Culp, APRN CNP    05.14.2019 Abdon Bernstein MD   *Only VASCULITIS or LUPUS gather office notes for the following N/A    *PULMONARY   N/A    *ENT N/A    *DERMATOLOGY N/A    *RHEUMATOLOGY N/A    DISCHARGE SUMMARY from hospital N/A    DISCHARGE REPORT from the ER N/A    MEDICATION LIST Internal / CE    IMAGING  (NEED IMAGES AND REPORTS)     KIDNEY CT SCAN N/A    KIDNEY ULTRASOUND N/A    MR ABDOMEN N/A    NUCLEAR MEDICINE RENAL N/A    LABS     CBC Internal 06.14.2019   CMP Internal 06.14.2019   BMP Internal 01.21.2021   UA Internal 03.12.2019   URINE PROTEIN Internal 03.12.2019   RENAL PANEL N/A    BIOPSY     KIDNEY BIOPSY  N/A

## 2021-01-28 ENCOUNTER — OFFICE VISIT (OUTPATIENT)
Dept: CARDIOLOGY | Facility: CLINIC | Age: 66
End: 2021-01-28
Attending: INTERNAL MEDICINE
Payer: COMMERCIAL

## 2021-01-28 VITALS
HEART RATE: 62 BPM | DIASTOLIC BLOOD PRESSURE: 70 MMHG | WEIGHT: 164.1 LBS | BODY MASS INDEX: 29.07 KG/M2 | SYSTOLIC BLOOD PRESSURE: 120 MMHG | HEIGHT: 63 IN

## 2021-01-28 DIAGNOSIS — I50.22 CHRONIC SYSTOLIC HEART FAILURE (H): ICD-10-CM

## 2021-01-28 DIAGNOSIS — Z95.1 STATUS POST CORONARY ARTERY BYPASS GRAFT: ICD-10-CM

## 2021-01-28 LAB
ANION GAP SERPL CALCULATED.3IONS-SCNC: 8 MMOL/L (ref 3–14)
BUN SERPL-MCNC: 52 MG/DL (ref 7–30)
CALCIUM SERPL-MCNC: 9.8 MG/DL (ref 8.5–10.1)
CHLORIDE SERPL-SCNC: 106 MMOL/L (ref 94–109)
CO2 SERPL-SCNC: 22 MMOL/L (ref 20–32)
CREAT SERPL-MCNC: 1.8 MG/DL (ref 0.66–1.25)
GFR SERPL CREATININE-BSD FRML MDRD: 39 ML/MIN/{1.73_M2}
GLUCOSE SERPL-MCNC: 156 MG/DL (ref 70–99)
POTASSIUM SERPL-SCNC: 4.9 MMOL/L (ref 3.4–5.3)
SODIUM SERPL-SCNC: 136 MMOL/L (ref 133–144)

## 2021-01-28 PROCEDURE — 99214 OFFICE O/P EST MOD 30 MIN: CPT | Performed by: INTERNAL MEDICINE

## 2021-01-28 PROCEDURE — 80048 BASIC METABOLIC PNL TOTAL CA: CPT | Performed by: INTERNAL MEDICINE

## 2021-01-28 PROCEDURE — 36415 COLL VENOUS BLD VENIPUNCTURE: CPT | Performed by: INTERNAL MEDICINE

## 2021-01-28 RX ORDER — FUROSEMIDE 20 MG
20 TABLET ORAL DAILY
Qty: 90 TABLET | Refills: 3 | Status: SHIPPED | OUTPATIENT
Start: 2021-01-28 | End: 2022-02-07

## 2021-01-28 ASSESSMENT — MIFFLIN-ST. JEOR: SCORE: 1434.48

## 2021-01-28 NOTE — PROGRESS NOTES
HPI and Plan:   Today I had the pleasure of seeing Khang Mcelroy at [facility] clinic. He is a pleasant 65 year old patient with a history of HFrEF 2/2 ischemic cardiomyopathy and EF of 35 to 40%, CAD status post coronary artery bypass grafting (LIMA-LAD, SVG-DIAG, SVG-OM, SVG-PDA) in 2019, DM, hypertension, hyperlipidemia, sleep apnea, CKD, and anemia who presents to the clinic for a follow-up visit.  The history was obtained with the help of an .     He is today in the clinic for a follow-up visit.  He reports doing well and is able to bike and jog for a total of 10 blocks without any difficulty.  He also reports that the lower extremity edema has gotten significantly better and that he does not have any shortness of breath when lying flat.  His most recent echocardiogram  showed mildly reduced ejection fraction of 45-50% which was an improvement from his prior echocardiogram.  He had grade 1 diastolic dysfunction and no significant valve disease.  Most recent blood work shows rising creatinine.    He continues to be on optimal goal directed medical therapy.  His weight has been stable at around 167 pounds. He denies orthopnea, PND, dyspnea on exertion.  He also denies chest pain or chest pressure.  He takes all his medications as prescribed.     Assessment and plan:  1.  Ischemic cardiomyopathy with ejection fraction of 45-50%  2.  Heart failure with reduced ejection fraction, NYHA class II  3.  Chronic diastolic heart failure, grade 3 diastolic dysfunction  4.  Obstructive sleep apnea  5.  Hypertension  6.  Hyperlipidemia  7.  Type 2 diabetes  7.  CKD-1.98->1.8  8.  Anemia    The patient's condition is overall improved since his last clinic visit.  His lower extremity edema has near completely resolved, weight is stable and he is asymptomatic.  Additionally, he is able to ride and bike for a total of 10 blocks which is an improvement.  At this time, I do not feel very strongly  about switching him from lisinopril to Entresto since he is very stable and EF is >45%.  Given rising creatinine I asked her to stop Lasix for 3 days which led to improvement in his renal function.  Going forward I will decrease the dose of Lasix to 20 mg daily.  I discussed with him the way to titrate Lasix based on his symptoms and weight.  The patient understand that he has to give us a call if he becomes symptomatic again.  I also reinforced that he continues to use CPAP.  I will have him come back and see us 6 months.    Plan  1. Cut back on lasix to 20 mg going forward. You can skip a dose if you feel you don't have any swelling, and no edema. If you notice more swelling you can take 40 mg for one or two days. Then go back to taking 20 mg daily.   2. All your medications will remain the same.   4. See us back in 6 months.     Thank you for allowing me to participate in the care of Khang Mcelroy    This note was completed in part using Dragon voice recognition software. Although reviewed after completion, some word and grammatical errors may occur.    Bib Hernández MD  Cardiology    Orders Placed This Encounter   Procedures     Follow-Up with Cardiologist     Follow-Up with Cardiac Advanced Practice Provider       Orders Placed This Encounter   Medications     furosemide (LASIX) 20 MG tablet     Sig: Take 1 tablet (20 mg) by mouth daily     Dispense:  90 tablet     Refill:  3       Medications Discontinued During This Encounter   Medication Reason     furosemide (LASIX) 40 MG tablet      glipiZIDE (GLUCOTROL) 5 MG tablet Medication Reconciliation Clean Up       Encounter Diagnoses   Name Primary?     Chronic systolic heart failure (H)      Status post coronary artery bypass graft        CURRENT MEDICATIONS:  Current Outpatient Medications   Medication Sig Dispense Refill     acetaminophen (TYLENOL) 325 MG tablet Take 2 tablets (650 mg) by mouth every 4 hours as needed for other 60 tablet 0     amLODIPine  (NORVASC) 10 MG tablet Take 1 tablet (10 mg) by mouth daily 90 tablet 3     aspirin (ASA) 325 MG EC tablet Take 1 tablet (325 mg) by mouth daily 30 tablet 0     atorvastatin (LIPITOR) 80 MG tablet Take 1 tablet (80 mg) by mouth every evening 90 tablet 0     furosemide (LASIX) 20 MG tablet Take 1 tablet (20 mg) by mouth daily 90 tablet 3     lisinopril (ZESTRIL) 20 MG tablet Take 1 tablet (20 mg) by mouth daily 90 tablet 0     metFORMIN (GLUCOPHAGE) 500 MG tablet Take 1,000 mg by mouth 2 times daily (with meals)        metoprolol succinate ER (TOPROL-XL) 25 MG 24 hr tablet Take 1 tablet (25 mg) by mouth daily 90 tablet 0     order for DME Equipment being ordered: Walker ()  Treatment Diagnosis: s/p coronary artery bypass 1 Device 0     spironolactone (ALDACTONE) 25 MG tablet Take 1 tablet (25 mg) by mouth daily 90 tablet 0       ALLERGIES   No Known Allergies    PAST MEDICAL HISTORY:  Past Medical History:   Diagnosis Date     CAD in native artery 3/18/2019     Chronic renal insufficiency      Chronic systolic heart failure (H) 10/1/2019     Diabetes mellitus with proliferative retinopathy (H)      Essential hypertension 6/14/2019     Hyperlipidemia LDL goal <70 6/14/2019     Nephrolithiasis      NSTEMI (non-ST elevated myocardial infarction) (H)      CHALO (obstructive sleep apnea) 6/24/2019 6/21/2019 [facility] Sleep Study (153.0 lbs) - AHI 30.5, RDI 34.7, Supine AHI 31.5, REM AHI 56.5, Low O2 68.7%, Time Spent ?88% 3.7 minutes / Time Spent ?89% 4.5 minutes.     S/P CABG (coronary artery bypass graft) 3/26/2019       PAST SURGICAL HISTORY:  Past Surgical History:   Procedure Laterality Date     BYPASS GRAFT ARTERY CORONARY N/A 3/18/2019    Procedure: CORONARY BYPASS GRAFTING X 4 - LIMA TO LAD, SV TO DIAGONAL, PDA, AND OM; ON PUMP WITH NOA; ENDOVEIN HARVEST LEFT LEG;  Surgeon: Adarsh Sanchez MD;  Location: [facility] OR     CV CORONARY ANGIOGRAM N/A 3/12/2019    Procedure: Coronary Angiogram;   Surgeon: Remi Rodriguez MD;  Location:  HEART CARDIAC CATH LAB     CV HEART CATHETERIZATION WITH POSSIBLE INTERVENTION N/A 3/12/2019    Procedure: Heart Catheterization with possible Intervention;  Surgeon: Remi Rodriguez MD;  Location:  HEART CARDIAC CATH LAB     CV LEFT VENTRICULOGRAM N/A 3/12/2019    Procedure: Left Ventricular Angiogram;  Surgeon: Remi Rodriguez MD;  Location:  HEART CARDIAC CATH LAB     LITHOTRIPSY         FAMILY HISTORY:  Family History   Problem Relation Age of Onset     Other - See Comments Mother         giving birth     Other - See Comments Father         old age       SOCIAL HISTORY:  Social History     Socioeconomic History     Marital status:      Spouse name: None     Number of children: None     Years of education: None     Highest education level: None   Occupational History     None   Social Needs     Financial resource strain: None     Food insecurity     Worry: None     Inability: None     Transportation needs     Medical: None     Non-medical: None   Tobacco Use     Smoking status: Never Smoker     Smokeless tobacco: Never Used   Substance and Sexual Activity     Alcohol use: No     Frequency: Never     Drug use: No     Sexual activity: Yes     Partners: Female   Lifestyle     Physical activity     Days per week: None     Minutes per session: None     Stress: None   Relationships     Social connections     Talks on phone: None     Gets together: None     Attends Christian service: None     Active member of club or organization: None     Attends meetings of clubs or organizations: None     Relationship status: None     Intimate partner violence     Fear of current or ex partner: None     Emotionally abused: None     Physically abused: None     Forced sexual activity: None   Other Topics Concern     Parent/sibling w/ CABG, MI or angioplasty before 65F 55M? No   Social History Narrative     None       Review of Systems:  Skin:  Negative       Eyes:   "Negative      ENT:  Negative      Respiratory:  Negative       Cardiovascular:  Negative      Gastroenterology: Negative      Genitourinary:  Negative      Musculoskeletal:  Negative      Neurologic:  Negative      Psychiatric:  Negative      Heme/Lymph/Imm:  Negative      Endocrine:  Positive for diabetes      Physical Exam:  Vitals: /70   Pulse 62   Ht 1.6 m (5' 3\")   Wt 74.4 kg (164 lb 1.6 oz)   BMI 29.07 kg/m    Constitutional: awake, alert, no distress  Skin: Warm and dry to touch  Head: Normocephalic, atraumatic  Eyes: Conjunctivae and lids unremarkable, sclera white  ENT: No pallor or cyanosis  Respiratory: Normal breath sounds, clear to auscultation  Cardiac: Regular rate and rhythm, S1-S2 normal.  No murmurs gallops or rubs.  Trace b/l pedal edema.   Extremities and musculoskeletal: No gross motor deficit  Neurological.  Affect normal  Psych: Alert and oriented x3    Recent Lab Results:  LIPID RESULTS:  Lab Results   Component Value Date    CHOL 126 10/17/2019    HDL 47 10/17/2019    LDL 69 10/17/2019    TRIG 48 10/17/2019       LIVER ENZYME RESULTS:  Lab Results   Component Value Date    AST 27 06/14/2019    ALT 22 10/17/2019       CBC RESULTS:  Lab Results   Component Value Date    WBC 8.4 06/14/2019    RBC 4.06 (L) 06/14/2019    HGB 9.9 (L) 06/14/2019    HCT 30.7 (L) 06/14/2019    MCV 76 (L) 06/14/2019    MCH 24.4 (L) 06/14/2019    MCHC 32.2 06/14/2019    RDW 18.2 (H) 06/14/2019     06/14/2019       BMP RESULTS:  Lab Results   Component Value Date     01/28/2021    POTASSIUM 4.9 01/28/2021    CHLORIDE 106 01/28/2021    CO2 22 01/28/2021    ANIONGAP 8 01/28/2021     (H) 01/28/2021    BUN 52 (H) 01/28/2021    CR 1.80 (H) 01/28/2021    GFRESTIMATED 39 (L) 01/28/2021    GFRESTBLACK 45 (L) 01/28/2021    MYKE 9.8 01/28/2021        A1C RESULTS:  Lab Results   Component Value Date    A1C 7.9 (H) 03/11/2019       INR RESULTS:  Lab Results   Component Value Date    INR 1.54 (H) " 03/18/2019    INR 1.79 (H) 03/18/2019       CC  MICHELE Luis CNP  [address]  [place] [address]    All medical records were reviewed in detail and discussed with the patient. Greater than 30 mins were spent with the patient, 50% of this time was spent on counseling and coordination of care.  After visit summary was printed and given to the patient.

## 2021-01-28 NOTE — PATIENT INSTRUCTIONS
1. Cut back on lasix to 20 mg going forward. You can skip a dose if you feel you don't have any swelling, and no edema. If you notice more swelling you can take 40 mg for one or two days. Then go back to taking 20 mg daily.   2. All your medications will remain the same.   4. See us back in 6 months.

## 2021-01-28 NOTE — LETTER
1/28/2021    Mpho Brian Castro MD  [address]  [facility] [address]    RE: Khang Galenh       Dear Colleague,    I had the pleasure of seeing Khang Mcelroy in the [facility].  HPI and Plan:   Today I had the pleasure of seeing Khang Mcelroy at [facility]. He is a pleasant 65 year old patient with a history of HFrEF 2/2 ischemic cardiomyopathy and EF of 35 to 40%, CAD status post coronary artery bypass grafting (LIMA-LAD, SVG-DIAG, SVG-OM, SVG-PDA) in 2019, DM, hypertension, hyperlipidemia, sleep apnea, CKD, and anemia who presents to the clinic for a follow-up visit.  The history was obtained with the help of an .     He is today in the clinic for a follow-up visit.  He reports doing well and is able to bike and jog for a total of 10 blocks without any difficulty.  He also reports that the lower extremity edema has gotten significantly better and that he does not have any shortness of breath when lying flat.  His most recent echocardiogram  showed mildly reduced ejection fraction of 45-50% which was an improvement from his prior echocardiogram.  He had grade 1 diastolic dysfunction and no significant valve disease.  Most recent blood work shows rising creatinine.    He continues to be on optimal goal directed medical therapy.  His weight has been stable at around 167 pounds. He denies orthopnea, PND, dyspnea on exertion.  He also denies chest pain or chest pressure.  He takes all his medications as prescribed.     Assessment and plan:  1.  Ischemic cardiomyopathy with ejection fraction of 45-50%  2.  Heart failure with reduced ejection fraction, NYHA class II  3.  Chronic diastolic heart failure, grade 3 diastolic dysfunction  4.  Obstructive sleep apnea  5.  Hypertension  6.  Hyperlipidemia  7.  Type 2 diabetes  7.  CKD-1.98->1.8  8.  Anemia    The patient's condition is overall improved since his last clinic visit.  His  lower extremity edema has near completely resolved, weight is stable and he is asymptomatic.  Additionally, he is able to ride and bike for a total of 10 blocks which is an improvement.  At this time, I do not feel very strongly about switching him from lisinopril to Entresto since he is very stable and EF is >45%.  Given rising creatinine I asked her to stop Lasix for 3 days which led to improvement in his renal function.  Going forward I will decrease the dose of Lasix to 20 mg daily.  I discussed with him the way to titrate Lasix based on his symptoms and weight.  The patient understand that he has to give us a call if he becomes symptomatic again.  I also reinforced that he continues to use CPAP.  I will have him come back and see us 6 months.    Plan  1. Cut back on lasix to 20 mg going forward. You can skip a dose if you feel you don't have any swelling, and no edema. If you notice more swelling you can take 40 mg for one or two days. Then go back to taking 20 mg daily.   2. All your medications will remain the same.   4. See us back in 6 months.     Thank you for allowing me to participate in the care of Khang Mcelroy    This note was completed in part using Dragon voice recognition software. Although reviewed after completion, some word and grammatical errors may occur.    Bib Hernández MD  Cardiology    Orders Placed This Encounter   Procedures     Follow-Up with Cardiologist     Follow-Up with Cardiac Advanced Practice Provider       Orders Placed This Encounter   Medications     furosemide (LASIX) 20 MG tablet     Sig: Take 1 tablet (20 mg) by mouth daily     Dispense:  90 tablet     Refill:  3       Medications Discontinued During This Encounter   Medication Reason     furosemide (LASIX) 40 MG tablet      glipiZIDE (GLUCOTROL) 5 MG tablet Medication Reconciliation Clean Up       Encounter Diagnoses   Name Primary?     Chronic systolic heart failure (H)      Status post coronary artery bypass graft         CURRENT MEDICATIONS:  Current Outpatient Medications   Medication Sig Dispense Refill     acetaminophen (TYLENOL) 325 MG tablet Take 2 tablets (650 mg) by mouth every 4 hours as needed for other 60 tablet 0     amLODIPine (NORVASC) 10 MG tablet Take 1 tablet (10 mg) by mouth daily 90 tablet 3     aspirin (ASA) 325 MG EC tablet Take 1 tablet (325 mg) by mouth daily 30 tablet 0     atorvastatin (LIPITOR) 80 MG tablet Take 1 tablet (80 mg) by mouth every evening 90 tablet 0     furosemide (LASIX) 20 MG tablet Take 1 tablet (20 mg) by mouth daily 90 tablet 3     lisinopril (ZESTRIL) 20 MG tablet Take 1 tablet (20 mg) by mouth daily 90 tablet 0     metFORMIN (GLUCOPHAGE) 500 MG tablet Take 1,000 mg by mouth 2 times daily (with meals)        metoprolol succinate ER (TOPROL-XL) 25 MG 24 hr tablet Take 1 tablet (25 mg) by mouth daily 90 tablet 0     order for DME Equipment being ordered: Walker ()  Treatment Diagnosis: s/p coronary artery bypass 1 Device 0     spironolactone (ALDACTONE) 25 MG tablet Take 1 tablet (25 mg) by mouth daily 90 tablet 0       ALLERGIES   No Known Allergies    PAST MEDICAL HISTORY:  Past Medical History:   Diagnosis Date     CAD in native artery 3/18/2019     Chronic renal insufficiency      Chronic systolic heart failure (H) 10/1/2019     Diabetes mellitus with proliferative retinopathy (H)      Essential hypertension 6/14/2019     Hyperlipidemia LDL goal <70 6/14/2019     Nephrolithiasis      NSTEMI (non-ST elevated myocardial infarction) (H)      CHALO (obstructive sleep apnea) 6/24/2019 6/21/2019 [facility] Sleep Study (153.0 lbs) - AHI 30.5, RDI 34.7, Supine AHI 31.5, REM AHI 56.5, Low O2 68.7%, Time Spent ?88% 3.7 minutes / Time Spent ?89% 4.5 minutes.     S/P CABG (coronary artery bypass graft) 3/26/2019       PAST SURGICAL HISTORY:  Past Surgical History:   Procedure Laterality Date     BYPASS GRAFT ARTERY CORONARY N/A 3/18/2019    Procedure: CORONARY BYPASS GRAFTING X  4 - LIMA TO LAD, SV TO DIAGONAL, PDA, AND OM; ON PUMP WITH NOA; ENDOVEIN HARVEST LEFT LEG;  Surgeon: Adarsh Sanchez MD;  Location:  OR     CV CORONARY ANGIOGRAM N/A 3/12/2019    Procedure: Coronary Angiogram;  Surgeon: Remi Rodriguez MD;  Location:  HEART CARDIAC CATH LAB     CV HEART CATHETERIZATION WITH POSSIBLE INTERVENTION N/A 3/12/2019    Procedure: Heart Catheterization with possible Intervention;  Surgeon: Remi Rodriguez MD;  Location:  HEART CARDIAC CATH LAB     CV LEFT VENTRICULOGRAM N/A 3/12/2019    Procedure: Left Ventricular Angiogram;  Surgeon: Remi Rodriguez MD;  Location:  HEART CARDIAC CATH LAB     LITHOTRIPSY         FAMILY HISTORY:  Family History   Problem Relation Age of Onset     Other - See Comments Mother         giving birth     Other - See Comments Father         old age       SOCIAL HISTORY:  Social History     Socioeconomic History     Marital status:      Spouse name: None     Number of children: None     Years of education: None     Highest education level: None   Occupational History     None   Social Needs     Financial resource strain: None     Food insecurity     Worry: None     Inability: None     Transportation needs     Medical: None     Non-medical: None   Tobacco Use     Smoking status: Never Smoker     Smokeless tobacco: Never Used   Substance and Sexual Activity     Alcohol use: No     Frequency: Never     Drug use: No     Sexual activity: Yes     Partners: Female   Lifestyle     Physical activity     Days per week: None     Minutes per session: None     Stress: None   Relationships     Social connections     Talks on phone: None     Gets together: None     Attends Restorationism service: None     Active member of club or organization: None     Attends meetings of clubs or organizations: None     Relationship status: None     Intimate partner violence     Fear of current or ex partner: None     Emotionally abused: None     Physically  "abused: None     Forced sexual activity: None   Other Topics Concern     Parent/sibling w/ CABG, MI or angioplasty before 65F 55M? No   Social History Narrative     None       Review of Systems:  Skin:  Negative       Eyes:  Negative      ENT:  Negative      Respiratory:  Negative       Cardiovascular:  Negative      Gastroenterology: Negative      Genitourinary:  Negative      Musculoskeletal:  Negative      Neurologic:  Negative      Psychiatric:  Negative      Heme/Lymph/Imm:  Negative      Endocrine:  Positive for diabetes      Physical Exam:  Vitals: /70   Pulse 62   Ht 1.6 m (5' 3\")   Wt 74.4 kg (164 lb 1.6 oz)   BMI 29.07 kg/m    Constitutional: awake, alert, no distress  Skin: Warm and dry to touch  Head: Normocephalic, atraumatic  Eyes: Conjunctivae and lids unremarkable, sclera white  ENT: No pallor or cyanosis  Respiratory: Normal breath sounds, clear to auscultation  Cardiac: Regular rate and rhythm, S1-S2 normal.  No murmurs gallops or rubs.  Trace b/l pedal edema.   Extremities and musculoskeletal: No gross motor deficit  Neurological.  Affect normal  Psych: Alert and oriented x3    Recent Lab Results:  LIPID RESULTS:  Lab Results   Component Value Date    CHOL 126 10/17/2019    HDL 47 10/17/2019    LDL 69 10/17/2019    TRIG 48 10/17/2019       LIVER ENZYME RESULTS:  Lab Results   Component Value Date    AST 27 06/14/2019    ALT 22 10/17/2019       CBC RESULTS:  Lab Results   Component Value Date    WBC 8.4 06/14/2019    RBC 4.06 (L) 06/14/2019    HGB 9.9 (L) 06/14/2019    HCT 30.7 (L) 06/14/2019    MCV 76 (L) 06/14/2019    MCH 24.4 (L) 06/14/2019    MCHC 32.2 06/14/2019    RDW 18.2 (H) 06/14/2019     06/14/2019       BMP RESULTS:  Lab Results   Component Value Date     01/28/2021    POTASSIUM 4.9 01/28/2021    CHLORIDE 106 01/28/2021    CO2 22 01/28/2021    ANIONGAP 8 01/28/2021     (H) 01/28/2021    BUN 52 (H) 01/28/2021    CR 1.80 (H) 01/28/2021    GFRESTIMATED 39 (L) " 01/28/2021    GFRESTBLACK 45 (L) 01/28/2021    MYKE 9.8 01/28/2021        A1C RESULTS:  Lab Results   Component Value Date    A1C 7.9 (H) 03/11/2019       INR RESULTS:  Lab Results   Component Value Date    INR 1.54 (H) 03/18/2019    INR 1.79 (H) 03/18/2019       All medical records were reviewed in detail and discussed with the patient. Greater than 30 mins were spent with the patient, 50% of this time was spent on counseling and coordination of care.  After visit summary was printed and given to the patient.    Thank you for allowing me to participate in the care of your patient.    Sincerely,     Bib Hernández MD     [facility] [place]    cc:   Bib Hernández MD  [address] [facility] [address] [place] [id number]

## 2021-02-11 NOTE — PROGRESS NOTES
Khang is a 65 year old who is being evaluated via a billable telephone visit.      What phone number would you like to be contacted at? [phone]  How would you like to obtain your AVS? Mail a copy    Kristie Sousa MA on 2/11/2021 at 3:47 PM        Patient gave consent for phone visit.   Phone call duration: 13 minutes    Obstructive Sleep Apnea - PAP Follow-Up Visit:    Chief Complaint   Patient presents with     CPAP Follow Up     Stopped using and wants to re-start cpap. Last used 5/2020.       Khang Mcelroy comes in today for follow-up of their severe sleep apnea, managed with CPAP.     PSG on 6/21/2019 showed an apnea hypopnea index of 30.5 per hour.     Patient had responded well to CPAP initially. At our last meeting in January 2020, he was noted to be using therapy regularly and download demonstrated normal AHI. Patient seems to have fallen out of habit of using CPAP. He reports that this is because of     He uses full-face mask.     Bedtime is typically 9 pm. Usually it takes about 15 minutes to fall asleep with the mask on. Wake time is typically 4 am. The patient is usually getting 7 hours of sleep per night.    He does not feel rested in the morning.    Total score - [place]: 3 (2/11/2021  3:00 PM      Reviewed by team:  Allergies  Meds            Reviewed by provider:                Problem List:  Patient Active Problem List    Diagnosis Date Noted     Chronic systolic heart failure (H) 10/01/2019     Priority: Medium     CHALO (obstructive sleep apnea) 06/24/2019     Priority: Medium     6/21/2019 [facility] Sleep Study (153.0 lbs) - AHI 30.5, RDI 34.7, Supine AHI 31.5, REM AHI 56.5, Low O2 68.7%, Time Spent ?88% 3.7 minutes / Time Spent ?89% 4.5 minutes.       Essential hypertension 06/14/2019     Priority: Medium     Hyperlipidemia LDL goal <70 06/14/2019     Priority: Medium     S/P CABG (coronary artery bypass graft) 03/26/2019     Priority: Medium     Fluid overload  03/26/2019     Priority: Medium     Transient hyperglycemia post procedure 03/26/2019     Priority: Medium     CAD in native artery 03/18/2019     Priority: Medium     Type 2 diabetes mellitus (H) 03/11/2019     Priority: Medium     NSTEMI (non-ST elevated myocardial infarction) (H) 03/11/2019     Priority: Medium          There were no vitals taken for this visit.    Impression/Plan:     Severe obstructive sleep apnea.     - Although patient had demonstrated good compliance in the beginning, he has not been using CPAP during last one year. He doesnot report specific intolerance issues and looks like lack of motivation and inability to get supplies on time were the main barriers. We reviewed consequences of untreated disease and importance of regular adherence to therapy. He si motivated to restart regular treatment.     Plan:     1. Restart auto PAP therapy 5-12 cm H2O      Khang Mcelroy will follow up in about 6 month(s).     I spent a total of 20 minutes for this appointment today which include time spent before, during and after the visit for patient care, counseling and coordination of care.      Abdon Bernstein MD    CC:  Amari Castro,

## 2021-02-12 ENCOUNTER — VIRTUAL VISIT (OUTPATIENT)
Dept: SLEEP MEDICINE | Facility: CLINIC | Age: 66
End: 2021-02-12
Attending: INTERNAL MEDICINE
Payer: COMMERCIAL

## 2021-02-12 DIAGNOSIS — G47.33 OSA (OBSTRUCTIVE SLEEP APNEA): Primary | ICD-10-CM

## 2021-02-12 PROCEDURE — 99213 OFFICE O/P EST LOW 20 MIN: CPT | Mod: TEL | Performed by: INTERNAL MEDICINE

## 2021-02-12 NOTE — PATIENT INSTRUCTIONS
Your There is no height or weight on file to calculate BMI.  Weight management is a personal decision.  If you are interested in exploring weight loss strategies, the following discussion covers the approaches that may be successful. Body mass index (BMI) is one way to tell whether you are at a healthy weight, overweight, or obese. It measures your weight in relation to your height.  A BMI of 18.5 to 24.9 is in the healthy range. A person with a BMI of 25 to 29.9 is considered overweight, and someone with a BMI of 30 or greater is considered obese. More than two-thirds of American adults are considered overweight or obese.  Being overweight or obese increases the risk for further weight gain. Excess weight may lead to heart disease and diabetes.  Creating and following plans for healthy eating and physical activity may help you improve your health.  Weight control is part of healthy lifestyle and includes exercise, emotional health, and healthy eating habits. Careful eating habits lifelong are the mainstay of weight control. Though there are significant health benefits from weight loss, long-term weight loss with diet alone may be very difficult to achieve- studies show long-term success with dietary management in less than 10% of people. Attaining a healthy weight may be especially difficult to achieve in those with severe obesity. In some cases, medications, devices and surgical management might be considered.  What can you do?  If you are overweight or obese and are interested in methods for weight loss, you should discuss this with your provider.     Consider reducing daily calorie intake by 500 calories.     Keep a food journal.     Avoiding skipping meals, consider cutting portions instead.    Diet combined with exercise helps maintain muscle while optimizing fat loss. Strength training is particularly important for building and maintaining muscle mass. Exercise helps reduce stress, increase energy, and  improves fitness. Increasing exercise without diet control, however, may not burn enough calories to loose weight.       Start walking three days a week 10-20 minutes at a time    Work towards walking thirty minutes five days a week     Eventually, increase the speed of your walking for 1-2 minutes at time    In addition, we recommend that you review healthy lifestyles and methods for weight loss available through the National Institutes of Health patient information sites:  http://win.niddk.nih.gov/publications/index.htm    And look into health and wellness programs that may be available through your health insurance provider, employer, local community center, or [employer].

## 2021-03-02 ENCOUNTER — DOCUMENTATION ONLY (OUTPATIENT)
Dept: CARE COORDINATION | Facility: CLINIC | Age: 66
End: 2021-03-02

## 2021-03-29 DIAGNOSIS — I10 ESSENTIAL HYPERTENSION: Primary | ICD-10-CM

## 2021-03-29 DIAGNOSIS — I50.22 CHRONIC SYSTOLIC HEART FAILURE (H): ICD-10-CM

## 2021-03-29 DIAGNOSIS — Z95.1 STATUS POST CORONARY ARTERY BYPASS GRAFT: ICD-10-CM

## 2021-03-29 RX ORDER — METOPROLOL SUCCINATE 25 MG/1
25 TABLET, EXTENDED RELEASE ORAL DAILY
Qty: 90 TABLET | Refills: 2 | Status: SHIPPED | OUTPATIENT
Start: 2021-03-29 | End: 2023-02-07

## 2021-03-29 RX ORDER — LISINOPRIL 20 MG/1
20 TABLET ORAL DAILY
Qty: 90 TABLET | Refills: 2 | Status: SHIPPED | OUTPATIENT
Start: 2021-03-29 | End: 2021-05-07 | Stop reason: DRUGHIGH

## 2021-03-31 ENCOUNTER — PRE VISIT (OUTPATIENT)
Dept: NEPHROLOGY | Facility: CLINIC | Age: 66
End: 2021-03-31

## 2021-03-31 ENCOUNTER — VIRTUAL VISIT (OUTPATIENT)
Dept: NEPHROLOGY | Facility: CLINIC | Age: 66
End: 2021-03-31
Attending: INTERNAL MEDICINE
Payer: COMMERCIAL

## 2021-03-31 DIAGNOSIS — I50.22 CHRONIC SYSTOLIC HEART FAILURE (H): ICD-10-CM

## 2021-03-31 DIAGNOSIS — I10 ESSENTIAL HYPERTENSION: ICD-10-CM

## 2021-03-31 DIAGNOSIS — N18.32 STAGE 3B CHRONIC KIDNEY DISEASE (H): ICD-10-CM

## 2021-03-31 DIAGNOSIS — E11.00 TYPE 2 DIABETES MELLITUS WITH HYPEROSMOLARITY WITHOUT COMA, WITHOUT LONG-TERM CURRENT USE OF INSULIN (H): Primary | ICD-10-CM

## 2021-03-31 PROCEDURE — 99214 OFFICE O/P EST MOD 30 MIN: CPT | Mod: TEL | Performed by: INTERNAL MEDICINE

## 2021-03-31 NOTE — PROGRESS NOTES
March 31, 2021    I was asked to see this patient by Dr. Hernández    CC: CKD evaluation     HPI: Khang Mcelroy is a 65 year old male with PMH significant for HTN, DM with diabetic retinopathy, CKD stage III, CHALO, CAD s/p CABG, CHF with EF of 45%, diastolic dysfunction grade I  who presents for evaluation and management of CKD.     Pt with no acute symptoms today. Endorses doing well. Denied systemic symptoms including fever/chills, nausea/vomitng, diarrhea, abdominal pain, chest pain, SOB, cough, orthopnea/ dizziness or lightheadedness.     Pt denied dysuria, urgency or frequency of urination. Feels completely empty the bladder after voiding.    Up on questioning uses CPAP at night for most part.     Home BP were in 130's mostly systolic but can go up to 140 systolic some times.  Morning BG levels were <150    - History of Hematuria: no  - Swelling: no  - Hx of UTIs: no  - Hx of stones: no  - Rashes/Joint pain: no  - Family hx of kidney disease: no  - NSAID use: no      No Known Allergies    acetaminophen (TYLENOL) 325 MG tablet, Take 2 tablets (650 mg) by mouth every 4 hours as needed for other  amLODIPine (NORVASC) 10 MG tablet, Take 1 tablet (10 mg) by mouth daily  aspirin (ASA) 325 MG EC tablet, Take 1 tablet (325 mg) by mouth daily  atorvastatin (LIPITOR) 80 MG tablet, Take 1 tablet (80 mg) by mouth every evening  furosemide (LASIX) 20 MG tablet, Take 1 tablet (20 mg) by mouth daily  lisinopril (ZESTRIL) 20 MG tablet, Take 1 tablet (20 mg) by mouth daily  metFORMIN (GLUCOPHAGE) 500 MG tablet, Take 1,000 mg by mouth 2 times daily (with meals)   metoprolol succinate ER (TOPROL-XL) 25 MG 24 hr tablet, Take 1 tablet (25 mg) by mouth daily  order for DME, Equipment being ordered: Walker ()  Treatment Diagnosis: s/p coronary artery bypass  spironolactone (ALDACTONE) 25 MG tablet, Take 1 tablet (25 mg) by mouth daily    No current facility-administered medications on file prior to visit.     Past Medical  History:   Diagnosis Date     CAD in native artery 3/18/2019     Chronic renal insufficiency      Chronic systolic heart failure (H) 10/1/2019     Diabetes mellitus with proliferative retinopathy (H)      Essential hypertension 6/14/2019     Hyperlipidemia LDL goal <70 6/14/2019     Nephrolithiasis      NSTEMI (non-ST elevated myocardial infarction) (H)      CHALO (obstructive sleep apnea) 6/24/2019 6/21/2019 [facility] Sleep Study (153.0 lbs) - AHI 30.5, RDI 34.7, Supine AHI 31.5, REM AHI 56.5, Low O2 68.7%, Time Spent ?88% 3.7 minutes / Time Spent ?89% 4.5 minutes.     S/P CABG (coronary artery bypass graft) 3/26/2019       Past Surgical History:   Procedure Laterality Date     BYPASS GRAFT ARTERY CORONARY N/A 3/18/2019    Procedure: CORONARY BYPASS GRAFTING X 4 - LIMA TO LAD, SV TO DIAGONAL, PDA, AND OM; ON PUMP WITH NOA; ENDOVEIN HARVEST LEFT LEG;  Surgeon: Adarsh Sanchez MD;  Location:  OR     CV CORONARY ANGIOGRAM N/A 3/12/2019    Procedure: Coronary Angiogram;  Surgeon: Remi Rodriguez MD;  Location:  HEART CARDIAC CATH LAB     CV HEART CATHETERIZATION WITH POSSIBLE INTERVENTION N/A 3/12/2019    Procedure: Heart Catheterization with possible Intervention;  Surgeon: Remi Rodriguez MD;  Location:  HEART CARDIAC CATH LAB     CV LEFT VENTRICULOGRAM N/A 3/12/2019    Procedure: Left Ventricular Angiogram;  Surgeon: Remi Rodriguez MD;  Location:  HEART CARDIAC CATH LAB     LITHOTRIPSY         Social History     Tobacco Use     Smoking status: Never Smoker     Smokeless tobacco: Never Used   Substance Use Topics     Alcohol use: No     Frequency: Never     Drug use: No       Family History   Problem Relation Age of Onset     Other - See Comments Mother         giving birth     Other - See Comments Father         old age       ROS: A 12 system review of systems was negative other than noted here or above.     Exam:  There were no vitals taken for this visit.    GENERAL  APPEARANCE: alert and no distress  RESP: able to speak in full sentences  NEURO: mentation intact and speech normal  PSYCH: affect normal/bright    Further physical exam is limited in setting of telephone visit    Results:    No visits with results within 1 Day(s) from this visit.   Latest known visit with results is:   Orders Only on 01/28/2021   Component Date Value Ref Range Status     Sodium 01/28/2021 136  133 - 144 mmol/L Final     Potassium 01/28/2021 4.9  3.4 - 5.3 mmol/L Final     Chloride 01/28/2021 106  94 - 109 mmol/L Final     Carbon Dioxide 01/28/2021 22  20 - 32 mmol/L Final     Anion Gap 01/28/2021 8  3 - 14 mmol/L Final     Glucose 01/28/2021 156* 70 - 99 mg/dL Final     Urea Nitrogen 01/28/2021 52* 7 - 30 mg/dL Final     Creatinine 01/28/2021 1.80* 0.66 - 1.25 mg/dL Final     GFR Estimate 01/28/2021 39* >60 mL/min/[1.73_m2] Final    Comment: Non  GFR Calc  Starting 12/18/2018, serum creatinine based estimated GFR (eGFR) will be   calculated using the Chronic Kidney Disease Epidemiology Collaboration   (CKD-EPI) equation.       GFR Estimate If Black 01/28/2021 45* >60 mL/min/[1.73_m2] Final    Comment:  GFR Calc  Starting 12/18/2018, serum creatinine based estimated GFR (eGFR) will be   calculated using the Chronic Kidney Disease Epidemiology Collaboration   (CKD-EPI) equation.       Calcium 01/28/2021 9.8  8.5 - 10.1 mg/dL Final       Assessment/Plan:     CKD stage IIIb  Pt with baseline creatinine of 1.5-1.7 in 2019 and 2020 but up trended to 1.98 in jan and down trended to 1.80 with in few days in jan. No further labs to assess his renal function at this time. UA in 2019 with some protein and RBC in a catheterized sample. Will repeat labs for its persistence. With given diabetic retinopathy, high suspicious for diabetic nephropathy at this point, but labs will also dictate further work up  - Will get the labs including BMP and UA  - UPCR and Albumin to creatinine  ratio    Hypertension :   Seems Euvolemic by history.  Fairly controlled with BP in 130's mostly but can go up to 140 some times. Pt on multiple drugs at this point, plan is to repeat labs and see he would qualify for SGL2 inhibitor.  - Lisinopril 20 mg once a day   - furosemide 20 mg once a day  - amlodipine 10 mg once a day  - metoprolol 25 mg once a day   - spironolactone 25 mg once a day     Electrolytes are stable     DM type II :  Metformin 1000 mg BID, further care per his PCP.    D/w Dr. Gloria Michaud  Nephrology fellow

## 2021-04-06 ENCOUNTER — HOSPITAL ENCOUNTER (OUTPATIENT)
Dept: LAB | Facility: CLINIC | Age: 66
Discharge: HOME | End: 2021-04-06
Admitting: INTERNAL MEDICINE
Payer: COMMERCIAL

## 2021-04-06 DIAGNOSIS — I10 ESSENTIAL HYPERTENSION: ICD-10-CM

## 2021-04-06 LAB
ALBUMIN SERPL-MCNC: 3.7 G/DL (ref 3.4–5)
ALBUMIN UR-MCNC: 20 MG/DL
ANION GAP SERPL CALCULATED.3IONS-SCNC: 4 MMOL/L (ref 3–14)
APPEARANCE UR: CLEAR
BILIRUB UR QL STRIP: NEGATIVE
BUN SERPL-MCNC: 48 MG/DL (ref 7–30)
CALCIUM SERPL-MCNC: 9.9 MG/DL (ref 8.5–10.1)
CHLORIDE SERPL-SCNC: 110 MMOL/L (ref 94–109)
CO2 SERPL-SCNC: 25 MMOL/L (ref 20–32)
COLOR UR AUTO: ABNORMAL
CREAT SERPL-MCNC: 1.99 MG/DL (ref 0.66–1.25)
DEPRECATED CALCIDIOL+CALCIFEROL SERPL-MC: 15 UG/L (ref 20–75)
ERYTHROCYTE [DISTWIDTH] IN BLOOD BY AUTOMATED COUNT: 13.2 % (ref 10–15)
FERRITIN SERPL-MCNC: 600 NG/ML (ref 26–388)
GFR SERPL CREATININE-BSD FRML MDRD: 35 ML/MIN/{1.73_M2}
GLUCOSE SERPL-MCNC: 137 MG/DL (ref 70–99)
GLUCOSE UR STRIP-MCNC: NEGATIVE MG/DL
HCT VFR BLD AUTO: 31.1 % (ref 40–53)
HGB BLD-MCNC: 10 G/DL (ref 13.3–17.7)
HGB UR QL STRIP: NEGATIVE
IRON SATN MFR SERPL: 32 % (ref 15–46)
IRON SERPL-MCNC: 99 UG/DL (ref 35–180)
KETONES UR STRIP-MCNC: NEGATIVE MG/DL
LEUKOCYTE ESTERASE UR QL STRIP: NEGATIVE
MCH RBC QN AUTO: 27 PG (ref 26.5–33)
MCHC RBC AUTO-ENTMCNC: 32.2 G/DL (ref 31.5–36.5)
MCV RBC AUTO: 84 FL (ref 78–100)
MUCOUS THREADS #/AREA URNS LPF: PRESENT /LPF
NITRATE UR QL: NEGATIVE
PH UR STRIP: 5.5 PH (ref 5–7)
PHOSPHATE SERPL-MCNC: 3.6 MG/DL (ref 2.5–4.5)
PLATELET # BLD AUTO: 282 10E9/L (ref 150–450)
POTASSIUM SERPL-SCNC: 5.6 MMOL/L (ref 3.4–5.3)
PROT UR-MCNC: 0.3 G/L
PROT/CREAT 24H UR: 0.29 G/G CR (ref 0–0.2)
PTH-INTACT SERPL-MCNC: 57 PG/ML (ref 18–80)
RBC # BLD AUTO: 3.7 10E12/L (ref 4.4–5.9)
RBC #/AREA URNS AUTO: 1 /HPF (ref 0–2)
SODIUM SERPL-SCNC: 139 MMOL/L (ref 133–144)
SOURCE: ABNORMAL
SP GR UR STRIP: 1.01 (ref 1–1.03)
SPERM #/AREA URNS HPF: PRESENT /HPF
SQUAMOUS #/AREA URNS AUTO: <1 /HPF (ref 0–1)
TIBC SERPL-MCNC: 308 UG/DL (ref 240–430)
UROBILINOGEN UR STRIP-MCNC: 0 MG/DL (ref 0–2)
WBC # BLD AUTO: 9.8 10E9/L (ref 4–11)
WBC #/AREA URNS AUTO: 1 /HPF (ref 0–5)

## 2021-04-06 PROCEDURE — 82306 VITAMIN D 25 HYDROXY: CPT | Performed by: INTERNAL MEDICINE

## 2021-04-06 PROCEDURE — 83550 IRON BINDING TEST: CPT | Performed by: INTERNAL MEDICINE

## 2021-04-06 PROCEDURE — 83970 ASSAY OF PARATHORMONE: CPT | Performed by: INTERNAL MEDICINE

## 2021-04-06 PROCEDURE — 36415 COLL VENOUS BLD VENIPUNCTURE: CPT | Performed by: INTERNAL MEDICINE

## 2021-04-06 PROCEDURE — 80069 RENAL FUNCTION PANEL: CPT | Performed by: INTERNAL MEDICINE

## 2021-04-06 PROCEDURE — 82728 ASSAY OF FERRITIN: CPT | Performed by: INTERNAL MEDICINE

## 2021-04-06 PROCEDURE — 83540 ASSAY OF IRON: CPT | Performed by: INTERNAL MEDICINE

## 2021-04-06 PROCEDURE — 85027 COMPLETE CBC AUTOMATED: CPT | Performed by: INTERNAL MEDICINE

## 2021-04-06 PROCEDURE — 81001 URINALYSIS AUTO W/SCOPE: CPT | Performed by: INTERNAL MEDICINE

## 2021-04-06 PROCEDURE — 84156 ASSAY OF PROTEIN URINE: CPT | Performed by: INTERNAL MEDICINE

## 2021-04-26 DIAGNOSIS — I50.22 CHRONIC SYSTOLIC HEART FAILURE (H): ICD-10-CM

## 2021-04-26 DIAGNOSIS — Z95.1 STATUS POST CORONARY ARTERY BYPASS GRAFT: ICD-10-CM

## 2021-04-26 RX ORDER — ATORVASTATIN CALCIUM 80 MG/1
80 TABLET, FILM COATED ORAL EVERY EVENING
Qty: 90 TABLET | Refills: 3 | Status: SHIPPED | OUTPATIENT
Start: 2021-04-26

## 2021-04-26 RX ORDER — SPIRONOLACTONE 25 MG/1
25 TABLET ORAL DAILY
Qty: 90 TABLET | Refills: 3 | Status: SHIPPED | OUTPATIENT
Start: 2021-04-26 | End: 2022-06-15

## 2021-05-07 ENCOUNTER — VIRTUAL VISIT (OUTPATIENT)
Dept: NEPHROLOGY | Facility: CLINIC | Age: 66
End: 2021-05-07
Attending: INTERNAL MEDICINE
Payer: COMMERCIAL

## 2021-05-07 DIAGNOSIS — E87.5 HYPERKALEMIA: ICD-10-CM

## 2021-05-07 DIAGNOSIS — N18.32 STAGE 3B CHRONIC KIDNEY DISEASE (H): Primary | ICD-10-CM

## 2021-05-07 DIAGNOSIS — Z95.1 STATUS POST CORONARY ARTERY BYPASS GRAFT: ICD-10-CM

## 2021-05-07 DIAGNOSIS — I10 ESSENTIAL HYPERTENSION: ICD-10-CM

## 2021-05-07 PROCEDURE — 99214 OFFICE O/P EST MOD 30 MIN: CPT | Mod: TEL | Performed by: INTERNAL MEDICINE

## 2021-05-07 RX ORDER — AMLODIPINE BESYLATE 10 MG/1
10 TABLET ORAL DAILY
Qty: 90 TABLET | Refills: 3 | Status: ON HOLD | OUTPATIENT
Start: 2021-05-07 | End: 2022-11-18

## 2021-05-07 RX ORDER — LISINOPRIL 20 MG/1
20 TABLET ORAL DAILY
Qty: 90 TABLET | Refills: 2 | Status: ON HOLD | OUTPATIENT
Start: 2021-05-07 | End: 2022-06-15

## 2021-05-07 NOTE — PROGRESS NOTES
SERVICE [phone], [facility], [place]  PATIENT PH# [phone]    Khang is a 65 year old who is being evaluated via a billable telephone visit.      What phone number would you like to be contacted at?  SERVICE [phone], [facility], [place]  PATIENT PH# [phone]  How would you like to obtain your AVS? Mail a copy  Phone call duration: 40 minutes      May 7, 2021    I was asked to see this patient by Dr. Hernández     CC: CKD evaluation     HPI:   Khang Mcelroy is a 65 year old male with PMH significant for HTN, DM with diabetic retinopathy, CKD stage III, CHALO, CAD s/p CABG, CHF with EF of 45%, diastolic dysfunction grade I  who presents for evaluation and management of CKD.      Pt with no acute symptoms today. Endorses doing well. Denied systemic symptoms including fever/chills, nausea/vomitng, diarrhea, abdominal pain, chest pain, SOB, cough, orthopnea/ dizziness or lightheadedness.      Pt denied dysuria, urgency or frequency of urination. Feels completely empty the bladder after voiding.     Up on questioning uses CPAP at night for most part.      Home BP were in 130's mostly systolic but can go up to 140 systolic some times.  Morning BG levels were <150     - History of Hematuria: no  - Swelling: no  - Hx of UTIs: no  - Hx of stones: no  - Rashes/Joint pain: no  - Family hx of kidney disease: no  - NSAID use: no     No Known Allergies    acetaminophen (TYLENOL) 325 MG tablet, Take 2 tablets (650 mg) by mouth every 4 hours as needed for other  amLODIPine (NORVASC) 10 MG tablet, Take 1 tablet (10 mg) by mouth daily  aspirin (ASA) 325 MG EC tablet, Take 1 tablet (325 mg) by mouth daily  atorvastatin (LIPITOR) 80 MG tablet, Take 1 tablet (80 mg) by mouth every evening  lisinopril (ZESTRIL) 20 MG tablet, Take 1 tablet (20 mg) by mouth daily  metFORMIN (GLUCOPHAGE) 500 MG tablet, Take 1,000 mg by mouth 2 times daily (with meals)   metoprolol succinate ER (TOPROL-XL) 25 MG 24 hr tablet, Take 1  tablet (25 mg) by mouth daily  furosemide (LASIX) 20 MG tablet, Take 1 tablet (20 mg) by mouth daily (Patient not taking: Reported on 5/7/2021)  order for DME, Equipment being ordered: Walker ()  Treatment Diagnosis: s/p coronary artery bypass  spironolactone (ALDACTONE) 25 MG tablet, Take 1 tablet (25 mg) by mouth daily    No current facility-administered medications on file prior to visit.       Past Medical History:   Diagnosis Date     CAD in native artery 3/18/2019     Chronic renal insufficiency      Chronic systolic heart failure (H) 10/1/2019     Diabetes mellitus with proliferative retinopathy (H)      Essential hypertension 6/14/2019     Hyperlipidemia LDL goal <70 6/14/2019     Nephrolithiasis      NSTEMI (non-ST elevated myocardial infarction) (H)      CHALO (obstructive sleep apnea) 6/24/2019 6/21/2019 [facility] Sleep Study (153.0 lbs) - AHI 30.5, RDI 34.7, Supine AHI 31.5, REM AHI 56.5, Low O2 68.7%, Time Spent ?88% 3.7 minutes / Time Spent ?89% 4.5 minutes.     S/P CABG (coronary artery bypass graft) 3/26/2019       Past Surgical History:   Procedure Laterality Date     BYPASS GRAFT ARTERY CORONARY N/A 3/18/2019    Procedure: CORONARY BYPASS GRAFTING X 4 - LIMA TO LAD, SV TO DIAGONAL, PDA, AND OM; ON PUMP WITH NOA; ENDOVEIN HARVEST LEFT LEG;  Surgeon: Adarsh Sanchez MD;  Location:  OR     CV CORONARY ANGIOGRAM N/A 3/12/2019    Procedure: Coronary Angiogram;  Surgeon: Remi Rodriguez MD;  Location:  HEART CARDIAC CATH LAB     CV HEART CATHETERIZATION WITH POSSIBLE INTERVENTION N/A 3/12/2019    Procedure: Heart Catheterization with possible Intervention;  Surgeon: Remi Rodriguez MD;  Location:  HEART CARDIAC CATH LAB     CV LEFT VENTRICULOGRAM N/A 3/12/2019    Procedure: Left Ventricular Angiogram;  Surgeon: eRmi Rodriguez MD;  Location:  HEART CARDIAC CATH LAB     LITHOTRIPSY       Social History     Tobacco Use     Smoking status: Never Smoker      Smokeless tobacco: Never Used   Substance Use Topics     Alcohol use: No     Frequency: Never     Drug use: No       Family History   Problem Relation Age of Onset     Other - See Comments Mother         giving birth     Other - See Comments Father         old age       ROS: A 12 system review of systems was negative other than noted here or above.     Exam:  There were no vitals taken for this visit.    GENERAL APPEARANCE: alert and no distress  RESP: able to speak in full sentences  NEURO: mentation intact and speech normal  PSYCH: affect normal/bright     Further physical exam is limited in setting of telephone visit  Results:    No visits with results within 1 Day(s) from this visit.   Latest known visit with results is:   Hospital Outpatient Visit on 04/06/2021   Component Date Value Ref Range Status     Iron 04/06/2021 99  35 - 180 ug/dL Final     Iron Binding Cap 04/06/2021 308  240 - 430 ug/dL Final     Iron Saturation Index 04/06/2021 32  15 - 46 % Final     Ferritin 04/06/2021 600* 26 - 388 ng/mL Final     Vitamin D Deficiency screening 04/06/2021 15* 20 - 75 ug/L Final    Comment: Season, race, dietary intake, and treatment affect the concentration of   25-hydroxy-Vitamin D. Values may decrease during winter months and increase   during summer months. Values 20-29 ug/L may indicate Vitamin D insufficiency   and values <20 ug/L may indicate Vitamin D deficiency.  Vitamin D determination is routinely performed by an immunoassay specific for   25 hydroxyvitamin D3.  If an individual is on vitamin D2 (ergocalciferol)   supplementation, please specify 25 OH vitamin D2 and D3 level determination by   LCMSMS test [id number].       Parathyroid Hormone Intact 04/06/2021 57  18 - 80 pg/mL Final     WBC 04/06/2021 9.8  4.0 - 11.0 10e9/L Final     RBC Count 04/06/2021 3.70* 4.4 - 5.9 10e12/L Final     Hemoglobin 04/06/2021 10.0* 13.3 - 17.7 g/dL Final     Hematocrit 04/06/2021 31.1* 40.0 - 53.0 % Final     MCV 04/06/2021  84  78 - 100 fl Final     MCH 04/06/2021 27.0  26.5 - 33.0 pg Final     MCHC 04/06/2021 32.2  31.5 - 36.5 g/dL Final     RDW 04/06/2021 13.2  10.0 - 15.0 % Final     Platelet Count 04/06/2021 282  150 - 450 10e9/L Final     Sodium 04/06/2021 139  133 - 144 mmol/L Final     Potassium 04/06/2021 5.6* 3.4 - 5.3 mmol/L Final     Chloride 04/06/2021 110* 94 - 109 mmol/L Final     Carbon Dioxide 04/06/2021 25  20 - 32 mmol/L Final     Anion Gap 04/06/2021 4  3 - 14 mmol/L Final     Glucose 04/06/2021 137* 70 - 99 mg/dL Final     Urea Nitrogen 04/06/2021 48* 7 - 30 mg/dL Final     Creatinine 04/06/2021 1.99* 0.66 - 1.25 mg/dL Final     GFR Estimate 04/06/2021 35* >60 mL/min/[1.73_m2] Final    Comment: Non  GFR Calc  Starting 12/18/2018, serum creatinine based estimated GFR (eGFR) will be   calculated using the Chronic Kidney Disease Epidemiology Collaboration   (CKD-EPI) equation.       GFR Estimate If Black 04/06/2021 40* >60 mL/min/[1.73_m2] Final    Comment:  GFR Calc  Starting 12/18/2018, serum creatinine based estimated GFR (eGFR) will be   calculated using the Chronic Kidney Disease Epidemiology Collaboration   (CKD-EPI) equation.       Calcium 04/06/2021 9.9  8.5 - 10.1 mg/dL Final     Phosphorus 04/06/2021 3.6  2.5 - 4.5 mg/dL Final     Albumin 04/06/2021 3.7  3.4 - 5.0 g/dL Final     Color Urine 04/06/2021 Light Yellow   Final     Appearance Urine 04/06/2021 Clear   Final     Glucose Urine 04/06/2021 Negative  NEG^Negative mg/dL Final     Bilirubin Urine 04/06/2021 Negative  NEG^Negative Final     Ketones Urine 04/06/2021 Negative  NEG^Negative mg/dL Final     Specific Gravity Urine 04/06/2021 1.014  1.003 - 1.035 Final     Blood Urine 04/06/2021 Negative  NEG^Negative Final     pH Urine 04/06/2021 5.5  5.0 - 7.0 pH Final     Protein Albumin Urine 04/06/2021 20* NEG^Negative mg/dL Final     Urobilinogen mg/dL 04/06/2021 0.0  0.0 - 2.0 mg/dL Final     Nitrite Urine 04/06/2021  Negative  NEG^Negative Final     Leukocyte Esterase Urine 04/06/2021 Negative  NEG^Negative Final     Source 04/06/2021 Midstream Urine   Final     WBC Urine 04/06/2021 1  0 - 5 /HPF Final     RBC Urine 04/06/2021 1  0 - 2 /HPF Final     Squamous Epithelial /HPF Urine 04/06/2021 <1  0 - 1 /HPF Final     Mucous Urine 04/06/2021 Present* NEG^Negative /LPF Final     sperm 04/06/2021 Present* NEG^Negative /HPF Final     Protein Random Urine 04/06/2021 0.30  g/L Final     Protein Total Urine g/gr Creatinine 04/06/2021 0.29* 0 - 0.2 g/g Cr Final       Assessment/Plan:     CKD stage IIIb, likely DM 2/2 diabetic retinopathy   Pt with baseline creatinine of 1.5-1.7 in 2019 and 2020 but now at 1.8-2. UA showed minimal protein but no sediment. UPCR with minimal proteinuria. With given diabetic retinopathy, suspect diabetic nephropathy at this point.  - repeat labs now as potassium was high in last check  - encouraged to control his BP and BG levels strictly which can decrease the progression of his renal disease.     Hypertension :   Seems Euvolemic by history.  Fairly controlled with BP in 130's mostly. Pt is on multiple medications. Initial thought was to increase lisinopril and discontinue amlo, but noted his potassium was wen, so no changes made today.  - Lisinopril 20 mg once a day   - amlodipine 10 mg once a day  - metoprolol 25 mg once a day   - spironolactone 25 mg once a day  - furosemide 20 mg discontinued by his provider      Hyperkalemia : will repeat labs at this point.  Also discussed about low potassium diet.     DM type II :  Metformin 1000 mg BID, further care per his PCP.    Guillermina Michaud  Nephrology fellow    I was present with the fellow during the history and exam.  I discussed the case with the fellow and agree with the findings as documented in the assessment and plan. CKD and hyperkalemia, medication changes recommended and will continue to monitor.   Spent 12 minutes in total discussing with  fellow and patient and son, and 15 minutes reviewing chart and records on day of service.    Melba Wolfe

## 2021-05-07 NOTE — LETTER
5/7/2021       RE: Khang Mcelroy  [address]  [facility] [address]     Dear Colleague,    Thank you for referring your patient, Khang Mcelroy, to the [facility] NEPHROLOG[facility] [place] at [facility]. Please see a copy of my visit note below.     SERVICE [phone], OPTION 3, Maltese  PATIENT PH# [phone]    Khang is a 65 year old who is being evaluated via a billable telephone visit.      What phone number would you like to be contacted at?  SERVICE [phone], OPTION [employer], Maltese  PATIENT PH# [phone]  How would you like to obtain your AVS? Mail a copy  Phone call duration: 40 minutes      May 7, 2021    I was asked to see this patient by Dr. Hernández     CC: CKD evaluation     HPI:   Khang Mcelroy is a 65 year old male with PMH significant for HTN, DM with diabetic retinopathy, CKD stage III, CHALO, CAD s/p CABG, CHF with EF of 45%, diastolic dysfunction grade I  who presents for evaluation and management of CKD.      Pt with no acute symptoms today. Endorses doing well. Denied systemic symptoms including fever/chills, nausea/vomitng, diarrhea, abdominal pain, chest pain, SOB, cough, orthopnea/ dizziness or lightheadedness.      Pt denied dysuria, urgency or frequency of urination. Feels completely empty the bladder after voiding.     Up on questioning uses CPAP at night for most part.      Home BP were in 130's mostly systolic but can go up to 140 systolic some times.  Morning BG levels were <150     - History of Hematuria: no  - Swelling: no  - Hx of UTIs: no  - Hx of stones: no  - Rashes/Joint pain: no  - Family hx of kidney disease: no  - NSAID use: no     No Known Allergies    acetaminophen (TYLENOL) 325 MG tablet, Take 2 tablets (650 mg) by mouth every 4 hours as needed for other  amLODIPine (NORVASC) 10 MG tablet, Take 1 tablet (10 mg) by mouth daily  aspirin (ASA) 325 MG EC tablet, Take 1 tablet (325 mg) by mouth  daily  atorvastatin (LIPITOR) 80 MG tablet, Take 1 tablet (80 mg) by mouth every evening  lisinopril (ZESTRIL) 20 MG tablet, Take 1 tablet (20 mg) by mouth daily  metFORMIN (GLUCOPHAGE) 500 MG tablet, Take 1,000 mg by mouth 2 times daily (with meals)   metoprolol succinate ER (TOPROL-XL) 25 MG 24 hr tablet, Take 1 tablet (25 mg) by mouth daily  furosemide (LASIX) 20 MG tablet, Take 1 tablet (20 mg) by mouth daily (Patient not taking: Reported on 5/7/2021)  order for DME, Equipment being ordered: Walker ()  Treatment Diagnosis: s/p coronary artery bypass  spironolactone (ALDACTONE) 25 MG tablet, Take 1 tablet (25 mg) by mouth daily    No current facility-administered medications on file prior to visit.       Past Medical History:   Diagnosis Date     CAD in native artery 3/18/2019     Chronic renal insufficiency      Chronic systolic heart failure (H) 10/1/2019     Diabetes mellitus with proliferative retinopathy (H)      Essential hypertension 6/14/2019     Hyperlipidemia LDL goal <70 6/14/2019     Nephrolithiasis      NSTEMI (non-ST elevated myocardial infarction) (H)      CHALO (obstructive sleep apnea) 6/24/2019 6/21/2019 [place] [facility] Sleep Study (153.0 lbs) - AHI 30.5, RDI 34.7, Supine AHI 31.5, REM AHI 56.5, Low O2 68.7%, Time Spent ?88% 3.7 minutes / Time Spent ?89% 4.5 minutes.     S/P CABG (coronary artery bypass graft) 3/26/2019       Past Surgical History:   Procedure Laterality Date     BYPASS GRAFT ARTERY CORONARY N/A 3/18/2019    Procedure: CORONARY BYPASS GRAFTING X 4 - LIMA TO LAD, SV TO DIAGONAL, PDA, AND OM; ON PUMP WITH NOA; ENDOVEIN HARVEST LEFT LEG;  Surgeon: Adarsh Sanchez MD;  Location:  OR     CV CORONARY ANGIOGRAM N/A 3/12/2019    Procedure: Coronary Angiogram;  Surgeon: Remi Rodriguez MD;  Location:  HEART CARDIAC CATH LAB     CV HEART CATHETERIZATION WITH POSSIBLE INTERVENTION N/A 3/12/2019    Procedure: Heart Catheterization with possible Intervention;   Surgeon: Remi Rodriguez MD;  Location:  HEART CARDIAC CATH LAB     CV LEFT VENTRICULOGRAM N/A 3/12/2019    Procedure: Left Ventricular Angiogram;  Surgeon: Remi Rodriguez MD;  Location:  HEART CARDIAC CATH LAB     LITHOTRIPSY       Social History     Tobacco Use     Smoking status: Never Smoker     Smokeless tobacco: Never Used   Substance Use Topics     Alcohol use: No     Frequency: Never     Drug use: No       Family History   Problem Relation Age of Onset     Other - See Comments Mother         giving birth     Other - See Comments Father         old age       ROS: A 12 system review of systems was negative other than noted here or above.     Exam:  There were no vitals taken for this visit.    GENERAL APPEARANCE: alert and no distress  RESP: able to speak in full sentences  NEURO: mentation intact and speech normal  PSYCH: affect normal/bright     Further physical exam is limited in setting of telephone visit  Results:    No visits with results within 1 Day(s) from this visit.   Latest known visit with results is:   Hospital Outpatient Visit on 04/06/2021   Component Date Value Ref Range Status     Iron 04/06/2021 99  35 - 180 ug/dL Final     Iron Binding Cap 04/06/2021 308  240 - 430 ug/dL Final     Iron Saturation Index 04/06/2021 32  15 - 46 % Final     Ferritin 04/06/2021 600* 26 - 388 ng/mL Final     Vitamin D Deficiency screening 04/06/2021 15* 20 - 75 ug/L Final    Comment: Season, race, dietary intake, and treatment affect the concentration of   25-hydroxy-Vitamin D. Values may decrease during winter months and increase   during summer months. Values 20-29 ug/L may indicate Vitamin D insufficiency   and values <20 ug/L may indicate Vitamin D deficiency.  Vitamin D determination is routinely performed by an immunoassay specific for   25 hydroxyvitamin D3.  If an individual is on vitamin D2 (ergocalciferol)   supplementation, please specify 25 OH vitamin D2 and D3 level determination by    LCMSMS test [id number].       Parathyroid Hormone Intact 04/06/2021 57  18 - 80 pg/mL Final     WBC 04/06/2021 9.8  4.0 - 11.0 10e9/L Final     RBC Count 04/06/2021 3.70* 4.4 - 5.9 10e12/L Final     Hemoglobin 04/06/2021 10.0* 13.3 - 17.7 g/dL Final     Hematocrit 04/06/2021 31.1* 40.0 - 53.0 % Final     MCV 04/06/2021 84  78 - 100 fl Final     MCH 04/06/2021 27.0  26.5 - 33.0 pg Final     MCHC 04/06/2021 32.2  31.5 - 36.5 g/dL Final     RDW 04/06/2021 13.2  10.0 - 15.0 % Final     Platelet Count 04/06/2021 282  150 - 450 10e9/L Final     Sodium 04/06/2021 139  133 - 144 mmol/L Final     Potassium 04/06/2021 5.6* 3.4 - 5.3 mmol/L Final     Chloride 04/06/2021 110* 94 - 109 mmol/L Final     Carbon Dioxide 04/06/2021 25  20 - 32 mmol/L Final     Anion Gap 04/06/2021 4  3 - 14 mmol/L Final     Glucose 04/06/2021 137* 70 - 99 mg/dL Final     Urea Nitrogen 04/06/2021 48* 7 - 30 mg/dL Final     Creatinine 04/06/2021 1.99* 0.66 - 1.25 mg/dL Final     GFR Estimate 04/06/2021 35* >60 mL/min/[1.73_m2] Final    Comment: Non  GFR Calc  Starting 12/18/2018, serum creatinine based estimated GFR (eGFR) will be   calculated using the Chronic Kidney Disease Epidemiology Collaboration   (CKD-EPI) equation.       GFR Estimate If Black 04/06/2021 40* >60 mL/min/[1.73_m2] Final    Comment:  GFR Calc  Starting 12/18/2018, serum creatinine based estimated GFR (eGFR) will be   calculated using the Chronic Kidney Disease Epidemiology Collaboration   (CKD-EPI) equation.       Calcium 04/06/2021 9.9  8.5 - 10.1 mg/dL Final     Phosphorus 04/06/2021 3.6  2.5 - 4.5 mg/dL Final     Albumin 04/06/2021 3.7  3.4 - 5.0 g/dL Final     Color Urine 04/06/2021 Light Yellow   Final     Appearance Urine 04/06/2021 Clear   Final     Glucose Urine 04/06/2021 Negative  NEG^Negative mg/dL Final     Bilirubin Urine 04/06/2021 Negative  NEG^Negative Final     Ketones Urine 04/06/2021 Negative  NEG^Negative mg/dL Final     Specific  Gravity Urine 04/06/2021 1.014  1.003 - 1.035 Final     Blood Urine 04/06/2021 Negative  NEG^Negative Final     pH Urine 04/06/2021 5.5  5.0 - 7.0 pH Final     Protein Albumin Urine 04/06/2021 20* NEG^Negative mg/dL Final     Urobilinogen mg/dL 04/06/2021 0.0  0.0 - 2.0 mg/dL Final     Nitrite Urine 04/06/2021 Negative  NEG^Negative Final     Leukocyte Esterase Urine 04/06/2021 Negative  NEG^Negative Final     Source 04/06/2021 Midstream Urine   Final     WBC Urine 04/06/2021 1  0 - 5 /HPF Final     RBC Urine 04/06/2021 1  0 - 2 /HPF Final     Squamous Epithelial /HPF Urine 04/06/2021 <1  0 - 1 /HPF Final     Mucous Urine 04/06/2021 Present* NEG^Negative /LPF Final     sperm 04/06/2021 Present* NEG^Negative /HPF Final     Protein Random Urine 04/06/2021 0.30  g/L Final     Protein Total Urine g/gr Creatinine 04/06/2021 0.29* 0 - 0.2 g/g Cr Final       Assessment/Plan:     CKD stage IIIb, likely DM 2/2 diabetic retinopathy   Pt with baseline creatinine of 1.5-1.7 in 2019 and 2020 but now at 1.8-2. UA showed minimal protein but no sediment. UPCR with minimal proteinuria. With given diabetic retinopathy, suspect diabetic nephropathy at this point.  - repeat labs now as potassium was high in last check  - encouraged to control his BP and BG levels strictly which can decrease the progression of his renal disease.     Hypertension :   Seems Euvolemic by history.  Fairly controlled with BP in 130's mostly. Pt is on multiple medications. Initial thought was to increase lisinopril and discontinue amlo, but noted his potassium was wen, so no changes made today.  - Lisinopril 20 mg once a day   - amlodipine 10 mg once a day  - metoprolol 25 mg once a day   - spironolactone 25 mg once a day  - furosemide 20 mg discontinued by his provider      Hyperkalemia : will repeat labs at this point.  Also discussed about low potassium diet.     DM type II :  Metformin 1000 mg BID, further care per his PCP.    Guillermina RIVER  Keily  Nephrology fellow    I was present with the fellow during the history and exam.  I discussed the case with the fellow and agree with the findings as documented in the assessment and plan. CKD and hyperkalemia, medication changes recommended and will continue to monitor.   Spent 12 minutes in total discussing with fellow and patient and son, and 15 minutes reviewing chart and records on day of service.    Melba Wolfe

## 2021-10-05 ENCOUNTER — OFFICE VISIT (OUTPATIENT)
Dept: CARDIOLOGY | Facility: CLINIC | Age: 66
End: 2021-10-05
Attending: INTERNAL MEDICINE
Payer: MEDICARE

## 2021-10-05 VITALS
DIASTOLIC BLOOD PRESSURE: 62 MMHG | HEIGHT: 63 IN | BODY MASS INDEX: 30.3 KG/M2 | SYSTOLIC BLOOD PRESSURE: 132 MMHG | WEIGHT: 171 LBS | HEART RATE: 57 BPM

## 2021-10-05 DIAGNOSIS — I25.10 CORONARY ARTERY DISEASE INVOLVING NATIVE CORONARY ARTERY OF NATIVE HEART WITHOUT ANGINA PECTORIS: ICD-10-CM

## 2021-10-05 DIAGNOSIS — I50.22 CHRONIC SYSTOLIC HEART FAILURE (H): Primary | ICD-10-CM

## 2021-10-05 DIAGNOSIS — N18.32 STAGE 3B CHRONIC KIDNEY DISEASE (H): ICD-10-CM

## 2021-10-05 DIAGNOSIS — G47.33 OSA (OBSTRUCTIVE SLEEP APNEA): ICD-10-CM

## 2021-10-05 DIAGNOSIS — I10 ESSENTIAL HYPERTENSION: ICD-10-CM

## 2021-10-05 DIAGNOSIS — E78.5 HYPERLIPIDEMIA LDL GOAL <70: ICD-10-CM

## 2021-10-05 PROCEDURE — 99214 OFFICE O/P EST MOD 30 MIN: CPT | Performed by: NURSE PRACTITIONER

## 2021-10-05 ASSESSMENT — MIFFLIN-ST. JEOR: SCORE: 1465.78

## 2021-10-05 NOTE — LETTER
10/5/2021    Mpho Brian Castro MD  [address]  [facility] [address]    RE: Khang Mcelroy       Dear Colleague,    I had the pleasure of seeing Khang Mcelroy in the [facility].    Cardiology Clinic Progress Note  Khang Mcelroy MRN# [id number]   YOB: 1955 Age: 65 year old   Primary Cardiologist: Dr. Hernández Reason for visit: overdue for cardiology followup            Assessment and Plan:   Khang Mcelroy is a very pleasant 65 year old male with a history of HFrEF, ischemic cardiomyopathy, LVEF 35-40% per echocardiogram 3/12/19, coronary artery disease s/p CABG x 4 (LIMA-LAD, SVG-DIAG, SVG-OM, SVG-PDA) on 3/18/19, DM, hypertension, hyperlipidemia, CKD, and anemia. Patient here today for CORE followup.        1.  Combined chronic systolic heart failure/HFrEF and diastolic heart failure, ischemic cardiomyopathy - LVEF 35-40% 3/12/19 since has had some recovery and stable EF around 40-45% with grade I diastolic dysfunction              - NYHA class III, stage C              - Etiology : ischemic              - Fluid status : euvolemic              - Diuretic regimen : furosemide 20mg daily               - Ischemic evaluation : coronary angiogram 3/12/19, s/p CABG 3/18/19              - Guideline directed medical therapy                          - Betablocker: metoprolol succinate 25mg daily                          - ACEI/ARB/ARNI: lisinopril 20mg daily                          - Aldactone antagonist: spironolactone 25mg daily   2. Coronary artery disease s/p CABG x 4 (LIMA-LAD, SVG-DIAG, SVG-OM, SVG-PDA) on 3/18/19, stable no signs/symptoms of myocardial ischemia.               - Continue aspirin, statin and betablocker therapy.  3. Hypertension - controlled   4. Hyperlipidemia - stable, LDL goal <70, lipid panel today showed total cholesterol 126, HDL 47, LDL 69 and triglycerides 48.               - Continue  atorvastatin  5. Chronic kidney disease - baseline 1.8-2.0, follows with nephrology.   6. Diabetes mellitus - hgbA1c 8.1 8/2021  7. Obstructive sleep apnea - compliant with CPAP    I saw Khang today for cardiology follow up. He is doing well from a cardiovascular and heart failure standpoint. He appears compensated and euvolemic on exam. Advised to continue current medications. Reinforced healthy lifestyle modifications. Encouraged to notify clinic of any changes in symptoms.     Changes today: none    Follow up plan:     Cardiology follow up in 6 months, sooner if needed.         History of Presenting Illness:    Khang Mcelroy is a very pleasant 65 year old male with a history of HFrEF, ischemic cardiomyopathy, LVEF 35-40% per echocardiogram 3/12/19, coronary artery disease s/p CABG x 4 (LIMA-LAD, SVG-DIAG, SVG-OM, SVG-PDA) on 3/18/19, DM, hypertension, hyperlipidemia, CKD, and anemia.      Hospitalized 3/11/19-3/24/19 presented with acute hypoxemic and hypercapnic respiratory failure secondary to NSTEMI, found to have multivessel coronary artery disease, s/p CABG x 4 (LIMA-LAD, SVG-DIAG, SVG-OM, SVG-PDA) on 3/18/19. Troponin peaked at 53. Echocardiogram 3/12/19 showed an LVEF 35-40%, grade III or advanced diastolic dysfunction, with multiple wall motion abnormalities.      Primary cardiologist Dr. Hernández. Most recent echocardiogram 1/2021 showed EF 40-45%, RV normal size/function, inferior wall hypokinesis, grade I diastolic dysfunction and mildly decreased RV systolic function.     He most recently saw Dr. Hernández in January 2021 at which time he was doing well, his furosemide was decreased to 20mg with additional 20mg as needed.     Patient is here today for cardiology follow up. Phone  used for our visit today.     Patient reports feeling good. Denies shortness of breath at rest. Denies exertional dyspnea. Denies orthopnea or PND. Denies lower extremity edema. Denies chest pain or chest tightness.  "Denies dizziness, lightheadedness or other presyncopal symptoms. Denies tachycardia or palpitations. Taking medications daily.     Most recent labs from 8/2021 showed stable renal function, creatinine 1.79, stable electrolytes, hgbA1c 8.1. Blood pressure 154/68 and HR 57 in clinic today. Unable to recall any home blood pressures from at home.     Appetite good, \"too much\". Walking for exercise 5-10 blocks daily. Denies tobacco use. Denies alcohol use.         Social History      Social History     Socioeconomic History     Marital status:      Spouse name: Not on file     Number of children: Not on file     Years of education: Not on file     Highest education level: Not on file   Occupational History     Not on file   Tobacco Use     Smoking status: Never Smoker     Smokeless tobacco: Never Used   Substance and Sexual Activity     Alcohol use: No     Drug use: No     Sexual activity: Yes     Partners: Female   Other Topics Concern     Parent/sibling w/ CABG, MI or angioplasty before 65F 55M? No   Social History Narrative     Not on file     Social Determinants of Health     Financial Resource Strain:      Difficulty of Paying Living Expenses:    Food Insecurity:      Worried About Running Out of Food in the Last Year:      Ran Out of Food in the Last Year:    Transportation Needs:      Lack of Transportation (Medical):      Lack of Transportation (Non-Medical):    Physical Activity:      Days of Exercise per Week:      Minutes of Exercise per Session:    Stress:      Feeling of Stress :    Social Connections:      Frequency of Communication with Friends and Family:      Frequency of Social Gatherings with Friends and Family:      Attends [employer] Services:      Active Member of Clubs or Organizations:      Attends Club or Organization Meetings:      Marital Status:    Intimate Partner Violence:      Fear of Current or Ex-Partner:      Emotionally Abused:      Physically Abused:      Sexually Abused:         " "    Review of Systems:   Skin:  Negative     Eyes:  Negative    ENT:  Negative    Respiratory:  Positive for CPAP;sleep apnea  Cardiovascular:  Negative    Gastroenterology: Negative    Genitourinary:  Negative    Musculoskeletal:  Negative    Neurologic:  Negative    Psychiatric:  Negative    Heme/Lymph/Imm:  Negative    Endocrine:  Positive for diabetes         Physical Exam:   Vitals: BP (!) 154/68   Pulse 57   Ht 1.6 m (5' 3\")   Wt 77.6 kg (171 lb)   BMI 30.29 kg/m     Wt Readings from Last 4 Encounters:   10/05/21 77.6 kg (171 lb)   01/28/21 74.4 kg (164 lb 1.6 oz)   07/24/20 75.9 kg (167 lb 6.4 oz)   03/10/20 76.2 kg (168 lb)     GEN: well nourished, in no acute distress.  HEENT:  Pupils equal, round. Sclerae nonicteric.   NECK: Supple, no masses appreciated.  C/V:  Regular rate and rhythm  RESP: Respirations are unlabored. Clear to auscultation bilaterally without wheezing, rales, or rhonchi.  GI: Abdomen soft, nontender.  EXTREM: No LE edema.  NEURO: Alert and oriented, cooperative.  SKIN: Warm and dry.        Data:     LIPID RESULTS:  Lab Results   Component Value Date    CHOL 126 10/17/2019    HDL 47 10/17/2019    LDL 69 10/17/2019    TRIG 48 10/17/2019     LIVER ENZYME RESULTS:  Lab Results   Component Value Date    AST 27 06/14/2019    ALT 22 10/17/2019     CBC RESULTS:  Lab Results   Component Value Date    WBC 9.8 04/06/2021    RBC 3.70 (L) 04/06/2021    HGB 10.0 (L) 04/06/2021    HCT 31.1 (L) 04/06/2021    MCV 84 04/06/2021    MCH 27.0 04/06/2021    MCHC 32.2 04/06/2021    RDW 13.2 04/06/2021     04/06/2021     BMP RESULTS:  Lab Results   Component Value Date     04/06/2021    POTASSIUM 5.6 (H) 04/06/2021    CHLORIDE 110 (H) 04/06/2021    CO2 25 04/06/2021    ANIONGAP 4 04/06/2021     (H) 04/06/2021    BUN 48 (H) 04/06/2021    CR 1.99 (H) 04/06/2021    GFRESTIMATED 35 (L) 04/06/2021    GFRESTBLACK 40 (L) 04/06/2021    MYKE 9.9 04/06/2021      A1C RESULTS:  Lab Results "   Component Value Date    A1C 7.9 (H) 03/11/2019     INR RESULTS:  Lab Results   Component Value Date    INR 1.54 (H) 03/18/2019    INR 1.79 (H) 03/18/2019            Medications     Current Outpatient Medications   Medication Sig Dispense Refill     acetaminophen (TYLENOL) 325 MG tablet Take 2 tablets (650 mg) by mouth every 4 hours as needed for other 60 tablet 0     amLODIPine (NORVASC) 10 MG tablet Take 1 tablet (10 mg) by mouth daily 90 tablet 3     aspirin (ASA) 325 MG EC tablet Take 1 tablet (325 mg) by mouth daily 30 tablet 0     atorvastatin (LIPITOR) 80 MG tablet Take 1 tablet (80 mg) by mouth every evening 90 tablet 3     furosemide (LASIX) 20 MG tablet Take 1 tablet (20 mg) by mouth daily 90 tablet 3     GLIPIZIDE PO Take 2.5 mg by mouth 2 times daily       lisinopril (ZESTRIL) 20 MG tablet Take 1 tablet (20 mg) by mouth daily 90 tablet 2     metoprolol succinate ER (TOPROL-XL) 25 MG 24 hr tablet Take 1 tablet (25 mg) by mouth daily 90 tablet 2     spironolactone (ALDACTONE) 25 MG tablet Take 1 tablet (25 mg) by mouth daily 90 tablet 3     metFORMIN (GLUCOPHAGE) 500 MG tablet Take 1,000 mg by mouth 2 times daily (with meals)  (Patient not taking: Reported on 10/5/2021)       order for DME Equipment being ordered: Walker ()  Treatment Diagnosis: s/p coronary artery bypass (Patient not taking: Reported on 10/5/2021) 1 Device 0          Past Medical History     Past Medical History:   Diagnosis Date     CAD in native artery 3/18/2019     Chronic renal insufficiency      Chronic systolic heart failure (H) 10/1/2019     Diabetes mellitus with proliferative retinopathy (H)      Essential hypertension 6/14/2019     Hyperlipidemia LDL goal <70 6/14/2019     Nephrolithiasis      NSTEMI (non-ST elevated myocardial infarction) (H)      CHALO (obstructive sleep apnea) 6/24/2019 6/21/2019 [place] [facility] Sleep Study (153.0 lbs) - AHI 30.5, RDI 34.7, Supine AHI 31.5, REM AHI 56.5, Low O2 68.7%, Time Spent  ?88% 3.7 minutes / Time Spent ?89% 4.5 minutes.     S/P CABG (coronary artery bypass graft) 3/26/2019     Past Surgical History:   Procedure Laterality Date     BYPASS GRAFT ARTERY CORONARY N/A 3/18/2019    Procedure: CORONARY BYPASS GRAFTING X 4 - LIMA TO LAD, SV TO DIAGONAL, PDA, AND OM; ON PUMP WITH NOA; ENDOVEIN HARVEST LEFT LEG;  Surgeon: Adarsh Sanchez MD;  Location:  OR     CV CORONARY ANGIOGRAM N/A 3/12/2019    Procedure: Coronary Angiogram;  Surgeon: Remi Rodriguez MD;  Location:  HEART CARDIAC CATH LAB     CV HEART CATHETERIZATION WITH POSSIBLE INTERVENTION N/A 3/12/2019    Procedure: Heart Catheterization with possible Intervention;  Surgeon: Remi Rodriguez MD;  Location:  HEART CARDIAC CATH LAB     CV LEFT VENTRICULOGRAM N/A 3/12/2019    Procedure: Left Ventricular Angiogram;  Surgeon: Remi Rodriguez MD;  Location:  HEART CARDIAC CATH LAB     LITHOTRIPSY       Family History   Problem Relation Age of Onset     Other - See Comments Mother         giving birth     Other - See Comments Father         old age            Allergies   Patient has no known allergies.    30 minutes spent on the date of the encounter doing chart review, history and exam, documentation and further activities as noted above      MICHELE Hicks [facility]  Pager: [phone]        Thank you for allowing me to participate in the care of your patient.      Sincerely,     MICHELE Hicks [facility]  cc:   Bib Hernández MD  [address]  [place] [address]

## 2021-10-05 NOTE — PATIENT INSTRUCTIONS
1. No medication changes  2. Follow up with Viv in 6 months  3. Please call with any questions/concerns [phone]

## 2021-10-05 NOTE — PROGRESS NOTES
Cardiology Clinic Progress Note  Khang Mcelroy MRN# [id number]   YOB: 1955 Age: 65 year old   Primary Cardiologist: Dr. Hernández Reason for visit: overdue for cardiology followup            Assessment and Plan:   Khang Mcelroy is a very pleasant 65 year old male with a history of HFrEF, ischemic cardiomyopathy, LVEF 35-40% per echocardiogram 3/12/19, coronary artery disease s/p CABG x 4 (LIMA-LAD, SVG-DIAG, SVG-OM, SVG-PDA) on 3/18/19, DM, hypertension, hyperlipidemia, CKD, and anemia. Patient here today for CORE followup.        1.  Combined chronic systolic heart failure/HFrEF and diastolic heart failure, ischemic cardiomyopathy - LVEF 35-40% 3/12/19 since has had some recovery and stable EF around 40-45% with grade I diastolic dysfunction              - NYHA class III, stage C              - Etiology : ischemic              - Fluid status : euvolemic              - Diuretic regimen : furosemide 20mg daily               - Ischemic evaluation : coronary angiogram 3/12/19, s/p CABG 3/18/19              - Guideline directed medical therapy                          - Betablocker: metoprolol succinate 25mg daily                          - ACEI/ARB/ARNI: lisinopril 20mg daily                          - Aldactone antagonist: spironolactone 25mg daily   2. Coronary artery disease s/p CABG x 4 (LIMA-LAD, SVG-DIAG, SVG-OM, SVG-PDA) on 3/18/19, stable no signs/symptoms of myocardial ischemia.               - Continue aspirin, statin and betablocker therapy.  3. Hypertension - controlled   4. Hyperlipidemia - stable, LDL goal <70, lipid panel today showed total cholesterol 126, HDL 47, LDL 69 and triglycerides 48.               - Continue atorvastatin  5. Chronic kidney disease - baseline 1.8-2.0, follows with nephrology.   6. Diabetes mellitus - hgbA1c 8.1 8/2021  7. Obstructive sleep apnea - compliant with CPAP    I saw Khang today for cardiology follow up. He is doing well from a cardiovascular and  heart failure standpoint. He appears compensated and euvolemic on exam. Advised to continue current medications. Reinforced healthy lifestyle modifications. Encouraged to notify clinic of any changes in symptoms.     Changes today: none    Follow up plan:     Cardiology follow up in 6 months, sooner if needed.         History of Presenting Illness:    Khang Mcelroy is a very pleasant 65 year old male with a history of HFrEF, ischemic cardiomyopathy, LVEF 35-40% per echocardiogram 3/12/19, coronary artery disease s/p CABG x 4 (LIMA-LAD, SVG-DIAG, SVG-OM, SVG-PDA) on 3/18/19, DM, hypertension, hyperlipidemia, CKD, and anemia.      Hospitalized 3/11/19-3/24/19 presented with acute hypoxemic and hypercapnic respiratory failure secondary to NSTEMI, found to have multivessel coronary artery disease, s/p CABG x 4 (LIMA-LAD, SVG-DIAG, SVG-OM, SVG-PDA) on 3/18/19. Troponin peaked at 53. Echocardiogram 3/12/19 showed an LVEF 35-40%, grade III or advanced diastolic dysfunction, with multiple wall motion abnormalities.      Primary cardiologist Dr. Hernández. Most recent echocardiogram 1/2021 showed EF 40-45%, RV normal size/function, inferior wall hypokinesis, grade I diastolic dysfunction and mildly decreased RV systolic function.     He most recently saw Dr. Hernández in January 2021 at which time he was doing well, his furosemide was decreased to 20mg with additional 20mg as needed.     Patient is here today for cardiology follow up. Phone  used for our visit today.     Patient reports feeling good. Denies shortness of breath at rest. Denies exertional dyspnea. Denies orthopnea or PND. Denies lower extremity edema. Denies chest pain or chest tightness. Denies dizziness, lightheadedness or other presyncopal symptoms. Denies tachycardia or palpitations. Taking medications daily.     Most recent labs from 8/2021 showed stable renal function, creatinine 1.79, stable electrolytes, hgbA1c 8.1. Blood pressure 154/68 and  "HR 57 in clinic today. Unable to recall any home blood pressures from at home.     Appetite good, \"too much\". Walking for exercise 5-10 blocks daily. Denies tobacco use. Denies alcohol use.         Social History      Social History     Socioeconomic History     Marital status:      Spouse name: Not on file     Number of children: Not on file     Years of education: Not on file     Highest education level: Not on file   Occupational History     Not on file   Tobacco Use     Smoking status: Never Smoker     Smokeless tobacco: Never Used   Substance and Sexual Activity     Alcohol use: No     Drug use: No     Sexual activity: Yes     Partners: Female   Other Topics Concern     Parent/sibling w/ CABG, MI or angioplasty before 65F 55M? No   Social History Narrative     Not on file     Social Determinants of Health     Financial Resource Strain:      Difficulty of Paying Living Expenses:    Food Insecurity:      Worried About Running Out of Food in the Last Year:      Ran Out of Food in the Last Year:    Transportation Needs:      Lack of Transportation (Medical):      Lack of Transportation (Non-Medical):    Physical Activity:      Days of Exercise per Week:      Minutes of Exercise per Session:    Stress:      Feeling of Stress :    Social Connections:      Frequency of Communication with Friends and Family:      Frequency of Social Gatherings with Friends and Family:      Attends Sabianist Services:      Active Member of Clubs or Organizations:      Attends Club or Organization Meetings:      Marital Status:    Intimate Partner Violence:      Fear of Current or Ex-Partner:      Emotionally Abused:      Physically Abused:      Sexually Abused:             Review of Systems:   Skin:  Negative     Eyes:  Negative    ENT:  Negative    Respiratory:  Positive for CPAP;sleep apnea  Cardiovascular:  Negative    Gastroenterology: Negative    Genitourinary:  Negative    Musculoskeletal:  Negative    Neurologic:  Negative " "   Psychiatric:  Negative    Heme/Lymph/Imm:  Negative    Endocrine:  Positive for diabetes         Physical Exam:   Vitals: BP (!) 154/68   Pulse 57   Ht 1.6 m (5' 3\")   Wt 77.6 kg (171 lb)   BMI 30.29 kg/m     Wt Readings from Last 4 Encounters:   10/05/21 77.6 kg (171 lb)   01/28/21 74.4 kg (164 lb 1.6 oz)   07/24/20 75.9 kg (167 lb 6.4 oz)   03/10/20 76.2 kg (168 lb)     GEN: well nourished, in no acute distress.  HEENT:  Pupils equal, round. Sclerae nonicteric.   NECK: Supple, no masses appreciated.  C/V:  Regular rate and rhythm  RESP: Respirations are unlabored. Clear to auscultation bilaterally without wheezing, rales, or rhonchi.  GI: Abdomen soft, nontender.  EXTREM: No LE edema.  NEURO: Alert and oriented, cooperative.  SKIN: Warm and dry.        Data:     LIPID RESULTS:  Lab Results   Component Value Date    CHOL 126 10/17/2019    HDL 47 10/17/2019    LDL 69 10/17/2019    TRIG 48 10/17/2019     LIVER ENZYME RESULTS:  Lab Results   Component Value Date    AST 27 06/14/2019    ALT 22 10/17/2019     CBC RESULTS:  Lab Results   Component Value Date    WBC 9.8 04/06/2021    RBC 3.70 (L) 04/06/2021    HGB 10.0 (L) 04/06/2021    HCT 31.1 (L) 04/06/2021    MCV 84 04/06/2021    MCH 27.0 04/06/2021    MCHC 32.2 04/06/2021    RDW 13.2 04/06/2021     04/06/2021     BMP RESULTS:  Lab Results   Component Value Date     04/06/2021    POTASSIUM 5.6 (H) 04/06/2021    CHLORIDE 110 (H) 04/06/2021    CO2 25 04/06/2021    ANIONGAP 4 04/06/2021     (H) 04/06/2021    BUN 48 (H) 04/06/2021    CR 1.99 (H) 04/06/2021    GFRESTIMATED 35 (L) 04/06/2021    GFRESTBLACK 40 (L) 04/06/2021    MYKE 9.9 04/06/2021      A1C RESULTS:  Lab Results   Component Value Date    A1C 7.9 (H) 03/11/2019     INR RESULTS:  Lab Results   Component Value Date    INR 1.54 (H) 03/18/2019    INR 1.79 (H) 03/18/2019            Medications     Current Outpatient Medications   Medication Sig Dispense Refill     acetaminophen (TYLENOL) " 325 MG tablet Take 2 tablets (650 mg) by mouth every 4 hours as needed for other 60 tablet 0     amLODIPine (NORVASC) 10 MG tablet Take 1 tablet (10 mg) by mouth daily 90 tablet 3     aspirin (ASA) 325 MG EC tablet Take 1 tablet (325 mg) by mouth daily 30 tablet 0     atorvastatin (LIPITOR) 80 MG tablet Take 1 tablet (80 mg) by mouth every evening 90 tablet 3     furosemide (LASIX) 20 MG tablet Take 1 tablet (20 mg) by mouth daily 90 tablet 3     GLIPIZIDE PO Take 2.5 mg by mouth 2 times daily       lisinopril (ZESTRIL) 20 MG tablet Take 1 tablet (20 mg) by mouth daily 90 tablet 2     metoprolol succinate ER (TOPROL-XL) 25 MG 24 hr tablet Take 1 tablet (25 mg) by mouth daily 90 tablet 2     spironolactone (ALDACTONE) 25 MG tablet Take 1 tablet (25 mg) by mouth daily 90 tablet 3     metFORMIN (GLUCOPHAGE) 500 MG tablet Take 1,000 mg by mouth 2 times daily (with meals)  (Patient not taking: Reported on 10/5/2021)       order for DME Equipment being ordered: Walker ()  Treatment Diagnosis: s/p coronary artery bypass (Patient not taking: Reported on 10/5/2021) 1 Device 0          Past Medical History     Past Medical History:   Diagnosis Date     CAD in native artery 3/18/2019     Chronic renal insufficiency      Chronic systolic heart failure (H) 10/1/2019     Diabetes mellitus with proliferative retinopathy (H)      Essential hypertension 6/14/2019     Hyperlipidemia LDL goal <70 6/14/2019     Nephrolithiasis      NSTEMI (non-ST elevated myocardial infarction) (H)      CHALO (obstructive sleep apnea) 6/24/2019 6/21/2019 [place] [facility] Sleep Study (153.0 lbs) - AHI 30.5, RDI 34.7, Supine AHI 31.5, REM AHI 56.5, Low O2 68.7%, Time Spent ?88% 3.7 minutes / Time Spent ?89% 4.5 minutes.     S/P CABG (coronary artery bypass graft) 3/26/2019     Past Surgical History:   Procedure Laterality Date     BYPASS GRAFT ARTERY CORONARY N/A 3/18/2019    Procedure: CORONARY BYPASS GRAFTING X 4 - LIMA TO LAD, SV TO DIAGONAL,  PDA, AND OM; ON PUMP WITH NOA; ENDOVEIN HARVEST LEFT LEG;  Surgeon: Adarsh Sanchez MD;  Location:  OR     CV CORONARY ANGIOGRAM N/A 3/12/2019    Procedure: Coronary Angiogram;  Surgeon: Remi Rodriguez MD;  Location:  HEART CARDIAC CATH LAB     CV HEART CATHETERIZATION WITH POSSIBLE INTERVENTION N/A 3/12/2019    Procedure: Heart Catheterization with possible Intervention;  Surgeon: Remi Rodriguez MD;  Location:  HEART CARDIAC CATH LAB     CV LEFT VENTRICULOGRAM N/A 3/12/2019    Procedure: Left Ventricular Angiogram;  Surgeon: Remi Rodriguez MD;  Location:  HEART CARDIAC CATH LAB     LITHOTRIPSY       Family History   Problem Relation Age of Onset     Other - See Comments Mother         giving birth     Other - See Comments Father         old age            Allergies   Patient has no known allergies.    30 minutes spent on the date of the encounter doing chart review, history and exam, documentation and further activities as noted above      MICHELE Hicks [facility]  Pager: [phone]

## 2022-02-07 ENCOUNTER — OFFICE VISIT (OUTPATIENT)
Dept: CARDIOLOGY | Facility: CLINIC | Age: 67
End: 2022-02-07
Payer: MEDICARE

## 2022-02-07 VITALS
HEART RATE: 56 BPM | HEIGHT: 63 IN | DIASTOLIC BLOOD PRESSURE: 73 MMHG | SYSTOLIC BLOOD PRESSURE: 136 MMHG | WEIGHT: 168.8 LBS | BODY MASS INDEX: 29.91 KG/M2

## 2022-02-07 DIAGNOSIS — I25.10 CAD IN NATIVE ARTERY: Primary | ICD-10-CM

## 2022-02-07 DIAGNOSIS — Z95.1 S/P CABG (CORONARY ARTERY BYPASS GRAFT): ICD-10-CM

## 2022-02-07 PROCEDURE — 99214 OFFICE O/P EST MOD 30 MIN: CPT | Performed by: INTERNAL MEDICINE

## 2022-02-07 ASSESSMENT — MIFFLIN-ST. JEOR: SCORE: 1450.8

## 2022-02-07 NOTE — LETTER
2/7/2022    Mpho Brian Castro MD  [address]  [facility] [address]    RE: Khang Mcelroy       Dear Colleague,     I had the pleasure of seeing Khang Mcelroy in the [facility].  HPI and Plan:   Today, I had the pleasure of seeing Khang Mcelroy at [facility]. He is a pleasant 66 year old patient with a history of HFrEF 2/2 ischemic cardiomyopathy and EF of 35 to 40%, CAD status post coronary artery bypass grafting (LIMA-LAD, SVG-DIAG, SVG-OM, SVG-PDA) in 2019, DM, hypertension, hyperlipidemia, sleep apnea, CKD, and anemia who presents to the clinic for a follow-up visit.  The history was obtained with the help of an .     He is today in the clinic for a follow-up visit.  He reports doing well and is able to exercise regularly without difficulty.  He also reports that the lower extremity edema has resolved and he has stopped taking Lasix.  He requests that Lasix be discontinued from his list of medications.  His most recent echocardiogram showed mildly reduced ejection fraction of 45-50% which was an improvement from his prior echocardiogram.  He had grade 1 diastolic dysfunction which is an improvement from his past echocardiogram which showed grade 3 diastolic dysfunction.  He has no significant valve disease.     He continues to be on optimal goal directed medical therapy.  His weight has been stable at around 165-170 pounds. He denies orthopnea, PND, dyspnea on exertion.  He also denies chest pain or chest pressure.  He takes all his medications as prescribed.  I reviewed his prior to echocardiograms, blood work, catheterization results.     Assessment and plan:  1.  Ischemic cardiomyopathy with ejection fraction of 45-50%  2.  Heart failure with reduced ejection fraction, NYHA class II  3.  Chronic diastolic heart failure, grade 3 diastolic dysfunction--> Grade I on most recent study  4.  Obstructive sleep apnea  5.  Hypertension  6.   Hyperlipidemia  7.  Type 2 diabetes  7.  CKD-1.98  8.  Anemia    The patient's condition is overall improved since his last clinic visit.  His lower extremity edema has near completely resolved, weight is stable and he is asymptomatic.  Additionally, he is able to exercise without any limitations.  At  this time, I do not feel very strongly about switching him from lisinopril to Entresto since he is very stable and EF is >45%.  I will discontinue Lasix which he already has stopped taking for quite some time.  I told him to keep the medication with him and take it on as-needed basis for lower extremity swelling.  I also reinforced that he continues to use CPAP.  I will have him come back and see us in 12 months.    Plan  1.  I do not believe you need to take Lasix on a regular basis.  You can take it on as-needed basis only.  2.  Otherwise, all your medications will remain the same.   4. See us back in 12 months.     Thank you for allowing me to participate in the care of Khang Mcelroy    This note was completed in part using Dragon voice recognition software. Although reviewed after completion, some word and grammatical errors may occur.    Bib Hernández MD  Cardiology    Orders Placed This Encounter   Procedures     Follow-Up with Cardiology       No orders of the defined types were placed in this encounter.      Medications Discontinued During This Encounter   Medication Reason     order for DME      metFORMIN (GLUCOPHAGE) 500 MG tablet Stopped by Patient     furosemide (LASIX) 20 MG tablet Stopped by Patient       Encounter Diagnoses   Name Primary?     CAD in native artery Yes     S/P CABG (coronary artery bypass graft)        CURRENT MEDICATIONS:  Current Outpatient Medications   Medication Sig Dispense Refill     acetaminophen (TYLENOL) 325 MG tablet Take 2 tablets (650 mg) by mouth every 4 hours as needed for other 60 tablet 0     amLODIPine (NORVASC) 10 MG tablet Take 1 tablet (10 mg) by mouth daily 90  tablet 3     aspirin (ASA) 325 MG EC tablet Take 1 tablet (325 mg) by mouth daily 30 tablet 0     atorvastatin (LIPITOR) 80 MG tablet Take 1 tablet (80 mg) by mouth every evening 90 tablet 3     GLIPIZIDE PO Take 2.5 mg by mouth 2 times daily       lisinopril (ZESTRIL) 20 MG tablet Take 1 tablet (20 mg) by mouth daily 90 tablet 2     metoprolol succinate ER (TOPROL-XL) 25 MG 24 hr tablet Take 1 tablet (25 mg) by mouth daily 90 tablet 2     spironolactone (ALDACTONE) 25 MG tablet Take 1 tablet (25 mg) by mouth daily 90 tablet 3       ALLERGIES   No Known Allergies    PAST MEDICAL HISTORY:  Past Medical History:   Diagnosis Date     CAD in native artery 3/18/2019     Chronic renal insufficiency      Chronic systolic heart failure (H) 10/1/2019     Diabetes mellitus with proliferative retinopathy (H)      Essential hypertension 6/14/2019     Hyperlipidemia LDL goal <70 6/14/2019     Nephrolithiasis      NSTEMI (non-ST elevated myocardial infarction) (H)      CHALO (obstructive sleep apnea) 6/24/2019 6/21/2019 [place] [facility] Sleep Study (153.0 lbs) - AHI 30.5, RDI 34.7, Supine AHI 31.5, REM AHI 56.5, Low O2 68.7%, Time Spent ?88% 3.7 minutes / Time Spent ?89% 4.5 minutes.     S/P CABG (coronary artery bypass graft) 3/26/2019       PAST SURGICAL HISTORY:  Past Surgical History:   Procedure Laterality Date     BYPASS GRAFT ARTERY CORONARY N/A 3/18/2019    Procedure: CORONARY BYPASS GRAFTING X 4 - LIMA TO LAD, SV TO DIAGONAL, PDA, AND OM; ON PUMP WITH NOA; ENDOVEIN HARVEST LEFT LEG;  Surgeon: Adarsh Sanchez MD;  Location:  OR     CV CORONARY ANGIOGRAM N/A 3/12/2019    Procedure: Coronary Angiogram;  Surgeon: Remi Rodriguez MD;  Location:  HEART CARDIAC CATH LAB     CV HEART CATHETERIZATION WITH POSSIBLE INTERVENTION N/A 3/12/2019    Procedure: Heart Catheterization with possible Intervention;  Surgeon: Remi Rodriguez MD;  Location:  HEART CARDIAC CATH LAB     CV LEFT VENTRICULOGRAM N/A  "3/12/2019    Procedure: Left Ventricular Angiogram;  Surgeon: Remi Rodriguez MD;  Location:  HEART CARDIAC CATH LAB     LITHOTRIPSY         FAMILY HISTORY:  Family History   Problem Relation Age of Onset     Other - See Comments Mother         giving birth     Other - See Comments Father         old age       SOCIAL HISTORY:  Social History     Socioeconomic History     Marital status:      Spouse name: None     Number of children: None     Years of education: None     Highest education level: None   Occupational History     None   Tobacco Use     Smoking status: Never Smoker     Smokeless tobacco: Never Used   Substance and Sexual Activity     Alcohol use: No     Drug use: No     Sexual activity: Yes     Partners: Female   Other Topics Concern     Parent/sibling w/ CABG, MI or angioplasty before 65F 55M? No   Social History Narrative     None     Social Determinants of Health     Financial Resource Strain: Not on file   Food Insecurity: Not on file   Transportation Needs: Not on file   Physical Activity: Not on file   Stress: Not on file   Social Connections: Not on file   Intimate Partner Violence: Not on file   Housing Stability: Not on file       Review of Systems:  Skin:  Negative       Eyes:  Negative      ENT:  Negative      Respiratory:  Negative shortness of breath;dyspnea on exertion;cough     Cardiovascular:  Negative;chest pain;palpitations;edema;dizziness;lightheadedness      Gastroenterology: not assessed      Genitourinary:  not assessed      Musculoskeletal:  Negative      Neurologic:  Negative headaches    Psychiatric:  Negative      Heme/Lymph/Imm:  Negative allergies    Endocrine:  Positive for diabetes type 2 diabetes    Physical Exam:  Vitals: /73   Pulse 56   Ht 1.6 m (5' 3\")   Wt 76.6 kg (168 lb 12.8 oz)   BMI 29.90 kg/m    Constitutional: awake, alert, no distress  Skin: Warm and dry to touch  Head: Normocephalic, atraumatic  Eyes: Conjunctivae and lids unremarkable, " sclera white  ENT: No pallor or cyanosis  Respiratory: Normal breath sounds, clear to auscultation  Cardiac: Regular rate and rhythm, S1-S2 normal.  No murmurs gallops or rubs.  Trace b/l pedal edema.   Extremities and musculoskeletal: No gross motor deficit  Neurological.  Affect normal  Psych: Alert and oriented x3    Recent Lab Results:  LIPID RESULTS:  Lab Results   Component Value Date    CHOL 126 10/17/2019    HDL 47 10/17/2019    LDL 69 10/17/2019    TRIG 48 10/17/2019       LIVER ENZYME RESULTS:  Lab Results   Component Value Date    AST 27 06/14/2019    ALT 22 10/17/2019       CBC RESULTS:  Lab Results   Component Value Date    WBC 9.8 04/06/2021    RBC 3.70 (L) 04/06/2021    HGB 10.0 (L) 04/06/2021    HCT 31.1 (L) 04/06/2021    MCV 84 04/06/2021    MCH 27.0 04/06/2021    MCHC 32.2 04/06/2021    RDW 13.2 04/06/2021     04/06/2021       BMP RESULTS:  Lab Results   Component Value Date     04/06/2021    POTASSIUM 5.6 (H) 04/06/2021    CHLORIDE 110 (H) 04/06/2021    CO2 25 04/06/2021    ANIONGAP 4 04/06/2021     (H) 04/06/2021    BUN 48 (H) 04/06/2021    CR 1.99 (H) 04/06/2021    GFRESTIMATED 35 (L) 04/06/2021    GFRESTBLACK 40 (L) 04/06/2021    MYKE 9.9 04/06/2021        A1C RESULTS:  Lab Results   Component Value Date    A1C 7.9 (H) 03/11/2019       INR RESULTS:  Lab Results   Component Value Date    INR 1.54 (H) 03/18/2019    INR 1.79 (H) 03/18/2019       MICHELE Pope CNP  [address]  [place] [address]    All medical records were reviewed in detail and discussed with the patient. Greater than 30 mins were spent with the patient, 50% of this time was spent on counseling and coordination of care.  After visit summary was printed and given to the patient.        Thank you for allowing me to participate in the care of your patient.      Sincerely,     Bib Hernández MD     [facility]

## 2022-02-07 NOTE — PROGRESS NOTES
HPI and Plan:   Today, I had the pleasure of seeing Khang Mcelroy at [facility]. He is a pleasant 66 year old patient with a history of HFrEF 2/2 ischemic cardiomyopathy and EF of 35 to 40%, CAD status post coronary artery bypass grafting (LIMA-LAD, SVG-DIAG, SVG-OM, SVG-PDA) in 2019, DM, hypertension, hyperlipidemia, sleep apnea, CKD, and anemia who presents to the clinic for a follow-up visit.  The history was obtained with the help of an .     He is today in the clinic for a follow-up visit.  He reports doing well and is able to exercise regularly without difficulty.  He also reports that the lower extremity edema has resolved and he has stopped taking Lasix.  He requests that Lasix be discontinued from his list of medications.  His most recent echocardiogram showed mildly reduced ejection fraction of 45-50% which was an improvement from his prior echocardiogram.  He had grade 1 diastolic dysfunction which is an improvement from his past echocardiogram which showed grade 3 diastolic dysfunction.  He has no significant valve disease.     He continues to be on optimal goal directed medical therapy.  His weight has been stable at around 165-170 pounds. He denies orthopnea, PND, dyspnea on exertion.  He also denies chest pain or chest pressure.  He takes all his medications as prescribed.  I reviewed his prior to echocardiograms, blood work, catheterization results.     Assessment and plan:  1.  Ischemic cardiomyopathy with ejection fraction of 45-50%  2.  Heart failure with reduced ejection fraction, NYHA class II  3.  Chronic diastolic heart failure, grade 3 diastolic dysfunction--> Grade I on most recent study  4.  Obstructive sleep apnea  5.  Hypertension  6.  Hyperlipidemia  7.  Type 2 diabetes  7.  CKD-1.98  8.  Anemia    The patient's condition is overall improved since his last clinic visit.  His lower extremity edema has near completely resolved, weight is stable and he  is asymptomatic.  Additionally, he is able to exercise without any limitations.  At  this time, I do not feel very strongly about switching him from lisinopril to Entresto since he is very stable and EF is >45%.  I will discontinue Lasix which he already has stopped taking for quite some time.  I told him to keep the medication with him and take it on as-needed basis for lower extremity swelling.  I also reinforced that he continues to use CPAP.  I will have him come back and see us in 12 months.    Plan  1.  I do not believe you need to take Lasix on a regular basis.  You can take it on as-needed basis only.  2.  Otherwise, all your medications will remain the same.   4. See us back in 12 months.     Thank you for allowing me to participate in the care of Khang Mcelroy    This note was completed in part using Dragon voice recognition software. Although reviewed after completion, some word and grammatical errors may occur.    Bib Hernández MD  Cardiology    Orders Placed This Encounter   Procedures     Follow-Up with Cardiology       No orders of the defined types were placed in this encounter.      Medications Discontinued During This Encounter   Medication Reason     order for DME      metFORMIN (GLUCOPHAGE) 500 MG tablet Stopped by Patient     furosemide (LASIX) 20 MG tablet Stopped by Patient       Encounter Diagnoses   Name Primary?     CAD in native artery Yes     S/P CABG (coronary artery bypass graft)        CURRENT MEDICATIONS:  Current Outpatient Medications   Medication Sig Dispense Refill     acetaminophen (TYLENOL) 325 MG tablet Take 2 tablets (650 mg) by mouth every 4 hours as needed for other 60 tablet 0     amLODIPine (NORVASC) 10 MG tablet Take 1 tablet (10 mg) by mouth daily 90 tablet 3     aspirin (ASA) 325 MG EC tablet Take 1 tablet (325 mg) by mouth daily 30 tablet 0     atorvastatin (LIPITOR) 80 MG tablet Take 1 tablet (80 mg) by mouth every evening 90 tablet 3     GLIPIZIDE PO Take 2.5 mg  by mouth 2 times daily       lisinopril (ZESTRIL) 20 MG tablet Take 1 tablet (20 mg) by mouth daily 90 tablet 2     metoprolol succinate ER (TOPROL-XL) 25 MG 24 hr tablet Take 1 tablet (25 mg) by mouth daily 90 tablet 2     spironolactone (ALDACTONE) 25 MG tablet Take 1 tablet (25 mg) by mouth daily 90 tablet 3       ALLERGIES   No Known Allergies    PAST MEDICAL HISTORY:  Past Medical History:   Diagnosis Date     CAD in native artery 3/18/2019     Chronic renal insufficiency      Chronic systolic heart failure (H) 10/1/2019     Diabetes mellitus with proliferative retinopathy (H)      Essential hypertension 6/14/2019     Hyperlipidemia LDL goal <70 6/14/2019     Nephrolithiasis      NSTEMI (non-ST elevated myocardial infarction) (H)      CHALO (obstructive sleep apnea) 6/24/2019 6/21/2019 [place] Diagnostic Sleep Study (153.0 lbs) - AHI 30.5, RDI 34.7, Supine AHI 31.5, REM AHI 56.5, Low O2 68.7%, Time Spent ?88% 3.7 minutes / Time Spent ?89% 4.5 minutes.     S/P CABG (coronary artery bypass graft) 3/26/2019       PAST SURGICAL HISTORY:  Past Surgical History:   Procedure Laterality Date     BYPASS GRAFT ARTERY CORONARY N/A 3/18/2019    Procedure: CORONARY BYPASS GRAFTING X 4 - LIMA TO LAD, SV TO DIAGONAL, PDA, AND OM; ON PUMP WITH NOA; ENDOVEIN HARVEST LEFT LEG;  Surgeon: Adarsh Sanchez MD;  Location:  OR     CV CORONARY ANGIOGRAM N/A 3/12/2019    Procedure: Coronary Angiogram;  Surgeon: Remi Rodriguez MD;  Location:  HEART CARDIAC CATH LAB     CV HEART CATHETERIZATION WITH POSSIBLE INTERVENTION N/A 3/12/2019    Procedure: Heart Catheterization with possible Intervention;  Surgeon: Remi Rodriguez MD;  Location:  HEART CARDIAC CATH LAB     CV LEFT VENTRICULOGRAM N/A 3/12/2019    Procedure: Left Ventricular Angiogram;  Surgeon: Remi Rodriguez MD;  Location:  HEART CARDIAC CATH LAB     LITHOTRIPSY         FAMILY HISTORY:  Family History   Problem Relation Age of Onset     Other -  "See Comments Mother         giving birth     Other - See Comments Father         old age       SOCIAL HISTORY:  Social History     Socioeconomic History     Marital status:      Spouse name: None     Number of children: None     Years of education: None     Highest education level: None   Occupational History     None   Tobacco Use     Smoking status: Never Smoker     Smokeless tobacco: Never Used   Substance and Sexual Activity     Alcohol use: No     Drug use: No     Sexual activity: Yes     Partners: Female   Other Topics Concern     Parent/sibling w/ CABG, MI or angioplasty before 65F 55M? No   Social History Narrative     None     Social Determinants of Health     Financial Resource Strain: Not on file   Food Insecurity: Not on file   Transportation Needs: Not on file   Physical Activity: Not on file   Stress: Not on file   Social Connections: Not on file   Intimate Partner Violence: Not on file   Housing Stability: Not on file       Review of Systems:  Skin:  Negative       Eyes:  Negative      ENT:  Negative      Respiratory:  Negative shortness of breath;dyspnea on exertion;cough     Cardiovascular:  Negative;chest pain;palpitations;edema;dizziness;lightheadedness      Gastroenterology: not assessed      Genitourinary:  not assessed      Musculoskeletal:  Negative      Neurologic:  Negative headaches    Psychiatric:  Negative      Heme/Lymph/Imm:  Negative allergies    Endocrine:  Positive for diabetes type 2 diabetes    Physical Exam:  Vitals: /73   Pulse 56   Ht 1.6 m (5' 3\")   Wt 76.6 kg (168 lb 12.8 oz)   BMI 29.90 kg/m    Constitutional: awake, alert, no distress  Skin: Warm and dry to touch  Head: Normocephalic, atraumatic  Eyes: Conjunctivae and lids unremarkable, sclera white  ENT: No pallor or cyanosis  Respiratory: Normal breath sounds, clear to auscultation  Cardiac: Regular rate and rhythm, S1-S2 normal.  No murmurs gallops or rubs.  Trace b/l pedal edema.   Extremities and " musculoskeletal: No gross motor deficit  Neurological.  Affect normal  Psych: Alert and oriented x3    Recent Lab Results:  LIPID RESULTS:  Lab Results   Component Value Date    CHOL 126 10/17/2019    HDL 47 10/17/2019    LDL 69 10/17/2019    TRIG 48 10/17/2019       LIVER ENZYME RESULTS:  Lab Results   Component Value Date    AST 27 06/14/2019    ALT 22 10/17/2019       CBC RESULTS:  Lab Results   Component Value Date    WBC 9.8 04/06/2021    RBC 3.70 (L) 04/06/2021    HGB 10.0 (L) 04/06/2021    HCT 31.1 (L) 04/06/2021    MCV 84 04/06/2021    MCH 27.0 04/06/2021    MCHC 32.2 04/06/2021    RDW 13.2 04/06/2021     04/06/2021       BMP RESULTS:  Lab Results   Component Value Date     04/06/2021    POTASSIUM 5.6 (H) 04/06/2021    CHLORIDE 110 (H) 04/06/2021    CO2 25 04/06/2021    ANIONGAP 4 04/06/2021     (H) 04/06/2021    BUN 48 (H) 04/06/2021    CR 1.99 (H) 04/06/2021    GFRESTIMATED 35 (L) 04/06/2021    GFRESTBLACK 40 (L) 04/06/2021    MYKE 9.9 04/06/2021        A1C RESULTS:  Lab Results   Component Value Date    A1C 7.9 (H) 03/11/2019       INR RESULTS:  Lab Results   Component Value Date    INR 1.54 (H) 03/18/2019    INR 1.79 (H) 03/18/2019       CC  Viv Taylor, APRN CNP  [address]  [place] [address]    All medical records were reviewed in detail and discussed with the patient. Greater than 30 mins were spent with the patient, 50% of this time was spent on counseling and coordination of care.  After visit summary was printed and given to the patient.

## 2022-06-13 ENCOUNTER — OFFICE VISIT (OUTPATIENT)
Dept: CARDIOLOGY | Facility: CLINIC | Age: 67
End: 2022-06-13
Attending: NURSE PRACTITIONER
Payer: MEDICARE

## 2022-06-13 ENCOUNTER — HOSPITAL ENCOUNTER (INPATIENT)
Facility: CLINIC | Age: 67
LOS: 1 days | Discharge: HOME | DRG: 683 | End: 2022-06-15
Attending: EMERGENCY MEDICINE | Admitting: HOSPITALIST
Payer: MEDICARE

## 2022-06-13 VITALS
OXYGEN SATURATION: 98 % | BODY MASS INDEX: 29.34 KG/M2 | DIASTOLIC BLOOD PRESSURE: 60 MMHG | SYSTOLIC BLOOD PRESSURE: 116 MMHG | HEART RATE: 68 BPM | WEIGHT: 165.6 LBS | HEIGHT: 63 IN

## 2022-06-13 DIAGNOSIS — E87.1 HYPONATREMIA: ICD-10-CM

## 2022-06-13 DIAGNOSIS — E87.5 HYPERKALEMIA: ICD-10-CM

## 2022-06-13 DIAGNOSIS — E11.01 TYPE 2 DIABETES MELLITUS WITH HYPEROSMOLAR COMA, WITHOUT LONG-TERM CURRENT USE OF INSULIN (H): ICD-10-CM

## 2022-06-13 DIAGNOSIS — N18.9 CHRONIC KIDNEY DISEASE, UNSPECIFIED CKD STAGE: ICD-10-CM

## 2022-06-13 DIAGNOSIS — E78.5 HYPERLIPIDEMIA LDL GOAL <70: ICD-10-CM

## 2022-06-13 DIAGNOSIS — I25.10 CAD IN NATIVE ARTERY: ICD-10-CM

## 2022-06-13 DIAGNOSIS — I10 ESSENTIAL HYPERTENSION: ICD-10-CM

## 2022-06-13 DIAGNOSIS — I25.5 ISCHEMIC CARDIOMYOPATHY: ICD-10-CM

## 2022-06-13 DIAGNOSIS — G47.33 OSA (OBSTRUCTIVE SLEEP APNEA): ICD-10-CM

## 2022-06-13 DIAGNOSIS — N17.8 ACUTE RENAL FAILURE WITH OTHER SPECIFIED PATHOLOGICAL LESION IN KIDNEY (H): ICD-10-CM

## 2022-06-13 DIAGNOSIS — I50.22 CHRONIC SYSTOLIC HEART FAILURE (H): Primary | ICD-10-CM

## 2022-06-13 PROCEDURE — C9803 HOPD COVID-19 SPEC COLLECT: HCPCS

## 2022-06-13 PROCEDURE — 99215 OFFICE O/P EST HI 40 MIN: CPT | Performed by: NURSE PRACTITIONER

## 2022-06-13 PROCEDURE — 93005 ELECTROCARDIOGRAM TRACING: CPT

## 2022-06-13 PROCEDURE — 99285 EMERGENCY DEPT VISIT HI MDM: CPT | Mod: 25

## 2022-06-13 RX ORDER — FUROSEMIDE 20 MG
20 TABLET ORAL DAILY
Status: ON HOLD | COMMUNITY
Start: 2022-05-25 | End: 2022-06-15

## 2022-06-13 NOTE — PROGRESS NOTES
Cardiology Clinic Progress Note  Khang Mcelroy MRN# [id number]   YOB: 1955 Age: 66 year old   Primary Cardiologist: Dr. Hernández  Reason for visit: Cardiology follow up             Assessment and Plan:   Khang Mcelroy is a very pleasant 66 year old male who I am seeing today for cardiology follow up.     1.  Combined chronic systolic heart failure/HFrEF and diastolic heart failure, ischemic cardiomyopathy - LVEF 35-40% 3/12/19 since has had some recovery and stable EF around 40-45% with grade I diastolic dysfunction              - NYHA class III, stage C              - Etiology : ischemic              - Fluid status : euvolemic              - Diuretic regimen : none              - Ischemic evaluation : coronary angiogram 3/12/19, s/p CABG 3/18/19              - Guideline directed medical therapy                          - Betablocker: metoprolol succinate 25mg daily                          - ACEI/ARB/ARNI: lisinopril 20mg daily                          - Aldactone antagonist: spironolactone 25mg daily   2. Coronary artery disease s/p CABG x 4 (LIMA-LAD, SVG-DIAG, SVG-OM, SVG-PDA) on 3/18/19, stable no signs/symptoms of myocardial ischemia.               - Continue aspirin, statin and betablocker therapy.  3. Hypertension - controlled   4. Hyperlipidemia - stable, LDL goal <70, lipid panel 6/7/22 showed total cholesterol 113, HDL 30, LDL 60.               - Continue atorvastatin  5. Acute on chronic kidney disease - baseline 1.8-2.0, follows with nephrology, recent BMP showed creatinine 3.01.   6. Diabetes mellitus - hgbA1c 7.8 6/7/22  7. Obstructive sleep apnea - not compliant with CPAP  8. Hyponatremia  9. Hyperkalemia     I saw patient and his wife today for routine cardiology follow-up. He is doing well from a cardiac perspective, appears compensated, euvolemic and denies any anginal symptoms. He was seen by his PCP last week, doesn't appear any recent medication changes. Labs were drawn  after OV which showed acute on chronic kidney injury, low sodium, elevated potassium (creatinine 3.01, sodium 125 and potassium 5.3). He was advised to go to the ED, patient did not follow instruction and brings up need to go to the ED today. We reviewed labs from PCP office visit and concerns. I offered to repeat labs in cardiology clinic to determine where his renal function/electrolytes are currently. Him and his wife preferred to present right to the ED for further evaluation. Unclear etiology for acute decline in renal function.    Would recommend stopping spironolactone and lisinopril in setting of acute renal injury. Furosemide remains listed on his [employer] medication list, but patient had reported stopping this prior to his last cardiology office visit in February 2022. It is unclear if he has been taking this.     Called emergency room and hand off given to ED staff.     Changes today: none, would recommend stopping spironolactone in setting of acute on chronic CKD and also holding lisinopril until further evaluation of renal failure.     Follow up plan:     Will assist patient to the emergency room for further evaluation of acute on chronic kidney injury.     Will have cardiology nursing team follow admission and we can determine when patient needs to be seen back for cardiology follow up.         History of Presenting Illness:    Khang Mcelroy is a very pleasant 66 year old male with a history of HFrEF, ischemic cardiomyopathy, LVEF 35-40% per echocardiogram 3/12/19, coronary artery disease s/p CABG x 4 (LIMA-LAD, SVG-DIAG, SVG-OM, SVG-PDA) on 3/18/19, DM, hypertension, hyperlipidemia, CKD, and anemia.      Hospitalized 3/11/19-3/24/19 presented with acute hypoxemic and hypercapnic respiratory failure secondary to NSTEMI, found to have multivessel coronary artery disease, s/p CABG x 4 (LIMA-LAD, SVG-DIAG, SVG-OM, SVG-PDA) on 3/18/19. Troponin peaked at 53. Echocardiogram 3/12/19 showed an LVEF  35-40%, grade III or advanced diastolic dysfunction, with multiple wall motion abnormalities.      Primary cardiologist Dr. Hernández. Most recent echocardiogram 1/2021 showed EF 45-50%, RV normal size/function, inferior wall hypokinesis, grade I diastolic dysfunction and mildly decreased RV systolic function.      He most recently saw Dr. Hernández 2/7/22 at which time he was doing well, prior to his OV he had stopped his furosemide.     He was seen by his PCP last week, labs were drawn after OV which showed acute on chronic renal disease, creatinine 3.01, sodium 125 and potassium 5.3. Per care everywhere notes he was advised to present to the ED for further evaluation/management. Does not appear any recent medication adjustments.      Patient is here today for routine cardiology follow up. [employer]  used for assistance with visit. Wife present for visit and is concerned that patient did not present to the ED as recommended by PCP last week.     Patient reports feeling okay. He states he doesn't know why he didn't got to the emergency room but he is wishing to go to the ED now. Denies shortness of breath at rest. Denies exertional dyspnea. Wife notes an occasional cough, with mucus. Has not been compliant with CPAP. Denies chest pain or chest tightness. Denies dizziness, lightheadedness or other presyncopal symptoms. Denies tachycardia or palpitations. States that he is urinating normal. States energy levels are normal.     Labs completed by PCP worsening renal function, creatinine 3.01, sodium 125, potassium 5.3. HgbA1c 7.8. Hemoglobin 9.8. Total cholesterol 113, HDL 30, LDL 60. Blood pressure 116/60 and HR 68 in clinic today.    Appetite good. Walking for exercise 5-10 blocks daily. Denies tobacco use. Denies alcohol use.         Social History      Social History     Socioeconomic History     Marital status:      Spouse name: Not on file     Number of children: Not on file     Years of education: Not on  "file     Highest education level: Not on file   Occupational History     Not on file   Tobacco Use     Smoking status: Never Smoker     Smokeless tobacco: Never Used   Substance and Sexual Activity     Alcohol use: No     Drug use: No     Sexual activity: Yes     Partners: Female   Other Topics Concern     Parent/sibling w/ CABG, MI or angioplasty before 65F 55M? No   Social History Narrative     Not on file     Social Determinants of Health     Financial Resource Strain: Not on file   Food Insecurity: Not on file   Transportation Needs: Not on file   Physical Activity: Not on file   Stress: Not on file   Social Connections: Not on file   Intimate Partner Violence: Not on file   Housing Stability: Not on file          Review of Systems:    ROS: 10 point ROS neg other than the symptoms noted above in the HPI.         Physical Exam:   Vitals: Ht 1.6 m (5' 3\")   BMI 29.90 kg/m     Wt Readings from Last 4 Encounters:   02/07/22 76.6 kg (168 lb 12.8 oz)   10/05/21 77.6 kg (171 lb)   01/28/21 74.4 kg (164 lb 1.6 oz)   07/24/20 75.9 kg (167 lb 6.4 oz)     GEN: well nourished, in no acute distress.  HEENT:  Pupils equal, round. Sclerae nonicteric.   NECK: Supple, no masses appreciated.   C/V:  Regular rate and rhythm, no murmur, rub or gallop.    RESP: Respirations are unlabored. Clear to auscultation bilaterally without wheezing, rales, or rhonchi.  GI: Abdomen soft, nontender.  EXTREM: No LE edema.  NEURO: Alert and oriented, cooperative.  SKIN: Warm and dry.        Data:     LIPID RESULTS:  Lab Results   Component Value Date    CHOL 126 10/17/2019    HDL 47 10/17/2019    LDL 69 10/17/2019    TRIG 48 10/17/2019     LIVER ENZYME RESULTS:  Lab Results   Component Value Date    AST 27 06/14/2019    ALT 22 10/17/2019     CBC RESULTS:  Lab Results   Component Value Date    WBC 9.8 04/06/2021    RBC 3.70 (L) 04/06/2021    HGB 10.0 (L) 04/06/2021    HCT 31.1 (L) 04/06/2021    MCV 84 04/06/2021    MCH 27.0 04/06/2021    MCHC " 32.2 04/06/2021    RDW 13.2 04/06/2021     04/06/2021     BMP RESULTS:  Lab Results   Component Value Date     04/06/2021    POTASSIUM 5.6 (H) 04/06/2021    CHLORIDE 110 (H) 04/06/2021    CO2 25 04/06/2021    ANIONGAP 4 04/06/2021     (H) 04/06/2021    BUN 48 (H) 04/06/2021    CR 1.99 (H) 04/06/2021    GFRESTIMATED 35 (L) 04/06/2021    GFRESTBLACK 40 (L) 04/06/2021    MYKE 9.9 04/06/2021      A1C RESULTS:  Lab Results   Component Value Date    A1C 7.9 (H) 03/11/2019     INR RESULTS:  Lab Results   Component Value Date    INR 1.54 (H) 03/18/2019    INR 1.79 (H) 03/18/2019            Medications     Current Outpatient Medications   Medication Sig Dispense Refill     spironolactone (ALDACTONE) 25 MG tablet Take 1 tablet (25 mg) by mouth daily 90 tablet 3     acetaminophen (TYLENOL) 325 MG tablet Take 2 tablets (650 mg) by mouth every 4 hours as needed for other 60 tablet 0     amLODIPine (NORVASC) 10 MG tablet Take 1 tablet (10 mg) by mouth daily 90 tablet 3     aspirin (ASA) 325 MG EC tablet Take 1 tablet (325 mg) by mouth daily 30 tablet 0     atorvastatin (LIPITOR) 80 MG tablet Take 1 tablet (80 mg) by mouth every evening 90 tablet 3     furosemide (LASIX) 20 MG tablet Take 20 mg by mouth daily       GLIPIZIDE PO Take 2.5 mg by mouth 2 times daily       lisinopril (ZESTRIL) 20 MG tablet Take 1 tablet (20 mg) by mouth daily 90 tablet 2     metoprolol succinate ER (TOPROL-XL) 25 MG 24 hr tablet Take 1 tablet (25 mg) by mouth daily 90 tablet 2        Past Medical History     Past Medical History:   Diagnosis Date     CAD in native artery 3/18/2019     Chronic renal insufficiency      Chronic systolic heart failure (H) 10/1/2019     Diabetes mellitus with proliferative retinopathy (H)      Essential hypertension 6/14/2019     Hyperlipidemia LDL goal <70 6/14/2019     Nephrolithiasis      NSTEMI (non-ST elevated myocardial infarction) (H)      CHALO (obstructive sleep apnea) 6/24/2019 6/21/2019  [facility] Diagnostic Sleep Study (153.0 lbs) - AHI 30.5, RDI 34.7, Supine AHI 31.5, REM AHI 56.5, Low O2 68.7%, Time Spent ?88% 3.7 minutes / Time Spent ?89% 4.5 minutes.     S/P CABG (coronary artery bypass graft) 3/26/2019     Past Surgical History:   Procedure Laterality Date     BYPASS GRAFT ARTERY CORONARY N/A 3/18/2019    Procedure: CORONARY BYPASS GRAFTING X 4 - LIMA TO LAD, SV TO DIAGONAL, PDA, AND OM; ON PUMP WITH NOA; ENDOVEIN HARVEST LEFT LEG;  Surgeon: Adarsh Sanchez MD;  Location:  OR     CV CORONARY ANGIOGRAM N/A 3/12/2019    Procedure: Coronary Angiogram;  Surgeon: Remi Rodriguez MD;  Location:  HEART CARDIAC CATH LAB     CV HEART CATHETERIZATION WITH POSSIBLE INTERVENTION N/A 3/12/2019    Procedure: Heart Catheterization with possible Intervention;  Surgeon: Remi Rodriguez MD;  Location:  HEART CARDIAC CATH LAB     CV LEFT VENTRICULOGRAM N/A 3/12/2019    Procedure: Left Ventricular Angiogram;  Surgeon: Remi Rodriguez MD;  Location:  HEART CARDIAC CATH LAB     LITHOTRIPSY       Family History   Problem Relation Age of Onset     Other - See Comments Mother         giving birth     Other - See Comments Father         old age            Allergies   Patient has no known allergies.    40 minutes spent on the date of the encounter doing chart review, history and exam, documentation and further activities as noted above      MICHELE Hicks [facility]  Pager: [phone]

## 2022-06-13 NOTE — ED TRIAGE NOTES
Pt at cardiology apt today for a regular apt. Kidney functions were elevated last week, was instructed to go to ER for further evaluation and didn't. According to note offered to have labs done at cardiology clinic today but pt decided to come here   Triage Assessment     Row Name 06/13/22 [id number]       Triage Assessment (Adult)    Airway WDL WDL       Respiratory WDL    Respiratory WDL WDL       Cardiac WDL    Cardiac WDL WDL       Cognitive/Neuro/Behavioral WDL    Cognitive/Neuro/Behavioral WDL WDL

## 2022-06-13 NOTE — LETTER
6/13/2022    Mpho Brian Castro MD  [address]  [facility] [address]    RE: Khang Mcelroy       Dear Colleague,     I had the pleasure of seeing Khang Mcelroy in the [facility].  Cardiology Clinic Progress Note  Khang Mcelroy MRN# [id number]   YOB: 1955 Age: 66 year old   Primary Cardiologist: Dr. Hernández  Reason for visit: Cardiology follow up             Assessment and Plan:   Khang Mcelroy is a very pleasant 66 year old male who I am seeing today for cardiology follow up.     1.  Combined chronic systolic heart failure/HFrEF and diastolic heart failure, ischemic cardiomyopathy - LVEF 35-40% 3/12/19 since has had some recovery and stable EF around 40-45% with grade I diastolic dysfunction              - NYHA class III, stage C              - Etiology : ischemic              - Fluid status : euvolemic              - Diuretic regimen : none              - Ischemic evaluation : coronary angiogram 3/12/19, s/p CABG 3/18/19              - Guideline directed medical therapy                          - Betablocker: metoprolol succinate 25mg daily                          - ACEI/ARB/ARNI: lisinopril 20mg daily                          - Aldactone antagonist: spironolactone 25mg daily   2. Coronary artery disease s/p CABG x 4 (LIMA-LAD, SVG-DIAG, SVG-OM, SVG-PDA) on 3/18/19, stable no signs/symptoms of myocardial ischemia.               - Continue aspirin, statin and betablocker therapy.  3. Hypertension - controlled   4. Hyperlipidemia - stable, LDL goal <70, lipid panel 6/7/22 showed total cholesterol 113, HDL 30, LDL 60.               - Continue atorvastatin  5. Acute on chronic kidney disease - baseline 1.8-2.0, follows with nephrology, recent BMP showed creatinine 3.01.   6. Diabetes mellitus - hgbA1c 7.8 6/7/22  7. Obstructive sleep apnea - not compliant with CPAP  8. Hyponatremia  9. Hyperkalemia     I saw patient and his wife today for routine cardiology  follow-up. He is doing well from a cardiac perspective, appears compensated, euvolemic and denies any anginal symptoms. He was seen by his PCP last week, doesn't appear any recent medication changes. Labs were drawn after OV which showed acute on chronic kidney injury, low sodium, elevated potassium (creatinine 3.01, sodium 125 and potassium 5.3). He was advised to go to the ED, patient did not follow instruction and brings up need to go to the ED today. We reviewed labs from PCP office visit and concerns. I offered to repeat labs in cardiology clinic to determine where his renal function/electrolytes are currently. Him and his wife preferred to present right to the ED for further evaluation. Unclear etiology for acute decline in renal function.    Would recommend stopping spironolactone and lisinopril in setting of acute renal injury. Furosemide remains listed on his [facility] medication list, but patient had reported stopping this prior to his last cardiology office visit in February 2022. It is unclear if he has been taking this.     Called emergency room and hand off given to ED staff.     Changes today: none, would recommend stopping spironolactone in setting of acute on chronic CKD and also holding lisinopril until further evaluation of renal failure.     Follow up plan:     Will assist patient to the emergency room for further evaluation of acute on chronic kidney injury.     Will have cardiology nursing team follow admission and we can determine when patient needs to be seen back for cardiology follow up.         History of Presenting Illness:    Khang Mcelroy is a very pleasant 66 year old male with a history of HFrEF, ischemic cardiomyopathy, LVEF 35-40% per echocardiogram 3/12/19, coronary artery disease s/p CABG x 4 (LIMA-LAD, SVG-DIAG, SVG-OM, SVG-PDA) on 3/18/19, DM, hypertension, hyperlipidemia, CKD, and anemia.      Hospitalized 3/11/19-3/24/19 presented with acute hypoxemic and hypercapnic  respiratory failure secondary to NSTEMI, found to have multivessel coronary artery disease, s/p CABG x 4 (LIMA-LAD, SVG-DIAG, SVG-OM, SVG-PDA) on 3/18/19. Troponin peaked at 53. Echocardiogram 3/12/19 showed an LVEF 35-40%, grade III or advanced diastolic dysfunction, with multiple wall motion abnormalities.      Primary cardiologist Dr. Hernández. Most recent echocardiogram 1/2021 showed EF 45-50%, RV normal size/function, inferior wall hypokinesis, grade I diastolic dysfunction and mildly decreased RV systolic function.      He most recently saw Dr. Hernández 2/7/22 at which time he was doing well, prior to his OV he had stopped his furosemide.     He was seen by his PCP last week, labs were drawn after OV which showed acute on chronic renal disease, creatinine 3.01, sodium 125 and potassium 5.3. Per care everywhere notes he was advised to present to the ED for further evaluation/management. Does not appear any recent medication adjustments.      Patient is here today for routine cardiology follow up. [place]  used for assistance with visit. Wife present for visit and is concerned that patient did not present to the ED as recommended by PCP last week.     Patient reports feeling okay. He states he doesn't know why he didn't got to the emergency room but he is wishing to go to the ED now. Denies shortness of breath at rest. Denies exertional dyspnea. Wife notes an occasional cough, with mucus. Has not been compliant with CPAP. Denies chest pain or chest tightness. Denies dizziness, lightheadedness or other presyncopal symptoms. Denies tachycardia or palpitations. States that he is urinating normal. States energy levels are normal.     Labs completed by PCP worsening renal function, creatinine 3.01, sodium 125, potassium 5.3. HgbA1c 7.8. Hemoglobin 9.8. Total cholesterol 113, HDL 30, LDL 60. Blood pressure 116/60 and HR 68 in clinic today.    Appetite good. Walking for exercise 5-10 blocks daily. Denies tobacco  "use. Denies alcohol use.         Social History      Social History     Socioeconomic History     Marital status:      Spouse name: Not on file     Number of children: Not on file     Years of education: Not on file     Highest education level: Not on file   Occupational History     Not on file   Tobacco Use     Smoking status: Never Smoker     Smokeless tobacco: Never Used   Substance and Sexual Activity     Alcohol use: No     Drug use: No     Sexual activity: Yes     Partners: Female   Other Topics Concern     Parent/sibling w/ CABG, MI or angioplasty before 65F 55M? No   Social History Narrative     Not on file     Social Determinants of Health     Financial Resource Strain: Not on file   Food Insecurity: Not on file   Transportation Needs: Not on file   Physical Activity: Not on file   Stress: Not on file   Social Connections: Not on file   Intimate Partner Violence: Not on file   Housing Stability: Not on file          Review of Systems:    ROS: 10 point ROS neg other than the symptoms noted above in the HPI.         Physical Exam:   Vitals: Ht 1.6 m (5' 3\")   BMI 29.90 kg/m     Wt Readings from Last 4 Encounters:   02/07/22 76.6 kg (168 lb 12.8 oz)   10/05/21 77.6 kg (171 lb)   01/28/21 74.4 kg (164 lb 1.6 oz)   07/24/20 75.9 kg (167 lb 6.4 oz)     GEN: well nourished, in no acute distress.  HEENT:  Pupils equal, round. Sclerae nonicteric.   NECK: Supple, no masses appreciated.   C/V:  Regular rate and rhythm, no murmur, rub or gallop.    RESP: Respirations are unlabored. Clear to auscultation bilaterally without wheezing, rales, or rhonchi.  GI: Abdomen soft, nontender.  EXTREM: No LE edema.  NEURO: Alert and oriented, cooperative.  SKIN: Warm and dry.        Data:     LIPID RESULTS:  Lab Results   Component Value Date    CHOL 126 10/17/2019    HDL 47 10/17/2019    LDL 69 10/17/2019    TRIG 48 10/17/2019     LIVER ENZYME RESULTS:  Lab Results   Component Value Date    AST 27 06/14/2019    ALT 22 " 10/17/2019     CBC RESULTS:  Lab Results   Component Value Date    WBC 9.8 04/06/2021    RBC 3.70 (L) 04/06/2021    HGB 10.0 (L) 04/06/2021    HCT 31.1 (L) 04/06/2021    MCV 84 04/06/2021    MCH 27.0 04/06/2021    MCHC 32.2 04/06/2021    RDW 13.2 04/06/2021     04/06/2021     BMP RESULTS:  Lab Results   Component Value Date     04/06/2021    POTASSIUM 5.6 (H) 04/06/2021    CHLORIDE 110 (H) 04/06/2021    CO2 25 04/06/2021    ANIONGAP 4 04/06/2021     (H) 04/06/2021    BUN 48 (H) 04/06/2021    CR 1.99 (H) 04/06/2021    GFRESTIMATED 35 (L) 04/06/2021    GFRESTBLACK 40 (L) 04/06/2021    MYKE 9.9 04/06/2021      A1C RESULTS:  Lab Results   Component Value Date    A1C 7.9 (H) 03/11/2019     INR RESULTS:  Lab Results   Component Value Date    INR 1.54 (H) 03/18/2019    INR 1.79 (H) 03/18/2019            Medications     Current Outpatient Medications   Medication Sig Dispense Refill     spironolactone (ALDACTONE) 25 MG tablet Take 1 tablet (25 mg) by mouth daily 90 tablet 3     acetaminophen (TYLENOL) 325 MG tablet Take 2 tablets (650 mg) by mouth every 4 hours as needed for other 60 tablet 0     amLODIPine (NORVASC) 10 MG tablet Take 1 tablet (10 mg) by mouth daily 90 tablet 3     aspirin (ASA) 325 MG EC tablet Take 1 tablet (325 mg) by mouth daily 30 tablet 0     atorvastatin (LIPITOR) 80 MG tablet Take 1 tablet (80 mg) by mouth every evening 90 tablet 3     furosemide (LASIX) 20 MG tablet Take 20 mg by mouth daily       GLIPIZIDE PO Take 2.5 mg by mouth 2 times daily       lisinopril (ZESTRIL) 20 MG tablet Take 1 tablet (20 mg) by mouth daily 90 tablet 2     metoprolol succinate ER (TOPROL-XL) 25 MG 24 hr tablet Take 1 tablet (25 mg) by mouth daily 90 tablet 2        Past Medical History     Past Medical History:   Diagnosis Date     CAD in native artery 3/18/2019     Chronic renal insufficiency      Chronic systolic heart failure (H) 10/1/2019     Diabetes mellitus with proliferative retinopathy (H)       Essential hypertension 6/14/2019     Hyperlipidemia LDL goal <70 6/14/2019     Nephrolithiasis      NSTEMI (non-ST elevated myocardial infarction) (H)      CHALO (obstructive sleep apnea) 6/24/2019 6/21/2019 [facility] [place] [facility] Sleep Study (153.0 lbs) - AHI 30.5, RDI 34.7, Supine AHI 31.5, REM AHI 56.5, Low O2 68.7%, Time Spent ?88% 3.7 minutes / Time Spent ?89% 4.5 minutes.     S/P CABG (coronary artery bypass graft) 3/26/2019     Past Surgical History:   Procedure Laterality Date     BYPASS GRAFT ARTERY CORONARY N/A 3/18/2019    Procedure: CORONARY BYPASS GRAFTING X 4 - LIMA TO LAD, SV TO DIAGONAL, PDA, AND OM; ON PUMP WITH NOA; ENDOVEIN HARVEST LEFT LEG;  Surgeon: Adarsh Sanchez MD;  Location:  OR     CV CORONARY ANGIOGRAM N/A 3/12/2019    Procedure: Coronary Angiogram;  Surgeon: Remi Rodriguez MD;  Location:  HEART CARDIAC CATH LAB     CV HEART CATHETERIZATION WITH POSSIBLE INTERVENTION N/A 3/12/2019    Procedure: Heart Catheterization with possible Intervention;  Surgeon: Remi Rodriguez MD;  Location:  HEART CARDIAC CATH LAB     CV LEFT VENTRICULOGRAM N/A 3/12/2019    Procedure: Left Ventricular Angiogram;  Surgeon: Remi Rodriguez MD;  Location:  HEART CARDIAC CATH LAB     LITHOTRIPSY       Family History   Problem Relation Age of Onset     Other - See Comments Mother         giving birth     Other - See Comments Father         old age            Allergies   Patient has no known allergies.    40 minutes spent on the date of the encounter doing chart review, history and exam, documentation and further activities as noted above      MICHELE Hicks CNP  [facility]  Pager: [phone]    Thank you for allowing me to participate in the care of your patient.      Sincerely,     MICHELE Hicks CNP     [facility]  cc:   MICHELE Luis CNP  [address]  [place]  [address]

## 2022-06-14 ENCOUNTER — APPOINTMENT (OUTPATIENT)
Dept: ULTRASOUND IMAGING | Facility: CLINIC | Age: 67
DRG: 683 | End: 2022-06-14
Attending: HOSPITALIST
Payer: MEDICARE

## 2022-06-14 PROBLEM — E87.5 HYPERKALEMIA: Status: ACTIVE | Noted: 2022-06-14

## 2022-06-14 PROBLEM — N18.9 CHRONIC KIDNEY DISEASE, UNSPECIFIED CKD STAGE: Status: ACTIVE | Noted: 2022-06-14

## 2022-06-14 LAB
ALBUMIN UR-MCNC: NEGATIVE MG/DL
ANION GAP SERPL CALCULATED.3IONS-SCNC: 6 MMOL/L (ref 3–14)
ANION GAP SERPL CALCULATED.3IONS-SCNC: 8 MMOL/L (ref 3–14)
APPEARANCE UR: CLEAR
ATRIAL RATE - MUSE: 60 BPM
BASOPHILS # BLD AUTO: 0 10E3/UL (ref 0–0.2)
BASOPHILS # BLD AUTO: 0 10E3/UL (ref 0–0.2)
BASOPHILS NFR BLD AUTO: 0 %
BASOPHILS NFR BLD AUTO: 0 %
BILIRUB UR QL STRIP: NEGATIVE
BUN SERPL-MCNC: 60 MG/DL (ref 7–30)
BUN SERPL-MCNC: 61 MG/DL (ref 7–30)
CALCIUM SERPL-MCNC: 9.2 MG/DL (ref 8.5–10.1)
CALCIUM SERPL-MCNC: 9.4 MG/DL (ref 8.5–10.1)
CHLORIDE BLD-SCNC: 109 MMOL/L (ref 94–109)
CHLORIDE BLD-SCNC: 111 MMOL/L (ref 94–109)
CO2 SERPL-SCNC: 19 MMOL/L (ref 20–32)
CO2 SERPL-SCNC: 21 MMOL/L (ref 20–32)
COLOR UR AUTO: ABNORMAL
CREAT SERPL-MCNC: 1.89 MG/DL (ref 0.66–1.25)
CREAT SERPL-MCNC: 2.16 MG/DL (ref 0.66–1.25)
CREAT SERPL-MCNC: 2.17 MG/DL (ref 0.66–1.25)
CREAT UR-MCNC: 105 MG/DL
DIASTOLIC BLOOD PRESSURE - MUSE: NORMAL MMHG
EOSINOPHIL # BLD AUTO: 0.1 10E3/UL (ref 0–0.7)
EOSINOPHIL # BLD AUTO: 0.1 10E3/UL (ref 0–0.7)
EOSINOPHIL NFR BLD AUTO: 1 %
EOSINOPHIL NFR BLD AUTO: 1 %
ERYTHROCYTE [DISTWIDTH] IN BLOOD BY AUTOMATED COUNT: 12.9 % (ref 10–15)
ERYTHROCYTE [DISTWIDTH] IN BLOOD BY AUTOMATED COUNT: 13 % (ref 10–15)
FERRITIN SERPL-MCNC: 672 NG/ML (ref 26–388)
FRACT EXCRET NA UR+SERPL-RTO: 0.6 %
GFR SERPL CREATININE-BSD FRML MDRD: 33 ML/MIN/1.73M2
GFR SERPL CREATININE-BSD FRML MDRD: 33 ML/MIN/1.73M2
GLUCOSE BLD-MCNC: 106 MG/DL (ref 70–99)
GLUCOSE BLD-MCNC: 256 MG/DL (ref 70–99)
GLUCOSE BLDC GLUCOMTR-MCNC: 154 MG/DL (ref 70–99)
GLUCOSE BLDC GLUCOMTR-MCNC: 202 MG/DL (ref 70–99)
GLUCOSE BLDC GLUCOMTR-MCNC: 208 MG/DL (ref 70–99)
GLUCOSE BLDC GLUCOMTR-MCNC: 209 MG/DL (ref 70–99)
GLUCOSE BLDC GLUCOMTR-MCNC: 215 MG/DL (ref 70–99)
GLUCOSE BLDC GLUCOMTR-MCNC: 222 MG/DL (ref 70–99)
GLUCOSE BLDC GLUCOMTR-MCNC: 228 MG/DL (ref 70–99)
GLUCOSE BLDC GLUCOMTR-MCNC: 236 MG/DL (ref 70–99)
GLUCOSE BLDC GLUCOMTR-MCNC: 240 MG/DL (ref 70–99)
GLUCOSE UR STRIP-MCNC: 100 MG/DL
HCT VFR BLD AUTO: 29.7 % (ref 40–53)
HCT VFR BLD AUTO: 31.8 % (ref 40–53)
HGB BLD-MCNC: 10.1 G/DL (ref 13.3–17.7)
HGB BLD-MCNC: 9.6 G/DL (ref 13.3–17.7)
HGB UR QL STRIP: NEGATIVE
IMM GRANULOCYTES # BLD: 0 10E3/UL
IMM GRANULOCYTES # BLD: 0 10E3/UL
IMM GRANULOCYTES NFR BLD: 0 %
IMM GRANULOCYTES NFR BLD: 0 %
INTERPRETATION ECG - MUSE: NORMAL
IRON SATN MFR SERPL: 26 % (ref 15–46)
IRON SERPL-MCNC: 79 UG/DL (ref 35–180)
KETONES UR STRIP-MCNC: NEGATIVE MG/DL
LEUKOCYTE ESTERASE UR QL STRIP: NEGATIVE
LYMPHOCYTES # BLD AUTO: 1.1 10E3/UL (ref 0.8–5.3)
LYMPHOCYTES # BLD AUTO: 1.7 10E3/UL (ref 0.8–5.3)
LYMPHOCYTES NFR BLD AUTO: 13 %
LYMPHOCYTES NFR BLD AUTO: 15 %
MAGNESIUM SERPL-MCNC: 1.6 MG/DL (ref 1.6–2.3)
MCH RBC QN AUTO: 27.4 PG (ref 26.5–33)
MCH RBC QN AUTO: 27.7 PG (ref 26.5–33)
MCHC RBC AUTO-ENTMCNC: 31.8 G/DL (ref 31.5–36.5)
MCHC RBC AUTO-ENTMCNC: 32.3 G/DL (ref 31.5–36.5)
MCV RBC AUTO: 86 FL (ref 78–100)
MCV RBC AUTO: 86 FL (ref 78–100)
MONOCYTES # BLD AUTO: 0.7 10E3/UL (ref 0–1.3)
MONOCYTES # BLD AUTO: 0.9 10E3/UL (ref 0–1.3)
MONOCYTES NFR BLD AUTO: 8 %
MONOCYTES NFR BLD AUTO: 8 %
MUCOUS THREADS #/AREA URNS LPF: PRESENT /LPF
NEUTROPHILS # BLD AUTO: 6.8 10E3/UL (ref 1.6–8.3)
NEUTROPHILS # BLD AUTO: 8.2 10E3/UL (ref 1.6–8.3)
NEUTROPHILS NFR BLD AUTO: 76 %
NEUTROPHILS NFR BLD AUTO: 78 %
NITRATE UR QL: NEGATIVE
NRBC # BLD AUTO: 0 10E3/UL
NRBC # BLD AUTO: 0 10E3/UL
NRBC BLD AUTO-RTO: 0 /100
NRBC BLD AUTO-RTO: 0 /100
P AXIS - MUSE: 53 DEGREES
PH UR STRIP: 5 [PH] (ref 5–7)
PLATELET # BLD AUTO: 229 10E3/UL (ref 150–450)
PLATELET # BLD AUTO: 256 10E3/UL (ref 150–450)
POTASSIUM BLD-SCNC: 4.9 MMOL/L (ref 3.4–5.3)
POTASSIUM BLD-SCNC: 5.1 MMOL/L (ref 3.4–5.3)
POTASSIUM BLD-SCNC: 5.2 MMOL/L (ref 3.4–5.3)
POTASSIUM BLD-SCNC: 5.9 MMOL/L (ref 3.4–5.3)
POTASSIUM BLD-SCNC: 6.2 MMOL/L (ref 3.4–5.3)
PR INTERVAL - MUSE: 232 MS
QRS DURATION - MUSE: 114 MS
QT - MUSE: 404 MS
QTC - MUSE: 404 MS
R AXIS - MUSE: -46 DEGREES
RBC # BLD AUTO: 3.47 10E6/UL (ref 4.4–5.9)
RBC # BLD AUTO: 3.69 10E6/UL (ref 4.4–5.9)
RBC URINE: 0 /HPF
SARS-COV-2 RNA RESP QL NAA+PROBE: NEGATIVE
SODIUM SERPL-SCNC: 136 MMOL/L (ref 133–144)
SODIUM SERPL-SCNC: 137 MMOL/L (ref 133–144)
SODIUM SERPL-SCNC: 138 MMOL/L (ref 133–144)
SODIUM UR-SCNC: 48 MMOL/L
SP GR UR STRIP: 1.01 (ref 1–1.03)
SYSTOLIC BLOOD PRESSURE - MUSE: NORMAL MMHG
T AXIS - MUSE: 88 DEGREES
TIBC SERPL-MCNC: 302 UG/DL (ref 240–430)
UROBILINOGEN UR STRIP-MCNC: NORMAL MG/DL
VENTRICULAR RATE- MUSE: 60 BPM
WBC # BLD AUTO: 10.9 10E3/UL (ref 4–11)
WBC # BLD AUTO: 8.8 10E3/UL (ref 4–11)
WBC URINE: 0 /HPF

## 2022-06-14 PROCEDURE — 83735 ASSAY OF MAGNESIUM: CPT | Performed by: EMERGENCY MEDICINE

## 2022-06-14 PROCEDURE — 85025 COMPLETE CBC W/AUTO DIFF WBC: CPT | Performed by: HOSPITALIST

## 2022-06-14 PROCEDURE — 76770 US EXAM ABDO BACK WALL COMP: CPT

## 2022-06-14 PROCEDURE — 250N000012 HC RX MED GY IP 250 OP 636 PS 637: Performed by: EMERGENCY MEDICINE

## 2022-06-14 PROCEDURE — 258N000001 HC RX 258: Performed by: EMERGENCY MEDICINE

## 2022-06-14 PROCEDURE — 250N000012 HC RX MED GY IP 250 OP 636 PS 637: Performed by: HOSPITALIST

## 2022-06-14 PROCEDURE — 36415 COLL VENOUS BLD VENIPUNCTURE: CPT | Performed by: HOSPITALIST

## 2022-06-14 PROCEDURE — 84132 ASSAY OF SERUM POTASSIUM: CPT | Performed by: EMERGENCY MEDICINE

## 2022-06-14 PROCEDURE — 84300 ASSAY OF URINE SODIUM: CPT | Performed by: HOSPITALIST

## 2022-06-14 PROCEDURE — 82310 ASSAY OF CALCIUM: CPT | Performed by: HOSPITALIST

## 2022-06-14 PROCEDURE — 83550 IRON BINDING TEST: CPT | Performed by: INTERNAL MEDICINE

## 2022-06-14 PROCEDURE — 258N000003 HC RX IP 258 OP 636: Performed by: EMERGENCY MEDICINE

## 2022-06-14 PROCEDURE — 84132 ASSAY OF SERUM POTASSIUM: CPT | Performed by: HOSPITALIST

## 2022-06-14 PROCEDURE — 82728 ASSAY OF FERRITIN: CPT | Performed by: INTERNAL MEDICINE

## 2022-06-14 PROCEDURE — 85025 COMPLETE CBC W/AUTO DIFF WBC: CPT | Performed by: EMERGENCY MEDICINE

## 2022-06-14 PROCEDURE — 36415 COLL VENOUS BLD VENIPUNCTURE: CPT | Performed by: INTERNAL MEDICINE

## 2022-06-14 PROCEDURE — 250N000009 HC RX 250: Performed by: HOSPITALIST

## 2022-06-14 PROCEDURE — 99223 1ST HOSP IP/OBS HIGH 75: CPT | Mod: AI | Performed by: HOSPITALIST

## 2022-06-14 PROCEDURE — 99221 1ST HOSP IP/OBS SF/LOW 40: CPT | Performed by: INTERNAL MEDICINE

## 2022-06-14 PROCEDURE — 258N000003 HC RX IP 258 OP 636: Performed by: HOSPITALIST

## 2022-06-14 PROCEDURE — 82565 ASSAY OF CREATININE: CPT | Performed by: HOSPITALIST

## 2022-06-14 PROCEDURE — 250N000013 HC RX MED GY IP 250 OP 250 PS 637: Performed by: INTERNAL MEDICINE

## 2022-06-14 PROCEDURE — 99207 PR APP CREDIT; MD BILLING SHARED VISIT: CPT | Performed by: INTERNAL MEDICINE

## 2022-06-14 PROCEDURE — 258N000001 HC RX 258: Performed by: HOSPITALIST

## 2022-06-14 PROCEDURE — 120N000001 HC R&B MED SURG/OB

## 2022-06-14 PROCEDURE — U0005 INFEC AGEN DETEC AMPLI PROBE: HCPCS | Performed by: EMERGENCY MEDICINE

## 2022-06-14 PROCEDURE — 250N000011 HC RX IP 250 OP 636: Performed by: EMERGENCY MEDICINE

## 2022-06-14 PROCEDURE — 36415 COLL VENOUS BLD VENIPUNCTURE: CPT | Performed by: EMERGENCY MEDICINE

## 2022-06-14 PROCEDURE — 84132 ASSAY OF SERUM POTASSIUM: CPT | Performed by: INTERNAL MEDICINE

## 2022-06-14 PROCEDURE — 84295 ASSAY OF SERUM SODIUM: CPT | Performed by: HOSPITALIST

## 2022-06-14 PROCEDURE — 81003 URINALYSIS AUTO W/O SCOPE: CPT | Performed by: EMERGENCY MEDICINE

## 2022-06-14 PROCEDURE — 250N000009 HC RX 250: Performed by: EMERGENCY MEDICINE

## 2022-06-14 PROCEDURE — 250N000012 HC RX MED GY IP 250 OP 636 PS 637: Performed by: INTERNAL MEDICINE

## 2022-06-14 PROCEDURE — 80048 BASIC METABOLIC PNL TOTAL CA: CPT | Performed by: EMERGENCY MEDICINE

## 2022-06-14 PROCEDURE — 94640 AIRWAY INHALATION TREATMENT: CPT

## 2022-06-14 RX ORDER — ACETAMINOPHEN 325 MG/1
650 TABLET ORAL EVERY 6 HOURS PRN
Status: DISCONTINUED | OUTPATIENT
Start: 2022-06-14 | End: 2022-06-15 | Stop reason: HOSPADM

## 2022-06-14 RX ORDER — DEXTROSE MONOHYDRATE 25 G/50ML
25 INJECTION, SOLUTION INTRAVENOUS ONCE
Status: COMPLETED | OUTPATIENT
Start: 2022-06-14 | End: 2022-06-14

## 2022-06-14 RX ORDER — METOPROLOL SUCCINATE 25 MG/1
25 TABLET, EXTENDED RELEASE ORAL DAILY
Status: DISCONTINUED | OUTPATIENT
Start: 2022-06-14 | End: 2022-06-15 | Stop reason: HOSPADM

## 2022-06-14 RX ORDER — FUROSEMIDE 20 MG
20 TABLET ORAL DAILY
Status: DISCONTINUED | OUTPATIENT
Start: 2022-06-14 | End: 2022-06-15 | Stop reason: HOSPADM

## 2022-06-14 RX ORDER — ATORVASTATIN CALCIUM 40 MG/1
80 TABLET, FILM COATED ORAL EVERY EVENING
Status: DISCONTINUED | OUTPATIENT
Start: 2022-06-14 | End: 2022-06-15 | Stop reason: HOSPADM

## 2022-06-14 RX ORDER — DEXTROSE MONOHYDRATE 25 G/50ML
25-50 INJECTION, SOLUTION INTRAVENOUS
Status: DISCONTINUED | OUTPATIENT
Start: 2022-06-14 | End: 2022-06-15 | Stop reason: HOSPADM

## 2022-06-14 RX ORDER — CALCIUM GLUCONATE 20 MG/ML
1 INJECTION, SOLUTION INTRAVENOUS ONCE
Status: COMPLETED | OUTPATIENT
Start: 2022-06-14 | End: 2022-06-14

## 2022-06-14 RX ORDER — DEXTROSE MONOHYDRATE 25 G/50ML
50 INJECTION, SOLUTION INTRAVENOUS ONCE
Status: COMPLETED | OUTPATIENT
Start: 2022-06-14 | End: 2022-06-14

## 2022-06-14 RX ORDER — AMLODIPINE BESYLATE 10 MG/1
10 TABLET ORAL DAILY
Status: DISCONTINUED | OUTPATIENT
Start: 2022-06-14 | End: 2022-06-15 | Stop reason: HOSPADM

## 2022-06-14 RX ORDER — ALBUTEROL SULFATE 5 MG/ML
10 SOLUTION RESPIRATORY (INHALATION) ONCE
Status: COMPLETED | OUTPATIENT
Start: 2022-06-14 | End: 2022-06-14

## 2022-06-14 RX ORDER — ACETAMINOPHEN 650 MG/1
650 SUPPOSITORY RECTAL EVERY 6 HOURS PRN
Status: DISCONTINUED | OUTPATIENT
Start: 2022-06-14 | End: 2022-06-15 | Stop reason: HOSPADM

## 2022-06-14 RX ORDER — LIDOCAINE 40 MG/G
CREAM TOPICAL
Status: DISCONTINUED | OUTPATIENT
Start: 2022-06-14 | End: 2022-06-15 | Stop reason: HOSPADM

## 2022-06-14 RX ORDER — NICOTINE POLACRILEX 4 MG
15-30 LOZENGE BUCCAL
Status: DISCONTINUED | OUTPATIENT
Start: 2022-06-14 | End: 2022-06-15 | Stop reason: HOSPADM

## 2022-06-14 RX ORDER — ONDANSETRON 2 MG/ML
4 INJECTION INTRAMUSCULAR; INTRAVENOUS EVERY 6 HOURS PRN
Status: DISCONTINUED | OUTPATIENT
Start: 2022-06-14 | End: 2022-06-15 | Stop reason: HOSPADM

## 2022-06-14 RX ORDER — FUROSEMIDE 10 MG/ML
20 INJECTION INTRAMUSCULAR; INTRAVENOUS ONCE
Status: COMPLETED | OUTPATIENT
Start: 2022-06-14 | End: 2022-06-14

## 2022-06-14 RX ORDER — DEXTROSE MONOHYDRATE 25 G/50ML
25-50 INJECTION, SOLUTION INTRAVENOUS
Status: DISCONTINUED | OUTPATIENT
Start: 2022-06-14 | End: 2022-06-14

## 2022-06-14 RX ORDER — NICOTINE POLACRILEX 4 MG
15-30 LOZENGE BUCCAL
Status: DISCONTINUED | OUTPATIENT
Start: 2022-06-14 | End: 2022-06-14

## 2022-06-14 RX ORDER — ONDANSETRON 4 MG/1
4 TABLET, ORALLY DISINTEGRATING ORAL EVERY 6 HOURS PRN
Status: DISCONTINUED | OUTPATIENT
Start: 2022-06-14 | End: 2022-06-15 | Stop reason: HOSPADM

## 2022-06-14 RX ADMIN — SODIUM BICARBONATE 50 MEQ: 84 INJECTION INTRAVENOUS at 03:09

## 2022-06-14 RX ADMIN — INSULIN ASPART 1 UNITS: 100 INJECTION, SOLUTION INTRAVENOUS; SUBCUTANEOUS at 20:05

## 2022-06-14 RX ADMIN — DEXTROSE MONOHYDRATE 25 ML: 25 INJECTION, SOLUTION INTRAVENOUS at 03:06

## 2022-06-14 RX ADMIN — CALCIUM GLUCONATE 1 G: 20 INJECTION, SOLUTION INTRAVENOUS at 01:51

## 2022-06-14 RX ADMIN — DEXTROSE MONOHYDRATE 300 ML: 100 INJECTION, SOLUTION INTRAVENOUS at 02:11

## 2022-06-14 RX ADMIN — SODIUM CHLORIDE 10 UNITS: 9 INJECTION, SOLUTION INTRAVENOUS at 03:06

## 2022-06-14 RX ADMIN — METOPROLOL SUCCINATE 25 MG: 25 TABLET, EXTENDED RELEASE ORAL at 18:00

## 2022-06-14 RX ADMIN — DEXTROSE MONOHYDRATE 25 G: 25 INJECTION, SOLUTION INTRAVENOUS at 02:06

## 2022-06-14 RX ADMIN — ALBUTEROL SULFATE 10 MG: 2.5 SOLUTION RESPIRATORY (INHALATION) at 01:45

## 2022-06-14 RX ADMIN — SODIUM CHLORIDE 5 UNITS: 9 INJECTION, SOLUTION INTRAVENOUS at 02:06

## 2022-06-14 RX ADMIN — ATORVASTATIN CALCIUM 80 MG: 40 TABLET, FILM COATED ORAL at 21:06

## 2022-06-14 RX ADMIN — FUROSEMIDE 20 MG: 10 INJECTION, SOLUTION INTRAVENOUS at 01:47

## 2022-06-14 ASSESSMENT — ACTIVITIES OF DAILY LIVING (ADL)
ADLS_ACUITY_SCORE: 20
DRESSING/BATHING_DIFFICULTY: NO
WEAR_GLASSES_OR_BLIND: NO
ADLS_ACUITY_SCORE: 35
ADLS_ACUITY_SCORE: 35
DOING_ERRANDS_INDEPENDENTLY_DIFFICULTY: NO
ADLS_ACUITY_SCORE: 35
ADLS_ACUITY_SCORE: 20
ADLS_ACUITY_SCORE: 35
TOILETING_ISSUES: NO
CONCENTRATING,_REMEMBERING_OR_MAKING_DECISIONS_DIFFICULTY: NO
ADLS_ACUITY_SCORE: 35
ADLS_ACUITY_SCORE: 20
ADLS_ACUITY_SCORE: 35
ADLS_ACUITY_SCORE: 35
WALKING_OR_CLIMBING_STAIRS_DIFFICULTY: NO
CHANGE_IN_FUNCTIONAL_STATUS_SINCE_ONSET_OF_CURRENT_ILLNESS/INJURY: NO
FALL_HISTORY_WITHIN_LAST_SIX_MONTHS: NO
DIFFICULTY_EATING/SWALLOWING: NO
ADLS_ACUITY_SCORE: 35

## 2022-06-14 ASSESSMENT — ENCOUNTER SYMPTOMS: ABDOMINAL PAIN: 0

## 2022-06-14 NOTE — ED PROVIDER NOTES
History   Chief Complaint:  Abnormal Labs       Limited by the use of .       Khang Mcelroy is a 66 year old male who presents with abnormal kidney test. The patient claims to have done kidney test few days ago which turned out to be abnormal. He states to be eating and drinking normally and also reports normal urination and bowel movement. He doesn't have swelling in his legs and reports everything to be normal despite the abnormal test result. He claims to have seen heart specialist recently and denies abdominal pain.    Review of Systems   Gastrointestinal: Negative for abdominal pain.   Denies chest pain, shortness of breath, fever, leg swelling  10 point review of systems was obtained and negative other than mentioned above.    Allergies:  The patient has no known allergies.     Medications:  Ecotrin   Lancets  Prinzide  Glucotrol XL  Lipitor  Fortamet  Tylenol  Norvasc  Lasix  Glucotrol  Zestril  Toprol XL  Aldactone    Past Medical History:     Diabetes  NSTEMI  CAD  CABG  Fluid overload  Transient hyperglycemia post procedure  Hypertension  Hyperlipidemia  CHALO  Chronic systolic heart failure    Past Surgical History:    Graft artery coronary  CV coronary angiogram  CV Heart catheterization   CV left ventriculogram  Lithotripsy    Social History:  The patient presents with his wife.    Physical Exam     Patient Vitals for the past 24 hrs:   BP Temp Pulse Resp SpO2 Weight   06/14/22 0230 130/63 -- 63 13 100 % --   06/14/22 0200 (!) 146/61 -- 59 22 99 % --   06/13/22 1451 (!) 141/55 97.7  F (36.5  C) 52 20 100 % 74.8 kg (165 lb)       Physical Exam  General: Sitting up in bed  Eyes:  The pupils are equal and round    Conjunctivae and sclerae are normal  ENT:    Wearing a mask  Neck:  Normal range of motion  CV:  Regular rate, regular rhythm    Skin warm and well perfused   Resp:  Non labored breathing on room air    No tachypnea    No cough heard    Lungs clear  bilaterally  GI:  Abdomen is soft, there is no rigidity    No distension    No rebound tenderness     No abdominal tenderness  MS:  Normal muscular tone  Skin:  No rash or acute skin lesions noted  Neuro:   Awake, alert.      Speech is normal and fluent.    Face is symmetric.     Moves all extremities equally  Psych: Normal affect.  Appropriate interactions.      Emergency Department Course   ECG  ECG:  ECG taken at 106, ECG read at 110  Sinus rhythm with 1st degree AV block  Left anterior fascicular block  Cannot rule out anterior infarct   ST and T wave abnormality, consider lateral ischemia  Abnormal ECG  No change compared to EKG dated 5/15/19   Rate 60 bpm. AR interval 232 ms. QRS duration 114 ms. QT/QTc 404/404 ms. P-R-T axes 53 -46 88.    ECG results from 06/13/22   EKG 12 lead     Value    Systolic Blood Pressure     Diastolic Blood Pressure     Ventricular Rate 60    Atrial Rate 60    AR Interval 232    QRS Duration 114        QTc 404    P Axis 53    R AXIS -46    T Axis 88    Interpretation ECG      Sinus rhythm with 1st degree A-V block  Left anterior fascicular block  Cannot rule out Anterior infarct , age undetermined  ST & T wave abnormality, consider lateral ischemia  Abnormal ECG  When compared with ECG of 01-APR-2019 14:26,  Significant changes have occurred  Confirmed by GENERATED REPORT, COMPUTER ([id number]),  Anahy Wesley ([id number]) on 6/14/2022 1:43:43 AM         Laboratory:  Labs Ordered and Resulted from Time of ED Arrival to Time of ED Departure   BASIC METABOLIC PANEL - Abnormal       Result Value    Sodium 138      Potassium 6.2 (*)     Chloride 111 (*)     Carbon Dioxide (CO2) 21      Anion Gap 6      Urea Nitrogen 61 (*)     Creatinine 2.16 (*)     Calcium 9.4      Glucose 106 (*)     GFR Estimate 33 (*)    CBC WITH PLATELETS AND DIFFERENTIAL - Abnormal    WBC Count 10.9      RBC Count 3.69 (*)     Hemoglobin 10.1 (*)     Hematocrit 31.8 (*)     MCV 86      MCH 27.4      MCHC 31.8       RDW 13.0      Platelet Count 256      % Neutrophils 76      % Lymphocytes 15      % Monocytes 8      % Eosinophils 1      % Basophils 0      % Immature Granulocytes 0      NRBCs per 100 WBC 0      Absolute Neutrophils 8.2      Absolute Lymphocytes 1.7      Absolute Monocytes 0.9      Absolute Eosinophils 0.1      Absolute Basophils 0.0      Absolute Immature Granulocytes 0.0      Absolute NRBCs 0.0     POTASSIUM - Abnormal    Potassium 5.9 (*)    GLUCOSE BY METER - Abnormal    GLUCOSE BY METER POCT 208 (*)    GLUCOSE BY METER - Abnormal    GLUCOSE BY METER POCT 215 (*)    COVID-19 VIRUS (CORONAVIRUS) BY PCR - Normal    SARS CoV2 PCR Negative     MAGNESIUM - Normal    Magnesium 1.6     ROUTINE UA WITH MICROSCOPIC REFLEX TO CULTURE   GLUCOSE MONITOR NURSING POCT   GLUCOSE MONITOR NURSING POCT   GLUCOSE MONITOR NURSING POCT   POTASSIUM   POTASSIUM        Emergency Department Course:    Reviewed:  I reviewed nursing notes, vitals, past medical history and Care Everywhere    Assessments:  0001 I obtained history and examined the patient as noted above.    I rechecked the patient and explained findings.     Intervention:  0151 Premix 1 g IV  0211 Dextrose 10% 300 ml IV  0206 Dextrose 50% 25 mg IV  0206 Insulin 5 units IV  0145 Proventil 10 mg nebulization  0147 Lasix 20 mg IV  0306 Insulin 10 units IV  0306  Dextrose 50% 50 ml IV  0309 Sodium bicarbonate 50mEq IV    Consults:  0142 Consulted with Dr. Cuellar hospitatlclementina.    Disposition:  The patient was admitted to the hospital under the care of ke Ungeritala.     Impression & Plan     Medical Decision Making:  Khang Mcelroy is a 66-year-old male who presented to the emergency department with abnormal labs.  Patient has chronic kidney disease and had an elevated potassium last week.  Did not come to the emergency department at that time.  Today he has continued chronic kidney disease with hyperkalemia.  He was given usual treatment for hyperkalemia  and potassium improved.  He did have EKG changes so calcium was given.  He otherwise is well-appearing with no preceding illness. discussed with hospitalist for admission.    Diagnosis:    ICD-10-CM    1. Hyperkalemia  E87.5    2. Chronic kidney disease, unspecified CKD stage  N18.9        Scribe Disclosure:  JOHAN BECKWITH, am serving as a scribe at 12:01 AM on 6/14/2022 to document services personally performed by Miguelina Montes MD, based on my observations and the provider's statements to me.            Miguelina Montes MD  06/14/22 [id number]

## 2022-06-14 NOTE — PROVIDER NOTIFICATION
MD Notification    Notified Person: MD    Notified Person Name: Dr. Llamas     Notification Date/Time: 6/14/22 2220    Notification Interaction: AmCom    Purpose of Notification: Pt has T2DM, had elevated blood glucose in ED, can we get glucose checks and sliding scale insulin ordered?     Orders Received: glucose and sliding scale insulin ordered

## 2022-06-14 NOTE — CONSULTS
Nephrology Initial Consult  June 14, 2022      Khang Mcelroy MRN:[id number] YOB: 1955  Date of Admission:6/13/2022  Primary care provider: Amari Castro  Requesting physician: Miguelina Foster MD    ASSESSMENT AND RECOMMENDATIONS:   1 CKD 3B-slowly progressive CKD with recent baseline creatinine of around 1.8-2.  Low-grade proteinuria of 0.3 g/gram when checked in 2021.  Dipstick negative now.  Renal ultrasound shows 9.5 x 9.9 cm kidney with potential 0.8 cm nonobstructing stone at left lower kidney.  Follows with [facility], last seen in 2021    2 critical hyperkalemia-in setting of prerenal ASIA along with concurrent use of spironolactone and lisinopril.  Also, furosemide appears to have been stopped several months ago but he may have been taking it infrequently for edema??  Has mild acidosis and hyperglycemia, which may aggravate hyperkalemia as well.       3 acute renal failure-creatinine as high as 3 recently.  Now down to 2.1 which is fairly close to his baseline.  Likely prerenal ASIA     4 mild acidosis-non-anion gap.  With diarrhea and ASIA    5 anemia-hemoglobin 9.6.  Stable compared to his historic hemoglobin.  Normocytic normochromic.  Normocalcemic.    Recc -   Continue to hold Lisinopril and SPL . BP on low side   Encourage oral intake  Low K diet   Repeat K and treat medically if needed. May use Lokelma 10 gm 1-2 doses if K surges back up   Daily RFP     Thank you for the consult. Will continue to follow along with you .        Mayur Sheth MD  [facility] - Nephrology   [phone]        REASON FOR CONSULT: Hyperkalemia, advanced CKD with ASIA    HISTORY OF PRESENT ILLNESS:  Khang Mcelroy is a 66 year old gentleman with past medical history of  -Ischemic cardiomyopathy -history of CABG, ejection fraction of 45%, diastolic dysfunction grade 1 ; follows with Dr. Hernández  -Type 2 diabetes mellitus with diabetic retinopathy  -Essential hypertension -      --  Lisinopril 20 mg once a day      - amlodipine 10 mg once a day     - metoprolol 25 mg once a day      - spironolactone 25 mg once a day     - furosemide 20 mg discontinued by his provider . Reports taking intermittently for edema    -CKD 3B-slowly progressive CKD with recent baseline creatinine of around 1.8-2.  Low-grade proteinuria of 0.3 g/gram when checked in 2021.  Dipstick negative now.  Renal ultrasound shows 9.5 x 9.9 cm kidney with potential 0.8 cm nonobstructing stone at left lower kidney.  Follows with [facility], last seen in 2021    He describes feeling generally unwell for past 2 weeks with fatigue and associated with intermittent loose nonbloody stool ( now improved) .  Creatinine was up to 3 with a potassium of 5.3 when checked in PCP clinic on 6/7.  On repeat check yesterday creatinine was 2.1 with but with a potassium 6.2.  Treated medically in ER with lowering cocktail.  Potassium is down to 5.2 this morning.  Creatinine stable at 2.1.  Urinalysis is bland.  Bicarb is slightly low at 19.  Blood glucose in 200s.    PAST MEDICAL HISTORY:  Reviewed with patient on 06/14/2022  and is as listed in HPI.       MEDICATIONS:  PTA Meds  Prior to Admission medications    Medication Sig Last Dose Taking? Auth Provider Long Term End Date   amLODIPine (NORVASC) 10 MG tablet Take 1 tablet (10 mg) by mouth daily 6/13/2022 at AM Yes Guillermina Michaud MD Yes    atorvastatin (LIPITOR) 80 MG tablet Take 1 tablet (80 mg) by mouth every evening 6/13/2022 Yes Bib Hernández MD Yes    furosemide (LASIX) 20 MG tablet Take 20 mg by mouth daily 6/13/2022 at AM Yes Reported, Patient No    GLIPIZIDE PO Take 2.5 mg by mouth 2 times daily 6/13/2022 at AM Yes Reported, Patient No    lisinopril (ZESTRIL) 20 MG tablet Take 1 tablet (20 mg) by mouth daily 6/13/2022 at AM Yes Guillermina Michaud MD Yes    metoprolol succinate ER (TOPROL-XL) 25 MG 24 hr tablet Take 1 tablet (25 mg) by mouth daily 6/13/2022 at AM Yes Bib Hernández MD  Yes    spironolactone (ALDACTONE) 25 MG tablet Take 1 tablet (25 mg) by mouth daily 2022 at AM Yes Bib Hernández MD Yes       Current Meds    Infusion Meds      ALLERGIES:    No Known Allergies    REVIEW OF SYSTEMS:  A comprehensive of systems was negative except as noted above.    SOCIAL HISTORY:   Reviewed with patient on 2022     FAMILY MEDICAL HISTORY:   Family History   Problem Relation Age of Onset     Other - See Comments Mother         giving birth     Other - See Comments Father         old age     Reviewed with patient on 2022     PHYSICAL EXAM:   Temp  Av.7  F (36.5  C)  Min: 97.7  F (36.5  C)  Max: 97.7  F (36.5  C)      Pulse  Av  Min: 52  Max: 70 Resp  Av  Min: 13  Max: 22  SpO2  Av.3 %  Min: 98 %  Max: 100 %       BP 98/76   Pulse 61   Temp 97.7  F (36.5  C)   Resp 20   Wt 74.8 kg (165 lb)   SpO2 100%   BMI 29.23 kg/m        Admit Weight: 74.8 kg (165 lb)     GENERAL APPEARANCE: no distress,  awake  EYES: no scleral icterus, pupils equal  HENT: NC/AT,  mouth  without ulcers or lesions  Lymphatics: no cervical or supraclavicular LAD  Endo: no moon facies, no goiter  Pulmonary: lungs clear to auscultation with equal breath sounds bilaterally, no clubbing  CV: regular rhythm, normal rate, no rub, soft systolic murmur at  Precordium    - JVP -   - Edema-  GI: soft, nontender,   MS: no evidence of inflammation in joints  : no whalen  SKIN: no rash, warm, dry, no cyanosis  NEURO: face symmetric, no asterixis     LABS:   CMP  Recent Labs   Lab 22  0734 22  0618 22  0507 22  0419 22  0333 22  0228 22  0220 22  0029   NA  --   --   --   --  136  --   --  138   POTASSIUM  --   --   --   --  5.2  5.1  --  5.9* 6.2*   CHLORIDE  --   --   --   --  109  --   --  111*   CO2  --   --   --   --  19*  --   --  21   ANIONGAP  --   --   --   --  8  --   --  6   * 222* 228* 236* 256*   < >  --  106*   BUN  --   --   --    --  60*  --   --  61*   CR  --   --   --   --  2.17*  --   --  2.16*   GFRESTIMATED  --   --   --   --  33*  --   --  33*   MYKE  --   --   --   --  9.2  --   --  9.4   MAG  --   --   --   --   --   --   --  1.6    < > = values in this interval not displayed.     CBC  Recent Labs   Lab 06/14/22  0739 06/14/22  0029   HGB 9.6* 10.1*   WBC 8.8 10.9   RBC 3.47* 3.69*   HCT 29.7* 31.8*   MCV 86 86   MCH 27.7 27.4   MCHC 32.3 31.8   RDW 12.9 13.0    256     INRNo lab results found in last 7 days.  ABGNo lab results found in last 7 days.   URINE STUDIES  Recent Labs   Lab Test 06/14/22  0304 04/06/21  0755 03/12/19  2111 03/11/19  0356   COLOR Straw Light Yellow Yellow Yellow   APPEARANCE Clear Clear Clear Cloudy   URINEGLC 100 * Negative Negative Negative   URINEBILI Negative Negative Negative Negative   URINEKETONE Negative Negative Negative Negative   SG 1.007 1.014 1.033 1.012   UBLD Negative Negative Small* Moderate*   URINEPH 5.0 5.5 5.5 5.0   PROTEIN Negative 20* 30* 30*   NITRITE Negative Negative Negative Negative   LEUKEST Negative Negative Large* Moderate*   RBCU 0 1 6* 30*   WBCU 0 1 37* 22*     Recent Labs   Lab Test 04/06/21  0755   UTPG 0.29*     PTH  Recent Labs   Lab Test 04/06/21  0752   PTHI 57     IRON STUDIES  Recent Labs   Lab Test 04/06/21  0752 03/13/19  0532   IRON 99 20*    195*   IRONSAT 32 10*   SIMOEN 600* 528*       IMAGING:  Personally reviewed the images and findings are as listed in HPI     Mayur Sheth MD

## 2022-06-14 NOTE — ED NOTES
[facility]  ED Nurse Handoff Report    ED Chief complaint: Abnormal Labs      ED Diagnosis:   Final diagnoses:   Hyperkalemia   Chronic kidney disease, unspecified CKD stage       Code Status: To be addressed by admitting provider    Allergies: No Known Allergies    Patient Story: Patient presents to ED from cardiology clinic last week where he was informed that his kidney function labs were elevated and instructed to come to hospital.     Focused Assessment:    Cardiac - WDL, No chest pain. On continuous cardiac monitoring. EKG completed.   Respiratory - WDL  Neuro - WDL  .  Labs Ordered and Resulted from Time of ED Arrival to Time of ED Departure   BASIC METABOLIC PANEL - Abnormal       Result Value    Sodium 138      Potassium 6.2 (*)     Chloride 111 (*)     Carbon Dioxide (CO2) 21      Anion Gap 6      Urea Nitrogen 61 (*)     Creatinine 2.16 (*)     Calcium 9.4      Glucose 106 (*)     GFR Estimate 33 (*)    CBC WITH PLATELETS AND DIFFERENTIAL - Abnormal    WBC Count 10.9      RBC Count 3.69 (*)     Hemoglobin 10.1 (*)     Hematocrit 31.8 (*)     MCV 86      MCH 27.4      MCHC 31.8      RDW 13.0      Platelet Count 256      % Neutrophils 76      % Lymphocytes 15      % Monocytes 8      % Eosinophils 1      % Basophils 0      % Immature Granulocytes 0      NRBCs per 100 WBC 0      Absolute Neutrophils 8.2      Absolute Lymphocytes 1.7      Absolute Monocytes 0.9      Absolute Eosinophils 0.1      Absolute Basophils 0.0      Absolute Immature Granulocytes 0.0      Absolute NRBCs 0.0     MAGNESIUM - Normal    Magnesium 1.6     ROUTINE UA WITH MICROSCOPIC REFLEX TO CULTURE   GLUCOSE MONITOR NURSING POCT   GLUCOSE MONITOR NURSING POCT   POTASSIUM   GLUCOSE MONITOR NURSING POCT   COVID-19 VIRUS (CORONAVIRUS) BY PCR     Treatments and/or interventions provided:   Medications   glucose gel 15-30 g (has no administration in time range)     Or   dextrose 50 % injection 25-50 mL (has no administration in  time range)     Or   glucagon injection 1 mg (has no administration in time range)   dextrose 10% BOLUS (300 mLs Intravenous New Bag 6/14/22 0211)   calcium gluconate 1 g in 50 mL sodium chloride intermittent infusion (premix) (1 g Intravenous Given 6/14/22 0151)   dextrose 50 % injection 25 g (25 g Intravenous Given 6/14/22 0206)   insulin regular 1 unit/mL injection 5 Units (5 Units Intravenous Given 6/14/22 0206)   albuterol (PROVENTIL) neb solution 10 mg (10 mg Nebulization Given 6/14/22 0145)   furosemide (LASIX) injection 20 mg (20 mg Intravenous Given 6/14/22 0147)     Patient's response to treatments and/or interventions: Patient tolerating ED interventions appropriately.     To be done/followed up on inpatient unit:  See orders    Does this patient have any cognitive concerns?: None    Activity level - Baseline/Home:  Independent  Activity Level - Current:   Independent    Patient's Preferred language: American   Needed?: yes    Isolation: None  Infection: Not Applicable  Patient tested for COVID 19 prior to admission: YES  Bariatric?: No    Vital Signs:   Vitals:    06/13/22 1451   BP: (!) 141/55   Pulse: 52   Resp: 20   Temp: 97.7  F (36.5  C)   SpO2: 100%   Weight: 74.8 kg (165 lb)       Cardiac Rhythm:     Was the PSS-3 completed:   Yes  What interventions are required if any?               Family Comments: Family at bedside  OBS brochure/video discussed/provided to patient/family: N/A    For the majority of the shift this patient's behavior was Green.   Behavioral interventions performed were None.    ED NURSE PHONE NUMBER:

## 2022-06-14 NOTE — PHARMACY-ADMISSION MEDICATION HISTORY
Pharmacy Medication History  Admission medication history interview status for the 6/13/2022  admission is complete. See EPIC admission navigator for prior to admission medications     Location of Interview: Patient room  Medication history sources: Patient, surescripts    Significant changes made to the medication list:  None.    In the past week, patient estimated taking medication this percent of the time: greater than 90%    Additional medication history information:   All prescription medications have been picked up recently (5/25/22 or 6/2/22) via surescripts.    Medication reconciliation completed by provider prior to medication history? No    Time spent in this activity: 20 mins    Prior to Admission medications    Medication Sig Last Dose Taking? Auth Provider Long Term End Date   amLODIPine (NORVASC) 10 MG tablet Take 1 tablet (10 mg) by mouth daily 6/13/2022 at AM Yes Guillermina Michaud MD Yes    atorvastatin (LIPITOR) 80 MG tablet Take 1 tablet (80 mg) by mouth every evening 6/13/2022 Yes Bib Hernández MD Yes    furosemide (LASIX) 20 MG tablet Take 20 mg by mouth daily 6/13/2022 at AM Yes Reported, Patient No    GLIPIZIDE PO Take 2.5 mg by mouth 2 times daily 6/13/2022 at AM Yes Reported, Patient No    lisinopril (ZESTRIL) 20 MG tablet Take 1 tablet (20 mg) by mouth daily 6/13/2022 at AM Yes Guillermina Michaud MD Yes    metoprolol succinate ER (TOPROL-XL) 25 MG 24 hr tablet Take 1 tablet (25 mg) by mouth daily 6/13/2022 at AM Yes Bib Hernández MD Yes    spironolactone (ALDACTONE) 25 MG tablet Take 1 tablet (25 mg) by mouth daily 6/13/2022 at AM Yes Bib Hernández MD Yes        The information provided in this note is only as accurate as the sources available at the time of update(s)

## 2022-06-14 NOTE — PROGRESS NOTES
[facility]    Medicine Progress Note - Hospitalist Service    Date of Admission:  6/13/2022  Khang Mcelroy is a pleasant 66 year old gentleman with past medical history that is most notable for CAD, chronic systolic CHF, Type 2 Diabetes mellitus, and CKD Stage 3, among others; who presents with fatigue and is found to have ASIA and acute hyperkalemia.    Assessment & Plan        ASIA and acute hyperkalemia: Mr. Mcelroy is reportedly followed by a Nephrologist for Stage 3b CKD. He presents now for progressive fatigue for two weeks, associated with several days of loose stools. Blood work at a routine PCP visit on 6/7/2022 revealed Cr risen from prior baseline 1.8-2, up to 3.01. K was 5.3 at that time but he declined evaluation until he was then seen in a routine Cardiology visit on 6/13 and advised again to go. Tonight, Cr 2.16 and K is now up to 6.2, with evidence of peaked T waves on EKG and acute metabolic acidosis (CO2 21). The cause of ASIA seems unclear but could perhaps be related to ongoing use of Lisinopril and Spironolactone while possibly having stopped Furosemide. It could also be exacerbated by diarrhea and dehydration though clinically on exam he seems euvolemic currently. We will evaluate for ATN, as well as infectious or obstructive causes.           Recent Labs   Lab Test 06/14/22  0029 04/06/21  0752 01/28/21  0825   CR 2.16* 1.99* 1.80*        Telemetry.     Lisinopril and Spironolactone held.    Repeat K after shifting was down to 5.9; I ordered further rapid correction with 10 units IV insulin and Bicarbonate with STAT repeat K; K then dropped to 5.1. repeat BMP    renal US shows normal-sized kidneys.     UA has no RBCs, no WBCs    FeNa is pending    Nephrology consult requested, I appreciate Dr. Sheth's evaluation and recommendations.  Per him, patient has mild acidosis and hyperglycemia, which may aggravate hyperkalemia    Low potassium diet    Recheck potassium,  treat medically as needed.  Could use Lokelma 10 g x 1 to 2 doses if has repeat, severe hyperkalemia     CAD and chronic systolic CHF: Mr. Mcelroy is followed by  Cardiology for these conditions. He underwent CABG ((LIMA-LAD, SVG-DIAG, SVG-OM, SVG-PDA) in 2019. Reportedly he has had diminished LVEF as low as 35% in the past. Most recently it was 45-50% on TTE in 2021. He saw Dr. Hernández of cardiology in 2/2022 and Lasix reportedly was stopped at that time.     Resume home ASA, Toprol when verified and as blood pressure allows     Chronic anemia due to renal disease: Monitor while hospitalized.        Recent Labs   Lab Test 06/14/22  0029 04/06/21  0752 06/14/19  1146   HGB 10.1* 10.0* 9.9*      Type 2 Diabetes mellitus: A1c 7.8 on 6/7/2022.     Hold home Glipizide for now.     ISS insulin ordered.     Hypertension:     Resume home Norvasc as blood pressure allows     Dyslipidemia:     Resume home Lipitor      CHALO: CPAP ordered     Rule Out COVID-19 infection  This patient was evaluated during a global COVID-19 pandemic, which necessitated consideration that the patient might be at risk for infection with the SARS-CoV-2 virus that causes COVID-19. Applicable protocols for evaluation were followed during the patient's care.     COVID-19 PCR negative     Diet: Combination Diet Regular Diet Adult; Renal Diet (dialysis); Moderate Consistent Carb (60 g CHO per Meal) Diet; 2 gm NA Diet; Low Saturated Fat Diet    DVT Prophylaxis: Pneumatic Compression Devices  Dejesus Catheter: Not present  Central Lines: None  Cardiac Monitoring: None  Code Status:   Full    Disposition Plan   Expected Discharge: 06/15/2022  Anticipated discharge location:  Awaiting care coordination huddle  Delays:     pending stable/improved potassium        The patient's care was discussed with the Bedside Nurse and Patient and patient's wife    Sade Bingham MD  Hospitalist Service  [facility]  Securely message with the  "[employer] Web Console (learn more here)  Text page via [employer] Paging/Directory       Clinically Significant Risk Factors Present on Admission                  # Overweight: Estimated body mass index is 29.23 kg/m  as calculated from the following:    Height as of an earlier encounter on 6/13/22: 1.6 m (5' 3\").    Weight as of this encounter: 74.8 kg (165 lb).      ______________________________________________________________________    Interval History   \"I am better, now.\"  Patient seen with benefit of Kinyarwanda .   He says he feels better and he is clearly relieved by normal results of renal ultrasound, improving lab studies.  He denies chest pain, no respiratory or GI complaints.    Data reviewed today: I reviewed all medications, new labs and imaging results over the last 24 hours. I personally reviewed the renal ultrasound image(s) showing Potential 0.8 cm nonobstructing stone at the lower left kidney, no hydronephrosis.    Physical Exam   Vital Signs: Temp: 97.7  F (36.5  C)   BP: 98/76 Pulse: 61   Resp: 20 SpO2: 100 %      Weight: 165 lbs 0 oz  Constitutional: Awake, alert, cooperative, no apparent distress  Respiratory: Clear to auscultation bilaterally, no crackles or wheezing  Cardiovascular: Regular rate and rhythm, normal S1 and S2, and no murmur noted  GI: Normal bowel sounds, soft, non-distended, non-tender  Skin/Integumen: No rash on exposed skin  Other: Mood is very pleasant      Data   Recent Labs   Lab 06/14/22  0739 06/14/22  0734 06/14/22  0618 06/14/22  0507 06/14/22  0419 06/14/22  0333 06/14/22  0228 06/14/22  0220 06/14/22  0029   WBC 8.8  --   --   --   --   --   --   --  10.9   HGB 9.6*  --   --   --   --   --   --   --  10.1*   MCV 86  --   --   --   --   --   --   --  86     --   --   --   --   --   --   --  256   NA  --   --   --   --   --  136  --   --  138   POTASSIUM  --   --   --   --   --  5.2  5.1  --  5.9* 6.2*   CHLORIDE  --   --   --   --   --  109  --   --  111*   CO2 "  --   --   --   --   --  19*  --   --  21   BUN  --   --   --   --   --  60*  --   --  61*   CR  --   --   --   --   --  2.17*  --   --  2.16*   ANIONGAP  --   --   --   --   --  8  --   --  6   MYKE  --   --   --   --   --  9.2  --   --  9.4   GLC  --  209* 222* 228*   < > 256*   < >  --  106*    < > = values in this interval not displayed.     Recent Results (from the past 24 hour(s))   US Renal Complete    Narrative    US RENAL COMPLETE   6/14/2022 9:21 AM     HISTORY: ASIA    COMPARISON: None.    FINDINGS:  Right Kidney: 9.5 x 5.0 x 5.0 cm, cortex thickness of 1.5 cm. No  hydronephrosis or focal lesion.    Left Kidney: 9.9 x 3.7 x 5.1 cm, cortex thickness of 1.5 cm. No  hydronephrosis. Potential 0.8 cm nonobstructing stone lower pole left  kidney.    Bladder: Unremarkable.      Impression    IMPRESSION: Potential 0.8 cm nonobstructing stone at the lower left  kidney. No hydronephrosis.     Medications

## 2022-06-14 NOTE — PROGRESS NOTES
RECEIVING UNIT ED HANDOFF REVIEW    ED Nurse Handoff Report was reviewed by: Lilian Mitchell on June 14, 2022 at 5:16 PM

## 2022-06-14 NOTE — H&P
[facility]    History and Physical  Hospitalist       Date of Admission:  6/13/2022  Date of Service (when I saw the patient): 06/14/22    ASSESSMENT  Khang Mcelroy is a pleasant 66 year old gentleman with past medical history that is most notable for CAD, chronic systolic CHF, Type 2 Diabetes mellitus, and CKD Stage 3, among others; who presents with fatigue and is found to have ASIA and acute hyperkalemia.    PLAN    ASIA and acute hyperkalemia: Mr. Mcelroy is reportedly followed by a Nephrologist for Stage 3b CKD. He presents now for progressive fatigue for two weeks, associated with several days of loose stools. Blood work at a routine PCP visit on 6/7/2022 revealed Cr risen from prior baseline 1.8-2, up to 3.01. K was 5.3 at that time but he declined evaluation until he was then seen in a routine Cardiology visit on 6/13 and advised again to go. Tonight, Cr 2.16 and K is now up to 6.2, with evidence of peaked T waves on EKG and acute metabolic acidosis (CO2 21). The cause of ASIA seems unclear but could perhaps be related to ongoing use of Lisinopril and Spironolactone while possibly having stopped Furosemide. It could also be exacerbated by diarrhea and dehydration though clinically on exam he seems euvolemic currently. We will evaluate for ATN, as well as infectious or obstructive causes.    Recent Labs   Lab Test 06/14/22  0029 04/06/21  0752 01/28/21  0825   CR 2.16* 1.99* 1.80*       -- Inpatient. Telemetry. Lisinopril and Spironolactone held.    -- Repeat K after shifting was down to 5.9; I ordered further rapid correction with 10 units IV insulin and Bicarbonate with STAT repeat K; K then dropped to 5.1. repeat BMP pending this AM.    -- UA, FeNa, and real US ordered. Nephrology consulted for further evaluation.    CAD and chronic systolic CHF: Mr. Mcelroy is followed by  Cardiology for these conditions. He underwent CABG ((LIMA-LAD, SVG-DIAG, SVG-OM, SVG-PDA) in  2019. Reportedly he has had diminished LVEF as low as 35% in the past. Most recently it was 45-50% on TTE in 2021. He saw Dr. Hernández of cardiology in 2/2022 and Lasix reportedly was stopped at that time.     -- Resume home ASA, Toprol when verified.    Chronic anemia due to renal disease: Monitor while hospitalized.  Recent Labs   Lab Test 06/14/22  0029 04/06/21  0752 06/14/19  1146   HGB 10.1* 10.0* 9.9*     Type 2 Diabetes mellitus: A1c 7.8 on 6/7/2022. Hold home Glipizide for now. ISS insulin ordered.    Hypertension: Resume home Norvasc when verified    Dyslipidemia: Resume home Lipitor when verified    CHALO: CPAP ordered    Rule Out COVID-19 infection  This patient was evaluated during a global COVID-19 pandemic, which necessitated consideration that the patient might be at risk for infection with the SARS-CoV-2 virus that causes COVID-19. Applicable protocols for evaluation were followed during the patient's care.   -follow up COVID-19 PCR test result  -no current indication for precautions    Chief Complaint   Fatigue    History is obtained from the patient, his wife at the bedside, and the ED physician whom I have spoken with    History of Present Illness   Khang Mcelroy is a pleasant 66 year old gentleman, interviewed with the help of a professional Laotian  by phone, who presents with fatigue. He says he developed this in a generalized character about two weeks ago and it has been constant since onset, and several days ago it became associated with intermittent loose, non-bloody stool. He was seen in a routine PCP clinic visit on 6/7/2022 for follow up of his Diabetes, and denied any acute symptoms at that time. Routine blood work was done, and he was notified that his Potassium and Creatinine levels were elevated (to 5.3 and 3.0, respectively) and reportedly advised to go to the ED but decided to hold off. On 6/13, he was seen in a routine Cardiology clinic appointment and note was made of  the recent elevated K level. It was noted that he is on Lisinopril and Spironolactone, but it was unclear if he was also continuing to take Furosemide. He was advised again to go to the ED and now he is here in our ED. He tells me tonight that he takes all medications including Furosemide as prescribed. He denies missing any meals or feeling diminished appetite to me, or any nausea or vomiting, dysuria, fever, chills, sweats, or abdominal or chest pain. He also denies any NSAID or ETOH use, or any other acute complaints.    In the ED, Blood pressure (!) 141/55, pulse 52, temperature 97.7  F (36.5  C), resp. rate 20, weight 74.8 kg (165 lb), SpO2 100 %.    CBC and BMP were notable for HGB 10.1, K 6.2, Cl 111, CO2 21, BUN 61, Cr 2.16, glucose 106, otherwise were within the normal reference range. EKG showed NSR with peaked precordial T waves. Mag was 1.6.    He was given and Albuterol nebulizer, 1 g Ca gluconate, and 5 units regular insulin IV with D50, and repeat K was 5.9.    PHYSICAL EXAM  Blood pressure (!) 141/55, pulse 52, temperature 97.7  F (36.5  C), resp. rate 20, weight 74.8 kg (165 lb), SpO2 100 %.  Constitutional: Alert and oriented to person, place and time; no apparent distress  HEENT: normocephalic moist mucus membranes  Respiratory: lungs clear to auscultation bilaterally  Cardiovascular: regular S1 S2  GI: abdomen soft non tender non distended bowel sounds positive  Lymph/Hematologic: pale, no cervical lymphadenopathy  Skin: no rash, good turgor  Musculoskeletal: trace bilateral LE edema  Neurologic: extra-ocular muscles intact; moves all four extremities  Psychiatric: appropriate affect, speech non-tangential     DVT Prophylaxis: Pneumatic Compression Devices  Code Status: Full Code    Disposition: Expected discharge in 2-3 days    Paulino Cuellar MD, MD    Past Medical History    I have reviewed this patient's medical history and updated it with pertinent information if needed.   Past Medical  History:   Diagnosis Date     CAD in native artery 03/18/2019     Chronic renal insufficiency     Stage 3b in 2021     Chronic systolic heart failure (H) 10/01/2019     Diabetes mellitus with proliferative retinopathy (H)      Essential hypertension 06/14/2019     Hyperlipidemia LDL goal <70 06/14/2019     Nephrolithiasis      NSTEMI (non-ST elevated myocardial infarction) (H)      CHALO (obstructive sleep apnea) 06/24/2019 6/21/2019 [place] [facility] Study (153.0 lbs) - AHI 30.5, RDI 34.7, Supine AHI 31.5, REM AHI 56.5, Low O2 68.7%, Time Spent ?88% 3.7 minutes / Time Spent ?89% 4.5 minutes.     S/P CABG (coronary artery bypass graft) 03/26/2019       Past Surgical History   I have reviewed this patient's surgical history and updated it with pertinent information if needed.  Past Surgical History:   Procedure Laterality Date     BYPASS GRAFT ARTERY CORONARY N/A 3/18/2019    Procedure: CORONARY BYPASS GRAFTING X 4 - LIMA TO LAD, SV TO DIAGONAL, PDA, AND OM; ON PUMP WITH NOA; ENDOVEIN HARVEST LEFT LEG;  Surgeon: Adarsh Sanchez MD;  Location:  OR     CV CORONARY ANGIOGRAM N/A 3/12/2019    Procedure: Coronary Angiogram;  Surgeon: Remi Rodriguez MD;  Location:  HEART CARDIAC CATH LAB     CV HEART CATHETERIZATION WITH POSSIBLE INTERVENTION N/A 3/12/2019    Procedure: Heart Catheterization with possible Intervention;  Surgeon: Remi Rodriguez MD;  Location:  HEART CARDIAC CATH LAB     CV LEFT VENTRICULOGRAM N/A 3/12/2019    Procedure: Left Ventricular Angiogram;  Surgeon: Remi Rodriguez MD;  Location:  HEART CARDIAC CATH LAB     LITHOTRIPSY         Prior to Admission Medications   Prior to Admission Medications   Prescriptions Last Dose Informant Patient Reported? Taking?   GLIPIZIDE PO   Yes No   Sig: Take 2.5 mg by mouth 2 times daily   acetaminophen (TYLENOL) 325 MG tablet   No No   Sig: Take 2 tablets (650 mg) by mouth every 4 hours as needed for other   amLODIPine (NORVASC)  10 MG tablet   No No   Sig: Take 1 tablet (10 mg) by mouth daily   aspirin (ASA) 325 MG EC tablet   No No   Sig: Take 1 tablet (325 mg) by mouth daily   atorvastatin (LIPITOR) 80 MG tablet   No No   Sig: Take 1 tablet (80 mg) by mouth every evening   furosemide (LASIX) 20 MG tablet   Yes No   Sig: Take 20 mg by mouth daily   lisinopril (ZESTRIL) 20 MG tablet   No No   Sig: Take 1 tablet (20 mg) by mouth daily   metoprolol succinate ER (TOPROL-XL) 25 MG 24 hr tablet   No No   Sig: Take 1 tablet (25 mg) by mouth daily   spironolactone (ALDACTONE) 25 MG tablet   No No   Sig: Take 1 tablet (25 mg) by mouth daily      Facility-Administered Medications: None     Allergies   No Known Allergies    Social History   I have reviewed this patient's social history and updated it with pertinent information if needed. Khang Ribeiropamelavaleri  reports that he has never smoked. He has never used smokeless tobacco. He reports that he does not drink alcohol and does not use drugs.    Family History   Family history assessed and, except as above, is non-contributory.    Family History   Problem Relation Age of Onset     Other - See Comments Mother         giving birth     Other - See Comments Father         old age       Review of Systems   The 10 point Review of Systems is negative other than noted in the HPI or here.     Primary Care Physician   Amari Castro    Data   Labs Ordered and Resulted from Time of ED Arrival to Time of ED Departure   BASIC METABOLIC PANEL - Abnormal       Result Value    Sodium 138      Potassium 6.2 (*)     Chloride 111 (*)     Carbon Dioxide (CO2) 21      Anion Gap 6      Urea Nitrogen 61 (*)     Creatinine 2.16 (*)     Calcium 9.4      Glucose 106 (*)     GFR Estimate 33 (*)    CBC WITH PLATELETS AND DIFFERENTIAL - Abnormal    WBC Count 10.9      RBC Count 3.69 (*)     Hemoglobin 10.1 (*)     Hematocrit 31.8 (*)     MCV 86      MCH 27.4      MCHC 31.8      RDW 13.0      Platelet Count 256      %  Neutrophils 76      % Lymphocytes 15      % Monocytes 8      % Eosinophils 1      % Basophils 0      % Immature Granulocytes 0      NRBCs per 100 WBC 0      Absolute Neutrophils 8.2      Absolute Lymphocytes 1.7      Absolute Monocytes 0.9      Absolute Eosinophils 0.1      Absolute Basophils 0.0      Absolute Immature Granulocytes 0.0      Absolute NRBCs 0.0     MAGNESIUM - Normal    Magnesium 1.6     ROUTINE UA WITH MICROSCOPIC REFLEX TO CULTURE   GLUCOSE MONITOR NURSING POCT   GLUCOSE MONITOR NURSING POCT   POTASSIUM   GLUCOSE MONITOR NURSING POCT   COVID-19 VIRUS (CORONAVIRUS) BY PCR       Data reviewed today:  I personally reviewed the EKG tracing showing NSR with peaked T waves.

## 2022-06-15 VITALS
BODY MASS INDEX: 28.98 KG/M2 | OXYGEN SATURATION: 98 % | SYSTOLIC BLOOD PRESSURE: 119 MMHG | RESPIRATION RATE: 16 BRPM | DIASTOLIC BLOOD PRESSURE: 73 MMHG | TEMPERATURE: 98.2 F | WEIGHT: 163.6 LBS | HEART RATE: 63 BPM

## 2022-06-15 LAB
ANION GAP SERPL CALCULATED.3IONS-SCNC: 6 MMOL/L (ref 3–14)
BUN SERPL-MCNC: 57 MG/DL (ref 7–30)
CALCIUM SERPL-MCNC: 9.1 MG/DL (ref 8.5–10.1)
CHLORIDE BLD-SCNC: 109 MMOL/L (ref 94–109)
CO2 SERPL-SCNC: 23 MMOL/L (ref 20–32)
CREAT SERPL-MCNC: 1.97 MG/DL (ref 0.66–1.25)
GFR SERPL CREATININE-BSD FRML MDRD: 37 ML/MIN/1.73M2
GLUCOSE BLD-MCNC: 151 MG/DL (ref 70–99)
GLUCOSE BLDC GLUCOMTR-MCNC: 119 MG/DL (ref 70–99)
POTASSIUM BLD-SCNC: 5.5 MMOL/L (ref 3.4–5.3)
SODIUM SERPL-SCNC: 138 MMOL/L (ref 133–144)

## 2022-06-15 PROCEDURE — 250N000013 HC RX MED GY IP 250 OP 250 PS 637: Performed by: INTERNAL MEDICINE

## 2022-06-15 PROCEDURE — 82310 ASSAY OF CALCIUM: CPT | Performed by: INTERNAL MEDICINE

## 2022-06-15 PROCEDURE — 99239 HOSP IP/OBS DSCHRG MGMT >30: CPT | Performed by: INTERNAL MEDICINE

## 2022-06-15 PROCEDURE — 36415 COLL VENOUS BLD VENIPUNCTURE: CPT | Performed by: INTERNAL MEDICINE

## 2022-06-15 RX ADMIN — SODIUM ZIRCONIUM CYCLOSILICATE 10 G: 5 POWDER, FOR SUSPENSION ORAL at 10:33

## 2022-06-15 RX ADMIN — METOPROLOL SUCCINATE 25 MG: 25 TABLET, EXTENDED RELEASE ORAL at 07:55

## 2022-06-15 RX ADMIN — INSULIN ASPART 1 UNITS: 100 INJECTION, SOLUTION INTRAVENOUS; SUBCUTANEOUS at 07:54

## 2022-06-15 ASSESSMENT — ACTIVITIES OF DAILY LIVING (ADL)
ADLS_ACUITY_SCORE: 20

## 2022-06-15 NOTE — DISCHARGE SUMMARY
[facility]  Hospitalist Discharge Summary      Date of Admission:  6/13/2022  Date of Discharge:  6/15/2022  Discharging Provider: Sade Bingham MD  Discharge Service: Hospitalist Service    Discharge Diagnoses   Acute renal failure, likely prerenal acute kidney injury  Critical hyperkalemia, likely secondary to lisinopril and spironolactone in addition to prerenal ASIA  Stage IIIb chronic kidney disease  mild, nonanion gap acidosis, likely secondary to diarrhea  Anemia of chronic kidney disease      Follow-ups Needed After Discharge   Follow-up Appointments     Follow-up and recommended labs and tests       Follow up with primary care provider, Zoeyo Brian Castro, on Friday to   have potassium checked.             Unresulted Labs Ordered in the Past 30 Days of this Admission     No orders found from 5/14/2022 to 6/14/2022.          Discharge Disposition   Discharged to home  Condition at discharge: Stable      Hospital Course          ASIA and acute hyperkalemia: Mr. Mcelroy is reportedly followed by a Nephrologist for Stage 3b CKD. He presents now for progressive fatigue for two weeks, associated with several days of loose stools. Blood work at a routine PCP visit on 6/7/2022 revealed Cr risen from prior baseline 1.8-2, up to 3.01. K was 5.3 at that time but he declined evaluation until he was then seen in a routine Cardiology visit on 6/13 and advised again to go. Tonight, Cr 2.16 and K is now up to 6.2, with evidence of peaked T waves on EKG and acute metabolic acidosis (CO2 21). The cause of ASIA seems unclear but could perhaps be related to ongoing use of Lisinopril and Spironolactone while possibly having stopped Furosemide. It could also be exacerbated by diarrhea and dehydration though clinically on exam he seems euvolemic currently. We will evaluate for ATN, as well as infectious or obstructive causes.           Recent Labs   Lab Test 06/14/22  0029 04/06/21  0759  01/28/21  0825   CR 2.16* 1.99* 1.80*        Lisinopril and Spironolactone held.    Repeat K after shifting was down to 5.9; I ordered further rapid correction with 10 units IV insulin and Bicarbonate with STAT repeat K; K then dropped to 5.1. repeat BMP    renal US shows normal-sized kidneys.     UA has no RBCs, no WBCs    FeNa is pending    Nephrology consult requested, I appreciate Dr. Sheth's evaluation and recommendations.  Per him, patient has mild acidosis and hyperglycemia, which may aggravate hyperkalemia    Low potassium diet    Dr. Sheth recommended giving Lokelma 10 mg today and tomorrow, rechecking potassium on Friday.  Okay with him if patient discharges home today.     CAD and chronic systolic CHF: Mr. Mcelroy is followed by  Cardiology for these conditions. He underwent CABG ((LIMA-LAD, SVG-DIAG, SVG-OM, SVG-PDA) in 2019. Reportedly he has had diminished LVEF as low as 35% in the past. Most recently it was 45-50% on TTE in 2021. He saw Dr. Hernández of cardiology in 2/2022 and Lasix reportedly was stopped at that time.     Resume home ASA, Toprol      Chronic anemia due to renal disease: Monitor while hospitalized.        Recent Labs   Lab Test 06/14/22  0029 04/06/21  0752 06/14/19  1146   HGB 10.1* 10.0* 9.9*      Type 2 Diabetes mellitus: A1c 7.8 on 6/7/2022.     Resume sulfonylurea     Hypertension:     Resume home Norvasc      Dyslipidemia:     Resume home Lipitor      CHALO: CPAP ordered     Rule Out COVID-19 infection  This patient was evaluated during a global COVID-19 pandemic, which necessitated consideration that the patient might be at risk for infection with the SARS-CoV-2 virus that causes COVID-19. Applicable protocols for evaluation were followed during the patient's care.     COVID-19 PCR negative    Consultations This Hospital Stay   NEPHROLOGY IP CONSULT    Code Status   Full Code    Time Spent on this Encounter   ISade MD, personally saw the patient today and spent  greater than 30 minutes discharging this patient.       Sade Bingham MD  [facility] [id number] [facility]  [address]  [place] [address]  Phone: [phone]  ______________________________________________________________________    Physical Exam   Vital Signs: Temp: 98.2  F (36.8  C) Temp src: Oral BP: 119/73 Pulse: 63   Resp: 16 SpO2: 98 % O2 Device: None (Room air)    Weight: 163 lbs 9.6 oz  I saw and examined the patient on the date of discharge.         Primary Care Physician   Amari Castro    Discharge Orders      Reason for your hospital stay    Your kidney function and potassium levels were elevated.  This was related to medications and has improved.     Follow-up and recommended labs and tests     Follow up with primary care provider, Amari Castro, on Friday to have potassium checked.     Activity    Your activity upon discharge: activity as tolerated     Diet    Follow this diet upon discharge: Orders Placed This Encounter      Combination Diet Regular Diet Adult; Renal Diet (dialysis); Moderate Consistent Carb (60 g CHO per Meal)       Significant Results and Procedures   Most Recent 3 CBC's:Recent Labs   Lab Test 06/14/22  0739 06/14/22  0029 04/06/21  0752   WBC 8.8 10.9 9.8   HGB 9.6* 10.1* 10.0*   MCV 86 86 84    256 282     Most Recent 3 BMP's:Recent Labs   Lab Test 06/15/22  0647 06/15/22  0218 06/14/22  2201 06/14/22  1900 06/14/22  1756 06/14/22  0419 06/14/22  0333 06/14/22  0220 06/14/22  0029     --   --   --  137  --  136  --  138   POTASSIUM 5.5*  --   --   --  4.9  --  5.2  5.1   < > 6.2*   CHLORIDE 109  --   --   --   --   --  109  --  111*   CO2 23  --   --   --   --   --  19*  --  21   BUN 57*  --   --   --   --   --  60*  --  61*   CR 1.97*  --   --   --  1.89*  --  2.17*  --  2.16*   ANIONGAP 6  --   --   --   --   --  8  --  6   MYKE 9.1  --   --   --   --   --  9.2  --  9.4   * 119* 202*   < >  --    < > 256*   < > 106*     < > = values in this interval not displayed.   ,   Results for orders placed or performed during the hospital encounter of 06/13/22   US Renal Complete    Narrative    US RENAL COMPLETE   6/14/2022 9:21 AM     HISTORY: ASIA    COMPARISON: None.    FINDINGS:  Right Kidney: 9.5 x 5.0 x 5.0 cm, cortex thickness of 1.5 cm. No  hydronephrosis or focal lesion.    Left Kidney: 9.9 x 3.7 x 5.1 cm, cortex thickness of 1.5 cm. No  hydronephrosis. Potential 0.8 cm nonobstructing stone lower pole left  kidney.    Bladder: Unremarkable.      Impression    IMPRESSION: Potential 0.8 cm nonobstructing stone at the lower left  kidney. No hydronephrosis.    MARIA R ALEXANDER MD         SYSTEM ID:  [id number]       Discharge Medications   Current Discharge Medication List      START taking these medications    Details   sodium zirconium cyclosilicate (LOKELMA) 10 g PACK packet Take 1 packet (10 g) by mouth once for 1 dose  Qty: 1 packet, Refills: 0    Associated Diagnoses: Hyperkalemia         CONTINUE these medications which have NOT CHANGED    Details   amLODIPine (NORVASC) 10 MG tablet Take 1 tablet (10 mg) by mouth daily  Qty: 90 tablet, Refills: 3    Associated Diagnoses: Status post coronary artery bypass graft      atorvastatin (LIPITOR) 80 MG tablet Take 1 tablet (80 mg) by mouth every evening  Qty: 90 tablet, Refills: 3    Associated Diagnoses: Status post coronary artery bypass graft      GLIPIZIDE PO Take 2.5 mg by mouth 2 times daily      metoprolol succinate ER (TOPROL-XL) 25 MG 24 hr tablet Take 1 tablet (25 mg) by mouth daily  Qty: 90 tablet, Refills: 2    Associated Diagnoses: Chronic systolic heart failure (H)         STOP taking these medications       furosemide (LASIX) 20 MG tablet Comments:   Reason for Stopping:         lisinopril (ZESTRIL) 20 MG tablet Comments:   Reason for Stopping:         spironolactone (ALDACTONE) 25 MG tablet Comments:   Reason for Stopping:             Allergies   No Known Allergies

## 2022-06-15 NOTE — PLAN OF CARE
Goal Outcome Evaluation:        A&Ox4. Primary language Mongolian, but understands basic English. Tele - SR with 1st deg AV block. VSS. Tolerating renal, 2g Na, mod carb, low fat diet with good appetite. R PIV - SL. Cont of B&B - voiding well, denies pain or difficulty with urination. BG checks - no coverage given overnight. Discharge pending improvement in potassium levels.

## 2022-06-15 NOTE — PLAN OF CARE
Goal Outcome Evaluation:    A&O. Primary language is Kinyarwanda but understands English. VSS. Tele: SR with 1st deg AV block. Up ind in the room. Renal, 2 gram Na, Mod carb, and Low fat diet. Good appetite. R PIV SL. K down to 4.9. Cont B/B. Voiding well. Per US, nonobstructing stone in L kidney, denies any pain or difficulty with urination. BG checks, covered appropriately. Discharge pending improvement in potassium levels.

## 2022-06-15 NOTE — PROGRESS NOTES
Care Management Follow Up    Length of Stay (days): 1    Expected Discharge Date: 06/15/2022     Concerns to be Addressed:       Patient plan of care discussed at interdisciplinary rounds: Yes    Anticipated Discharge Disposition:       Anticipated Discharge Services:    Anticipated Discharge DME:      Patient/family educated on Medicare website which has current facility and service quality ratings:    Education Provided on the Discharge Plan:    Patient/Family in Agreement with the Plan:      Referrals Placed by CM/SW:    Private pay costs discussed: Not applicable    Additional Information:  Per discharge orders, pt will need PCP and potassium check on Friday.  Called and scheduled pt with lab on Friday.  No PCP available for Friday.  Scheduled pt to see his PCP on June 21st.  You are scheduled to have a blood draw to check your potassium on Friday, June 17th at 10:50 AM at [facility]    You are scheduled to see Dr. Castro on June 21st at 1:50 PM for an after hospital stay follow-up at [facility]     Appointments added to AVS.  Discharge orders faxed to PCP clinic [phone].  Updated bedside RN.  Petra Raymundo, RN  Petra Raymundo RN, BSN, OCN   Inpatient Care Coordination [address]  Office: [phone]

## 2022-06-15 NOTE — PROGRESS NOTES
Patient states that he uses CPAP at home but declined hospital CPAP for tonight. Will continue to follow.

## 2022-06-16 DIAGNOSIS — I50.22 CHRONIC SYSTOLIC HEART FAILURE (H): Primary | ICD-10-CM

## 2022-06-16 DIAGNOSIS — I25.10 CORONARY ARTERY DISEASE INVOLVING NATIVE CORONARY ARTERY OF NATIVE HEART WITHOUT ANGINA PECTORIS: ICD-10-CM

## 2022-06-16 DIAGNOSIS — I25.5 ISCHEMIC CARDIOMYOPATHY: ICD-10-CM

## 2022-06-16 NOTE — PROGRESS NOTES
Per ILIANA Nam pt to have Cardiology follow up with BMP prior after recent admission for ASIA. Orders placed and message sent to scheduling.

## 2022-07-11 ENCOUNTER — OFFICE VISIT (OUTPATIENT)
Dept: CARDIOLOGY | Facility: CLINIC | Age: 67
End: 2022-07-11
Attending: NURSE PRACTITIONER

## 2022-07-11 ENCOUNTER — LAB (OUTPATIENT)
Dept: LAB | Facility: CLINIC | Age: 67
End: 2022-07-11
Payer: MEDICARE

## 2022-07-11 VITALS
BODY MASS INDEX: 29.95 KG/M2 | HEIGHT: 63 IN | SYSTOLIC BLOOD PRESSURE: 130 MMHG | HEART RATE: 50 BPM | DIASTOLIC BLOOD PRESSURE: 70 MMHG | OXYGEN SATURATION: 100 % | WEIGHT: 169 LBS

## 2022-07-11 DIAGNOSIS — I25.10 CORONARY ARTERY DISEASE INVOLVING NATIVE CORONARY ARTERY OF NATIVE HEART WITHOUT ANGINA PECTORIS: ICD-10-CM

## 2022-07-11 DIAGNOSIS — E78.5 HYPERLIPIDEMIA LDL GOAL <70: ICD-10-CM

## 2022-07-11 DIAGNOSIS — I25.5 ISCHEMIC CARDIOMYOPATHY: ICD-10-CM

## 2022-07-11 DIAGNOSIS — N18.32 STAGE 3B CHRONIC KIDNEY DISEASE (H): ICD-10-CM

## 2022-07-11 DIAGNOSIS — I50.22 CHRONIC SYSTOLIC HEART FAILURE (H): ICD-10-CM

## 2022-07-11 DIAGNOSIS — I50.22 CHRONIC SYSTOLIC HEART FAILURE (H): Primary | ICD-10-CM

## 2022-07-11 DIAGNOSIS — I10 ESSENTIAL HYPERTENSION: ICD-10-CM

## 2022-07-11 DIAGNOSIS — G47.33 OSA (OBSTRUCTIVE SLEEP APNEA): ICD-10-CM

## 2022-07-11 LAB
ANION GAP SERPL CALCULATED.3IONS-SCNC: 6 MMOL/L (ref 3–14)
BUN SERPL-MCNC: 28 MG/DL (ref 7–30)
CALCIUM SERPL-MCNC: 8.8 MG/DL (ref 8.5–10.1)
CHLORIDE BLD-SCNC: 108 MMOL/L (ref 94–109)
CO2 SERPL-SCNC: 24 MMOL/L (ref 20–32)
CREAT SERPL-MCNC: 1.73 MG/DL (ref 0.66–1.25)
GFR SERPL CREATININE-BSD FRML MDRD: 44 ML/MIN/1.73M2
GLUCOSE BLD-MCNC: 253 MG/DL (ref 70–99)
POTASSIUM BLD-SCNC: 4.4 MMOL/L (ref 3.4–5.3)
SODIUM SERPL-SCNC: 138 MMOL/L (ref 133–144)

## 2022-07-11 PROCEDURE — 80048 BASIC METABOLIC PNL TOTAL CA: CPT | Performed by: NURSE PRACTITIONER

## 2022-07-11 PROCEDURE — 99215 OFFICE O/P EST HI 40 MIN: CPT | Performed by: NURSE PRACTITIONER

## 2022-07-11 PROCEDURE — 36415 COLL VENOUS BLD VENIPUNCTURE: CPT | Performed by: NURSE PRACTITIONER

## 2022-07-11 NOTE — PROGRESS NOTES
Cardiology Clinic Progress Note  Khang Mcelroy MRN# [id number]   YOB: 1955 Age: 66 year old   Primary Cardiologist: Dr. Hernández  Reason for visit: Cardiology follow up             Assessment and Plan:   Khang Mcelroy is a very pleasant 66 year old male who I am seeing today for cardiology follow up.      1.  Combined chronic systolic heart failure/HFrEF and diastolic heart failure, ischemic cardiomyopathy - LVEF 35-40% 3/12/19 since has had some recovery, stable EF around 45-50% with grade I diastolic dysfunction              - NYHA class III, stage C              - Etiology : ischemic              - Fluid status : euvolemic              - Diuretic regimen : none              - Ischemic evaluation : coronary angiogram 3/12/19, s/p CABG 3/18/19              - Guideline directed medical therapy                          - Betablocker: metoprolol succinate 25mg daily                          - ACEI/ARB/ARNI: none, stopped due to acute on chronic kidney disease 6/2022                          - Aldactone antagonist: none, stopped due to acute on chronic kidney disease 6/2022  2. Coronary artery disease s/p CABG x 4 (LIMA-LAD, SVG-DIAG, SVG-OM, SVG-PDA) on 3/18/19, stable no signs/symptoms of myocardial ischemia.               - Continue aspirin, statin and betablocker therapy.  3. Hypertension - controlled   4. Hyperlipidemia - stable, LDL goal <70, lipid panel 6/7/22 showed total cholesterol 113, HDL 30, LDL 60.               - Continue atorvastatin  5. Chronic kidney disease - baseline 1.7-2.0, needs to re-establish with nephrology, stable, had recent acute on chronic kidney disease, creatinine peak ~3   6. Diabetes mellitus - hgbA1c 7.8 6/7/22  7. Obstructive sleep apnea - not compliant with CPAP    I saw patient and wife today for cardiology follow up with the assistance of Kyler rodríguezer. Patient was recently hospitalized for acute on chronic kidney injury, spironolactone and lisinopril  stopped. As well as advised to stay off furosemide, though I think he had stopped this some time ago. Today he appears compensated, euvolemic, blood pressures controlled and improved renal function off lisinopril/spironolactone.     Plan to continue current medications. If blood pressures were to become elevated in the future or worsening HF symptoms would recommend resuming ACEI/ARB.     Support given today. All questions answered.       Changes today: none    Follow up plan:     Cardiology follow up with me in 6 months with labs prior        History of Presenting Illness:    Khang Mcelroy is a very pleasant 66 year old male with a history of HFrEF, ischemic cardiomyopathy, LVEF 35-40% per echocardiogram 3/12/19, coronary artery disease s/p CABG x 4 (LIMA-LAD, SVG-DIAG, SVG-OM, SVG-PDA) on 3/18/19, DM, hypertension, hyperlipidemia, CKD, and anemia.      Hospitalized 3/11/19-3/24/19 presented with acute hypoxemic and hypercapnic respiratory failure secondary to NSTEMI, found to have multivessel coronary artery disease, s/p CABG x 4 (LIMA-LAD, SVG-DIAG, SVG-OM, SVG-PDA) on 3/18/19. Troponin peaked at 53. Echocardiogram 3/12/19 showed an LVEF 35-40%, grade III or advanced diastolic dysfunction, with multiple wall motion abnormalities.      Primary cardiologist Dr. Hernández. Most recent echocardiogram 1/2021 showed EF 45-50%, RV normal size/function, inferior wall hypokinesis, grade I diastolic dysfunction and mildly decreased RV systolic function.      He most recently saw Dr. Hernández 2/7/22 at which time he was doing well, prior to his OV he had stopped his furosemide.      He was seen by his PCP beginning of June, labs were drawn after OV which showed acute on chronic renal disease, creatinine 3.01, sodium 125 and potassium 5.3. Per care everywhere notes he was advised to present to the ED for further evaluation/management. Does not appear any recent medication adjustments.      I then saw patient on 6/13, he was  doing well from a heart failure standpoint, compensated and euvolemic. We reviewed labs from PCP office visit and concerns. I offered to repeat labs in cardiology clinic to determine where his renal function/electrolytes are currently. Him and his wife preferred to present right to the ED for further evaluation. Unclear etiology for acute decline in renal function.     Patient was hospitalized 6/13-6/15 due to acute kidney injury and critically elevated potassium, likely pre-renal, his fursoemide, lisinopril and spironolactone were stopped. Evaluated by nephrology.     Patient is here today for cardiology follow up. [employer]  used for assistance with visit.    Patient reports feeling good. Monitoring weights daily a home, weight stable around ~162#. Denies shortness of breath at rest. Sleeping good. Denies orthopnea or PND. Denies LE edema. Denies abdominal distention/bloating. Denies chest pain or chest tightness. Denies dizziness, lightheadedness or other presyncopal symptoms. Denies tachycardia or palpitations. Taking medications daily.     Labs today show stable kidney function, creatinine 1.73.  Blood pressure 130/70 and HR 50 in clinic today. Occasionally monitoring blood pressure at home, recalls systolically 130s.     Appetite good. Walking or bicycling for exercise, typically 10 blocks. Denies alcohol use. Denies tobacco use.         Social History      Social History     Socioeconomic History     Marital status:      Spouse name: Not on file     Number of children: Not on file     Years of education: Not on file     Highest education level: Not on file   Occupational History     Not on file   Tobacco Use     Smoking status: Never Smoker     Smokeless tobacco: Never Used   Substance and Sexual Activity     Alcohol use: No     Drug use: No     Sexual activity: Yes     Partners: Female   Other Topics Concern     Parent/sibling w/ CABG, MI or angioplasty before 65F 55M? No   Social History  "Narrative     Not on file     Social Determinants of Health     Financial Resource Strain: Not on file   Food Insecurity: Not on file   Transportation Needs: Not on file   Physical Activity: Not on file   Stress: Not on file   Social Connections: Not on file   Intimate Partner Violence: Not on file   Housing Stability: Not on file          Review of Systems:   10 point ROS neg other than the symptoms noted above in the HPI.         Physical Exam:   Vitals: BP (!) 144/75   Pulse 50   Ht 1.6 m (5' 3\")   Wt 76.7 kg (169 lb)   SpO2 100%   BMI 29.94 kg/m     Wt Readings from Last 4 Encounters:   07/11/22 76.7 kg (169 lb)   06/15/22 74.2 kg (163 lb 9.6 oz)   06/13/22 75.1 kg (165 lb 9.6 oz)   02/07/22 76.6 kg (168 lb 12.8 oz)     GEN: well nourished, in no acute distress.  HEENT:  Pupils equal, round. Sclerae nonicteric.   NECK: Supple, no masses appreciated. JVP appears normal  C/V:  Regular rate and rhythm, no murmur, rub or gallop.   RESP: Respirations are unlabored. Clear to auscultation bilaterally without wheezing, rales, or rhonchi.  GI: Abdomen soft, nontender.  EXTREM: No LE edema.  NEURO: Alert and oriented, cooperative.  SKIN: Warm and dry.        Data:     LIPID RESULTS:  Lab Results   Component Value Date    CHOL 126 10/17/2019    HDL 47 10/17/2019    LDL 69 10/17/2019    TRIG 48 10/17/2019     LIVER ENZYME RESULTS:  Lab Results   Component Value Date    AST 27 06/14/2019    ALT 22 10/17/2019     CBC RESULTS:  Lab Results   Component Value Date    WBC 8.8 06/14/2022    WBC 9.8 04/06/2021    RBC 3.47 (L) 06/14/2022    RBC 3.70 (L) 04/06/2021    HGB 9.6 (L) 06/14/2022    HGB 10.0 (L) 04/06/2021    HCT 29.7 (L) 06/14/2022    HCT 31.1 (L) 04/06/2021    MCV 86 06/14/2022    MCV 84 04/06/2021    MCH 27.7 06/14/2022    MCH 27.0 04/06/2021    MCHC 32.3 06/14/2022    MCHC 32.2 04/06/2021    RDW 12.9 06/14/2022    RDW 13.2 04/06/2021     06/14/2022     04/06/2021     BMP RESULTS:  Lab Results "   Component Value Date     07/11/2022     04/06/2021    POTASSIUM 4.4 07/11/2022    POTASSIUM 5.6 (H) 04/06/2021    CHLORIDE 108 07/11/2022    CHLORIDE 110 (H) 04/06/2021    CO2 24 07/11/2022    CO2 25 04/06/2021    ANIONGAP 6 07/11/2022    ANIONGAP 4 04/06/2021     (H) 07/11/2022     (H) 04/06/2021    BUN 28 07/11/2022    BUN 48 (H) 04/06/2021    CR 1.73 (H) 07/11/2022    CR 1.99 (H) 04/06/2021    GFRESTIMATED 44 (L) 07/11/2022    GFRESTIMATED 35 (L) 04/06/2021    GFRESTBLACK 40 (L) 04/06/2021    MYKE 8.8 07/11/2022    MYKE 9.9 04/06/2021      A1C RESULTS:  Lab Results   Component Value Date    A1C 7.9 (H) 03/11/2019     INR RESULTS:  Lab Results   Component Value Date    INR 1.54 (H) 03/18/2019    INR 1.79 (H) 03/18/2019          Medications     Current Outpatient Medications   Medication Sig Dispense Refill     amLODIPine (NORVASC) 10 MG tablet Take 1 tablet (10 mg) by mouth daily 90 tablet 3     atorvastatin (LIPITOR) 80 MG tablet Take 1 tablet (80 mg) by mouth every evening 90 tablet 3     GLIPIZIDE PO Take 2.5 mg by mouth 2 times daily       metoprolol succinate ER (TOPROL-XL) 25 MG 24 hr tablet Take 1 tablet (25 mg) by mouth daily 90 tablet 2        Past Medical History     Past Medical History:   Diagnosis Date     CAD in native artery 03/18/2019     Chronic renal insufficiency     Stage 3b in 2021     Chronic systolic heart failure (H) 10/01/2019     Diabetes mellitus with proliferative retinopathy (H)      Essential hypertension 06/14/2019     Hyperlipidemia LDL goal <70 06/14/2019     Nephrolithiasis      NSTEMI (non-ST elevated myocardial infarction) (H)      CHALO (obstructive sleep apnea) 06/24/2019 6/21/2019 [facility] Sleep Study (153.0 lbs) - AHI 30.5, RDI 34.7, Supine AHI 31.5, REM AHI 56.5, Low O2 68.7%, Time Spent ?88% 3.7 minutes / Time Spent ?89% 4.5 minutes.     S/P CABG (coronary artery bypass graft) 03/26/2019     Past Surgical History:   Procedure  Laterality Date     BYPASS GRAFT ARTERY CORONARY N/A 3/18/2019    Procedure: CORONARY BYPASS GRAFTING X 4 - LIMA TO LAD, SV TO DIAGONAL, PDA, AND OM; ON PUMP WITH NOA; ENDOVEIN HARVEST LEFT LEG;  Surgeon: Adarsh Sanchez MD;  Location:  OR      CORONARY ANGIOGRAM N/A 3/12/2019    Procedure: Coronary Angiogram;  Surgeon: Remi Rodriguez MD;  Location:  HEART CARDIAC CATH LAB     CV HEART CATHETERIZATION WITH POSSIBLE INTERVENTION N/A 3/12/2019    Procedure: Heart Catheterization with possible Intervention;  Surgeon: Remi Rodriguez MD;  Location:  HEART CARDIAC CATH LAB     CV LEFT VENTRICULOGRAM N/A 3/12/2019    Procedure: Left Ventricular Angiogram;  Surgeon: Remi Rodriguez MD;  Location:  HEART CARDIAC CATH LAB     LITHOTRIPSY       Family History   Problem Relation Age of Onset     Other - See Comments Mother         giving birth     Other - See Comments Father         old age          Allergies   Patient has no known allergies.    40 minutes spent on the date of the encounter doing chart review, history and exam, documentation and further activities as noted above      MICHELE Hicks [facility]  Pager: [phone]

## 2022-07-11 NOTE — PATIENT INSTRUCTIONS
Call [facility] nurse for any questions or concerns:  [phone]   *If you have concerns after hours, please call [phone], option 2 to speak with on call Cardiologist.    1. Medication changes and/or recommendations from today:  none    2. Follow up plan:      - Follow up with Viv in 6 months with labs prior    -The kidney specialist ([facility]) will call you to make an appointment. If you haven't heard from them by 7/14, call [phone].     3. Weigh yourself daily and write it down.     4. Call [facility] nurse if your weight is up more than 2 pounds in one day or 5 pounds in one week.     5. Call [facility] nurse if you feel more short of breath, have more abdominal bloating, or leg swelling.     6. Continue low sodium diet (less than 2000 mg daily). If you eat less salt, you will retain less fluid.     7. Alcohol can weaken your heart further. You should avoid alcohol or limit its use to special times, such as a holiday or birthday.      8. Do NOT take Aleve or ibuprofen without talking to your doctor first.      9. Lab Results:   Component      Latest Ref Rng & Units 7/11/2022   Sodium      133 - 144 mmol/L 138   Potassium      3.4 - 5.3 mmol/L 4.4   Chloride      94 - 109 mmol/L 108   Carbon Dioxide      20 - 32 mmol/L 24   Anion Gap      3 - 14 mmol/L 6   Urea Nitrogen      7 - 30 mg/dL 28   Creatinine      0.66 - 1.25 mg/dL 1.73 (H)   Calcium      8.5 - 10.1 mg/dL 8.8   Glucose      70 - 99 mg/dL 253 (H)   GFR Estimate      >60 mL/min/1.73m2 44 (L)        [facility]: Cardiomyopathy, Optimization, Rehabilitation, Education  The [facility] is a heart failure specialty clinic within the [facility] where you will work with specialized nurse practitioners, physician assistants, doctors, and registered nurses. They are dedicated to helping patients with heart failure to carefully adjust medications, receive education, and learn who and when to call if symptoms develop. They specialize in helping  you better understand your condition, slow the progression of your disease, improve the length and quality of your life, help you detect future heart problems before they become life threatening, and avoid hospitalizations.

## 2022-07-11 NOTE — LETTER
7/11/2022    Mpho Brian Castro MD  [address] [place]  [facility] [address]    RE: Khang Mcelroy       Dear Colleague,     I had the pleasure of seeing Khang Mcelroy in the [facility].  Cardiology Clinic Progress Note  Khang Mcelroy MRN# [id number]   YOB: 1955 Age: 66 year old   Primary Cardiologist: Dr. Hernández  Reason for visit: Cardiology follow up             Assessment and Plan:   Khang Mcelroy is a very pleasant 66 year old male who I am seeing today for cardiology follow up.      1.  Combined chronic systolic heart failure/HFrEF and diastolic heart failure, ischemic cardiomyopathy - LVEF 35-40% 3/12/19 since has had some recovery, stable EF around 45-50% with grade I diastolic dysfunction              - NYHA class III, stage C              - Etiology : ischemic              - Fluid status : euvolemic              - Diuretic regimen : none              - Ischemic evaluation : coronary angiogram 3/12/19, s/p CABG 3/18/19              - Guideline directed medical therapy                          - Betablocker: metoprolol succinate 25mg daily                          - ACEI/ARB/ARNI: none, stopped due to acute on chronic kidney disease 6/2022                          - Aldactone antagonist: none, stopped due to acute on chronic kidney disease 6/2022  2. Coronary artery disease s/p CABG x 4 (LIMA-LAD, SVG-DIAG, SVG-OM, SVG-PDA) on 3/18/19, stable no signs/symptoms of myocardial ischemia.               - Continue aspirin, statin and betablocker therapy.  3. Hypertension - controlled   4. Hyperlipidemia - stable, LDL goal <70, lipid panel 6/7/22 showed total cholesterol 113, HDL 30, LDL 60.               - Continue atorvastatin  5. Chronic kidney disease - baseline 1.7-2.0, needs to re-establish with nephrology, stable, had recent acute on chronic kidney disease, creatinine peak ~3   6. Diabetes mellitus - hgbA1c 7.8 6/7/22  7. Obstructive sleep apnea - not  compliant with CPAP    I saw patient and wife today for cardiology follow up with the assistance of Kyler . Patient was recently hospitalized for acute on chronic kidney injury, spironolactone and lisinopril stopped. As well as advised to stay off furosemide, though I think he had stopped this some time ago. Today he appears compensated, euvolemic, blood pressures controlled and improved renal function off lisinopril/spironolactone.     Plan to continue current medications. If blood pressures were to become elevated in the future or worsening HF symptoms would recommend resuming ACEI/ARB.     Support given today. All questions answered.       Changes today: none    Follow up plan:     Cardiology follow up with me in 6 months with labs prior        History of Presenting Illness:    Khang Mcelroy is a very pleasant 66 year old male with a history of HFrEF, ischemic cardiomyopathy, LVEF 35-40% per echocardiogram 3/12/19, coronary artery disease s/p CABG x 4 (LIMA-LAD, SVG-DIAG, SVG-OM, SVG-PDA) on 3/18/19, DM, hypertension, hyperlipidemia, CKD, and anemia.      Hospitalized 3/11/19-3/24/19 presented with acute hypoxemic and hypercapnic respiratory failure secondary to NSTEMI, found to have multivessel coronary artery disease, s/p CABG x 4 (LIMA-LAD, SVG-DIAG, SVG-OM, SVG-PDA) on 3/18/19. Troponin peaked at 53. Echocardiogram 3/12/19 showed an LVEF 35-40%, grade III or advanced diastolic dysfunction, with multiple wall motion abnormalities.      Primary cardiologist Dr. Hernández. Most recent echocardiogram 1/2021 showed EF 45-50%, RV normal size/function, inferior wall hypokinesis, grade I diastolic dysfunction and mildly decreased RV systolic function.      He most recently saw Dr. Hernández 2/7/22 at which time he was doing well, prior to his OV he had stopped his furosemide.      He was seen by his PCP beginning of June, labs were drawn after OV which showed acute on chronic renal disease, creatinine 3.01,  sodium 125 and potassium 5.3. Per care everywhere notes he was advised to present to the ED for further evaluation/management. Does not appear any recent medication adjustments.      I then saw patient on 6/13, he was doing well from a heart failure standpoint, compensated and euvolemic. We reviewed labs from PCP office visit and concerns. I offered to repeat labs in cardiology clinic to determine where his renal function/electrolytes are currently. Him and his wife preferred to present right to the ED for further evaluation. Unclear etiology for acute decline in renal function.     Patient was hospitalized 6/13-6/15 due to acute kidney injury and critically elevated potassium, likely pre-renal, his fursoemide, lisinopril and spironolactone were stopped. Evaluated by nephrology.     Patient is here today for cardiology follow up. Kyler  used for assistance with visit.    Patient reports feeling good. Monitoring weights daily a home, weight stable around ~162#. Denies shortness of breath at rest. Sleeping good. Denies orthopnea or PND. Denies LE edema. Denies abdominal distention/bloating. Denies chest pain or chest tightness. Denies dizziness, lightheadedness or other presyncopal symptoms. Denies tachycardia or palpitations. Taking medications daily.     Labs today show stable kidney function, creatinine 1.73.  Blood pressure 130/70 and HR 50 in clinic today. Occasionally monitoring blood pressure at home, recalls systolically 130s.     Appetite good. Walking or bicycling for exercise, typically 10 blocks. Denies alcohol use. Denies tobacco use.         Social History      Social History     Socioeconomic History     Marital status:      Spouse name: Not on file     Number of children: Not on file     Years of education: Not on file     Highest education level: Not on file   Occupational History     Not on file   Tobacco Use     Smoking status: Never Smoker     Smokeless tobacco: Never Used  "  Substance and Sexual Activity     Alcohol use: No     Drug use: No     Sexual activity: Yes     Partners: Female   Other Topics Concern     Parent/sibling w/ CABG, MI or angioplasty before 65F 55M? No   Social History Narrative     Not on file     Social Determinants of Health     Financial Resource Strain: Not on file   Food Insecurity: Not on file   Transportation Needs: Not on file   Physical Activity: Not on file   Stress: Not on file   Social Connections: Not on file   Intimate Partner Violence: Not on file   Housing Stability: Not on file          Review of Systems:   10 point ROS neg other than the symptoms noted above in the HPI.         Physical Exam:   Vitals: BP (!) 144/75   Pulse 50   Ht 1.6 m (5' 3\")   Wt 76.7 kg (169 lb)   SpO2 100%   BMI 29.94 kg/m     Wt Readings from Last 4 Encounters:   07/11/22 76.7 kg (169 lb)   06/15/22 74.2 kg (163 lb 9.6 oz)   06/13/22 75.1 kg (165 lb 9.6 oz)   02/07/22 76.6 kg (168 lb 12.8 oz)     GEN: well nourished, in no acute distress.  HEENT:  Pupils equal, round. Sclerae nonicteric.   NECK: Supple, no masses appreciated. JVP appears normal  C/V:  Regular rate and rhythm, no murmur, rub or gallop.   RESP: Respirations are unlabored. Clear to auscultation bilaterally without wheezing, rales, or rhonchi.  GI: Abdomen soft, nontender.  EXTREM: No LE edema.  NEURO: Alert and oriented, cooperative.  SKIN: Warm and dry.        Data:     LIPID RESULTS:  Lab Results   Component Value Date    CHOL 126 10/17/2019    HDL 47 10/17/2019    LDL 69 10/17/2019    TRIG 48 10/17/2019     LIVER ENZYME RESULTS:  Lab Results   Component Value Date    AST 27 06/14/2019    ALT 22 10/17/2019     CBC RESULTS:  Lab Results   Component Value Date    WBC 8.8 06/14/2022    WBC 9.8 04/06/2021    RBC 3.47 (L) 06/14/2022    RBC 3.70 (L) 04/06/2021    HGB 9.6 (L) 06/14/2022    HGB 10.0 (L) 04/06/2021    HCT 29.7 (L) 06/14/2022    HCT 31.1 (L) 04/06/2021    MCV 86 06/14/2022    MCV 84 04/06/2021 "    MCH 27.7 06/14/2022    MCH 27.0 04/06/2021    MCHC 32.3 06/14/2022    MCHC 32.2 04/06/2021    RDW 12.9 06/14/2022    RDW 13.2 04/06/2021     06/14/2022     04/06/2021     BMP RESULTS:  Lab Results   Component Value Date     07/11/2022     04/06/2021    POTASSIUM 4.4 07/11/2022    POTASSIUM 5.6 (H) 04/06/2021    CHLORIDE 108 07/11/2022    CHLORIDE 110 (H) 04/06/2021    CO2 24 07/11/2022    CO2 25 04/06/2021    ANIONGAP 6 07/11/2022    ANIONGAP 4 04/06/2021     (H) 07/11/2022     (H) 04/06/2021    BUN 28 07/11/2022    BUN 48 (H) 04/06/2021    CR 1.73 (H) 07/11/2022    CR 1.99 (H) 04/06/2021    GFRESTIMATED 44 (L) 07/11/2022    GFRESTIMATED 35 (L) 04/06/2021    GFRESTBLACK 40 (L) 04/06/2021    MYKE 8.8 07/11/2022    MYKE 9.9 04/06/2021      A1C RESULTS:  Lab Results   Component Value Date    A1C 7.9 (H) 03/11/2019     INR RESULTS:  Lab Results   Component Value Date    INR 1.54 (H) 03/18/2019    INR 1.79 (H) 03/18/2019          Medications     Current Outpatient Medications   Medication Sig Dispense Refill     amLODIPine (NORVASC) 10 MG tablet Take 1 tablet (10 mg) by mouth daily 90 tablet 3     atorvastatin (LIPITOR) 80 MG tablet Take 1 tablet (80 mg) by mouth every evening 90 tablet 3     GLIPIZIDE PO Take 2.5 mg by mouth 2 times daily       metoprolol succinate ER (TOPROL-XL) 25 MG 24 hr tablet Take 1 tablet (25 mg) by mouth daily 90 tablet 2        Past Medical History     Past Medical History:   Diagnosis Date     CAD in native artery 03/18/2019     Chronic renal insufficiency     Stage 3b in 2021     Chronic systolic heart failure (H) 10/01/2019     Diabetes mellitus with proliferative retinopathy (H)      Essential hypertension 06/14/2019     Hyperlipidemia LDL goal <70 06/14/2019     Nephrolithiasis      NSTEMI (non-ST elevated myocardial infarction) (H)      CHALO (obstructive sleep apnea) 06/24/2019 6/21/2019 [place] [facility] Sleep Study (153.0 lbs) - AHI 30.5, RDI  34.7, Supine AHI 31.5, REM AHI 56.5, Low O2 68.7%, Time Spent ?88% 3.7 minutes / Time Spent ?89% 4.5 minutes.     S/P CABG (coronary artery bypass graft) 03/26/2019     Past Surgical History:   Procedure Laterality Date     BYPASS GRAFT ARTERY CORONARY N/A 3/18/2019    Procedure: CORONARY BYPASS GRAFTING X 4 - LIMA TO LAD, SV TO DIAGONAL, PDA, AND OM; ON PUMP WITH NOA; ENDOVEIN HARVEST LEFT LEG;  Surgeon: Adarsh Sanchez MD;  Location:  OR     CV CORONARY ANGIOGRAM N/A 3/12/2019    Procedure: Coronary Angiogram;  Surgeon: Remi Rodriguez MD;  Location:  HEART CARDIAC CATH LAB     CV HEART CATHETERIZATION WITH POSSIBLE INTERVENTION N/A 3/12/2019    Procedure: Heart Catheterization with possible Intervention;  Surgeon: Remi Rodriguez MD;  Location:  HEART CARDIAC CATH LAB     CV LEFT VENTRICULOGRAM N/A 3/12/2019    Procedure: Left Ventricular Angiogram;  Surgeon: Remi Rodriguez MD;  Location:  HEART CARDIAC CATH LAB     LITHOTRIPSY       Family History   Problem Relation Age of Onset     Other - See Comments Mother         giving birth     Other - See Comments Father         old age          Allergies   Patient has no known allergies.    40 minutes spent on the date of the encounter doing chart review, history and exam, documentation and further activities as noted above      MICHELE Hicks CNP  [facility]  Pager: [phone]    Thank you for allowing me to participate in the care of your patient.      Sincerely,     MICHELE Hicks [facility]  cc:   MICHELE Luis CNP  [address]  [place] [address]

## 2022-07-11 NOTE — NURSING NOTE
Faxed nephrology referral to [facility] per Viv Taylor CNP. Pt preferred to establish care there due to location.    Angelina Oropeza RN BSN   2:34 PM 07/11/22

## 2022-07-21 ENCOUNTER — TELEPHONE (OUTPATIENT)
Dept: NEPHROLOGY | Facility: CLINIC | Age: 67
End: 2022-07-21

## 2022-11-15 ENCOUNTER — HOSPITAL ENCOUNTER (INPATIENT)
Facility: CLINIC | Age: 67
LOS: 3 days | Discharge: HOME | DRG: 660 | End: 2022-11-18
Attending: EMERGENCY MEDICINE | Admitting: INTERNAL MEDICINE
Payer: MEDICARE

## 2022-11-15 ENCOUNTER — APPOINTMENT (OUTPATIENT)
Dept: CT IMAGING | Facility: CLINIC | Age: 67
DRG: 660 | End: 2022-11-15
Attending: EMERGENCY MEDICINE
Payer: MEDICARE

## 2022-11-15 DIAGNOSIS — N20.1 URETERAL STONE: ICD-10-CM

## 2022-11-15 DIAGNOSIS — N17.9 ACUTE KIDNEY INJURY (H): ICD-10-CM

## 2022-11-15 DIAGNOSIS — E11.01 TYPE 2 DIABETES MELLITUS WITH HYPEROSMOLAR COMA, WITHOUT LONG-TERM CURRENT USE OF INSULIN (H): ICD-10-CM

## 2022-11-15 DIAGNOSIS — N20.1 LEFT URETERAL STONE: Primary | ICD-10-CM

## 2022-11-15 LAB
ALBUMIN SERPL-MCNC: 3.2 G/DL (ref 3.4–5)
ALBUMIN UR-MCNC: 100 MG/DL
ALP SERPL-CCNC: 138 U/L (ref 40–150)
ALT SERPL W P-5'-P-CCNC: 41 U/L (ref 0–70)
ANION GAP SERPL CALCULATED.3IONS-SCNC: 6 MMOL/L (ref 3–14)
APPEARANCE UR: CLEAR
AST SERPL W P-5'-P-CCNC: 38 U/L (ref 0–45)
BASOPHILS # BLD AUTO: 0 10E3/UL (ref 0–0.2)
BASOPHILS NFR BLD AUTO: 0 %
BILIRUB SERPL-MCNC: 1 MG/DL (ref 0.2–1.3)
BILIRUB UR QL STRIP: NEGATIVE
BUN SERPL-MCNC: 30 MG/DL (ref 7–30)
CALCIUM SERPL-MCNC: 9.7 MG/DL (ref 8.5–10.1)
CHLORIDE BLD-SCNC: 104 MMOL/L (ref 94–109)
CO2 SERPL-SCNC: 23 MMOL/L (ref 20–32)
COLOR UR AUTO: ABNORMAL
CREAT SERPL-MCNC: 2.73 MG/DL (ref 0.66–1.25)
EOSINOPHIL # BLD AUTO: 0 10E3/UL (ref 0–0.7)
EOSINOPHIL NFR BLD AUTO: 0 %
ERYTHROCYTE [DISTWIDTH] IN BLOOD BY AUTOMATED COUNT: 14.7 % (ref 10–15)
GFR SERPL CREATININE-BSD FRML MDRD: 25 ML/MIN/1.73M2
GLUCOSE BLD-MCNC: 194 MG/DL (ref 70–99)
GLUCOSE BLDC GLUCOMTR-MCNC: 137 MG/DL (ref 70–99)
GLUCOSE UR STRIP-MCNC: NEGATIVE MG/DL
HCT VFR BLD AUTO: 34.3 % (ref 40–53)
HGB BLD-MCNC: 11.6 G/DL (ref 13.3–17.7)
HGB UR QL STRIP: ABNORMAL
IMM GRANULOCYTES # BLD: 0.1 10E3/UL
IMM GRANULOCYTES NFR BLD: 1 %
KETONES UR STRIP-MCNC: NEGATIVE MG/DL
LEUKOCYTE ESTERASE UR QL STRIP: ABNORMAL
LIPASE SERPL-CCNC: 116 U/L (ref 73–393)
LYMPHOCYTES # BLD AUTO: 0.7 10E3/UL (ref 0.8–5.3)
LYMPHOCYTES NFR BLD AUTO: 5 %
MCH RBC QN AUTO: 26 PG (ref 26.5–33)
MCHC RBC AUTO-ENTMCNC: 33.8 G/DL (ref 31.5–36.5)
MCV RBC AUTO: 77 FL (ref 78–100)
MONOCYTES # BLD AUTO: 1.1 10E3/UL (ref 0–1.3)
MONOCYTES NFR BLD AUTO: 8 %
MUCOUS THREADS #/AREA URNS LPF: PRESENT /LPF
NEUTROPHILS # BLD AUTO: 12 10E3/UL (ref 1.6–8.3)
NEUTROPHILS NFR BLD AUTO: 86 %
NITRATE UR QL: NEGATIVE
NRBC # BLD AUTO: 0 10E3/UL
NRBC BLD AUTO-RTO: 0 /100
PH UR STRIP: 5.5 [PH] (ref 5–7)
PLATELET # BLD AUTO: 220 10E3/UL (ref 150–450)
POTASSIUM BLD-SCNC: 4.2 MMOL/L (ref 3.4–5.3)
PROT SERPL-MCNC: 8.5 G/DL (ref 6.8–8.8)
RBC # BLD AUTO: 4.46 10E6/UL (ref 4.4–5.9)
RBC URINE: 27 /HPF
SARS-COV-2 RNA RESP QL NAA+PROBE: NEGATIVE
SODIUM SERPL-SCNC: 133 MMOL/L (ref 133–144)
SP GR UR STRIP: 1.01 (ref 1–1.03)
UROBILINOGEN UR STRIP-MCNC: NORMAL MG/DL
WBC # BLD AUTO: 13.9 10E3/UL (ref 4–11)
WBC URINE: 12 /HPF

## 2022-11-15 PROCEDURE — 85025 COMPLETE CBC W/AUTO DIFF WBC: CPT | Performed by: EMERGENCY MEDICINE

## 2022-11-15 PROCEDURE — 250N000011 HC RX IP 250 OP 636: Performed by: EMERGENCY MEDICINE

## 2022-11-15 PROCEDURE — U0003 INFECTIOUS AGENT DETECTION BY NUCLEIC ACID (DNA OR RNA); SEVERE ACUTE RESPIRATORY SYNDROME CORONAVIRUS 2 (SARS-COV-2) (CORONAVIRUS DISEASE [COVID-19]), AMPLIFIED PROBE TECHNIQUE, MAKING USE OF HIGH THROUGHPUT TECHNOLOGIES AS DESCRIBED BY CMS-2020-01-R: HCPCS | Performed by: EMERGENCY MEDICINE

## 2022-11-15 PROCEDURE — 81001 URINALYSIS AUTO W/SCOPE: CPT | Performed by: EMERGENCY MEDICINE

## 2022-11-15 PROCEDURE — 120N000001 HC R&B MED SURG/OB

## 2022-11-15 PROCEDURE — 36415 COLL VENOUS BLD VENIPUNCTURE: CPT | Performed by: EMERGENCY MEDICINE

## 2022-11-15 PROCEDURE — G1010 CDSM STANSON: HCPCS

## 2022-11-15 PROCEDURE — 99291 CRITICAL CARE FIRST HOUR: CPT | Mod: 25

## 2022-11-15 PROCEDURE — C9803 HOPD COVID-19 SPEC COLLECT: HCPCS

## 2022-11-15 PROCEDURE — 87086 URINE CULTURE/COLONY COUNT: CPT | Performed by: EMERGENCY MEDICINE

## 2022-11-15 PROCEDURE — 258N000003 HC RX IP 258 OP 636: Performed by: EMERGENCY MEDICINE

## 2022-11-15 PROCEDURE — 99223 1ST HOSP IP/OBS HIGH 75: CPT | Mod: AI | Performed by: INTERNAL MEDICINE

## 2022-11-15 PROCEDURE — 96374 THER/PROPH/DIAG INJ IV PUSH: CPT

## 2022-11-15 PROCEDURE — 80053 COMPREHEN METABOLIC PANEL: CPT | Performed by: EMERGENCY MEDICINE

## 2022-11-15 PROCEDURE — 83690 ASSAY OF LIPASE: CPT | Performed by: EMERGENCY MEDICINE

## 2022-11-15 RX ORDER — MORPHINE SULFATE 4 MG/ML
4 INJECTION, SOLUTION INTRAMUSCULAR; INTRAVENOUS ONCE
Status: COMPLETED | OUTPATIENT
Start: 2022-11-15 | End: 2022-11-15

## 2022-11-15 RX ORDER — GLIPIZIDE 5 MG/1
2.5 TABLET ORAL
Status: ON HOLD | COMMUNITY
End: 2022-11-18

## 2022-11-15 RX ORDER — ACETAMINOPHEN 325 MG/1
650 TABLET ORAL EVERY 6 HOURS PRN
COMMUNITY

## 2022-11-15 RX ORDER — CEFTRIAXONE 1 G/1
1 INJECTION, POWDER, FOR SOLUTION INTRAMUSCULAR; INTRAVENOUS ONCE
Status: COMPLETED | OUTPATIENT
Start: 2022-11-15 | End: 2022-11-15

## 2022-11-15 RX ORDER — FERROUS SULFATE 325(65) MG
325 TABLET ORAL DAILY
COMMUNITY

## 2022-11-15 RX ORDER — ONDANSETRON 2 MG/ML
4 INJECTION INTRAMUSCULAR; INTRAVENOUS EVERY 30 MIN PRN
Status: DISCONTINUED | OUTPATIENT
Start: 2022-11-15 | End: 2022-11-15

## 2022-11-15 RX ORDER — LISINOPRIL 10 MG/1
10 TABLET ORAL DAILY
COMMUNITY
End: 2023-03-30

## 2022-11-15 RX ORDER — ONDANSETRON 2 MG/ML
4 INJECTION INTRAMUSCULAR; INTRAVENOUS ONCE
Status: COMPLETED | OUTPATIENT
Start: 2022-11-15 | End: 2022-11-15

## 2022-11-15 RX ADMIN — CEFTRIAXONE SODIUM 1 G: 1 INJECTION, POWDER, FOR SOLUTION INTRAMUSCULAR; INTRAVENOUS at 20:45

## 2022-11-15 RX ADMIN — MORPHINE SULFATE 4 MG: 4 INJECTION, SOLUTION INTRAMUSCULAR; INTRAVENOUS at 20:25

## 2022-11-15 RX ADMIN — ONDANSETRON 4 MG: 2 INJECTION INTRAMUSCULAR; INTRAVENOUS at 20:26

## 2022-11-15 RX ADMIN — SODIUM CHLORIDE 500 ML: 9 INJECTION, SOLUTION INTRAVENOUS at 20:50

## 2022-11-15 RX ADMIN — ONDANSETRON 4 MG: 2 INJECTION INTRAMUSCULAR; INTRAVENOUS at 15:36

## 2022-11-15 ASSESSMENT — ACTIVITIES OF DAILY LIVING (ADL)
ADLS_ACUITY_SCORE: 35

## 2022-11-15 ASSESSMENT — ENCOUNTER SYMPTOMS
ABDOMINAL PAIN: 1
CONSTIPATION: 1

## 2022-11-15 NOTE — ED NOTES
Rapid Assessment Note    History:   Khang Mcelroy is a 66 year old male who presents with abdominal pain and constipation since 2 days ago with last bowel movement being 2 days ago. Also, he has been unable to eat. He denies chest pain.     Exam:   General:  Alert, interactive  Cardiovascular:  Well perfused  Lungs:  No respiratory distress  Abdomen: Nondistended, soft, diffuse mild tenderness seemingly worse in the left lower quadrant  Back: No T or L-spine tenderness  Neuro:  Moving all 4 extremities  Skin:  Warm, dry  Psych:  Normal affect    Plan of Care:   I evaluated the patient and developed an initial plan of care. I discussed this plan and explained that I, or one of my partners, would be returning to complete the evaluation.         I, Jimenez Hamilton, am serving as a scribe to document services personally performed by Oliver Truong MD  , based on my observations and the provider's statements to me.    11/15/2022  [employer] [employer]    Portions of this medical record were completed by a scribe. UPON MY REVIEW AND AUTHENTICATION BY ELECTRONIC SIGNATURE, this confirms (a) I performed the applicable clinical services, and (b) the record is accurate.      Oliver Truong MD  11/15/22 [id number]

## 2022-11-15 NOTE — ED PROVIDER NOTES
History   Chief Complaint:  Abdominal Pain        The history is provided by the patient. The history is limited by a language barrier. A  was used (Formal ).      Khang Mcelroy is a 66 year old male with a history of type 2 diabetes and chronic kidney disease who presents with abdominal pain and constipation since 2 days ago with last bowel movement being 2 days ago. Also, he has been unable to eat. He denies chest pain.     Review of Systems   Gastrointestinal: Positive for abdominal pain and constipation.   All other systems reviewed and are negative.    Allergies:  No Known Allergies    Medications:  Norvasc   Lipitor   Glipizide   Toprol     Past Medical History:     Type 2 diabetes mellitus (H)  NSTEMI (non-ST elevated myocardial infarction) (H)  CAD in native artery  Transient hyperglycemia post procedure  Essential hypertension  Hyperlipidemia   CHALO (obstructive sleep apnea)  Chronic systolic heart failure (H)  Stage 3b chronic kidney disease (H)  Hyperkalemia    Past Surgical History:    CABG   Heart catheterization   Lithotripsy      Family History:    The patient denies pertinent family history.      Social History:  The patient presents to the ED with his wife via private vehicle   PCP: Amari Castro   The patient is Laotian speaking     Physical Exam     Patient Vitals for the past 24 hrs:   BP Temp Temp src Pulse Resp SpO2   11/15/22 2013 (!) 142/69 98.3  F (36.8  C) Oral 74 18 97 %   11/15/22 1757 -- -- -- -- -- 99 %   11/15/22 1756 -- -- -- -- -- 98 %   11/15/22 1755 (!) 169/83 -- -- 78 -- --   11/15/22 1435 (!) 161/70 98.3  F (36.8  C) Temporal 79 16 98 %       Physical Exam  Constitutional: Well developed, nontox appearance  Head: Atraumatic.   Neck:  no stridor  Eyes: no scleral icterus  Cardiovascular: RRR, 2+ bilat radial pulses  Pulmonary/Chest: nml resp effort  Abdominal: ND, soft, mild diffuse tenderness, difficulty reproducing LLQ tenderness, no  rebound or guarding   Ext: Warm, well perfused, no edema  Neurological: A&O, symmetric facies, moves ext x4  Skin: Skin is warm and dry.   Psychiatric: Behavior is normal. Thought content normal.   Nursing note and vitals reviewed.    Emergency Department Course   Imaging:  CT Abdomen Pelvis w/o Contrast  Final Result  IMPRESSION:   1.  Large 1.1 x 0.9 cm stone left UPJ with moderate left-sided hydronephrosis. Multiple additional stones within dilated calyces in the lower pole of the left kidney with stones measuring up to 3 mm. Greater than 5 stones present.    2.  There is mild urinary bladder wall thickening with soft tissue haziness surrounding the bladder particularly in the left hemipelvis. Recommend exclusion of cystitis. Sternotomy with cardiac enlargement. Small fat-containing left inguinal hernia.    Report per radiology    Laboratory:  Labs Ordered and Resulted from Time of ED Arrival to Time of ED Departure   COMPREHENSIVE METABOLIC PANEL - Abnormal       Result Value    Sodium 133      Potassium 4.2      Chloride 104      Carbon Dioxide (CO2) 23      Anion Gap 6      Urea Nitrogen 30      Creatinine 2.73 (*)     Calcium 9.7      Glucose 194 (*)     Alkaline Phosphatase 138      AST 38      ALT 41      Protein Total 8.5      Albumin 3.2 (*)     Bilirubin Total 1.0      GFR Estimate 25 (*)    ROUTINE UA WITH MICROSCOPIC REFLEX TO CULTURE - Abnormal    Color Urine Light Yellow      Appearance Urine Clear      Glucose Urine Negative      Bilirubin Urine Negative      Ketones Urine Negative      Specific Gravity Urine 1.015      Blood Urine Large (*)     pH Urine 5.5      Protein Albumin Urine 100 (*)     Urobilinogen Urine Normal      Nitrite Urine Negative      Leukocyte Esterase Urine Trace (*)     Mucus Urine Present (*)     RBC Urine 27 (*)     WBC Urine 12 (*)    CBC WITH PLATELETS AND DIFFERENTIAL - Abnormal    WBC Count 13.9 (*)     RBC Count 4.46      Hemoglobin 11.6 (*)     Hematocrit 34.3 (*)      MCV 77 (*)     MCH 26.0 (*)     MCHC 33.8      RDW 14.7      Platelet Count 220      % Neutrophils 86      % Lymphocytes 5      % Monocytes 8      % Eosinophils 0      % Basophils 0      % Immature Granulocytes 1      NRBCs per 100 WBC 0      Absolute Neutrophils 12.0 (*)     Absolute Lymphocytes 0.7 (*)     Absolute Monocytes 1.1      Absolute Eosinophils 0.0      Absolute Basophils 0.0      Absolute Immature Granulocytes 0.1      Absolute NRBCs 0.0     LIPASE - Normal    Lipase 116     COVID-19 VIRUS (CORONAVIRUS) BY PCR - Normal    SARS CoV2 PCR Negative     GLUCOSE MONITOR NURSING POCT   URINE CULTURE      Emergency Department Course:    Reviewed:  I reviewed nursing notes, vitals and past medical history    Assessments:   I obtained history and examined the patient as noted above. I explained findings and discussed plan for admission to the hospital.      Consults:   I spoke with Dr. Julien from Urology.    I spoke with Dr. Treviño from Hospitalist Services, who accepts the patient for admission.    Interventions:  1536 Zofran 4 mg IV     Disposition:  The patient was admitted to the hospital under the care of Dr. Treviño.     Impression & Plan   Medical Decision Makin-year-old male presenting with left lower quadrant abdominal pain     Differential diagnosis includes diverticulitis, abdominal pain NOS, constipation, ureteral stone, UTI, radiculopathy.  Doubt vascular catastrophe given history and physical exam.  Labs significant for intervally elevated creatinine indicative of acute kidney injury, UA inconsistent with infection and the pt has no fever.  Given leukocytosis, pt given ceftriaxone in setting of CT findings.  CT abdomen pelvis demonstrated large ureteral stone as described above with associated hydronephrosis.  I discussed patient with urology who reported they will see him in the morning.  The patient was subsequently admitted to the hospital service for further evaluation and  management including urology consult.  He was counseled on the results, diagnosis and disposition prior to admission with the use of a Laotian .      Diagnosis:    ICD-10-CM    1. Acute kidney injury (H)  N17.9       2. Ureteral stone  N20.1           Scribe Disclosure:  I, Jimenez Villasenor, am serving as a scribe at 5:58 PM on 11/15/2022 to document services personally performed by Oliver Truong MD based on my observations and the provider's statements to me.        Oliver Trunog MD  11/15/22 [id number]

## 2022-11-16 ENCOUNTER — ANESTHESIA (OUTPATIENT)
Dept: SURGERY | Facility: CLINIC | Age: 67
DRG: 660 | End: 2022-11-16
Payer: MEDICARE

## 2022-11-16 ENCOUNTER — APPOINTMENT (OUTPATIENT)
Dept: GENERAL RADIOLOGY | Facility: CLINIC | Age: 67
DRG: 660 | End: 2022-11-16
Attending: INTERNAL MEDICINE
Payer: MEDICARE

## 2022-11-16 ENCOUNTER — ANESTHESIA EVENT (OUTPATIENT)
Dept: SURGERY | Facility: CLINIC | Age: 67
DRG: 660 | End: 2022-11-16
Payer: MEDICARE

## 2022-11-16 PROBLEM — N20.1 LEFT URETERAL STONE: Status: ACTIVE | Noted: 2022-11-16

## 2022-11-16 LAB
ANION GAP SERPL CALCULATED.3IONS-SCNC: 4 MMOL/L (ref 3–14)
BASOPHILS # BLD AUTO: 0 10E3/UL (ref 0–0.2)
BASOPHILS NFR BLD AUTO: 0 %
BUN SERPL-MCNC: 30 MG/DL (ref 7–30)
CALCIUM SERPL-MCNC: 9.1 MG/DL (ref 8.5–10.1)
CHLORIDE BLD-SCNC: 108 MMOL/L (ref 94–109)
CO2 SERPL-SCNC: 25 MMOL/L (ref 20–32)
CREAT SERPL-MCNC: 2.91 MG/DL (ref 0.66–1.25)
EOSINOPHIL # BLD AUTO: 0 10E3/UL (ref 0–0.7)
EOSINOPHIL NFR BLD AUTO: 0 %
ERYTHROCYTE [DISTWIDTH] IN BLOOD BY AUTOMATED COUNT: 14.8 % (ref 10–15)
GFR SERPL CREATININE-BSD FRML MDRD: 23 ML/MIN/1.73M2
GLUCOSE BLD-MCNC: 136 MG/DL (ref 70–99)
GLUCOSE BLDC GLUCOMTR-MCNC: 118 MG/DL (ref 70–99)
GLUCOSE BLDC GLUCOMTR-MCNC: 140 MG/DL (ref 70–99)
GLUCOSE BLDC GLUCOMTR-MCNC: 148 MG/DL (ref 70–99)
GLUCOSE BLDC GLUCOMTR-MCNC: 171 MG/DL (ref 70–99)
GLUCOSE BLDC GLUCOMTR-MCNC: 267 MG/DL (ref 70–99)
HBA1C MFR BLD: 8.2 % (ref 0–5.6)
HCT VFR BLD AUTO: 29.8 % (ref 40–53)
HGB BLD-MCNC: 10 G/DL (ref 13.3–17.7)
IMM GRANULOCYTES # BLD: 0.1 10E3/UL
IMM GRANULOCYTES NFR BLD: 0 %
LACTATE SERPL-SCNC: 1.4 MMOL/L (ref 0.7–2)
LACTATE SERPL-SCNC: 3.1 MMOL/L (ref 0.7–2)
LYMPHOCYTES # BLD AUTO: 0.8 10E3/UL (ref 0.8–5.3)
LYMPHOCYTES NFR BLD AUTO: 6 %
MCH RBC QN AUTO: 26.3 PG (ref 26.5–33)
MCHC RBC AUTO-ENTMCNC: 33.6 G/DL (ref 31.5–36.5)
MCV RBC AUTO: 78 FL (ref 78–100)
MONOCYTES # BLD AUTO: 1.4 10E3/UL (ref 0–1.3)
MONOCYTES NFR BLD AUTO: 11 %
NEUTROPHILS # BLD AUTO: 11.1 10E3/UL (ref 1.6–8.3)
NEUTROPHILS NFR BLD AUTO: 83 %
NRBC # BLD AUTO: 0 10E3/UL
NRBC BLD AUTO-RTO: 0 /100
PLATELET # BLD AUTO: 197 10E3/UL (ref 150–450)
POTASSIUM BLD-SCNC: 4.1 MMOL/L (ref 3.4–5.3)
RBC # BLD AUTO: 3.8 10E6/UL (ref 4.4–5.9)
SODIUM SERPL-SCNC: 137 MMOL/L (ref 133–144)
WBC # BLD AUTO: 13.4 10E3/UL (ref 4–11)

## 2022-11-16 PROCEDURE — 83036 HEMOGLOBIN GLYCOSYLATED A1C: CPT | Performed by: INTERNAL MEDICINE

## 2022-11-16 PROCEDURE — 272N000001 HC OR GENERAL SUPPLY STERILE: Performed by: UROLOGY

## 2022-11-16 PROCEDURE — 83605 ASSAY OF LACTIC ACID: CPT | Performed by: PHYSICIAN ASSISTANT

## 2022-11-16 PROCEDURE — 250N000025 HC SEVOFLURANE, PER MIN: Performed by: UROLOGY

## 2022-11-16 PROCEDURE — 250N000011 HC RX IP 250 OP 636: Performed by: ANESTHESIOLOGY

## 2022-11-16 PROCEDURE — 255N000002 HC RX 255 OP 636: Performed by: UROLOGY

## 2022-11-16 PROCEDURE — C1887 CATHETER, GUIDING: HCPCS | Performed by: UROLOGY

## 2022-11-16 PROCEDURE — 250N000011 HC RX IP 250 OP 636: Performed by: INTERNAL MEDICINE

## 2022-11-16 PROCEDURE — 258N000003 HC RX IP 258 OP 636: Performed by: INTERNAL MEDICINE

## 2022-11-16 PROCEDURE — 258N000003 HC RX IP 258 OP 636: Performed by: ANESTHESIOLOGY

## 2022-11-16 PROCEDURE — 0T778DZ DILATION OF LEFT URETER WITH INTRALUMINAL DEVICE, VIA NATURAL OR ARTIFICIAL OPENING ENDOSCOPIC: ICD-10-PCS | Performed by: UROLOGY

## 2022-11-16 PROCEDURE — 999N000141 HC STATISTIC PRE-PROCEDURE NURSING ASSESSMENT: Performed by: UROLOGY

## 2022-11-16 PROCEDURE — 370N000017 HC ANESTHESIA TECHNICAL FEE, PER MIN: Performed by: UROLOGY

## 2022-11-16 PROCEDURE — 36415 COLL VENOUS BLD VENIPUNCTURE: CPT | Performed by: INTERNAL MEDICINE

## 2022-11-16 PROCEDURE — 999N000179 XR SURGERY CARM FLUORO LESS THAN 5 MIN W STILLS

## 2022-11-16 PROCEDURE — 80048 BASIC METABOLIC PNL TOTAL CA: CPT | Performed by: INTERNAL MEDICINE

## 2022-11-16 PROCEDURE — 250N000009 HC RX 250: Performed by: ANESTHESIOLOGY

## 2022-11-16 PROCEDURE — C2617 STENT, NON-COR, TEM W/O DEL: HCPCS | Performed by: UROLOGY

## 2022-11-16 PROCEDURE — 710N000009 HC RECOVERY PHASE 1, LEVEL 1, PER MIN: Performed by: UROLOGY

## 2022-11-16 PROCEDURE — 250N000012 HC RX MED GY IP 250 OP 636 PS 637: Performed by: INTERNAL MEDICINE

## 2022-11-16 PROCEDURE — 250N000013 HC RX MED GY IP 250 OP 250 PS 637: Performed by: INTERNAL MEDICINE

## 2022-11-16 PROCEDURE — 87075 CULTR BACTERIA EXCEPT BLOOD: CPT | Performed by: UROLOGY

## 2022-11-16 PROCEDURE — 120N000001 HC R&B MED SURG/OB

## 2022-11-16 PROCEDURE — 36415 COLL VENOUS BLD VENIPUNCTURE: CPT | Performed by: PHYSICIAN ASSISTANT

## 2022-11-16 PROCEDURE — 85025 COMPLETE CBC W/AUTO DIFF WBC: CPT | Performed by: INTERNAL MEDICINE

## 2022-11-16 PROCEDURE — 99233 SBSQ HOSP IP/OBS HIGH 50: CPT | Performed by: INTERNAL MEDICINE

## 2022-11-16 PROCEDURE — 360N000075 HC SURGERY LEVEL 2, PER MIN: Performed by: UROLOGY

## 2022-11-16 PROCEDURE — 87070 CULTURE OTHR SPECIMN AEROBIC: CPT | Performed by: UROLOGY

## 2022-11-16 DEVICE — URETERAL STENT
Type: IMPLANTABLE DEVICE | Site: URETHRA | Status: FUNCTIONAL
Brand: POLARIS™ ULTRA

## 2022-11-16 RX ORDER — ONDANSETRON 4 MG/1
4 TABLET, ORALLY DISINTEGRATING ORAL EVERY 6 HOURS PRN
Status: DISCONTINUED | OUTPATIENT
Start: 2022-11-16 | End: 2022-11-18 | Stop reason: HOSPADM

## 2022-11-16 RX ORDER — PROCHLORPERAZINE 25 MG
25 SUPPOSITORY, RECTAL RECTAL EVERY 12 HOURS PRN
Status: DISCONTINUED | OUTPATIENT
Start: 2022-11-16 | End: 2022-11-18 | Stop reason: HOSPADM

## 2022-11-16 RX ORDER — ONDANSETRON 2 MG/ML
4 INJECTION INTRAMUSCULAR; INTRAVENOUS EVERY 30 MIN PRN
Status: DISCONTINUED | OUTPATIENT
Start: 2022-11-16 | End: 2022-11-16 | Stop reason: HOSPADM

## 2022-11-16 RX ORDER — FENTANYL CITRATE 50 UG/ML
INJECTION, SOLUTION INTRAMUSCULAR; INTRAVENOUS PRN
Status: DISCONTINUED | OUTPATIENT
Start: 2022-11-16 | End: 2022-11-16

## 2022-11-16 RX ORDER — ACETAMINOPHEN 325 MG/1
650 TABLET ORAL EVERY 6 HOURS PRN
Status: DISCONTINUED | OUTPATIENT
Start: 2022-11-16 | End: 2022-11-18 | Stop reason: HOSPADM

## 2022-11-16 RX ORDER — AMOXICILLIN 250 MG
1 CAPSULE ORAL 2 TIMES DAILY PRN
Status: DISCONTINUED | OUTPATIENT
Start: 2022-11-16 | End: 2022-11-18 | Stop reason: HOSPADM

## 2022-11-16 RX ORDER — HYDROMORPHONE HYDROCHLORIDE 1 MG/ML
.3-.5 INJECTION, SOLUTION INTRAMUSCULAR; INTRAVENOUS; SUBCUTANEOUS EVERY 4 HOURS PRN
Status: DISCONTINUED | OUTPATIENT
Start: 2022-11-16 | End: 2022-11-18 | Stop reason: HOSPADM

## 2022-11-16 RX ORDER — DEXMEDETOMIDINE HYDROCHLORIDE 4 UG/ML
INJECTION, SOLUTION INTRAVENOUS PRN
Status: DISCONTINUED | OUTPATIENT
Start: 2022-11-16 | End: 2022-11-16

## 2022-11-16 RX ORDER — POLYETHYLENE GLYCOL 3350 17 G/17G
17 POWDER, FOR SOLUTION ORAL DAILY PRN
Status: DISCONTINUED | OUTPATIENT
Start: 2022-11-16 | End: 2022-11-18 | Stop reason: HOSPADM

## 2022-11-16 RX ORDER — EPHEDRINE SULFATE 50 MG/ML
INJECTION, SOLUTION INTRAMUSCULAR; INTRAVENOUS; SUBCUTANEOUS PRN
Status: DISCONTINUED | OUTPATIENT
Start: 2022-11-16 | End: 2022-11-16

## 2022-11-16 RX ORDER — HYDROMORPHONE HCL IN WATER/PF 6 MG/30 ML
0.4 PATIENT CONTROLLED ANALGESIA SYRINGE INTRAVENOUS EVERY 5 MIN PRN
Status: DISCONTINUED | OUTPATIENT
Start: 2022-11-16 | End: 2022-11-16 | Stop reason: HOSPADM

## 2022-11-16 RX ORDER — NICOTINE POLACRILEX 4 MG
15-30 LOZENGE BUCCAL
Status: DISCONTINUED | OUTPATIENT
Start: 2022-11-16 | End: 2022-11-18 | Stop reason: HOSPADM

## 2022-11-16 RX ORDER — SODIUM CHLORIDE, SODIUM LACTATE, POTASSIUM CHLORIDE, CALCIUM CHLORIDE 600; 310; 30; 20 MG/100ML; MG/100ML; MG/100ML; MG/100ML
INJECTION, SOLUTION INTRAVENOUS CONTINUOUS
Status: DISCONTINUED | OUTPATIENT
Start: 2022-11-16 | End: 2022-11-16 | Stop reason: HOSPADM

## 2022-11-16 RX ORDER — SODIUM CHLORIDE 9 MG/ML
INJECTION, SOLUTION INTRAVENOUS CONTINUOUS
Status: DISCONTINUED | OUTPATIENT
Start: 2022-11-16 | End: 2022-11-17

## 2022-11-16 RX ORDER — PROCHLORPERAZINE MALEATE 5 MG
10 TABLET ORAL EVERY 6 HOURS PRN
Status: DISCONTINUED | OUTPATIENT
Start: 2022-11-16 | End: 2022-11-18 | Stop reason: HOSPADM

## 2022-11-16 RX ORDER — LIDOCAINE HYDROCHLORIDE 20 MG/ML
INJECTION, SOLUTION INFILTRATION; PERINEURAL PRN
Status: DISCONTINUED | OUTPATIENT
Start: 2022-11-16 | End: 2022-11-16

## 2022-11-16 RX ORDER — SODIUM CHLORIDE 9 MG/ML
INJECTION, SOLUTION INTRAVENOUS CONTINUOUS
Status: DISCONTINUED | OUTPATIENT
Start: 2022-11-16 | End: 2022-11-18 | Stop reason: HOSPADM

## 2022-11-16 RX ORDER — AMOXICILLIN 250 MG
2 CAPSULE ORAL 2 TIMES DAILY PRN
Status: DISCONTINUED | OUTPATIENT
Start: 2022-11-16 | End: 2022-11-18 | Stop reason: HOSPADM

## 2022-11-16 RX ORDER — ONDANSETRON 2 MG/ML
4 INJECTION INTRAMUSCULAR; INTRAVENOUS EVERY 6 HOURS PRN
Status: DISCONTINUED | OUTPATIENT
Start: 2022-11-16 | End: 2022-11-18 | Stop reason: HOSPADM

## 2022-11-16 RX ORDER — CEFTRIAXONE 1 G/1
1 INJECTION, POWDER, FOR SOLUTION INTRAMUSCULAR; INTRAVENOUS EVERY 24 HOURS
Status: DISCONTINUED | OUTPATIENT
Start: 2022-11-16 | End: 2022-11-16

## 2022-11-16 RX ORDER — DEXAMETHASONE SODIUM PHOSPHATE 4 MG/ML
INJECTION, SOLUTION INTRA-ARTICULAR; INTRALESIONAL; INTRAMUSCULAR; INTRAVENOUS; SOFT TISSUE PRN
Status: DISCONTINUED | OUTPATIENT
Start: 2022-11-16 | End: 2022-11-16

## 2022-11-16 RX ORDER — CEFTRIAXONE 2 G/1
2 INJECTION, POWDER, FOR SOLUTION INTRAMUSCULAR; INTRAVENOUS EVERY 24 HOURS
Status: DISCONTINUED | OUTPATIENT
Start: 2022-11-16 | End: 2022-11-17

## 2022-11-16 RX ORDER — FENTANYL CITRATE 0.05 MG/ML
50 INJECTION, SOLUTION INTRAMUSCULAR; INTRAVENOUS EVERY 5 MIN PRN
Status: DISCONTINUED | OUTPATIENT
Start: 2022-11-16 | End: 2022-11-16 | Stop reason: HOSPADM

## 2022-11-16 RX ORDER — ACETAMINOPHEN 650 MG/1
650 SUPPOSITORY RECTAL EVERY 6 HOURS PRN
Status: DISCONTINUED | OUTPATIENT
Start: 2022-11-16 | End: 2022-11-18 | Stop reason: HOSPADM

## 2022-11-16 RX ORDER — HYDROMORPHONE HCL IN WATER/PF 6 MG/30 ML
0.2 PATIENT CONTROLLED ANALGESIA SYRINGE INTRAVENOUS EVERY 5 MIN PRN
Status: DISCONTINUED | OUTPATIENT
Start: 2022-11-16 | End: 2022-11-16 | Stop reason: HOSPADM

## 2022-11-16 RX ORDER — TAMSULOSIN HYDROCHLORIDE 0.4 MG/1
0.4 CAPSULE ORAL DAILY
Status: DISCONTINUED | OUTPATIENT
Start: 2022-11-16 | End: 2022-11-18 | Stop reason: HOSPADM

## 2022-11-16 RX ORDER — OXYBUTYNIN CHLORIDE 5 MG/1
5 TABLET ORAL EVERY 12 HOURS PRN
Status: DISCONTINUED | OUTPATIENT
Start: 2022-11-16 | End: 2022-11-18 | Stop reason: HOSPADM

## 2022-11-16 RX ORDER — ONDANSETRON 4 MG/1
4 TABLET, ORALLY DISINTEGRATING ORAL EVERY 30 MIN PRN
Status: DISCONTINUED | OUTPATIENT
Start: 2022-11-16 | End: 2022-11-16 | Stop reason: HOSPADM

## 2022-11-16 RX ORDER — PROPOFOL 10 MG/ML
INJECTION, EMULSION INTRAVENOUS PRN
Status: DISCONTINUED | OUTPATIENT
Start: 2022-11-16 | End: 2022-11-16

## 2022-11-16 RX ORDER — DEXTROSE MONOHYDRATE 25 G/50ML
25-50 INJECTION, SOLUTION INTRAVENOUS
Status: DISCONTINUED | OUTPATIENT
Start: 2022-11-16 | End: 2022-11-18 | Stop reason: HOSPADM

## 2022-11-16 RX ORDER — SODIUM CHLORIDE, SODIUM LACTATE, POTASSIUM CHLORIDE, CALCIUM CHLORIDE 600; 310; 30; 20 MG/100ML; MG/100ML; MG/100ML; MG/100ML
INJECTION, SOLUTION INTRAVENOUS CONTINUOUS PRN
Status: DISCONTINUED | OUTPATIENT
Start: 2022-11-16 | End: 2022-11-16

## 2022-11-16 RX ORDER — LIDOCAINE 40 MG/G
CREAM TOPICAL
Status: DISCONTINUED | OUTPATIENT
Start: 2022-11-16 | End: 2022-11-18 | Stop reason: HOSPADM

## 2022-11-16 RX ORDER — ONDANSETRON 2 MG/ML
INJECTION INTRAMUSCULAR; INTRAVENOUS PRN
Status: DISCONTINUED | OUTPATIENT
Start: 2022-11-16 | End: 2022-11-16

## 2022-11-16 RX ORDER — FENTANYL CITRATE 0.05 MG/ML
25 INJECTION, SOLUTION INTRAMUSCULAR; INTRAVENOUS EVERY 5 MIN PRN
Status: DISCONTINUED | OUTPATIENT
Start: 2022-11-16 | End: 2022-11-16 | Stop reason: HOSPADM

## 2022-11-16 RX ORDER — IOPAMIDOL 612 MG/ML
INJECTION, SOLUTION INTRAVASCULAR PRN
Status: DISCONTINUED | OUTPATIENT
Start: 2022-11-16 | End: 2022-11-16 | Stop reason: HOSPADM

## 2022-11-16 RX ADMIN — DEXAMETHASONE SODIUM PHOSPHATE 4 MG: 4 INJECTION, SOLUTION INTRA-ARTICULAR; INTRALESIONAL; INTRAMUSCULAR; INTRAVENOUS; SOFT TISSUE at 17:07

## 2022-11-16 RX ADMIN — ONDANSETRON 4 MG: 2 INJECTION INTRAMUSCULAR; INTRAVENOUS at 17:04

## 2022-11-16 RX ADMIN — SODIUM CHLORIDE, POTASSIUM CHLORIDE, SODIUM LACTATE AND CALCIUM CHLORIDE: 600; 310; 30; 20 INJECTION, SOLUTION INTRAVENOUS at 16:53

## 2022-11-16 RX ADMIN — SUCCINYLCHOLINE CHLORIDE 100 MG: 20 INJECTION, SOLUTION INTRAMUSCULAR; INTRAVENOUS; PARENTERAL at 16:57

## 2022-11-16 RX ADMIN — SODIUM CHLORIDE: 9 INJECTION, SOLUTION INTRAVENOUS at 19:32

## 2022-11-16 RX ADMIN — SODIUM CHLORIDE: 9 INJECTION, SOLUTION INTRAVENOUS at 21:24

## 2022-11-16 RX ADMIN — FENTANYL CITRATE 100 MCG: 50 INJECTION, SOLUTION INTRAMUSCULAR; INTRAVENOUS at 16:57

## 2022-11-16 RX ADMIN — SODIUM CHLORIDE: 9 INJECTION, SOLUTION INTRAVENOUS at 11:15

## 2022-11-16 RX ADMIN — PROPOFOL 180 MG: 10 INJECTION, EMULSION INTRAVENOUS at 16:57

## 2022-11-16 RX ADMIN — LIDOCAINE HYDROCHLORIDE 100 MG: 20 INJECTION, SOLUTION INFILTRATION; PERINEURAL at 16:57

## 2022-11-16 RX ADMIN — TAMSULOSIN HYDROCHLORIDE 0.4 MG: 0.4 CAPSULE ORAL at 12:46

## 2022-11-16 RX ADMIN — DEXMEDETOMIDINE HYDROCHLORIDE 8 MCG: 200 INJECTION INTRAVENOUS at 17:14

## 2022-11-16 RX ADMIN — PHENYLEPHRINE HYDROCHLORIDE 100 MCG: 10 INJECTION INTRAVENOUS at 17:12

## 2022-11-16 RX ADMIN — CEFTRIAXONE SODIUM 2 G: 2 INJECTION, POWDER, FOR SOLUTION INTRAMUSCULAR; INTRAVENOUS at 11:14

## 2022-11-16 RX ADMIN — INSULIN ASPART 1 UNITS: 100 INJECTION, SOLUTION INTRAVENOUS; SUBCUTANEOUS at 21:23

## 2022-11-16 RX ADMIN — Medication 5 MG: at 17:08

## 2022-11-16 RX ADMIN — PHENYLEPHRINE HYDROCHLORIDE 100 MCG: 10 INJECTION INTRAVENOUS at 17:22

## 2022-11-16 ASSESSMENT — ACTIVITIES OF DAILY LIVING (ADL)
ADLS_ACUITY_SCORE: 35

## 2022-11-16 ASSESSMENT — COPD QUESTIONNAIRES: COPD: 0

## 2022-11-16 ASSESSMENT — LIFESTYLE VARIABLES: TOBACCO_USE: 0

## 2022-11-16 NOTE — ED NOTES
"[facility]  ED Nurse Handoff Report    ED Chief complaint: Abdominal Pain      ED Diagnosis:   Final diagnoses:   Acute kidney injury (H)   Ureteral stone       Code Status: Per hospitalist     Allergies: No Known Allergies    Patient Story: Pt reports experiencing  abdominal pain and constipation x 2 days. Reports  last bowel movement  was 2 days ago.  He denied CP,SOB, or blood in stool. Pt wife at the bedside she speaks \"some English\"    Focused Assessment:  A/O x4, + BS x all 4 quadrants.  C/O LLQ non-radiating pain. Denied  Chest pain, SOB or N/V/D    Treatments and/or interventions provided: CT showed lare kidney stone 3mm  and another kidney stone measuring 1.1x0.9mm. NS bolus 500cc, Morphine, Rocephin, Zofran,  LAB    Results for orders placed or performed during the hospital encounter of 11/15/22   CT Abdomen Pelvis w/o Contrast     Status: None    Narrative    EXAM: CT ABDOMEN PELVIS W/O CONTRAST  LOCATION: [facility]  DATE/TIME: 11/15/2022 5:23 PM    INDICATION: LLQ abdominal pain.  COMPARISON: None.  TECHNIQUE: CT scan of the abdomen and pelvis was performed without IV contrast. Multiplanar reformats were obtained. Dose reduction techniques were used.  CONTRAST: None.    FINDINGS:   LOWER CHEST: Sternotomy. Cardiac enlargement. Left basilar atelectasis.    HEPATOBILIARY: Normal.    PANCREAS: Normal.    SPLEEN: Normal.    ADRENAL GLANDS: Normal.    KIDNEYS/BLADDER: 1.1 x 0.9 cm stone left UPJ with moderate left-sided hydronephrosis. There are multiple additional stones within dilated calyces in the lower pole left kidney with stones measuring up to 3 mm with greater than 5 stones present.    BOWEL: Normal.    LYMPH NODES: Normal.    VASCULATURE: Unremarkable.    PELVIC ORGANS: Normal.    MUSCULOSKELETAL: Small fat-containing left inguinal hernia.      Impression    IMPRESSION:   1.  Large 1.1 x 0.9 cm stone left UPJ with moderate left-sided hydronephrosis. " Multiple additional stones within dilated calyces in the lower pole of the left kidney with stones measuring up to 3 mm. Greater than 5 stones present.    2.  There is mild urinary bladder wall thickening with soft tissue haziness surrounding the bladder particularly in the left hemipelvis. Recommend exclusion of cystitis. Sternotomy with cardiac enlargement. Small fat-containing left inguinal hernia.     Comprehensive metabolic panel     Status: Abnormal   Result Value Ref Range    Sodium 133 133 - 144 mmol/L    Potassium 4.2 3.4 - 5.3 mmol/L    Chloride 104 94 - 109 mmol/L    Carbon Dioxide (CO2) 23 20 - 32 mmol/L    Anion Gap 6 3 - 14 mmol/L    Urea Nitrogen 30 7 - 30 mg/dL    Creatinine 2.73 (H) 0.66 - 1.25 mg/dL    Calcium 9.7 8.5 - 10.1 mg/dL    Glucose 194 (H) 70 - 99 mg/dL    Alkaline Phosphatase 138 40 - 150 U/L    AST 38 0 - 45 U/L    ALT 41 0 - 70 U/L    Protein Total 8.5 6.8 - 8.8 g/dL    Albumin 3.2 (L) 3.4 - 5.0 g/dL    Bilirubin Total 1.0 0.2 - 1.3 mg/dL    GFR Estimate 25 (L) >60 mL/min/1.73m2   Lipase     Status: Normal   Result Value Ref Range    Lipase 116 73 - 393 U/L   UA with Microscopic reflex to Culture     Status: Abnormal    Specimen: Urine, Midstream   Result Value Ref Range    Color Urine Light Yellow Colorless, Straw, Light Yellow, Yellow    Appearance Urine Clear Clear    Glucose Urine Negative Negative mg/dL    Bilirubin Urine Negative Negative    Ketones Urine Negative Negative mg/dL    Specific Gravity Urine 1.015 1.003 - 1.035    Blood Urine Large (A) Negative    pH Urine 5.5 5.0 - 7.0    Protein Albumin Urine 100 (A) Negative mg/dL    Urobilinogen Urine Normal Normal, 2.0 mg/dL    Nitrite Urine Negative Negative    Leukocyte Esterase Urine Trace (A) Negative    Mucus Urine Present (A) None Seen /LPF    RBC Urine 27 (H) <=2 /HPF    WBC Urine 12 (H) <=5 /HPF    Narrative    Urine Culture ordered based on laboratory criteria   CBC with platelets and differential     Status: Abnormal    Result Value Ref Range    WBC Count 13.9 (H) 4.0 - 11.0 10e3/uL    RBC Count 4.46 4.40 - 5.90 10e6/uL    Hemoglobin 11.6 (L) 13.3 - 17.7 g/dL    Hematocrit 34.3 (L) 40.0 - 53.0 %    MCV 77 (L) 78 - 100 fL    MCH 26.0 (L) 26.5 - 33.0 pg    MCHC 33.8 31.5 - 36.5 g/dL    RDW 14.7 10.0 - 15.0 %    Platelet Count 220 150 - 450 10e3/uL    % Neutrophils 86 %    % Lymphocytes 5 %    % Monocytes 8 %    % Eosinophils 0 %    % Basophils 0 %    % Immature Granulocytes 1 %    NRBCs per 100 WBC 0 <1 /100    Absolute Neutrophils 12.0 (H) 1.6 - 8.3 10e3/uL    Absolute Lymphocytes 0.7 (L) 0.8 - 5.3 10e3/uL    Absolute Monocytes 1.1 0.0 - 1.3 10e3/uL    Absolute Eosinophils 0.0 0.0 - 0.7 10e3/uL    Absolute Basophils 0.0 0.0 - 0.2 10e3/uL    Absolute Immature Granulocytes 0.1 <=0.4 10e3/uL    Absolute NRBCs 0.0 10e3/uL   Asymptomatic COVID-19 Virus (Coronavirus) by PCR Nasopharyngeal     Status: Normal    Specimen: Nasopharyngeal; Swab   Result Value Ref Range    SARS CoV2 PCR Negative Negative    Narrative    Testing was performed using the Xpert Xpress SARS-CoV-2 Assay on the   Cepheid Gene-Xpert Instrument Systems. Additional information about   this Emergency Use Authorization (EUA) assay can be found via the Lab   Guide. This test should be ordered for the detection of SARS-CoV-2 in   individuals who meet SARS-CoV-2 clinical and/or epidemiological   criteria. Test performance is unknown in asymptomatic patients. This   test is for in vitro diagnostic use under the FDA EUA for   laboratories certified under CLIA to perform high complexity testing.   This test has not been FDA cleared or approved. A negative result   does not rule out the presence of PCR inhibitors in the specimen or   target RNA in concentration below the limit of detection for the   assay. The possibility of a false negative should be considered if   the patient's recent exposure or clinical presentation suggests   COVID-19. This test was validated by the [facility]. This laboratory is certified under the Clinical Laboratory Improvement Amendments of 1988 (CLIA-88) as qualified to perform high complexity laboratory testing.     CBC with platelets differential     Status: Abnormal    Narrative    The following orders were created for panel order CBC with platelets differential.  Procedure                               Abnormality         Status                     ---------                               -----------         ------                     CBC with platelets and d...[[id number]]  Abnormal            Final result                 Please view results for these tests on the individual orders.      Patient's response to treatments and/or interventions:  improved    To be done/followed up on inpatient unit:  Monitor     Does this patient have any cognitive concerns?:  No    Activity level - Baseline/Home:  Independent  Activity Level - Current:   Stand with Assist    Patient's Preferred language: Rwandan   Needed?: Yes    Isolation: None  Infection: Not Applicable  Patient tested for COVID 19 prior to admission: YES  Bariatric?: No    Vital Signs:   Vitals:    11/15/22 1756 11/15/22 1757 11/15/22 2013 11/15/22 2200   BP:   (!) 142/69 138/69   Pulse:   74 75   Resp:   18 20   Temp:   98.3  F (36.8  C)    TempSrc:   Oral    SpO2: 98% 99% 97% 95%       Cardiac Rhythm:     Was the PSS-3 completed:   Yes  What interventions are required if any?               Family Comments:  Wife present at the bedside   OBS brochure/video discussed/provided to patient/family: N/A              Name of person given brochure if not patient: N/A              Relationship to patient: N/A    For the majority of the shift this patient's behavior was Green.   Behavioral interventions performed were N/A.    ED NURSE PHONE NUMBER: [phone]

## 2022-11-16 NOTE — H&P
INTERNAL MEDICINE H&P    H&P dictated:  #[id number]    Robert Treviño Jr., MD  [phone] (p)  Text Page  [place]

## 2022-11-16 NOTE — PROGRESS NOTES
[facility]    Hospitalist Progress Note    Date of Service (when I saw the patient): 11/16/2022    Assessment & Plan   Khang Mcelroy is a 66 year old male who was admitted on 11/15/2022.  ASSESSMENT AND PLAN:  Mr. Khang Mcelroy is a 66-year-old male patient with history including diabetes mellitus type 2, hypertension, coronary artery disease, CHF, chronic kidney disease, nephrolithiasis who presents with abdominal pain and found with signs of acute kidney injury and imaging revealing large left UPJ stone with moderate left hydronephrosis.    1.  Large 1.1 x 0.9 cm left UPJ stone with moderate left hydronephrosis; nephrolithiasis; complicated urinary tract infection related to above.      Initial presentation as above.  Continue ceftriaxone.  We will continue IV fluids.  Strain the patient's urine.    We will order tamsulosin 0.4 mg daily.    Urology has already been contacted and appears that they will perform surgery today   NPO for procedure    Order p.r.n. acetaminophen and p.r.n. IV hydromorphone; minimize opioids as able.    2.  Acute kidney injury on chronic kidney disease, suspect related to obstructive uropathy.  History of presentation as above.  IV fluids as above, being mindful of volume overload given his history of CHF.    Monitor BMP.  Avoid  nephrotoxic medications.    Urologic management as above.      3.  Benign essential hypertension, coronary artery disease with prior CABG (2019); CHF.  Monitor volume status post CHF with IV fluids as above.  Continue [id number] amlodipine, metoprolol XL.    Hold prior to admission lisinopril for now given acute kidney injury.  Continue atorvastatin.    4.  Diabetes mellitus type 2.  Hemoglobin A1c 7.9 in 03/2019.  Order insulin correction scale (low resistance at bases for now).  Hold prior to admission glipizide for now.     PROPHYLAXIS:  Pneumoboots and ambulation.     CODE STATUS:  FULL CODE.      Disposition:  Expected discharge in 2-3days once creatinine improves .    Discussed with bedside RN, patient and his wife today     Luh Hill MD, MD  [phone] (P)      Interval History   Patient is resting comfortably in bed. No nausea/vomiting. Pain well controlled today. Patient is still boarding in ED. Seen in ED. IVF started now     -Data reviewed today: I reviewed all new labs and imaging results over the last 24 hours. I personally reviewed no images or EKG's today.    Physical Exam   Temp: 98.4  F (36.9  C) Temp src: Oral BP: (!) 143/71 Pulse: 74   Resp: 18 SpO2: 99 % O2 Device: None (Room air)    Vitals:    11/16/22 0729   Weight: 72.4 kg (159 lb 9.8 oz)     Vital Signs with Ranges  Temp:  [98.3  F (36.8  C)-98.5  F (36.9  C)] 98.4  F (36.9  C)  Pulse:  [74-79] 74  Resp:  [16-20] 18  BP: (138-169)/(69-83) 143/71  SpO2:  [91 %-99 %] 99 %  No intake/output data recorded.    Constitutional: Awake, alert, cooperative, no apparent distress  Respiratory: Clear to auscultation bilaterally, no crackles or wheezing  Cardiovascular: Regular rate and rhythm, normal S1 and S2, and no murmur noted  GI: Normal bowel sounds, soft, non-distended, left sided abdominal pain present   Skin/Integumen: No rashes, no cyanosis, no edema  Other:     Medications     sodium chloride 100 mL/hr at 11/16/22 1115       cefTRIAXone  2 g Intravenous Q24H     insulin aspart  1-3 Units Subcutaneous TID AC     insulin aspart  1-3 Units Subcutaneous At Bedtime     tamsulosin  0.4 mg Oral Daily       Data   Recent Labs   Lab 11/16/22  0724 11/16/22  0639 11/15/22  2158 11/15/22  1535   WBC  --  13.4*  --  13.9*   HGB  --  10.0*  --  11.6*   MCV  --  78  --  77*   PLT  --  197  --  220   NA  --  137  --  133   POTASSIUM  --  4.1  --  4.2   CHLORIDE  --  108  --  104   CO2  --  25  --  23   BUN  --  30  --  30   CR  --  2.91*  --  2.73*   ANIONGAP  --  4  --  6   MYKE  --  9.1  --  9.7   * 136* 137* 194*   ALBUMIN  --   --   --  3.2*   PROTTOTAL   --   --   --  8.5   BILITOTAL  --   --   --  1.0   ALKPHOS  --   --   --  138   ALT  --   --   --  41   AST  --   --   --  38   LIPASE  --   --   --  116       Recent Results (from the past 24 hour(s))   CT Abdomen Pelvis w/o Contrast    Narrative    EXAM: CT ABDOMEN PELVIS W/O CONTRAST  LOCATION: [facility]  DATE/TIME: 11/15/2022 5:23 PM    INDICATION: LLQ abdominal pain.  COMPARISON: None.  TECHNIQUE: CT scan of the abdomen and pelvis was performed without IV contrast. Multiplanar reformats were obtained. Dose reduction techniques were used.  CONTRAST: None.    FINDINGS:   LOWER CHEST: Sternotomy. Cardiac enlargement. Left basilar atelectasis.    HEPATOBILIARY: Normal.    PANCREAS: Normal.    SPLEEN: Normal.    ADRENAL GLANDS: Normal.    KIDNEYS/BLADDER: 1.1 x 0.9 cm stone left UPJ with moderate left-sided hydronephrosis. There are multiple additional stones within dilated calyces in the lower pole left kidney with stones measuring up to 3 mm with greater than 5 stones present.    BOWEL: Normal.    LYMPH NODES: Normal.    VASCULATURE: Unremarkable.    PELVIC ORGANS: Normal.    MUSCULOSKELETAL: Small fat-containing left inguinal hernia.      Impression    IMPRESSION:   1.  Large 1.1 x 0.9 cm stone left UPJ with moderate left-sided hydronephrosis. Multiple additional stones within dilated calyces in the lower pole of the left kidney with stones measuring up to 3 mm. Greater than 5 stones present.    2.  There is mild urinary bladder wall thickening with soft tissue haziness surrounding the bladder particularly in the left hemipelvis. Recommend exclusion of cystitis. Sternotomy with cardiac enlargement. Small fat-containing left inguinal hernia.

## 2022-11-16 NOTE — ANESTHESIA PREPROCEDURE EVALUATION
Anesthesia Pre-Procedure Evaluation    Patient: Khang Mcelroy   MRN: [id number] : 1955        Procedure : Procedure(s):  CYSTOSCOPY, WITH LEFT URETERAL STENT INSERTION          Past Medical History:   Diagnosis Date     CAD in native artery 2019     Chronic renal insufficiency     Stage 3b in      Chronic systolic heart failure (H) 10/01/2019     Diabetes mellitus with proliferative retinopathy (H)      Essential hypertension 2019     Hyperlipidemia LDL goal <70 2019     Nephrolithiasis      NSTEMI (non-ST elevated myocardial infarction) (H)      CHALO (obstructive sleep apnea) 2019 [place] [facility] Sleep Study (153.0 lbs) - AHI 30.5, RDI 34.7, Supine AHI 31.5, REM AHI 56.5, Low O2 68.7%, Time Spent ?88% 3.7 minutes / Time Spent ?89% 4.5 minutes.     S/P CABG (coronary artery bypass graft) 2019      Past Surgical History:   Procedure Laterality Date     BYPASS GRAFT ARTERY CORONARY N/A 3/18/2019    Procedure: CORONARY BYPASS GRAFTING X 4 - LIMA TO LAD, SV TO DIAGONAL, PDA, AND OM; ON PUMP WITH NOA; ENDOVEIN HARVEST LEFT LEG;  Surgeon: Adarsh Sanchez MD;  Location:  OR     CV CORONARY ANGIOGRAM N/A 3/12/2019    Procedure: Coronary Angiogram;  Surgeon: Remi Rodriguez MD;  Location:  HEART CARDIAC CATH LAB     CV HEART CATHETERIZATION WITH POSSIBLE INTERVENTION N/A 3/12/2019    Procedure: Heart Catheterization with possible Intervention;  Surgeon: Remi Rodriguez MD;  Location:  HEART CARDIAC CATH LAB     CV LEFT VENTRICULOGRAM N/A 3/12/2019    Procedure: Left Ventricular Angiogram;  Surgeon: Remi Rodriguez MD;  Location:  HEART CARDIAC CATH LAB     LITHOTRIPSY        No Known Allergies   Social History     Tobacco Use     Smoking status: Never     Smokeless tobacco: Never   Substance Use Topics     Alcohol use: No      Wt Readings from Last 1 Encounters:   22 72.4 kg (159 lb 9.8 oz)        Anesthesia Evaluation   Pt has  had prior anesthetic. Type: General.    No history of anesthetic complications       ROS/MED HX  ENT/Pulmonary:     (+) sleep apnea,  (-) tobacco use, asthma and COPD   Neurologic:  - neg neurologic ROS     Cardiovascular:     (+) Dyslipidemia hypertension--CAD -CABG-date: . -CHF Previous cardiac testing   Echo: Date: 21 Results:  [facility]chocardi[facility]  [address]  [address]  [place] [address]  Phone [phone]  Fax [phone]        Name: DESTINEY HARRIS  MRN: [id number]  : 1955  Study Date: 2021 10:00 AM  Age: 65 yrs  Gender: Male  Patient Location: [facility]  Reason For Study: Chronic systolic heart failure  Ordering Physician: FRANCESCA SPRINGER  Referring Physician: Amari Castro  Performed By: Leslie Quintero     BSA: 1.8 m2  Height: 63 in  Weight: 167 lb  HR: 65  BP: 136/65 mmHg  _____________________________________________________________________________  __     Procedure  Limited Echo Adult.     _____________________________________________________________________________  __        Interpretation Summary     The left ventricle is normal in size.  There is normal left ventricular wall thickness.  The visual ejection fraction is estimated at 45-50%.  There is severe inferior wall hypokinesis.  Grade I or early diastolic dysfunction.  Mildly decreased right ventricular systolic function  Compared to prior, RV function mildly diminished.  _____________________________________________________________________________  __        Left Ventricle  The left ventricle is normal in size. There is normal left ventricular wall  thickness. The visual ejection fraction is estimated at 45-50%. Grade I or  early diastolic dysfunction. Diastolic Doppler findings (E/E' ratio and/or  other parameters) suggest left ventricular filling pressures are  indeterminate. There is severe inferior wall hypokinesis.      Right Ventricle  Mildly decreased right ventricular systolic function. The right ventricle is  not well visualized.     Atria  Normal left atrial size. Right atrial size is normal. There is no atrial shunt  seen.     Mitral Valve  The mitral valve leaflets appear normal. There is no evidence of stenosis,  fluttering, or prolapse. There is trace mitral regurgitation.     Tricuspid Valve  Normal tricuspid valve. There is trace tricuspid regurgitation.        Aortic Valve  There is mild trileaflet aortic sclerosis. There is trace aortic  regurgitation. No aortic stenosis is present.     Pulmonic Valve  There is trace pulmonic valvular regurgitation.     Vessels  Normal size aorta.     Pericardium  There is no pericardial effusion.     Rhythm  Sinus rhythm was noted.     _____________________________________________________________________________  __  MMode/2D Measurements & Calculations  IVSd: 1.1 cm  LVIDd: 4.6 cm  LVIDs: 3.9 cm  LVPWd: 1.1 cm  FS: 15.7 %  LV mass(C)d: 176.6 grams  LV mass(C)dI: 98.6 grams/m2  Ao root diam: 3.2 cm  LA dimension: 4.5 cm     asc Aorta Diam: 3.6 cm  LA/Ao: 1.4  LVOT diam: 2.0 cm  LVOT area: 3.0 cm2  LA Volume (BP): 49.4 ml  LA Volume Index (BP): 27.6 ml/m2  RWT: 0.46  TAPSE: 1.3 cm        Doppler Measurements & Calculations  MV E max anuj: 51.0 cm/sec  MV A max anuj: 89.4 cm/sec  MV E/A: 0.57  MV dec slope: 183.5 cm/sec2     LV V1 max P.2 mmHg  LV V1 max: 74.4 cm/sec  LV V1 VTI: 16.5 cm  Stress Test: Date: Results:    ECG Reviewed: Date: Results:    Cath: Date: Results:      METS/Exercise Tolerance:     Hematologic:       Musculoskeletal:       GI/Hepatic:    (-) GERD and liver disease   Renal/Genitourinary:     (+) renal disease, type: ARF, Nephrolithiasis ,     Endo:     (+) type II DM,     Psychiatric/Substance Use:       Infectious Disease:       Malignancy:       Other:            Physical Exam    Airway        Mallampati: III   TM distance: > 3 FB   Neck ROM: full   Mouth  opening: > 3 cm    Respiratory Devices and Support         Dental     Comment: Several loose teeth and missing teeth    (+) loose      Cardiovascular          Rhythm and rate: regular and normal     Pulmonary           breath sounds clear to auscultation           OUTSIDE LABS:  CBC:   Lab Results   Component Value Date    WBC 13.4 (H) 11/16/2022    WBC 13.9 (H) 11/15/2022    HGB 10.0 (L) 11/16/2022    HGB 11.6 (L) 11/15/2022    HCT 29.8 (L) 11/16/2022    HCT 34.3 (L) 11/15/2022     11/16/2022     11/15/2022     BMP:   Lab Results   Component Value Date     11/16/2022     11/15/2022    POTASSIUM 4.1 11/16/2022    POTASSIUM 4.2 11/15/2022    CHLORIDE 108 11/16/2022    CHLORIDE 104 11/15/2022    CO2 25 11/16/2022    CO2 23 11/15/2022    BUN 30 11/16/2022    BUN 30 11/15/2022    CR 2.91 (H) 11/16/2022    CR 2.73 (H) 11/15/2022     (H) 11/16/2022     (H) 11/16/2022     COAGS:   Lab Results   Component Value Date    PTT 45 (H) 03/18/2019    INR 1.54 (H) 03/18/2019    FIBR 503 (H) 03/18/2019     POC:   Lab Results   Component Value Date    BGM 67 (L) 06/03/2019     HEPATIC:   Lab Results   Component Value Date    ALBUMIN 3.2 (L) 11/15/2022    PROTTOTAL 8.5 11/15/2022    ALT 41 11/15/2022    AST 38 11/15/2022    ALKPHOS 138 11/15/2022    BILITOTAL 1.0 11/15/2022     OTHER:   Lab Results   Component Value Date    PH 7.38 03/18/2019    LACT 2.3 (H) 03/18/2019    A1C 7.9 (H) 03/11/2019    MYKE 9.1 11/16/2022    PHOS 3.6 04/06/2021    MAG 1.6 06/14/2022    LIPASE 116 11/15/2022    TSH 1.03 04/03/2019       Anesthesia Plan    ASA Status:  3   NPO Status:  NPO Appropriate    Anesthesia Type: General.     - Airway: ETT   Induction: Intravenous.   Maintenance: Inhalation.   Techniques and Equipment:     - Airway: Video-Laryngoscope         Consents    Anesthesia Plan(s) and associated risks, benefits, and realistic alternatives discussed. Questions answered and patient/representative(s)  expressed understanding.    - Discussed:     - Discussed with:  Patient      - Extended Intubation/Ventilatory Support Discussed: No.      - Patient is DNR/DNI Status: No    Use of blood products discussed: No .     Postoperative Care    Pain management: IV analgesics.   PONV prophylaxis: Ondansetron (or other 5HT-3), Dexamethasone or Solumedrol     Comments:    Other Comments: Soft bite block on wakeup            Paulino Ingram MD

## 2022-11-16 NOTE — ED NOTES
Pt up independently to the bathroom voided accompanied by wife.  He denied hematuria or retrieval of stone. Pt encouraged to use call light for staff assistance as a precautionary measure. Call light in reach, wife remains at bedside.

## 2022-11-16 NOTE — ANESTHESIA CARE TRANSFER NOTE
Patient: Khang Mcelroy    Procedure: Procedure(s):  CYSTOSCOPY, WITH LEFT URETERAL STENT INSERTION. LEFT RETROGRADE PYELOGRAM       Diagnosis: Ureteral stone [N20.1]  Diagnosis Additional Information: No value filed.    Anesthesia Type:   General     Note:    Oropharynx: oropharynx clear of all foreign objects and spontaneously breathing  Level of Consciousness: drowsy  Oxygen Supplementation: face mask  Level of Supplemental Oxygen (L/min / FiO2): 6  Independent Airway: airway patency satisfactory and stable    Vital Signs Stable: post-procedure vital signs reviewed and stable  Report to RN Given: handoff report given  Patient transferred to: PACU    Handoff Report: Identifed the Patient, Identified the Reponsible Provider, Reviewed the pertinent medical history, Discussed the surgical course, Reviewed Intra-OP anesthesia mangement and issues during anesthesia, Set expectations for post-procedure period and Allowed opportunity for questions and acknowledgement of understanding      Vitals:  Vitals Value Taken Time   /58 11/16/22 1741   Temp     Pulse 80 11/16/22 1743   Resp 21 11/16/22 1743   SpO2 100 % 11/16/22 1743   Vitals shown include unvalidated device data.    Electronically Signed By: MICHELE Ny CRNA  November 16, 2022  5:45 PM

## 2022-11-16 NOTE — OR NURSING
Dr Moreno discussed POC with pt and spouse at bedside with  via phone, consent completed as well with  and surgeon. Confirmed Rocephin given in ED enough for ABX coverage during procedure.

## 2022-11-16 NOTE — PHARMACY-ADMISSION MEDICATION HISTORY
Pharmacy Medication History  Admission medication history interview status for the 11/15/2022  admission is complete. See EPIC admission navigator for prior to admission medications     Location of Interview: Patient room  Medication history sources: Patient and Surescripts    Significant changes made to the medication list:  Added Lisinopril    In the past week, patient estimated taking medication this percent of the time: greater than 90%    Medication reconciliation completed by provider prior to medication history? No    Time spent in this activity: 20 minutes    Prior to Admission medications    Medication Sig Last Dose Taking? Auth Provider Long Term End Date   acetaminophen (TYLENOL) 325 MG tablet Take 650 mg by mouth every 6 hours as needed for mild pain as needed Yes Unknown, Entered By History     amLODIPine (NORVASC) 10 MG tablet Take 1 tablet (10 mg) by mouth daily 11/15/2022 Yes Guillermina Michaud MD Yes    atorvastatin (LIPITOR) 80 MG tablet Take 1 tablet (80 mg) by mouth every evening 11/14/2022 Yes Bib Hernández MD Yes    ferrous sulfate (FEROSUL) 325 (65 Fe) MG tablet Take 325 mg by mouth daily 11/15/2022 Yes Unknown, Entered By History     glipiZIDE (GLUCOTROL) 5 MG tablet Take 2.5 mg by mouth 2 times daily (before meals) 11/15/2022 Yes Unknown, Entered By History No    lisinopril (ZESTRIL) 10 MG tablet Take 10 mg by mouth daily 11/15/2022 Yes Unknown, Entered By History Yes    metoprolol succinate ER (TOPROL-XL) 25 MG 24 hr tablet Take 1 tablet (25 mg) by mouth daily 11/14/2022 at pm Yes Bib Hernández MD Yes    spironolactone (ALDACTONE) 25 MG tablet Take 1 tablet (25 mg) by mouth daily   Bib Hernández MD Yes 6/15/22       The information provided in this note is only as accurate as the sources available at the time of update(s)

## 2022-11-16 NOTE — H&P
Admitted: 11/15/2022    PRIMARY CARE PHYSICIAN:  Dr. Amari Castro.    CHIEF COMPLAINT:  Abdominal pain.    HISTORY OF PRESENT ILLNESS:  Mr. Khang Mcelroy  is a 66-year-old gentleman with history notable including diabetes mellitus type 2, hypertension, coronary artery disease, CHF, obstructive sleep apnea, nephrolithiasis who presents with the above issues.  The patient has had abdominal pain for the past 2 days.  He has also been constipated.  No chest pain.  No fevers or chills.  Overall, given his symptomatology, he came in [facility] for further evaluation.    He was seen in the ER by Dr. Truong.  On initial evaluation, he had vital signs that showed temperature 98.3, heart rate 79, blood pressure 161/70, respiration rate 16, O2 saturation 98% on room air.    Laboratory evaluation showed BMP with creatinine 2.73 (appears his baseline is more in the mid to upper 1 range); CBC showed white count 13.9, hemoglobin 11.6 and platelets 220; urinalysis showed 5 white blood cells, 37 red blood cells, trace leukocyte esterase, and large blood.  CT abdomen and pelvis without contrast showed large 1.1 x 0.9 cm stone in the left UPJ with moderate left hydronephrosis with multiple additional stones within dilated calices in the lower pole of left kidney with stones measuring up to 3 mm and noted there were greater than 5 stones present; mild urinary bladder wall thickening with soft tissue haziness surrounding the bladder, particularly in the left hemipelvis; small, fat-containing left inguinal hernia.    The patient was given IV fluids, a dose of ceftriaxone and ordered morphine and ondansetron.  Urology was contacted and it was felt that he will need surgery tomorrow.  Given his issues, request for admission was made.    PAST MEDICAL HISTORY:    1.  Diabetes mellitus type 2.  No recent hemoglobin A1c, but A1c was 7.9 in 03/2019.  2.  Hypertension.  3.  Coronary artery disease.  Status post 4-vessel (LIMA to LAD,  saphenous vein graft to diagonal, saphenous vein graft to OM, saphenous vein graft to PDA) in 03/2019.  4.  Congestive heart failure.  Echocardiogram 01/2021 showed left EF of 45-50% with wall motion abnormalities and grade 1 diastolic function; mildly decreased RV systolic function.  5.  Obstructive sleep apnea.  6.  Chronic kidney disease.  Unclear baseline.  Creatinine appears to run in the mid to upper 1 range.   7.  Nephrolithiasis  8.  Hyperlipidemia.    Past Medical History:   Diagnosis Date     CAD in native artery 03/18/2019     Chronic renal insufficiency     Stage 3b in 2021     Chronic systolic heart failure (H) 10/01/2019     Diabetes mellitus with proliferative retinopathy (H)      Essential hypertension 06/14/2019     Hyperlipidemia LDL goal <70 06/14/2019     Nephrolithiasis      NSTEMI (non-ST elevated myocardial infarction) (H)      CHALO (obstructive sleep apnea) 06/24/2019 6/21/2019 [facility] Sleep Study (153.0 lbs) - AHI 30.5, RDI 34.7, Supine AHI 31.5, REM AHI 56.5, Low O2 68.7%, Time Spent ?88% 3.7 minutes / Time Spent ?89% 4.5 minutes.     S/P CABG (coronary artery bypass graft) 03/26/2019       PAST SURGICAL HISTORY:    Past Surgical History:   Procedure Laterality Date     BYPASS GRAFT ARTERY CORONARY N/A 3/18/2019    Procedure: CORONARY BYPASS GRAFTING X 4 - LIMA TO LAD, SV TO DIAGONAL, PDA, AND OM; ON PUMP WITH NOA; ENDOVEIN HARVEST LEFT LEG;  Surgeon: Adarsh Sanchez MD;  Location:  OR     COMBINED CYSTOSCOPY, INSERT STENT URETER(S) Left 11/16/2022    Procedure: CYSTOSCOPY, WITH LEFT URETERAL STENT INSERTION. LEFT RETROGRADE PYELOGRAM;  Surgeon: Alcides Moreno MD;  Location:  OR     CV CORONARY ANGIOGRAM N/A 3/12/2019    Procedure: Coronary Angiogram;  Surgeon: Remi Rodriguez MD;  Location:  HEART CARDIAC CATH LAB     CV HEART CATHETERIZATION WITH POSSIBLE INTERVENTION N/A 3/12/2019    Procedure: Heart Catheterization with possible Intervention;   Surgeon: Remi Rodriguez MD;  Location:  HEART CARDIAC CATH LAB     CV LEFT VENTRICULOGRAM N/A 3/12/2019    Procedure: Left Ventricular Angiogram;  Surgeon: Remi Rodriguez MD;  Location:  HEART CARDIAC CATH LAB     LITHOTRIPSY         ALLERGIES:  NO KNOWN DRUG ALLERGIES.    HOME MEDICATIONS:    Prior to Admission Medications   Prescriptions Last Dose Informant Patient Reported? Taking?   acetaminophen (TYLENOL) 325 MG tablet as needed Self Yes Yes   Sig: Take 650 mg by mouth every 6 hours as needed for mild pain   amLODIPine (NORVASC) 10 MG tablet 11/15/2022 Self No No   Sig: Take 1 tablet (10 mg) by mouth daily   atorvastatin (LIPITOR) 80 MG tablet 11/14/2022 Self No Yes   Sig: Take 1 tablet (80 mg) by mouth every evening   ferrous sulfate (FEROSUL) 325 (65 Fe) MG tablet 11/15/2022 Self Yes Yes   Sig: Take 325 mg by mouth daily   glipiZIDE (GLUCOTROL) 5 MG tablet 11/15/2022 Self Yes No   Sig: Take 2.5 mg by mouth 2 times daily (before meals)   lisinopril (ZESTRIL) 10 MG tablet 11/15/2022 Self Yes Yes   Sig: Take 10 mg by mouth daily   metoprolol succinate ER (TOPROL-XL) 25 MG 24 hr tablet 11/14/2022 at pm Self No Yes   Sig: Take 1 tablet (25 mg) by mouth daily      Facility-Administered Medications: None       SOCIAL HISTORY:  The patient does not smoke or drink.  He is .  He has 3 children.  Formerly worked as a  of large scale art such as [employer].  He is from [facility].    FAMILY HISTORY:  Reviewed and not felt contributory.    REVIEW OF SYSTEMS:  As in the HPI, otherwise 10-point review of systems negative.    PHYSICAL EXAMINATION:    VITAL SIGNS:  Temperature 98.3, heart rate 74, blood pressure 142/69, respiration 18, O2 saturation 97% on room air.  GENERAL:  This is a 66-year-old male patient lying in bed.  Conversant and friendly.  HEENT:  Pupils equal, round, and reactive.  No scleral icterus or conjunctival injection.  Oropharynx -- no gross erythema or exudate.  NECK:  No bruits,  JVD, or adenopathy.  HEART:  Regular rhythm without murmurs, rubs, or gallops.  LUNGS:  Diminished at bases.  No crackles or wheeze.  ABDOMEN:  Mildly distended with tenderness more on right side without rebound or guarding.  Positive bowel sounds.  No femoral bruits.  EXTREMITIES:  Show 1+ leg edema.  NEUROLOGIC:  No gross focal motor or sensory deficits.    LABORATORY AND IMAGING:  See above for relevant information. For complete labs, see Epic.        ASSESSMENT AND PLAN:  Mr. Khang Mcelroy is a 66-year-old male patient with history including diabetes mellitus type 2, hypertension, coronary artery disease, CHF, chronic kidney disease, nephrolithiasis who presents with abdominal pain and found with signs of acute kidney injury and imaging revealing large left UPJ stone with moderate left hydronephrosis.    1.  Large 1.1 x 0.9 cm left UPJ stone with moderate left hydronephrosis; nephrolithiasis; complicated urinary tract infection related to above.  Initial presentation as above.  Continue ceftriaxone.  We will continue IV fluids.  Strain the patient's urine.  We will order tamsulosin 0.4 mg daily.  Urology has already been contacted and appears that they will perform surgery tomorrow.  Order p.r.n. acetaminophen and p.r.n. IV hydromorphone; minimize opioids as able.    2.  Acute kidney injury on chronic kidney disease, suspect related to obstructive uropathy.  Presentation as above.  IV fluids as above, being mindful of volume overload given his history of CHF.  Monitor BMP.  Avoid  nephrotoxic medications.  Urologic management as above.      3.  Benign essential hypertension, coronary artery disease with prior CABG (2019); CHF.  Monitor volume status post CHF with IV fluids as above.  Continue [id number] amlodipine, metoprolol XL.  Hold prior to admission lisinopril for now given acute kidney injury.  Continue atorvastatin.    4.  Diabetes mellitus type 2.  Hemoglobin A1c 7.9 in 03/2019.  Order  insulin correction scale (low resistance at bases for now).  Hold prior to admission glipizide for now.    PROPHYLAXIS:  Pneumoboots and ambulation.    CODE STATUS:  FULL CODE.      Robert Treviño Jr., MD        D: 11/15/2022   T: 11/15/2022   MT: [email or web address]    Name:     DESTINEY HARRIS  MRN:      [id number]        Account:     [id number]   :      1955           Admitted:    11/15/2022       Document: [id number]

## 2022-11-16 NOTE — ANESTHESIA PROCEDURE NOTES
Airway       Patient location during procedure: OR       Procedure Start/Stop Times: 11/16/2022 5:00 PM  Staff -        Anesthesiologist:  Amandeep Aguila MD       Performed By: anesthesiologist  Consent for Airway        Urgency: elective  Indications and Patient Condition       Indications for airway management: westley-procedural       Induction type:intravenous       Mask difficulty assessment: 0 - not attempted    Final Airway Details       Final airway type: endotracheal airway       Successful airway: ETT - single  Endotracheal Airway Details        ETT size (mm): 8.0       Cuffed: yes       Successful intubation technique: video laryngoscopy       VL Blade Size: Lo 4       Grade View of Cords: 1       Adjucts: stylet       Position: Right       Measured from: gums/teeth       Secured at (cm): 23       Bite block used: None    Post intubation assessment        Placement verified by: capnometry, equal breath sounds and chest rise        Number of attempts at approach: 1       Number of other approaches attempted: 0       Secured with: silk tape       Ease of procedure: easy       Dentition: Intact and Unchanged    Medication(s) Administered   Medication Administration Time: 11/16/2022 5:00 PM

## 2022-11-16 NOTE — CONSULTS
UROLOGY CONSULTATION:    PATIENT: Khang Turnerhabrayden DOMÍNGUEZ (1955)  AGE: 66 year old year old male    Primary Care Provider / Referring Physician:  Amari Castro    CHIEF COMPLAINT:  Acute kidney injury (H)  Ureteral stone    HPI:   Left UPJ stone, renal colici with increase of creatinine from his baseline.     CT abdomen and pelvis without contrast showed large 1.1 x 0.9 cm stone in the left UPJ with moderate left hydronephrosis. I reviewed the images myself.   HE has no voiding symptoms, no gross hematuria.      Past Medical History:   Diagnosis Date     CAD in native artery 2019     Chronic renal insufficiency     Stage 3b in      Chronic systolic heart failure (H) 10/01/2019     Diabetes mellitus with proliferative retinopathy (H)      Essential hypertension 2019     Hyperlipidemia LDL goal <70 2019     Nephrolithiasis      NSTEMI (non-ST elevated myocardial infarction) (H)      CHALO (obstructive sleep apnea) 2019 [facility] [place] [facility] Sleep Study (153.0 lbs) - AHI 30.5, RDI 34.7, Supine AHI 31.5, REM AHI 56.5, Low O2 68.7%, Time Spent ?88% 3.7 minutes / Time Spent ?89% 4.5 minutes.     S/P CABG (coronary artery bypass graft) 2019     Past Surgical History:   Procedure Laterality Date     BYPASS GRAFT ARTERY CORONARY N/A 3/18/2019    Procedure: CORONARY BYPASS GRAFTING X 4 - LIMA TO LAD, SV TO DIAGONAL, PDA, AND OM; ON PUMP WITH NOA; ENDOVEIN HARVEST LEFT LEG;  Surgeon: Adarsh Sanchez MD;  Location:  OR     CV CORONARY ANGIOGRAM N/A 3/12/2019    Procedure: Coronary Angiogram;  Surgeon: Remi Rodriguez MD;  Location:  HEART CARDIAC CATH LAB     CV HEART CATHETERIZATION WITH POSSIBLE INTERVENTION N/A 3/12/2019    Procedure: Heart Catheterization with possible Intervention;  Surgeon: Remi Rodriguez MD;  Location:  HEART CARDIAC CATH LAB     CV LEFT VENTRICULOGRAM N/A 3/12/2019    Procedure: Left Ventricular Angiogram;  Surgeon:  "Remi Rodriguez MD;  Location:  HEART CARDIAC CATH LAB     LITHOTRIPSY       PAST SOCIAL HISTORY  Social History     Social History Narrative     Not on file     Social History     Tobacco Use     Smoking status: Never     Smokeless tobacco: Never   Substance Use Topics     Alcohol use: No     Review of Systems  ROS 10 points negative              No current outpatient medications on file.              No Known Allergies    PHYSICAL EXAM:          BP (!) 147/71   Pulse 73   Temp 98.4  F (36.9  C) (Oral)   Resp 18   Ht 1.702 m (5' 7\")   Wt 72.4 kg (159 lb 9.8 oz)   SpO2 97%   BMI 25.00 kg/m           General appearance shows no deformaties and good grooming.  VITAL SIGNS:  Temperature 98.3, heart rate 74, blood pressure 142/69, respiration 18, O2 saturation 97% on room air.  GENERAL:  This is a 66-year-old male patient lying in bed.  Conversant and friendly.  HEENT:  Pupils equal, round, and reactive.  No scleral icterus or conjunctival injection.  Oropharynx -- no gross erythema or exudate.  NECK:  No bruits, JVD, or adenopathy.  HEART:  Regular rhythm without murmurs, rubs, or gallops.  LUNGS:  Diminished at bases.  No crackles or wheeze.  ABDOMEN:  Mildly distended with tenderness more on right side without rebound or guarding.  Positive bowel sounds.  No femoral bruits.  EXTREMITIES:  Show 1+ leg edema.      LABS:   No results found for: PSA  UA RESULTS:  Recent Labs   Lab Test 11/15/22  1835   COLOR Light Yellow   APPEARANCE Clear   URINEGLC Negative   URINEBILI Negative   URINEKETONE Negative   SG 1.015   UBLD Large*   URINEPH 5.5   PROTEIN 100*   NITRITE Negative   LEUKEST Trace*   RBCU 27*   WBCU 12*     Creatinine   Date Value Ref Range Status   11/16/2022 2.91 (H) 0.66 - 1.25 mg/dL Final   04/06/2021 1.99 (H) 0.66 - 1.25 mg/dL Final   ]  Hemoglobin   Date Value Ref Range Status   11/16/2022 10.0 (L) 13.3 - 17.7 g/dL Final   04/06/2021 10.0 (L) 13.3 - 17.7 g/dL Final   ]      ASSESSMENT:   1. " Acute kidney injury (H)    2. Ureteral stone          PLAN:    Left stent placement; went over risks and benefits f the procedure with risk of urosepsis. HE would need laser lithotripsy in 2-4 weeks. Consent is obtained.     Alcides Moreno MD

## 2022-11-16 NOTE — BRIEF OP NOTE
[facility] Brief Operative Note    Pre-operative diagnosis: Ureteral stone [N20.1]   Post-operative diagnosis Left 1 cm UPJ stone    Procedure: Procedure(s):  CYSTOSCOPY, WITH LEFT URETERAL STENT INSERTION   Surgeon: Alcides Moreno MD   Assistants(s): none   Estimated blood loss: Less than 10 ml    Specimens: Left kidney washings for culture    Findings: 1 cm radiolucent stone pushed to the kidney    Plan: Abx for 5 days and follow-up with laser lithotripsy in 2-4 weeks; overnight stay

## 2022-11-17 LAB
ANION GAP SERPL CALCULATED.3IONS-SCNC: 8 MMOL/L (ref 3–14)
BACTERIA UR CULT: NORMAL
BUN SERPL-MCNC: 38 MG/DL (ref 7–30)
CALCIUM SERPL-MCNC: 8.4 MG/DL (ref 8.5–10.1)
CHLORIDE BLD-SCNC: 106 MMOL/L (ref 94–109)
CO2 SERPL-SCNC: 21 MMOL/L (ref 20–32)
CREAT SERPL-MCNC: 2.6 MG/DL (ref 0.66–1.25)
ERYTHROCYTE [DISTWIDTH] IN BLOOD BY AUTOMATED COUNT: 14.5 % (ref 10–15)
GFR SERPL CREATININE-BSD FRML MDRD: 27 ML/MIN/1.73M2
GLUCOSE BLD-MCNC: 261 MG/DL (ref 70–99)
GLUCOSE BLDC GLUCOMTR-MCNC: 179 MG/DL (ref 70–99)
GLUCOSE BLDC GLUCOMTR-MCNC: 260 MG/DL (ref 70–99)
GLUCOSE BLDC GLUCOMTR-MCNC: 279 MG/DL (ref 70–99)
GLUCOSE BLDC GLUCOMTR-MCNC: 318 MG/DL (ref 70–99)
GLUCOSE BLDC GLUCOMTR-MCNC: 327 MG/DL (ref 70–99)
HCT VFR BLD AUTO: 30.4 % (ref 40–53)
HGB BLD-MCNC: 10 G/DL (ref 13.3–17.7)
MCH RBC QN AUTO: 25.9 PG (ref 26.5–33)
MCHC RBC AUTO-ENTMCNC: 32.9 G/DL (ref 31.5–36.5)
MCV RBC AUTO: 79 FL (ref 78–100)
PLATELET # BLD AUTO: 191 10E3/UL (ref 150–450)
POTASSIUM BLD-SCNC: 4.1 MMOL/L (ref 3.4–5.3)
RBC # BLD AUTO: 3.86 10E6/UL (ref 4.4–5.9)
SODIUM SERPL-SCNC: 135 MMOL/L (ref 133–144)
WBC # BLD AUTO: 12 10E3/UL (ref 4–11)

## 2022-11-17 PROCEDURE — 80048 BASIC METABOLIC PNL TOTAL CA: CPT | Performed by: INTERNAL MEDICINE

## 2022-11-17 PROCEDURE — 120N000001 HC R&B MED SURG/OB

## 2022-11-17 PROCEDURE — 85027 COMPLETE CBC AUTOMATED: CPT | Performed by: INTERNAL MEDICINE

## 2022-11-17 PROCEDURE — 250N000013 HC RX MED GY IP 250 OP 250 PS 637: Performed by: INTERNAL MEDICINE

## 2022-11-17 PROCEDURE — 258N000003 HC RX IP 258 OP 636: Performed by: INTERNAL MEDICINE

## 2022-11-17 PROCEDURE — 99233 SBSQ HOSP IP/OBS HIGH 50: CPT | Performed by: INTERNAL MEDICINE

## 2022-11-17 PROCEDURE — 36415 COLL VENOUS BLD VENIPUNCTURE: CPT | Performed by: INTERNAL MEDICINE

## 2022-11-17 PROCEDURE — 250N000012 HC RX MED GY IP 250 OP 636 PS 637: Performed by: INTERNAL MEDICINE

## 2022-11-17 PROCEDURE — 250N000011 HC RX IP 250 OP 636: Performed by: INTERNAL MEDICINE

## 2022-11-17 RX ORDER — CEFUROXIME AXETIL 500 MG/1
500 TABLET ORAL EVERY 12 HOURS SCHEDULED
Status: DISCONTINUED | OUTPATIENT
Start: 2022-11-17 | End: 2022-11-18 | Stop reason: HOSPADM

## 2022-11-17 RX ORDER — ATORVASTATIN CALCIUM 40 MG/1
80 TABLET, FILM COATED ORAL EVERY EVENING
Status: DISCONTINUED | OUTPATIENT
Start: 2022-11-17 | End: 2022-11-18 | Stop reason: HOSPADM

## 2022-11-17 RX ORDER — NALOXONE HYDROCHLORIDE 0.4 MG/ML
0.4 INJECTION, SOLUTION INTRAMUSCULAR; INTRAVENOUS; SUBCUTANEOUS
Status: DISCONTINUED | OUTPATIENT
Start: 2022-11-17 | End: 2022-11-18 | Stop reason: HOSPADM

## 2022-11-17 RX ORDER — NALOXONE HYDROCHLORIDE 0.4 MG/ML
0.2 INJECTION, SOLUTION INTRAMUSCULAR; INTRAVENOUS; SUBCUTANEOUS
Status: DISCONTINUED | OUTPATIENT
Start: 2022-11-17 | End: 2022-11-18 | Stop reason: HOSPADM

## 2022-11-17 RX ORDER — FERROUS SULFATE 325(65) MG
325 TABLET ORAL DAILY
Status: DISCONTINUED | OUTPATIENT
Start: 2022-11-17 | End: 2022-11-18 | Stop reason: HOSPADM

## 2022-11-17 RX ORDER — METOPROLOL SUCCINATE 25 MG/1
25 TABLET, EXTENDED RELEASE ORAL DAILY
Status: DISCONTINUED | OUTPATIENT
Start: 2022-11-17 | End: 2022-11-18 | Stop reason: HOSPADM

## 2022-11-17 RX ADMIN — SODIUM CHLORIDE: 9 INJECTION, SOLUTION INTRAVENOUS at 06:31

## 2022-11-17 RX ADMIN — POLYETHYLENE GLYCOL 3350 17 G: 17 POWDER, FOR SOLUTION ORAL at 09:51

## 2022-11-17 RX ADMIN — SODIUM CHLORIDE 1000 ML: 9 INJECTION, SOLUTION INTRAVENOUS at 09:44

## 2022-11-17 RX ADMIN — CEFUROXIME AXETIL 500 MG: 500 TABLET ORAL at 20:08

## 2022-11-17 RX ADMIN — TAMSULOSIN HYDROCHLORIDE 0.4 MG: 0.4 CAPSULE ORAL at 09:44

## 2022-11-17 RX ADMIN — CEFTRIAXONE SODIUM 2 G: 2 INJECTION, POWDER, FOR SOLUTION INTRAMUSCULAR; INTRAVENOUS at 12:20

## 2022-11-17 RX ADMIN — ATORVASTATIN CALCIUM 80 MG: 40 TABLET, FILM COATED ORAL at 20:08

## 2022-11-17 RX ADMIN — INSULIN GLARGINE 10 UNITS: 100 INJECTION, SOLUTION SUBCUTANEOUS at 13:56

## 2022-11-17 RX ADMIN — FERROUS SULFATE TAB 325 MG (65 MG ELEMENTAL FE) 325 MG: 325 (65 FE) TAB at 13:55

## 2022-11-17 RX ADMIN — METOPROLOL SUCCINATE 25 MG: 25 TABLET, EXTENDED RELEASE ORAL at 13:55

## 2022-11-17 ASSESSMENT — ACTIVITIES OF DAILY LIVING (ADL)
ADLS_ACUITY_SCORE: 35

## 2022-11-17 NOTE — PROGRESS NOTES
[facility]    Hospitalist Progress Note    Date of Service (when I saw the patient): 11/17/2022    Assessment & Plan   Khang Mcelroy is a 66 year old male who was admitted on 11/15/2022.  ASSESSMENT AND PLAN:  Mr. Khang Mcelroy is a 66-year-old male patient with history including diabetes mellitus type 2, hypertension, coronary artery disease, CHF, chronic kidney disease, nephrolithiasis who presents with abdominal pain and found with signs of acute kidney injury and imaging revealing large left UPJ stone with moderate left hydronephrosis.    1.  Large 1.1 x 0.9 cm left UPJ stone with moderate left hydronephrosis; nephrolithiasis status post cystoscopy and left ureteral stent placement by urology on 11/16/2022  Initial presentation as above.  Continue ceftriaxone.  We will continue IV fluids.  Strain the patient's urine.    We will order tamsulosin 0.4 mg daily.     Order p.r.n. acetaminophen and p.r.n. IV hydromorphone; minimize opioids as able.  Urology consulted.  Patient underwent cystoscopy and left ureteral stent placement on 11/16/2022.  Abdominal pain improved after the procedure.  Patient will follow up with urology outpatient for definitive management of the stone  Urology is recommending antibiotics for 5 days after the procedure  Started on Flomax 0.4 mg p.o. daily    2.  Acute kidney injury on chronic kidney disease most likely secondary to obstructive uropathy versus ATN,   Chronic kidney disease stage IIIa  Baseline Creatinine of 1.7-1.9  suspect related to obstructive uropathy.  History of presentation as above.  IV fluids as above, being mindful of volume overload given his history of CHF.    Monitor BMP.  Avoid  nephrotoxic medications.    Creatinine on admission was 2.73-2.91-2.6 on 11/17/22  Continue IV fluid hydration and monitor BMP closely     3.  Benign essential hypertension, coronary artery disease with prior CABG (2019); CHF.  Monitor volume status post  CHF with IV fluids as above.  Continue [id number]-[id number]-[id number] amlodipine, metoprolol XL.   Continue PTA Toprol-XL 25 mg p.o. daily   Hold prior to admission lisinopril and amlodipine for now given acute kidney injury.    Continue atorvastatin.    4.  Diabetes mellitus type 2.  Hemoglobin A1c 7.9 in 03/2019.  Order insulin correction scale (low resistance at bases for now).  Hold prior to admission glipizide for now.  Is running high in the 250s added Lantus 10 units subcu daily on 11/17/22  Monitor blood sugars closely and adjust insulin as needed     PROPHYLAXIS:  Pneumoboots and ambulation.     CODE STATUS:  FULL CODE.      Disposition: Expected discharge in 2-3days once creatinine improves .    Discussed with bedside RN, patient and his wife today     Luh Hill MD, MD  [phone] (P)      Interval History      Patient is resting comfortably in bed. No nausea/vomiting. Pain well controlled today.  No acute issues since yesterday    -Data reviewed today: I reviewed all new labs and imaging results over the last 24 hours. I personally reviewed no images or EKG's today.    Physical Exam   Temp: 97.6  F (36.4  C) Temp src: Oral BP: (!) 142/63 Pulse: 70   Resp: 18 SpO2: 99 % O2 Device: None (Room air) Oxygen Delivery: 1 LPM  Vitals:    11/16/22 0729 11/17/22 0636   Weight: 72.4 kg (159 lb 9.8 oz) 74.8 kg (164 lb 14.5 oz)     Vital Signs with Ranges  Temp:  [97.6  F (36.4  C)-98.5  F (36.9  C)] 97.6  F (36.4  C)  Pulse:  [70-82] 70  Resp:  [11-21] 18  BP: (101-142)/(56-70) 142/63  SpO2:  [95 %-99 %] 99 %  I/O last 3 completed shifts:  In: 600 [I.V.:600]  Out: 500 [Urine:500]    Constitutional: Awake, alert, cooperative, no apparent distress  Respiratory: Clear to auscultation bilaterally, no crackles or wheezing  Cardiovascular: Regular rate and rhythm, normal S1 and S2, and no murmur noted  GI: Normal bowel sounds, soft, non-distended, nontender, bowel sounds positive , no guarding rigidity or rebound    skin/Integumen: No rashes, no cyanosis, no edema  Other:     Medications     sodium chloride Stopped (11/16/22 2124)       sodium chloride 0.9%  1,000 mL Intravenous Once     cefTRIAXone  2 g Intravenous Q24H     insulin aspart  1-3 Units Subcutaneous TID AC     insulin aspart  1-3 Units Subcutaneous At Bedtime     insulin glargine  10 Units Subcutaneous QAM AC     sodium chloride (PF)  3 mL Intracatheter Q8H     tamsulosin  0.4 mg Oral Daily       Data   Recent Labs   Lab 11/17/22  1124 11/17/22  0802 11/17/22  0724 11/16/22  0724 11/16/22  0639 11/15/22  2158 11/15/22  1535   WBC  --   --  12.0*  --  13.4*  --  13.9*   HGB  --   --  10.0*  --  10.0*  --  11.6*   MCV  --   --  79  --  78  --  77*   PLT  --   --  191  --  197  --  220   NA  --   --  135  --  137  --  133   POTASSIUM  --   --  4.1  --  4.1  --  4.2   CHLORIDE  --   --  106  --  108  --  104   CO2  --   --  21  --  25  --  23   BUN  --   --  38*  --  30  --  30   CR  --   --  2.60*  --  2.91*  --  2.73*   ANIONGAP  --   --  8  --  4  --  6   MYKE  --   --  8.4*  --  9.1  --  9.7   * 260* 261*   < > 136*   < > 194*   ALBUMIN  --   --   --   --   --   --  3.2*   PROTTOTAL  --   --   --   --   --   --  8.5   BILITOTAL  --   --   --   --   --   --  1.0   ALKPHOS  --   --   --   --   --   --  138   ALT  --   --   --   --   --   --  41   AST  --   --   --   --   --   --  38   LIPASE  --   --   --   --   --   --  116    < > = values in this interval not displayed.       Recent Results (from the past 24 hour(s))   XR Surgery YONG Fluoro Less Than 5 Min w Stills    Narrative    SURGERY C-ARM FLUORO LESS THAN 5 MINUTES WITH STILLS 11/16/2022 5:20  PM     COMPARISON: None    HISTORY: Left cysto, left retro, left stent.    NUMBER OF IMAGES ACQUIRED: 2    VIEWS: 1    FLUOROSCOPY TIME: .1 minute(s)      Impression    IMPRESSION: Mild hydronephrosis, stent in good position. No  extravasation.    GABRIELE LANDON MD         SYSTEM ID:  [id number]

## 2022-11-17 NOTE — PROGRESS NOTES
Pt AO4.  Kyler speaking but understand most basic assessment questions and wife is in the room to help translate.  Only slight pain when urinating.  Straining urine.  Independent in room, patient very steady and wife is there to help.  BS checks.  Regular diet.  VSS on RA. No Stones found in urine.  Plan pending.

## 2022-11-17 NOTE — PLAN OF CARE
Goal Outcome Evaluation:    DATE & TIME: 11/16/22, 5940-0094  Summary: Urethral stone, Left ureter stent placement   Cognitive Concerns/ Orientation : A&O x 4, Faroese speaking    BEHAVIOR & AGGRESSION TOOL COLOR: GREEN  ABNL VS/O2: VSS on RA  MOBILITY: Not OOB  PAIN MANAGMENT: Denies   DIET: Regular   BOWEL/BLADDER: Cont B/B, uses bedside urinal, strainer in room to check for stones   ABNL LAB/BG: Lactic 3.1,   DRAIN/DEVICES: L PIV insufing  mL/hr  SKIN: CDI  TESTS/PROCEDURES: Stent placement today   D/C DAY/GOALS/PLACE: [facility]

## 2022-11-17 NOTE — PROGRESS NOTES
[facility]    Urology Progress Note     Assessment & Plan   Khang Mcelroy is a 66 year old male who was admitted on 11/15/2022. POD 1 s/p left ureteral stent placement with Dr. Moreno- doing well     Plan:   -ok for discharge from urology standpoint   -rec abx x5 days- defer to IM   -Dr. Moreon's  will coordinate f/u definitive stone surgery for 2-3wks, our office will call him to schedule     Will follow peripherally the remainder of his inpatient stay, please call with questions/concerns     Traci Smith PA-C  [facility]   [email or web address]  Text Page (7:30am to 4:30pm)      Interval History   No events overnight   Voiding well with stent, denies pain     AVSS    Physical Exam   Temp: 97.6  F (36.4  C) Temp src: Oral BP: (!) 142/63 Pulse: 70   Resp: 18 SpO2: 99 % O2 Device: None (Room air) Oxygen Delivery: 1 LPM  Vitals:    11/16/22 0729 11/17/22 0636   Weight: 72.4 kg (159 lb 9.8 oz) 74.8 kg (164 lb 14.5 oz)     Vital Signs with Ranges  Temp:  [97.6  F (36.4  C)-98.5  F (36.9  C)] 97.6  F (36.4  C)  Pulse:  [70-82] 70  Resp:  [11-21] 18  BP: (101-147)/(56-71) 142/63  SpO2:  [95 %-99 %] 99 %  I/O last 3 completed shifts:  In: 600 [I.V.:600]  Out: 500 [Urine:500]    Alert and oriented  Breathing unlabored  Abdomen soft, NT, ND  No CVAT  Extremities warm and well perfused, no edema      Medications     sodium chloride Stopped (11/16/22 2124)       sodium chloride 0.9%  1,000 mL Intravenous Once     cefTRIAXone  2 g Intravenous Q24H     insulin aspart  1-3 Units Subcutaneous TID AC     insulin aspart  1-3 Units Subcutaneous At Bedtime     insulin glargine  10 Units Subcutaneous QAM AC     sodium chloride (PF)  3 mL Intracatheter Q8H     tamsulosin  0.4 mg Oral Daily       Data   Results for orders placed or performed during the hospital encounter of 11/15/22 (from the past 24 hour(s))   Glucose by meter   Result Value Ref Range    GLUCOSE  BY METER POCT 140 (H) 70 - 99 mg/dL   Glucose by meter   Result Value Ref Range    GLUCOSE BY METER POCT 148 (H) 70 - 99 mg/dL   XR Surgery YONG Fluoro Less Than 5 Min w Stills    Narrative    SURGERY C-ARM FLUORO LESS THAN 5 MINUTES WITH STILLS 11/16/2022 5:20  PM     COMPARISON: None    HISTORY: Left cysto, left retro, left stent.    NUMBER OF IMAGES ACQUIRED: 2    VIEWS: 1    FLUOROSCOPY TIME: .1 minute(s)      Impression    IMPRESSION: Mild hydronephrosis, stent in good position. No  extravasation.    GABRIELE LANDON MD         SYSTEM ID:  [id number]   Glucose by meter   Result Value Ref Range    GLUCOSE BY METER POCT 171 (H) 70 - 99 mg/dL   Lactic Acid STAT   Result Value Ref Range    Lactic Acid 3.1 (H) 0.7 - 2.0 mmol/L   Glucose by meter   Result Value Ref Range    GLUCOSE BY METER POCT 267 (H) 70 - 99 mg/dL   Lactic acid whole blood   Result Value Ref Range    Lactic Acid 1.4 0.7 - 2.0 mmol/L   Glucose by meter   Result Value Ref Range    GLUCOSE BY METER POCT 279 (H) 70 - 99 mg/dL   CBC with platelets   Result Value Ref Range    WBC Count 12.0 (H) 4.0 - 11.0 10e3/uL    RBC Count 3.86 (L) 4.40 - 5.90 10e6/uL    Hemoglobin 10.0 (L) 13.3 - 17.7 g/dL    Hematocrit 30.4 (L) 40.0 - 53.0 %    MCV 79 78 - 100 fL    MCH 25.9 (L) 26.5 - 33.0 pg    MCHC 32.9 31.5 - 36.5 g/dL    RDW 14.5 10.0 - 15.0 %    Platelet Count 191 150 - 450 10e3/uL   Basic metabolic panel   Result Value Ref Range    Sodium 135 133 - 144 mmol/L    Potassium 4.1 3.4 - 5.3 mmol/L    Chloride 106 94 - 109 mmol/L    Carbon Dioxide (CO2) 21 20 - 32 mmol/L    Anion Gap 8 3 - 14 mmol/L    Urea Nitrogen 38 (H) 7 - 30 mg/dL    Creatinine 2.60 (H) 0.66 - 1.25 mg/dL    Calcium 8.4 (L) 8.5 - 10.1 mg/dL    Glucose 261 (H) 70 - 99 mg/dL    GFR Estimate 27 (L) >60 mL/min/1.73m2   Glucose by meter   Result Value Ref Range    GLUCOSE BY METER POCT 260 (H) 70 - 99 mg/dL   Glucose by meter   Result Value Ref Range    GLUCOSE BY METER POCT 318 (H) 70 - 99 mg/dL

## 2022-11-17 NOTE — PROVIDER NOTIFICATION
MD Notification    Notified Person: MD    Notified Person Name: Sameer    Notification Date/Time: 11/16/22, 7:42 PM    Notification Interaction: Vocera    Purpose of Notification: FYI lactic 3.1. vitals stable. Pt reports he is feeling well. Thank you!     Orders Received: Ah. Ok. As long as he's doing ok we can let it be.       Comments:

## 2022-11-17 NOTE — ANESTHESIA POSTPROCEDURE EVALUATION
Patient: Khang Mcelroy    Procedure: Procedure(s):  CYSTOSCOPY, WITH LEFT URETERAL STENT INSERTION. LEFT RETROGRADE PYELOGRAM       Anesthesia Type:  General    Note:  Disposition: Inpatient   Postop Pain Control: Uneventful            Sign Out: Well controlled pain   PONV: No   Neuro/Psych: Uneventful            Sign Out: Acceptable/Baseline neuro status   Airway/Respiratory: Uneventful            Sign Out: Acceptable/Baseline resp. status   CV/Hemodynamics: Uneventful            Sign Out: Acceptable CV status   Other NRE: NONE   DID A NON-ROUTINE EVENT OCCUR? No           Last vitals:  Vitals Value Taken Time   /67 11/16/22 1830   Temp 36.8  C (98.3  F) 11/16/22 1741   Pulse 79 11/16/22 1832   Resp 17 11/16/22 1832   SpO2 98 % 11/16/22 1834   Vitals shown include unvalidated device data.    Electronically Signed By: Amandeep Aguila MD  November 16, 2022  11:32 PM

## 2022-11-18 VITALS
OXYGEN SATURATION: 95 % | SYSTOLIC BLOOD PRESSURE: 145 MMHG | DIASTOLIC BLOOD PRESSURE: 67 MMHG | HEART RATE: 72 BPM | HEIGHT: 67 IN | WEIGHT: 164.9 LBS | TEMPERATURE: 97.7 F | BODY MASS INDEX: 25.88 KG/M2 | RESPIRATION RATE: 18 BRPM

## 2022-11-18 LAB
ANION GAP SERPL CALCULATED.3IONS-SCNC: 5 MMOL/L (ref 3–14)
BUN SERPL-MCNC: 37 MG/DL (ref 7–30)
CALCIUM SERPL-MCNC: 8.3 MG/DL (ref 8.5–10.1)
CHLORIDE BLD-SCNC: 110 MMOL/L (ref 94–109)
CO2 SERPL-SCNC: 24 MMOL/L (ref 20–32)
CREAT SERPL-MCNC: 2.01 MG/DL (ref 0.66–1.25)
ERYTHROCYTE [DISTWIDTH] IN BLOOD BY AUTOMATED COUNT: 14.6 % (ref 10–15)
GFR SERPL CREATININE-BSD FRML MDRD: 36 ML/MIN/1.73M2
GLUCOSE BLD-MCNC: 170 MG/DL (ref 70–99)
GLUCOSE BLDC GLUCOMTR-MCNC: 151 MG/DL (ref 70–99)
GLUCOSE BLDC GLUCOMTR-MCNC: 156 MG/DL (ref 70–99)
HCT VFR BLD AUTO: 31.7 % (ref 40–53)
HGB BLD-MCNC: 10.2 G/DL (ref 13.3–17.7)
MCH RBC QN AUTO: 25.8 PG (ref 26.5–33)
MCHC RBC AUTO-ENTMCNC: 32.2 G/DL (ref 31.5–36.5)
MCV RBC AUTO: 80 FL (ref 78–100)
PLATELET # BLD AUTO: 254 10E3/UL (ref 150–450)
POTASSIUM BLD-SCNC: 3.8 MMOL/L (ref 3.4–5.3)
RBC # BLD AUTO: 3.95 10E6/UL (ref 4.4–5.9)
SODIUM SERPL-SCNC: 139 MMOL/L (ref 133–144)
WBC # BLD AUTO: 14.5 10E3/UL (ref 4–11)

## 2022-11-18 PROCEDURE — 250N000013 HC RX MED GY IP 250 OP 250 PS 637: Performed by: INTERNAL MEDICINE

## 2022-11-18 PROCEDURE — 36415 COLL VENOUS BLD VENIPUNCTURE: CPT | Performed by: INTERNAL MEDICINE

## 2022-11-18 PROCEDURE — 80048 BASIC METABOLIC PNL TOTAL CA: CPT | Performed by: INTERNAL MEDICINE

## 2022-11-18 PROCEDURE — 99239 HOSP IP/OBS DSCHRG MGMT >30: CPT | Performed by: INTERNAL MEDICINE

## 2022-11-18 PROCEDURE — 258N000003 HC RX IP 258 OP 636: Performed by: INTERNAL MEDICINE

## 2022-11-18 PROCEDURE — 85027 COMPLETE CBC AUTOMATED: CPT | Performed by: INTERNAL MEDICINE

## 2022-11-18 RX ORDER — TAMSULOSIN HYDROCHLORIDE 0.4 MG/1
0.4 CAPSULE ORAL DAILY
Qty: 30 CAPSULE | Refills: 0 | Status: SHIPPED | OUTPATIENT
Start: 2022-11-19

## 2022-11-18 RX ORDER — CEFUROXIME AXETIL 500 MG/1
500 TABLET ORAL EVERY 12 HOURS
Qty: 6 TABLET | Refills: 0 | Status: SHIPPED | OUTPATIENT
Start: 2022-11-18 | End: 2022-11-21

## 2022-11-18 RX ADMIN — INSULIN GLARGINE 10 UNITS: 100 INJECTION, SOLUTION SUBCUTANEOUS at 09:33

## 2022-11-18 RX ADMIN — METOPROLOL SUCCINATE 25 MG: 25 TABLET, EXTENDED RELEASE ORAL at 09:28

## 2022-11-18 RX ADMIN — CEFUROXIME AXETIL 500 MG: 500 TABLET ORAL at 09:27

## 2022-11-18 RX ADMIN — TAMSULOSIN HYDROCHLORIDE 0.4 MG: 0.4 CAPSULE ORAL at 09:28

## 2022-11-18 RX ADMIN — FERROUS SULFATE TAB 325 MG (65 MG ELEMENTAL FE) 325 MG: 325 (65 FE) TAB at 09:28

## 2022-11-18 RX ADMIN — SODIUM CHLORIDE: 9 INJECTION, SOLUTION INTRAVENOUS at 03:56

## 2022-11-18 ASSESSMENT — ACTIVITIES OF DAILY LIVING (ADL)
ADLS_ACUITY_SCORE: 35
ADLS_ACUITY_SCORE: 35
ADLS_ACUITY_SCORE: 36
ADLS_ACUITY_SCORE: 36
ADLS_ACUITY_SCORE: 35
ADLS_ACUITY_SCORE: 36
ADLS_ACUITY_SCORE: 36
ADLS_ACUITY_SCORE: 35

## 2022-11-18 NOTE — PROGRESS NOTES
Pt A/Ox4, VSS on RA, IVF running at 100 mL, independent in room, denies pain, no stones in urine, BS checks & insulin coverage, discharge pending.

## 2022-11-18 NOTE — PROGRESS NOTES

## 2022-11-18 NOTE — PROGRESS NOTES
Met with pt and his spouse to go over discharge instructions. Provided insulin pen teaching instructions with teach back.     Called discharge pharmacy to transfer medications to pt's pharmacy ([facility]).

## 2022-11-18 NOTE — DISCHARGE SUMMARY
[facility]  Discharge Summary        Khang Mcelroy MRN# [id number]   YOB: 1955 Age: 66 year old     Date of Admission:  11/15/2022  Date of Discharge:  11/18/2022  Admitting Physician:  Robert Treviño MD  Discharge Physician: Luh Hill MD  Discharging Service: Hospitalist     Primary Provider: Amari Castro  Primary Care Physician Phone Number: [phone]         Discharge Diagnoses/Problem Oriented Hospital Course (Providers):    Khang Mcelroy was admitted on 11/15/2022 by Robert Treviño MD and I would refer you to their history and physical.  The following problems were addressed during his hospitalization:    ASSESSMENT AND PLAN:  Mr. Khang Mcelroy is a 66-year-old male patient with history including diabetes mellitus type 2, hypertension, coronary artery disease, CHF, chronic kidney disease, nephrolithiasis who presents with abdominal pain and found with signs of acute kidney injury and imaging revealing large left UPJ stone with moderate left hydronephrosis.    1.  Large 1.1 x 0.9 cm left UPJ stone with moderate left hydronephrosis; nephrolithiasis status post cystoscopy and left ureteral stent placement by urology on 11/16/2022  Initial presentation as above.  Continue ceftriaxone.  We will continue IV fluids.  Strain the patient's urine.    We will order tamsulosin 0.4 mg daily.     Order p.r.n. acetaminophen and p.r.n. IV hydromorphone; minimize opioids as able.  Urology consulted.  Patient underwent cystoscopy and left ureteral stent placement on 11/16/2022.  Abdominal pain improved after the procedure.  Patient will follow up with urology outpatient for definitive management of the stone  Urology is recommending antibiotics for 5 days after the procedure  Started on Flomax 0.4 mg p.o. daily  Ceftin BID ordered for 3 more days for total of 5 days course of antibiotics     2.  Acute kidney injury on chronic kidney disease most likely  secondary to obstructive uropathy versus ATN,   Chronic kidney disease stage IIIa  Baseline Creatinine of 1.7-1.9  suspect related to obstructive uropathy.  History of presentation as above.  IV fluids as above, being mindful of volume overload given his history of CHF.    Monitor BMP.  Avoid  nephrotoxic medications.    Creatinine on admission was 2.73-2.91-2.6-2   Creatinine close to baseline at 2 today so we will plan on discharging him today     3.  Benign essential hypertension, coronary artery disease with prior CABG (2019); CHF.  Monitor volume status post CHF with IV fluids as above.  Continue nyxrp-uj-syryufbfz amlodipine, metoprolol XL.   Continue PTA Toprol-XL 25 mg p.o. daily   Hold prior to admission lisinopril and amlodipine for now given acute kidney injury.    Continue atorvastatin.  Restarted lisinopril prior to discharge continue to hold amlodipine as blood pressure is soft systolics in the 130s to 140s     4.  Diabetes mellitus type 2 poorly controlled with hemoglobin A1c of 8.2%.    Patient's blood sugars were running high in the 250s to 300s  PTA glipizide was held  Started on Lantus 10 units subcu daily and increased  to 10 units twice a day  Currently blood sugars are well controlled with the Lantus they are running in the 150s to 170s  Will discharge with Lantus 10units BID      PROPHYLAXIS:  Pneumoboots and ambulation.     CODE STATUS:  FULL CODE.        Disposition: Expected discharge home today in stable condition with family     Discussed with bedside RN, patient  today      Luh Hill MD, MD  [phone] (P)           Code Status:      Full Code        Brief Hospital Stay Summary Sent Home With Patient in AVS:        Reason for your hospital stay      LEft ureteral stone S/p stent, ASIA and Hyperglycemia                 Important Results:      Please see below        Pending Results:        Unresulted Labs Ordered in the Past 30 Days of this Admission     Date and Time Order Name  Status Description    11/16/2022  5:18 PM Tissue Aerobic Bacterial Culture Routine Preliminary     11/16/2022  5:18 PM Anaerobic Bacterial Culture Routine Preliminary             Discharge Instructions and Follow-Up:      Follow-up Appointments     Follow Up ([facility])      Dr. Moreno's  will call you to coordinate follow-up surgery   for stone removal.     You have a left ureteral stent to help allow drainage from your kidney to   bladder. You should increase your fluid intake while you have a stent in   place to ensure good drainage of urine.   Your stent is not permanent, it requires routine exchanges or removal with   your urologist based on your plan of care.  While the stent is in place you may notice:  -Flank / back/ side  pain on the same side of your body as the stent  -Blood tinged urine that may come and go  -Sensation that you need to urinate more frequently   -Bladder cramping/discomfort at the end of urination  These are all normal to experience with the stent in place. However, if   these symptoms become too bothersome to tolerate, please alert your   urologist.     [address] [place]. [address]  You may call [phone] with any questions or concerns.   Central Appointment #: [phone]         Follow-up and recommended labs and tests       F/u with PCP in 1wee. REcheck BMP in 1week   F/u with [facility] in 2-3weeks               Discharge Disposition:      Discharged to home        Discharge Medications:        Current Discharge Medication List      START taking these medications    Details   cefuroxime (CEFTIN) 500 MG tablet Take 1 tablet (500 mg) by mouth every 12 hours for 3 days  Qty: 6 tablet, Refills: 0    Associated Diagnoses: Left ureteral stone      insulin glargine (LANTUS PEN) 100 UNIT/ML pen Inject 10 Units Subcutaneous 2 times daily  Qty: 15 mL, Refills: 0    Comments: If Lantus is not covered by insurance, may substitute Basaglar or Semglee or other  "insulin glargine product per insurance preference at same dose and frequency.    Associated Diagnoses: Type 2 diabetes mellitus with hyperosmolar coma, without long-term current use of insulin (H)      tamsulosin (FLOMAX) 0.4 MG capsule Take 1 capsule (0.4 mg) by mouth daily  Qty: 30 capsule, Refills: 0    Associated Diagnoses: Left ureteral stone         CONTINUE these medications which have NOT CHANGED    Details   acetaminophen (TYLENOL) 325 MG tablet Take 650 mg by mouth every 6 hours as needed for mild pain      atorvastatin (LIPITOR) 80 MG tablet Take 1 tablet (80 mg) by mouth every evening  Qty: 90 tablet, Refills: 3    Associated Diagnoses: Status post coronary artery bypass graft      ferrous sulfate (FEROSUL) 325 (65 Fe) MG tablet Take 325 mg by mouth daily      lisinopril (ZESTRIL) 10 MG tablet Take 10 mg by mouth daily      metoprolol succinate ER (TOPROL-XL) 25 MG 24 hr tablet Take 1 tablet (25 mg) by mouth daily  Qty: 90 tablet, Refills: 2    Associated Diagnoses: Chronic systolic heart failure (H)         STOP taking these medications       amLODIPine (NORVASC) 10 MG tablet Comments:   Reason for Stopping:         glipiZIDE (GLUCOTROL) 5 MG tablet Comments:   Reason for Stopping:                 Allergies:       No Known Allergies        Consultations This Hospital Stay:      Consultation during this admission received from urology        Condition and Physical on Discharge:      Discharge condition: Stable   Vitals: Blood pressure (!) 145/67, pulse 72, temperature 97.7  F (36.5  C), temperature source Oral, resp. rate 18, height 1.702 m (5' 7\"), weight 74.8 kg (164 lb 14.5 oz), SpO2 95 %.     Constitutional:  Alert awake, not in acute distress   Lungs:  Clear to auscultation bilaterally   Cardiovascular:  Normal rate rhythm regular, no murmurs rubs or gallops   Abdomen:  Soft, nontender, nondistended, no hepatosplenomegaly   Skin:  Warm and dry   Other:          Discharge Time:      Greater than 30 " minutes.        Image Results From This Hospital Stay (For Non-EPIC Providers):        Results for orders placed or performed during the hospital encounter of 11/15/22   CT Abdomen Pelvis w/o Contrast    Narrative    EXAM: CT ABDOMEN PELVIS W/O CONTRAST  LOCATION: [facility]  DATE/TIME: 11/15/2022 5:23 PM    INDICATION: LLQ abdominal pain.  COMPARISON: None.  TECHNIQUE: CT scan of the abdomen and pelvis was performed without IV contrast. Multiplanar reformats were obtained. Dose reduction techniques were used.  CONTRAST: None.    FINDINGS:   LOWER CHEST: Sternotomy. Cardiac enlargement. Left basilar atelectasis.    HEPATOBILIARY: Normal.    PANCREAS: Normal.    SPLEEN: Normal.    ADRENAL GLANDS: Normal.    KIDNEYS/BLADDER: 1.1 x 0.9 cm stone left UPJ with moderate left-sided hydronephrosis. There are multiple additional stones within dilated calyces in the lower pole left kidney with stones measuring up to 3 mm with greater than 5 stones present.    BOWEL: Normal.    LYMPH NODES: Normal.    VASCULATURE: Unremarkable.    PELVIC ORGANS: Normal.    MUSCULOSKELETAL: Small fat-containing left inguinal hernia.      Impression    IMPRESSION:   1.  Large 1.1 x 0.9 cm stone left UPJ with moderate left-sided hydronephrosis. Multiple additional stones within dilated calyces in the lower pole of the left kidney with stones measuring up to 3 mm. Greater than 5 stones present.    2.  There is mild urinary bladder wall thickening with soft tissue haziness surrounding the bladder particularly in the left hemipelvis. Recommend exclusion of cystitis. Sternotomy with cardiac enlargement. Small fat-containing left inguinal hernia.     XR Surgery YONG Fluoro Less Than 5 Min w Stills    Narrative    SURGERY C-ARM FLUORO LESS THAN 5 MINUTES WITH STILLS 11/16/2022 5:20  PM     COMPARISON: None    HISTORY: Left cysto, left retro, left stent.    NUMBER OF IMAGES ACQUIRED: 2    VIEWS: 1    FLUOROSCOPY TIME: .1  minute(s)      Impression    IMPRESSION: Mild hydronephrosis, stent in good position. No  extravasation.    GABRIELE LANDON MD         SYSTEM ID:  [id number]           Most Recent Lab Results In EPIC (For Non-EPIC Providers):    Most Recent 3 CBC's:  Recent Labs   Lab Test 11/18/22  1140 11/17/22  0724 11/16/22  0639   WBC 14.5* 12.0* 13.4*   HGB 10.2* 10.0* 10.0*   MCV 80 79 78    191 197      Most Recent 3 BMP's:  Recent Labs   Lab Test 11/18/22  0712 11/18/22  0221 11/17/22  2213 11/17/22  0802 11/17/22  0724 11/16/22  0724 11/16/22  0639     --   --   --  135  --  137   POTASSIUM 3.8  --   --   --  4.1  --  4.1   CHLORIDE 110*  --   --   --  106  --  108   CO2 24  --   --   --  21  --  25   BUN 37*  --   --   --  38*  --  30   CR 2.01*  --   --   --  2.60*  --  2.91*   ANIONGAP 5  --   --   --  8  --  4   MYKE 8.3*  --   --   --  8.4*  --  9.1   * 156* 179*   < > 261*   < > 136*    < > = values in this interval not displayed.     Most Recent 3 Troponin's:  Recent Labs   Lab Test 04/01/19  1653 04/01/19  1515 03/13/19  0532 03/11/19  2041 03/11/19  1754   TROPI 1.972* 2.074* 39.579*   < > 2.102*   TROPONIN  --   --   --   --  0.36*    < > = values in this interval not displayed.     Most Recent 3 INR's:  Recent Labs   Lab Test 03/18/19  1413 03/18/19  1312   INR 1.54* 1.79*     Most Recent 2 LFT's:  Recent Labs   Lab Test 11/15/22  1535 10/17/19  0941 06/14/19  1146   AST 38  --  27   ALT 41 22 39   ALKPHOS 138  --  149   BILITOTAL 1.0  --  0.3     Most Recent Cholesterol Panel:  Recent Labs   Lab Test 10/17/19  0941   CHOL 126   LDL 69   HDL 47   TRIG 48     Most Recent 6 Bacteria Isolates From Any Culture (See EPIC Reports for Culture Details):  Recent Labs   Lab Test 03/12/19  2111 03/12/19  0356 03/11/19 2041 03/11/19 2035   CULT No growth No growth No growth No growth     Most Recent TSH, T4 and HgbA1c:   Recent Labs   Lab Test 11/16/22  0639 04/03/19  1137   TSH  --  1.03   A1C 8.2*  --

## 2022-11-18 NOTE — PROGRESS NOTES
Patient vital signs are at baseline: Yes  Patient able to ambulate as they were prior to admission or with assist devices provided by therapies during their stay: Yes  Patient MUST void prior to discharge: Yes  Patient able to tolerate oral intake:  Yes  Pain has adequate pain control using Oral analgesics: Yes  Does patient have an identified :  Yes  Has goal D/C date and time been discussed with patient:  In progress

## 2022-11-21 LAB — BACTERIA TISS BX CULT: NO GROWTH

## 2022-11-23 LAB — BACTERIA TISS BX CULT: NORMAL

## 2022-11-30 NOTE — OP NOTE
Procedure Date: 11/16/2022    PREOPERATIVE DIAGNOSIS:  A 1 cm radiolucent stone in the proximal ureter.    POSTOPERATIVE DIAGNOSIS:  A 1 cm radiolucent stone in the proximal ureter.    PROCEDURES:  Cystoscopy, left stent placement.    SURGEON:  Alcides Moreno MD    ANESTHESIA:  General.     Preoperatively, she received antibiotics.    INDICATIONS FOR PROCEDURE:  Destiney is a 66-year-old gentleman who presents with a 1 cm stone in the left proximal ureter.  I consented him for the procedure with the risks of bleeding, infection, injury, need for additional surgery.  He would like to proceed.    PROCEDURE IN DETAIL:  Destiney was brought to the operating room and placed in supine position.  After excellent induction of general anesthesia, his perineum was prepped and draped in the regular fashion.  Cystoscopy was performed that did demonstrate no bladder pathology.  Attention was brought to the left ureteral orifice.  Glidewire was passed without difficulty.  I was not able to see the stone, which appears to be more radiolucent, at the UPJ location.  I did feel some resistance using an open-ended catheter.  I felt I pushed the stone all the way to the renal pelvis.  After that, I placed a 6 x 26 double-J ureteral stent over Glidewire, which appears in excellent position.  Prior to that, I obtained some washings from the kidney and sent it for culture.  Bladder was drained, and patient was transferred to recovery in stable condition.     The plan as of now is to follow up as an outpatient in 2-4 weeks with left ureteroscopy, holmium laser lithotripsy, and left stent exchange.  I conveyed this information to his wife.  She understands that the stent is temporary and needs to be removed at some point in the future once we take care of his kidney stones.    Alcides Moreno MD        D: 11/30/2022   T: 11/30/2022   MT: ANNETTE    Name:     DESTINEY HARRIS  MRN:      [id number]        Account:        [id number]    :      1955           Procedure Date: 2022     Document: [id number]    cc:  Alcides Moreno MD

## 2022-12-19 ENCOUNTER — OFFICE VISIT (OUTPATIENT)
Dept: CARDIOLOGY | Facility: CLINIC | Age: 67
End: 2022-12-19
Attending: NURSE PRACTITIONER
Payer: MEDICARE

## 2022-12-19 VITALS
HEART RATE: 63 BPM | DIASTOLIC BLOOD PRESSURE: 66 MMHG | SYSTOLIC BLOOD PRESSURE: 136 MMHG | HEIGHT: 63 IN | BODY MASS INDEX: 32.43 KG/M2 | OXYGEN SATURATION: 99 % | WEIGHT: 183 LBS

## 2022-12-19 DIAGNOSIS — I25.10 CORONARY ARTERY DISEASE INVOLVING NATIVE CORONARY ARTERY OF NATIVE HEART WITHOUT ANGINA PECTORIS: ICD-10-CM

## 2022-12-19 DIAGNOSIS — I25.5 ISCHEMIC CARDIOMYOPATHY: ICD-10-CM

## 2022-12-19 DIAGNOSIS — N18.32 STAGE 3B CHRONIC KIDNEY DISEASE (H): ICD-10-CM

## 2022-12-19 DIAGNOSIS — E11.00 TYPE 2 DIABETES MELLITUS WITH HYPEROSMOLARITY WITHOUT COMA, WITHOUT LONG-TERM CURRENT USE OF INSULIN (H): Primary | ICD-10-CM

## 2022-12-19 DIAGNOSIS — G47.33 OSA (OBSTRUCTIVE SLEEP APNEA): ICD-10-CM

## 2022-12-19 DIAGNOSIS — I10 ESSENTIAL HYPERTENSION: ICD-10-CM

## 2022-12-19 PROCEDURE — 99215 OFFICE O/P EST HI 40 MIN: CPT | Performed by: NURSE PRACTITIONER

## 2022-12-19 RX ORDER — AMLODIPINE BESYLATE 10 MG/1
1 TABLET ORAL DAILY
COMMUNITY
Start: 2022-08-25

## 2022-12-19 RX ORDER — GLIPIZIDE 5 MG/1
2.5 TABLET ORAL 2 TIMES DAILY
COMMUNITY
Start: 2022-12-09

## 2022-12-19 NOTE — LETTER
12/19/2022    Mpho Brian Castro MD  [address]  [facility] [address]    RE: Khang Mcelroy       Dear Colleague,     I had the pleasure of seeing Khang Mcelroy in the [facility].  Cardiology Clinic Progress Note  Khang Mcelroy MRN# [id number]   YOB: 1955 Age: 67 year old   Primary Cardiologist: Dr. Hernández  Reason for visit: Cardiology follow up             Assessment and Plan:   Khang Mcerloy is a very pleasant 67 year old male here today for cardiology follow up.     1.  Combined chronic systolic heart failure/HFrEF and diastolic heart failure, ischemic cardiomyopathy - LVEF 35-40% 3/12/19 since has had some recovery, stable EF around 45-50% with grade I diastolic dysfunction              - NYHA class III, stage C              - Etiology : ischemic              - Fluid status : mildly volume up              - Diuretic regimen : none              - Ischemic evaluation : coronary angiogram 3/12/19, s/p CABG 3/18/19              - Guideline directed medical therapy                          - Betablocker: metoprolol succinate 25mg daily                          - ACEI/ARB/ARNI: none, stopped due to acute on chronic kidney disease 6/2022                          - Aldactone antagonist: none, stopped due to acute on chronic kidney disease 6/2022    - SGLT2i : will price out and consider if renal function stays stable.   2. Coronary artery disease s/p CABG x 4 (LIMA-LAD, SVG-DIAG, SVG-OM, SVG-PDA) on 3/18/19, stable no signs/symptoms of myocardial ischemia.               - Continue aspirin, statin and betablocker therapy.  3. Hypertension - controlled   4. Hyperlipidemia - stable, LDL goal <70, lipid panel 6/7/22 showed total cholesterol 113, HDL 30, LDL 60.               - Continue atorvastatin  5. Chronic kidney disease - baseline 1.7-2.0, had recent acute on chronic kidney disease secondary to hydronephrosis due to kidney stone, creatinine peak ~3    -  Follows with nephrology  6. Diabetes mellitus - hgbA1c 7.8 6/7/22  7. Obstructive sleep apnea - not compliant with CPAP    I saw patient for CORE follow up with Kyler  via the phone. From a heart failure standpoint he appears compensated. He does have some LE edema but no other significant HF symptoms. He was recently hospitalized secondary to large left UPJ stone with moderate left hydronephrosis, the large 1.1 x 0.9 cm left UPJ stone s/p cystoscopy and left ureteral stent placement by urology on 11/16/2022. He again had an acute kidney injury with creatinine peaking ~3. He is scheduled for a kidney stone removal procedure tomorrow.     Hesitant to trial adjusting diuretics for LE edema given kidney stone with removal planned for tomorrow and CKD, he has been off diuretics. Wonder if amlodipine could be contributing to his LE edema as well. Blood pressure overall controlled here, but if amlodipine was stopped/decreased would need to add/titrate something else for his hypertension control.     Lastly could consider SGTL2i, will price out and consider in the future depending on renal function and symptoms.     Changes today: none    Follow up plan:     Pharmacy liaison consult to price out SGLT2i    Follow up with me in 6 weeks - consider initiation of SGLT2i depending on renal function and/or decrease amlodipine with titration of other antihypertensive due to LE edema        History of Presenting Illness:    Khang Mcelroy is a very pleasant 67 year old male with a history of HFrEF, ischemic cardiomyopathy, LVEF 35-40% per echocardiogram 3/12/19, coronary artery disease s/p CABG x 4 (LIMA-LAD, SVG-DIAG, SVG-OM, SVG-PDA) on 3/18/19, DM, hypertension, hyperlipidemia, CKD, and anemia.      Hospitalized 3/11/19-3/24/19 presented with acute hypoxemic and hypercapnic respiratory failure secondary to NSTEMI, found to have multivessel coronary artery disease, s/p CABG x 4 (LIMA-LAD, SVG-DIAG, SVG-OM, SVG-PDA) on  3/18/19. Troponin peaked at 53. Echocardiogram 3/12/19 showed an LVEF 35-40%, grade III or advanced diastolic dysfunction, with multiple wall motion abnormalities.      Primary cardiologist Dr. Hernández. Most recent echocardiogram 1/2021 showed EF 45-50%, RV normal size/function, inferior wall hypokinesis, grade I diastolic dysfunction and mildly decreased RV systolic function.      He most recently saw Dr. Hernández 2/7/22 at which time he was doing well, prior to his OV he had stopped his furosemide.     Over the summer he was hospitalized for an acute kidney injury and critically elevated potassium, creatinine peaked ~ 3, likely pre-renal, his fursoemide, lisinopril and spironolactone were stopped. Evaluated by nephrology.      Most recently he was hospitalized 11/15-11/18/22 secondary to large left UPJ stone with moderate left hydronephrosis, the large 1.1 x 0.9 cm left UPJ stone s/p cystoscopy and left ureteral stent placement by urology on 11/16/2022. He again had an acute kidney injury with creatinine peaking ~3. He is scheduled for a kidney stone removal procedure tomorrow.      Patient is here today for cardiology follow up.     Patient reports feeling good. Not monitoring weight daily at home. Clinic weight up. Complaints of some LE edema goes up and down. Denies shortness of breath at rest. Denies exertional dyspnea. Denies orthopnea or PND. Denies chest pain or chest tightness. Denies dizziness, lightheadedness or other presyncopal symptoms. Denies tachycardia or palpitations. Taking medications daily.     Blood pressure 136/66 and HR 63 in clinic today. States at home blood pressure but unable to recall readings.     Appetite good. Trying to limit salt on foods. Walking in mall for exercise, 3-4 x a week, 1-2 hours with no difficulty. Denies alcohol use. Denies tobacco use.         Social History      Social History     Socioeconomic History     Marital status:      Spouse name: Not on file     Number  "of children: Not on file     Years of education: Not on file     Highest education level: Not on file   Occupational History     Not on file   Tobacco Use     Smoking status: Never     Smokeless tobacco: Never   Substance and Sexual Activity     Alcohol use: No     Drug use: No     Sexual activity: Yes     Partners: Female   Other Topics Concern     Parent/sibling w/ CABG, MI or angioplasty before 65F 55M? No   Social History Narrative     Not on file     Social Determinants of Health     Financial Resource Strain: Not on file   Food Insecurity: Not on file   Transportation Needs: Not on file   Physical Activity: Not on file   Stress: Not on file   Social Connections: Not on file   Intimate Partner Violence: Not on file   Housing Stability: Not on file          Review of Systems:   Skin:  Negative     Eyes:  Negative glasses  ENT:  Negative    Respiratory:  Positive for sleep apnea;CPAP  Cardiovascular:  Negative;chest pain;palpitations;dizziness;lightheadedness;fatigue Positive for;edema  Gastroenterology: not assessed    Genitourinary:  not assessed    Musculoskeletal:  Negative    Neurologic:  Negative headaches  Psychiatric:  Positive for sleep disturbances  Heme/Lymph/Imm:  Negative allergies  Endocrine:  Positive for diabetes         Physical Exam:   Vitals: /66   Pulse 63   Ht 1.6 m (5' 3\")   Wt 83 kg (183 lb)   SpO2 99%   BMI 32.42 kg/m     Wt Readings from Last 4 Encounters:   12/19/22 83 kg (183 lb)   11/17/22 74.8 kg (164 lb 14.5 oz)   07/11/22 76.7 kg (169 lb)   06/15/22 74.2 kg (163 lb 9.6 oz)     GEN: well nourished, in no acute distress.  HEENT:  Pupils equal, round. Sclerae nonicteric.   NECK: Supple, no masses appreciated. JVP appears normal  C/V:  Regular rate and rhythm, no murmur, rub or gallop.    RESP: Respirations are unlabored. Clear to auscultation bilaterally without wheezing, rales, or rhonchi.  GI: Abdomen soft, nontender.  EXTREM: +1 bilateral LE edema.  NEURO: Alert and " oriented, cooperative.  SKIN: Warm and dry       Data:     LIPID RESULTS:  Lab Results   Component Value Date    CHOL 126 10/17/2019    HDL 47 10/17/2019    LDL 69 10/17/2019    TRIG 48 10/17/2019     LIVER ENZYME RESULTS:  Lab Results   Component Value Date    AST 38 11/15/2022    AST 27 06/14/2019    ALT 41 11/15/2022    ALT 22 10/17/2019     CBC RESULTS:  Lab Results   Component Value Date    WBC 14.5 (H) 11/18/2022    WBC 9.8 04/06/2021    RBC 3.95 (L) 11/18/2022    RBC 3.70 (L) 04/06/2021    HGB 10.2 (L) 11/18/2022    HGB 10.0 (L) 04/06/2021    HCT 31.7 (L) 11/18/2022    HCT 31.1 (L) 04/06/2021    MCV 80 11/18/2022    MCV 84 04/06/2021    MCH 25.8 (L) 11/18/2022    MCH 27.0 04/06/2021    MCHC 32.2 11/18/2022    MCHC 32.2 04/06/2021    RDW 14.6 11/18/2022    RDW 13.2 04/06/2021     11/18/2022     04/06/2021     BMP RESULTS:  Lab Results   Component Value Date     11/18/2022     04/06/2021    POTASSIUM 3.8 11/18/2022    POTASSIUM 5.6 (H) 04/06/2021    CHLORIDE 110 (H) 11/18/2022    CHLORIDE 110 (H) 04/06/2021    CO2 24 11/18/2022    CO2 25 04/06/2021    ANIONGAP 5 11/18/2022    ANIONGAP 4 04/06/2021     (H) 11/18/2022     (H) 11/18/2022     (H) 04/06/2021    BUN 37 (H) 11/18/2022    BUN 48 (H) 04/06/2021    CR 2.01 (H) 11/18/2022    CR 1.99 (H) 04/06/2021    GFRESTIMATED 36 (L) 11/18/2022    GFRESTIMATED 35 (L) 04/06/2021    GFRESTBLACK 40 (L) 04/06/2021    MYKE 8.3 (L) 11/18/2022    MYKE 9.9 04/06/2021      A1C RESULTS:  Lab Results   Component Value Date    A1C 8.2 (H) 11/16/2022    A1C 7.9 (H) 03/11/2019     INR RESULTS:  Lab Results   Component Value Date    INR 1.54 (H) 03/18/2019    INR 1.79 (H) 03/18/2019          Medications     Current Outpatient Medications   Medication Sig Dispense Refill     acetaminophen (TYLENOL) 325 MG tablet Take 650 mg by mouth every 6 hours as needed for mild pain       amLODIPine (NORVASC) 10 MG tablet Take 1 tablet by mouth daily        atorvastatin (LIPITOR) 80 MG tablet Take 1 tablet (80 mg) by mouth every evening 90 tablet 3     ferrous sulfate (FEROSUL) 325 (65 Fe) MG tablet Take 325 mg by mouth daily       glipiZIDE (GLUCOTROL) 5 MG tablet Take 2.5 mg by mouth 2 times daily       insulin glargine (LANTUS PEN) 100 UNIT/ML pen Inject 10 Units Subcutaneous 2 times daily 15 mL 0     lisinopril (ZESTRIL) 10 MG tablet Take 10 mg by mouth daily       metoprolol succinate ER (TOPROL-XL) 25 MG 24 hr tablet Take 1 tablet (25 mg) by mouth daily 90 tablet 2     tamsulosin (FLOMAX) 0.4 MG capsule Take 1 capsule (0.4 mg) by mouth daily 30 capsule 0          Past Medical History     Past Medical History:   Diagnosis Date     CAD in native artery 03/18/2019     Chronic renal insufficiency     Stage 3b in 2021     Chronic systolic heart failure (H) 10/01/2019     Diabetes mellitus with proliferative retinopathy (H)      Essential hypertension 06/14/2019     Hyperlipidemia LDL goal <70 06/14/2019     Nephrolithiasis      NSTEMI (non-ST elevated myocardial infarction) (H)      CHALO (obstructive sleep apnea) 06/24/2019 6/21/2019 [place] [facility] Sleep Study (153.0 lbs) - AHI 30.5, RDI 34.7, Supine AHI 31.5, REM AHI 56.5, Low O2 68.7%, Time Spent ?88% 3.7 minutes / Time Spent ?89% 4.5 minutes.     S/P CABG (coronary artery bypass graft) 03/26/2019     Past Surgical History:   Procedure Laterality Date     BYPASS GRAFT ARTERY CORONARY N/A 3/18/2019    Procedure: CORONARY BYPASS GRAFTING X 4 - LIMA TO LAD, SV TO DIAGONAL, PDA, AND OM; ON PUMP WITH NOA; ENDOVEIN HARVEST LEFT LEG;  Surgeon: Adarsh Sanchez MD;  Location:  OR     COMBINED CYSTOSCOPY, INSERT STENT URETER(S) Left 11/16/2022    Procedure: CYSTOSCOPY, WITH LEFT URETERAL STENT INSERTION. LEFT RETROGRADE PYELOGRAM;  Surgeon: Alcides Moreno MD;  Location:  OR     CV CORONARY ANGIOGRAM N/A 3/12/2019    Procedure: Coronary Angiogram;  Surgeon: Remi Rodriguez MD;  Location:   HEART CARDIAC CATH LAB     CV HEART CATHETERIZATION WITH POSSIBLE INTERVENTION N/A 3/12/2019    Procedure: Heart Catheterization with possible Intervention;  Surgeon: Remi Rodriguez MD;  Location:  HEART CARDIAC CATH LAB     CV LEFT VENTRICULOGRAM N/A 3/12/2019    Procedure: Left Ventricular Angiogram;  Surgeon: Remi Rodriguez MD;  Location:  HEART CARDIAC CATH LAB     LITHOTRIPSY       Family History   Problem Relation Age of Onset     Other - See Comments Mother         giving birth     Other - See Comments Father         old age            Allergies   Patient has no known allergies.    40 minutes spent on the date of the encounter doing chart review, history and exam, documentation and further activities as noted above      MICHELE Hicks CNP  [facility]  Pager: [phone]    Thank you for allowing me to participate in the care of your patient.      Sincerely,     MICHELE Hicks CNP     [facility]  cc:   MICHELE Luis CNP  [address]  [place] [address]

## 2022-12-19 NOTE — PATIENT INSTRUCTIONS
No medication changes  We will price out a new medication that is good for your heart and for your DM called Jardiance or Farxiga.   Follow up with Viv in 6 weeks with labs prior

## 2022-12-19 NOTE — PROGRESS NOTES
Cardiology Clinic Progress Note  Khang Mcelroy MRN# [id number]   YOB: 1955 Age: 67 year old   Primary Cardiologist: Dr. Hernández  Reason for visit: Cardiology follow up             Assessment and Plan:   Khang Mcelroy is a very pleasant 67 year old male here today for cardiology follow up.     1.  Combined chronic systolic heart failure/HFrEF and diastolic heart failure, ischemic cardiomyopathy - LVEF 35-40% 3/12/19 since has had some recovery, stable EF around 45-50% with grade I diastolic dysfunction              - NYHA class III, stage C              - Etiology : ischemic              - Fluid status : mildly volume up              - Diuretic regimen : none              - Ischemic evaluation : coronary angiogram 3/12/19, s/p CABG 3/18/19              - Guideline directed medical therapy                          - Betablocker: metoprolol succinate 25mg daily                          - ACEI/ARB/ARNI: none, stopped due to acute on chronic kidney disease 6/2022                          - Aldactone antagonist: none, stopped due to acute on chronic kidney disease 6/2022    - SGLT2i : will price out and consider if renal function stays stable.   2. Coronary artery disease s/p CABG x 4 (LIMA-LAD, SVG-DIAG, SVG-OM, SVG-PDA) on 3/18/19, stable no signs/symptoms of myocardial ischemia.               - Continue aspirin, statin and betablocker therapy.  3. Hypertension - controlled   4. Hyperlipidemia - stable, LDL goal <70, lipid panel 6/7/22 showed total cholesterol 113, HDL 30, LDL 60.               - Continue atorvastatin  5. Chronic kidney disease - baseline 1.7-2.0, had recent acute on chronic kidney disease secondary to hydronephrosis due to kidney stone, creatinine peak ~3    - Follows with nephrology  6. Diabetes mellitus - hgbA1c 7.8 6/7/22  7. Obstructive sleep apnea - not compliant with CPAP    I saw patient for CORE follow up with Kyler  via the phone. From a heart failure standpoint  he appears compensated. He does have some LE edema but no other significant HF symptoms. He was recently hospitalized secondary to large left UPJ stone with moderate left hydronephrosis, the large 1.1 x 0.9 cm left UPJ stone s/p cystoscopy and left ureteral stent placement by urology on 11/16/2022. He again had an acute kidney injury with creatinine peaking ~3. He is scheduled for a kidney stone removal procedure tomorrow.     Hesitant to trial adjusting diuretics for LE edema given kidney stone with removal planned for tomorrow and CKD, he has been off diuretics. Wonder if amlodipine could be contributing to his LE edema as well. Blood pressure overall controlled here, but if amlodipine was stopped/decreased would need to add/titrate something else for his hypertension control.     Lastly could consider SGTL2i, will price out and consider in the future depending on renal function and symptoms.     Changes today: none    Follow up plan:     Pharmacy liaison consult to price out SGLT2i    Follow up with me in 6 weeks - consider initiation of SGLT2i depending on renal function and/or decrease amlodipine with titration of other antihypertensive due to LE edema        History of Presenting Illness:    Khang Mcelroy is a very pleasant 67 year old male with a history of HFrEF, ischemic cardiomyopathy, LVEF 35-40% per echocardiogram 3/12/19, coronary artery disease s/p CABG x 4 (LIMA-LAD, SVG-DIAG, SVG-OM, SVG-PDA) on 3/18/19, DM, hypertension, hyperlipidemia, CKD, and anemia.      Hospitalized 3/11/19-3/24/19 presented with acute hypoxemic and hypercapnic respiratory failure secondary to NSTEMI, found to have multivessel coronary artery disease, s/p CABG x 4 (LIMA-LAD, SVG-DIAG, SVG-OM, SVG-PDA) on 3/18/19. Troponin peaked at 53. Echocardiogram 3/12/19 showed an LVEF 35-40%, grade III or advanced diastolic dysfunction, with multiple wall motion abnormalities.      Primary cardiologist Dr. Hernández. Most recent  echocardiogram 1/2021 showed EF 45-50%, RV normal size/function, inferior wall hypokinesis, grade I diastolic dysfunction and mildly decreased RV systolic function.      He most recently saw Dr. Hernández 2/7/22 at which time he was doing well, prior to his OV he had stopped his furosemide.     Over the summer he was hospitalized for an acute kidney injury and critically elevated potassium, creatinine peaked ~ 3, likely pre-renal, his fursoemide, lisinopril and spironolactone were stopped. Evaluated by nephrology.      Most recently he was hospitalized 11/15-11/18/22 secondary to large left UPJ stone with moderate left hydronephrosis, the large 1.1 x 0.9 cm left UPJ stone s/p cystoscopy and left ureteral stent placement by urology on 11/16/2022. He again had an acute kidney injury with creatinine peaking ~3. He is scheduled for a kidney stone removal procedure tomorrow.      Patient is here today for cardiology follow up.     Patient reports feeling good. Not monitoring weight daily at home. Clinic weight up. Complaints of some LE edema goes up and down. Denies shortness of breath at rest. Denies exertional dyspnea. Denies orthopnea or PND. Denies chest pain or chest tightness. Denies dizziness, lightheadedness or other presyncopal symptoms. Denies tachycardia or palpitations. Taking medications daily.     Blood pressure 136/66 and HR 63 in clinic today. States at home blood pressure but unable to recall readings.     Appetite good. Trying to limit salt on foods. Walking in mall for exercise, 3-4 x a week, 1-2 hours with no difficulty. Denies alcohol use. Denies tobacco use.         Social History      Social History     Socioeconomic History     Marital status:      Spouse name: Not on file     Number of children: Not on file     Years of education: Not on file     Highest education level: Not on file   Occupational History     Not on file   Tobacco Use     Smoking status: Never     Smokeless tobacco: Never  "  Substance and Sexual Activity     Alcohol use: No     Drug use: No     Sexual activity: Yes     Partners: Female   Other Topics Concern     Parent/sibling w/ CABG, MI or angioplasty before 65F 55M? No   Social History Narrative     Not on file     Social Determinants of Health     Financial Resource Strain: Not on file   Food Insecurity: Not on file   Transportation Needs: Not on file   Physical Activity: Not on file   Stress: Not on file   Social Connections: Not on file   Intimate Partner Violence: Not on file   Housing Stability: Not on file          Review of Systems:   Skin:  Negative     Eyes:  Negative glasses  ENT:  Negative    Respiratory:  Positive for sleep apnea;CPAP  Cardiovascular:  Negative;chest pain;palpitations;dizziness;lightheadedness;fatigue Positive for;edema  Gastroenterology: not assessed    Genitourinary:  not assessed    Musculoskeletal:  Negative    Neurologic:  Negative headaches  Psychiatric:  Positive for sleep disturbances  Heme/Lymph/Imm:  Negative allergies  Endocrine:  Positive for diabetes         Physical Exam:   Vitals: /66   Pulse 63   Ht 1.6 m (5' 3\")   Wt 83 kg (183 lb)   SpO2 99%   BMI 32.42 kg/m     Wt Readings from Last 4 Encounters:   12/19/22 83 kg (183 lb)   11/17/22 74.8 kg (164 lb 14.5 oz)   07/11/22 76.7 kg (169 lb)   06/15/22 74.2 kg (163 lb 9.6 oz)     GEN: well nourished, in no acute distress.  HEENT:  Pupils equal, round. Sclerae nonicteric.   NECK: Supple, no masses appreciated. JVP appears normal  C/V:  Regular rate and rhythm, no murmur, rub or gallop.    RESP: Respirations are unlabored. Clear to auscultation bilaterally without wheezing, rales, or rhonchi.  GI: Abdomen soft, nontender.  EXTREM: +1 bilateral LE edema.  NEURO: Alert and oriented, cooperative.  SKIN: Warm and dry       Data:     LIPID RESULTS:  Lab Results   Component Value Date    CHOL 126 10/17/2019    HDL 47 10/17/2019    LDL 69 10/17/2019    TRIG 48 10/17/2019     LIVER ENZYME " RESULTS:  Lab Results   Component Value Date    AST 38 11/15/2022    AST 27 06/14/2019    ALT 41 11/15/2022    ALT 22 10/17/2019     CBC RESULTS:  Lab Results   Component Value Date    WBC 14.5 (H) 11/18/2022    WBC 9.8 04/06/2021    RBC 3.95 (L) 11/18/2022    RBC 3.70 (L) 04/06/2021    HGB 10.2 (L) 11/18/2022    HGB 10.0 (L) 04/06/2021    HCT 31.7 (L) 11/18/2022    HCT 31.1 (L) 04/06/2021    MCV 80 11/18/2022    MCV 84 04/06/2021    MCH 25.8 (L) 11/18/2022    MCH 27.0 04/06/2021    MCHC 32.2 11/18/2022    MCHC 32.2 04/06/2021    RDW 14.6 11/18/2022    RDW 13.2 04/06/2021     11/18/2022     04/06/2021     BMP RESULTS:  Lab Results   Component Value Date     11/18/2022     04/06/2021    POTASSIUM 3.8 11/18/2022    POTASSIUM 5.6 (H) 04/06/2021    CHLORIDE 110 (H) 11/18/2022    CHLORIDE 110 (H) 04/06/2021    CO2 24 11/18/2022    CO2 25 04/06/2021    ANIONGAP 5 11/18/2022    ANIONGAP 4 04/06/2021     (H) 11/18/2022     (H) 11/18/2022     (H) 04/06/2021    BUN 37 (H) 11/18/2022    BUN 48 (H) 04/06/2021    CR 2.01 (H) 11/18/2022    CR 1.99 (H) 04/06/2021    GFRESTIMATED 36 (L) 11/18/2022    GFRESTIMATED 35 (L) 04/06/2021    GFRESTBLACK 40 (L) 04/06/2021    MYKE 8.3 (L) 11/18/2022    MYKE 9.9 04/06/2021      A1C RESULTS:  Lab Results   Component Value Date    A1C 8.2 (H) 11/16/2022    A1C 7.9 (H) 03/11/2019     INR RESULTS:  Lab Results   Component Value Date    INR 1.54 (H) 03/18/2019    INR 1.79 (H) 03/18/2019          Medications     Current Outpatient Medications   Medication Sig Dispense Refill     acetaminophen (TYLENOL) 325 MG tablet Take 650 mg by mouth every 6 hours as needed for mild pain       amLODIPine (NORVASC) 10 MG tablet Take 1 tablet by mouth daily       atorvastatin (LIPITOR) 80 MG tablet Take 1 tablet (80 mg) by mouth every evening 90 tablet 3     ferrous sulfate (FEROSUL) 325 (65 Fe) MG tablet Take 325 mg by mouth daily       glipiZIDE (GLUCOTROL) 5 MG  tablet Take 2.5 mg by mouth 2 times daily       insulin glargine (LANTUS PEN) 100 UNIT/ML pen Inject 10 Units Subcutaneous 2 times daily 15 mL 0     lisinopril (ZESTRIL) 10 MG tablet Take 10 mg by mouth daily       metoprolol succinate ER (TOPROL-XL) 25 MG 24 hr tablet Take 1 tablet (25 mg) by mouth daily 90 tablet 2     tamsulosin (FLOMAX) 0.4 MG capsule Take 1 capsule (0.4 mg) by mouth daily 30 capsule 0          Past Medical History     Past Medical History:   Diagnosis Date     CAD in native artery 03/18/2019     Chronic renal insufficiency     Stage 3b in 2021     Chronic systolic heart failure (H) 10/01/2019     Diabetes mellitus with proliferative retinopathy (H)      Essential hypertension 06/14/2019     Hyperlipidemia LDL goal <70 06/14/2019     Nephrolithiasis      NSTEMI (non-ST elevated myocardial infarction) (H)      CHALO (obstructive sleep apnea) 06/24/2019 6/21/2019 Delta Diagnostic Sleep Study (153.0 lbs) - AHI 30.5, RDI 34.7, Supine AHI 31.5, REM AHI 56.5, Low O2 68.7%, Time Spent ?88% 3.7 minutes / Time Spent ?89% 4.5 minutes.     S/P CABG (coronary artery bypass graft) 03/26/2019     Past Surgical History:   Procedure Laterality Date     BYPASS GRAFT ARTERY CORONARY N/A 3/18/2019    Procedure: CORONARY BYPASS GRAFTING X 4 - LIMA TO LAD, SV TO DIAGONAL, PDA, AND OM; ON PUMP WITH NOA; ENDOVEIN HARVEST LEFT LEG;  Surgeon: Adarsh Sanchez MD;  Location:  OR     COMBINED CYSTOSCOPY, INSERT STENT URETER(S) Left 11/16/2022    Procedure: CYSTOSCOPY, WITH LEFT URETERAL STENT INSERTION. LEFT RETROGRADE PYELOGRAM;  Surgeon: Alcides Moreno MD;  Location:  OR     CV CORONARY ANGIOGRAM N/A 3/12/2019    Procedure: Coronary Angiogram;  Surgeon: Remi Rodriguez MD;  Location:  HEART CARDIAC CATH LAB     CV HEART CATHETERIZATION WITH POSSIBLE INTERVENTION N/A 3/12/2019    Procedure: Heart Catheterization with possible Intervention;  Surgeon: Remi Rodriguez MD;  Location:   HEART CARDIAC CATH LAB     CV LEFT VENTRICULOGRAM N/A 3/12/2019    Procedure: Left Ventricular Angiogram;  Surgeon: Remi Rodriguez MD;  Location:  HEART CARDIAC CATH LAB     LITHOTRIPSY       Family History   Problem Relation Age of Onset     Other - See Comments Mother         giving birth     Other - See Comments Father         old age            Allergies   Patient has no known allergies.    40 minutes spent on the date of the encounter doing chart review, history and exam, documentation and further activities as noted above      MICHELE Hicks [facility]  Pager: [phone]

## 2023-02-01 ENCOUNTER — TELEPHONE (OUTPATIENT)
Dept: CARDIOLOGY | Facility: CLINIC | Age: 68
End: 2023-02-01
Payer: MEDICARE

## 2023-02-01 NOTE — TELEPHONE ENCOUNTER
Patient has Medicare D through [employer] and Medical Assistance.    This plan does not contract with [facility].    Jardiance/Farxiga and Entresto  --Cannot run a test claim because of the rakesh issue.  --All three drugs are covered without prior auth.  --I would estimate copays of no more that $10.35/mo each.    Paulina Agrawal  Pharmacy Technician/Liaison, Discharge Pharmacy   [phone] (voice or text)  [email or web address]     10

## 2023-02-01 NOTE — TELEPHONE ENCOUNTER
Pt is scheduled for a [facility] appt on 02/07/23    Pt with a history of systolic heart failure..  Medication list reviewed and pt is not currently on Entresto, Jardiance, Farxiga and Ivokana.    Please initiate coverage check for the above medications.  Sarah Tariq CMA ([facility])  02/01/23   11:59am

## 2023-02-07 ENCOUNTER — LAB (OUTPATIENT)
Dept: LAB | Facility: CLINIC | Age: 68
End: 2023-02-07
Payer: MEDICARE

## 2023-02-07 ENCOUNTER — OFFICE VISIT (OUTPATIENT)
Dept: CARDIOLOGY | Facility: CLINIC | Age: 68
End: 2023-02-07
Attending: NURSE PRACTITIONER
Payer: MEDICARE

## 2023-02-07 VITALS
HEIGHT: 63 IN | HEART RATE: 60 BPM | WEIGHT: 166 LBS | OXYGEN SATURATION: 98 % | BODY MASS INDEX: 29.41 KG/M2 | SYSTOLIC BLOOD PRESSURE: 154 MMHG | DIASTOLIC BLOOD PRESSURE: 90 MMHG

## 2023-02-07 DIAGNOSIS — I10 ESSENTIAL HYPERTENSION: ICD-10-CM

## 2023-02-07 DIAGNOSIS — N18.32 STAGE 3B CHRONIC KIDNEY DISEASE (H): ICD-10-CM

## 2023-02-07 DIAGNOSIS — E78.5 HYPERLIPIDEMIA LDL GOAL <70: ICD-10-CM

## 2023-02-07 DIAGNOSIS — I50.40 COMBINED SYSTOLIC AND DIASTOLIC ACC/AHA STAGE C CONGESTIVE HEART FAILURE (H): ICD-10-CM

## 2023-02-07 DIAGNOSIS — I25.5 ISCHEMIC CARDIOMYOPATHY: ICD-10-CM

## 2023-02-07 DIAGNOSIS — G47.33 OSA (OBSTRUCTIVE SLEEP APNEA): ICD-10-CM

## 2023-02-07 DIAGNOSIS — I25.5 ISCHEMIC CARDIOMYOPATHY: Primary | ICD-10-CM

## 2023-02-07 DIAGNOSIS — I25.10 CAD IN NATIVE ARTERY: ICD-10-CM

## 2023-02-07 LAB
ANION GAP SERPL CALCULATED.3IONS-SCNC: 11 MMOL/L (ref 7–15)
BUN SERPL-MCNC: 27.9 MG/DL (ref 8–23)
CALCIUM SERPL-MCNC: 9.7 MG/DL (ref 8.8–10.2)
CHLORIDE SERPL-SCNC: 104 MMOL/L (ref 98–107)
CREAT SERPL-MCNC: 1.93 MG/DL (ref 0.67–1.17)
DEPRECATED HCO3 PLAS-SCNC: 26 MMOL/L (ref 22–29)
GFR SERPL CREATININE-BSD FRML MDRD: 38 ML/MIN/1.73M2
GLUCOSE SERPL-MCNC: 181 MG/DL (ref 70–99)
POTASSIUM SERPL-SCNC: 4.9 MMOL/L (ref 3.4–5.3)
SODIUM SERPL-SCNC: 141 MMOL/L (ref 136–145)

## 2023-02-07 PROCEDURE — 36415 COLL VENOUS BLD VENIPUNCTURE: CPT | Performed by: NURSE PRACTITIONER

## 2023-02-07 PROCEDURE — 99215 OFFICE O/P EST HI 40 MIN: CPT | Performed by: NURSE PRACTITIONER

## 2023-02-07 PROCEDURE — 80048 BASIC METABOLIC PNL TOTAL CA: CPT | Performed by: NURSE PRACTITIONER

## 2023-02-07 RX ORDER — CARVEDILOL 12.5 MG/1
12.5 TABLET ORAL 2 TIMES DAILY WITH MEALS
Qty: 180 TABLET | Refills: 1 | Status: SHIPPED | OUTPATIENT
Start: 2023-02-07 | End: 2023-04-13

## 2023-02-07 NOTE — PROGRESS NOTES
Cardiology Clinic Progress Note  Khang Mcelroy MRN# [id number]   YOB: 1955 Age: 67 year old   Primary Cardiologist: Dr. Hernández  Reason for visit: CORE follow up            Assessment and Plan:   Khang Mcelroy is a very pleasant 67 year old male here for CORE follow up.     1.  Combined chronic systolic heart failure/HFrEF and diastolic heart failure, ischemic cardiomyopathy - LVEF 35-40% 3/12/19 since has had some recovery, stable EF around 45-50% with grade I diastolic dysfunction              - NYHA class III, stage C              - Etiology : ischemic              - Fluid status : euvolemic               - Diuretic regimen : none              - Ischemic evaluation : coronary angiogram 3/12/19, s/p CABG 3/18/19              - Guideline directed medical therapy                          - Betablocker: STOP metoprolol, START carvedilol 12.5mg BID                           - ACEI/ARB/ARNI: lisinopril 10mg daily                           - Aldactone antagonist: none, stopped due to acute on chronic kidney disease 6/2022                          - SGLT2i : START Jardiance 10mg daily  2. Coronary artery disease s/p CABG x 4 (LIMA-LAD, SVG-DIAG, SVG-OM, SVG-PDA) on 3/18/19, stable no signs/symptoms of myocardial ischemia.               - Continue aspirin, statin and betablocker therapy.  3. Hypertension - uncontrolled   4. Hyperlipidemia - stable, LDL goal <70, lipid panel 6/7/22 showed total cholesterol 113, HDL 30, LDL 60.               - Continue atorvastatin  5. Chronic kidney disease - baseline 1.7-2.0, had recent acute on chronic kidney disease secondary to hydronephrosis due to kidney stone, creatinine peak ~3               - Follows with nephrology  6. Diabetes mellitus - hgbA1c 8.2 11/16/22  7. Obstructive sleep apnea - not compliant with CPAP    I saw patient for CORE follow up he is doing well from a heart failure standpoint, he appears compensated and euvolemic on exam. LE edema has  improved since our last visit. Blood pressures are elevated today. He shared that he has been self adjusting his amlodipine based on blood pressures, noting that if BP normal take 1 tablet (10mg) and if elevated takes 2 tablets (20mg). To optimize blood pressure control and his GDMT recommend changing metoprolol to carvedilol and starting Jardiance. Recommend CORE follow up in 1 month at that time recommend consideration for Entresto and decreasing/stopping amlodipine.       Changes today: STOP metoprolol, START carvedilol 12.5mg BID, START jardiance 10mg daily     Follow up plan:    CORE follow up with me in 1 month with labs prior         History of Presenting Illness:    Khang Mcelroy is a very pleasant 67 year old male with a history of HFrEF, ischemic cardiomyopathy, LVEF 35-40% per echocardiogram 3/12/19, coronary artery disease s/p CABG x 4 (LIMA-LAD, SVG-DIAG, SVG-OM, SVG-PDA) on 3/18/19, DM, hypertension, hyperlipidemia, CKD, and anemia.      Hospitalized 3/11/19-3/24/19 presented with acute hypoxemic and hypercapnic respiratory failure secondary to NSTEMI, found to have multivessel coronary artery disease, s/p CABG x 4 (LIMA-LAD, SVG-DIAG, SVG-OM, SVG-PDA) on 3/18/19. Troponin peaked at 53. Echocardiogram 3/12/19 showed an LVEF 35-40%, grade III or advanced diastolic dysfunction, with multiple wall motion abnormalities.      Primary cardiologist Dr. Hernández. Most recent echocardiogram 1/2021 showed EF 45-50%, RV normal size/function, inferior wall hypokinesis, grade I diastolic dysfunction and mildly decreased RV systolic function.      He most recently saw Dr. Hernández 2/7/22 at which time he was doing well, prior to his OV he had stopped his furosemide.      Over the summer he was hospitalized for an acute kidney injury and critically elevated potassium, creatinine peaked ~ 3, likely pre-renal, his fursoemide, lisinopril and spironolactone were stopped. Evaluated by nephrology.      Most recently he was  hospitalized 11/15-11/18/22 secondary to large left UPJ stone with moderate left hydronephrosis, the large 1.1 x 0.9 cm left UPJ stone s/p cystoscopy and left ureteral stent placement by urology on 11/16/2022. He again had an acute kidney injury with creatinine peaking ~3. He is scheduled for a kidney stone removal procedure 12/20.      I last saw patient in December the day prior to his kidney stone removal, at that time he was mildly volume overloaded. I was hesitant to trial adjusting diuretics for LE edema given kidney stone with removal planned for the following day and CKD, he had been off diuretics. Decided to not change any medications and plan for close follow up with consideration of SGLT2i and decreasing/stopping amlodipine.     Patient is here today for CORE follow up. Refused Kyler  used for visit.     Patient reports feeling good. Monitoring weights daily a home. Denies shortness of breath at rest. Denies exertional dyspnea. Denies orthopnea or PND. Denies chest pain or chest tightness. Denies dizziness, lightheadedness or other presyncopal symptoms. Denies tachycardia or palpitations. Taking medications daily. Took medications today. He then shares that he has been taking his amlodipine based on blood pressure stating if blood pressure normal takes 1 tablet (10mg) and if elevated take 2 tablets (20mg)    Labs today show overall stable renal function, creatinine 1.93, stable electrolytes. Blood pressure 153/64 and HR 60 in clinic today. Manual recheck in clinic 154/90    Appetite good. Trying to limit salt on foods. Walking in mall for exercise, 3-4 x a week, 1-2 hours with no difficulty. Denies alcohol use. Denies tobacco use.         Social History      Social History     Socioeconomic History     Marital status:      Spouse name: Not on file     Number of children: Not on file     Years of education: Not on file     Highest education level: Not on file   Occupational History     Not  on file   Tobacco Use     Smoking status: Never     Smokeless tobacco: Never   Substance and Sexual Activity     Alcohol use: No     Drug use: No     Sexual activity: Yes     Partners: Female   Other Topics Concern     Parent/sibling w/ CABG, MI or angioplasty before 65F 55M? No   Social History Narrative     Not on file     Social Determinants of Health     Financial Resource Strain: Not on file   Food Insecurity: Not on file   Transportation Needs: Not on file   Physical Activity: Not on file   Stress: Not on file   Social Connections: Not on file   Intimate Partner Violence: Not on file   Housing Stability: Not on file            Review of Systems:   10 point ROS neg other than the symptoms noted above in the HPI.         Physical Exam:   Vitals: There were no vitals taken for this visit.   Wt Readings from Last 4 Encounters:   12/19/22 83 kg (183 lb)   11/17/22 74.8 kg (164 lb 14.5 oz)   07/11/22 76.7 kg (169 lb)   06/15/22 74.2 kg (163 lb 9.6 oz)     GEN: well nourished, in no acute distress.  HEENT:  Pupils equal, round. Sclerae nonicteric.   NECK: Supple, no masses appreciated. JVP appears normal  C/V:  Regular rate and rhythm, no murmur, rub or gallop.    RESP: Respirations are unlabored. Clear to auscultation bilaterally without wheezing, rales, or rhonchi.  GI: Abdomen soft, nontender.  EXTREM: No LE edema.  NEURO: Alert and oriented, cooperative.  SKIN: Warm and dry       Data:     LIPID RESULTS:  Lab Results   Component Value Date    CHOL 126 10/17/2019    HDL 47 10/17/2019    LDL 69 10/17/2019    TRIG 48 10/17/2019     LIVER ENZYME RESULTS:  Lab Results   Component Value Date    AST 38 11/15/2022    AST 27 06/14/2019    ALT 41 11/15/2022    ALT 22 10/17/2019     CBC RESULTS:  Lab Results   Component Value Date    WBC 14.5 (H) 11/18/2022    WBC 9.8 04/06/2021    RBC 3.95 (L) 11/18/2022    RBC 3.70 (L) 04/06/2021    HGB 10.2 (L) 11/18/2022    HGB 10.0 (L) 04/06/2021    HCT 31.7 (L) 11/18/2022    HCT 31.1  (L) 04/06/2021    MCV 80 11/18/2022    MCV 84 04/06/2021    MCH 25.8 (L) 11/18/2022    MCH 27.0 04/06/2021    MCHC 32.2 11/18/2022    MCHC 32.2 04/06/2021    RDW 14.6 11/18/2022    RDW 13.2 04/06/2021     11/18/2022     04/06/2021     BMP RESULTS:  Lab Results   Component Value Date     02/07/2023     04/06/2021    POTASSIUM 4.9 02/07/2023    POTASSIUM 3.8 11/18/2022    POTASSIUM 5.6 (H) 04/06/2021    CHLORIDE 104 02/07/2023    CHLORIDE 110 (H) 11/18/2022    CHLORIDE 110 (H) 04/06/2021    CO2 26 02/07/2023    CO2 24 11/18/2022    CO2 25 04/06/2021    ANIONGAP 11 02/07/2023    ANIONGAP 5 11/18/2022    ANIONGAP 4 04/06/2021     (H) 02/07/2023     (H) 11/18/2022     (H) 11/18/2022     (H) 04/06/2021    BUN 27.9 (H) 02/07/2023    BUN 37 (H) 11/18/2022    BUN 48 (H) 04/06/2021    CR 1.93 (H) 02/07/2023    CR 1.99 (H) 04/06/2021    GFRESTIMATED 38 (L) 02/07/2023    GFRESTIMATED 35 (L) 04/06/2021    GFRESTBLACK 40 (L) 04/06/2021    MYKE 9.7 02/07/2023    MYKE 9.9 04/06/2021      A1C RESULTS:  Lab Results   Component Value Date    A1C 8.2 (H) 11/16/2022    A1C 7.9 (H) 03/11/2019     INR RESULTS:  Lab Results   Component Value Date    INR 1.54 (H) 03/18/2019    INR 1.79 (H) 03/18/2019            Medications     Current Outpatient Medications   Medication Sig Dispense Refill     acetaminophen (TYLENOL) 325 MG tablet Take 650 mg by mouth every 6 hours as needed for mild pain       amLODIPine (NORVASC) 10 MG tablet Take 1 tablet by mouth daily       atorvastatin (LIPITOR) 80 MG tablet Take 1 tablet (80 mg) by mouth every evening 90 tablet 3     ferrous sulfate (FEROSUL) 325 (65 Fe) MG tablet Take 325 mg by mouth daily       glipiZIDE (GLUCOTROL) 5 MG tablet Take 2.5 mg by mouth 2 times daily       insulin glargine (LANTUS PEN) 100 UNIT/ML pen Inject 10 Units Subcutaneous 2 times daily 15 mL 0     lisinopril (ZESTRIL) 10 MG tablet Take 10 mg by mouth daily       metoprolol  succinate ER (TOPROL-XL) 25 MG 24 hr tablet Take 1 tablet (25 mg) by mouth daily 90 tablet 2     tamsulosin (FLOMAX) 0.4 MG capsule Take 1 capsule (0.4 mg) by mouth daily 30 capsule 0          Past Medical History     Past Medical History:   Diagnosis Date     CAD in native artery 03/18/2019     Chronic renal insufficiency     Stage 3b in 2021     Chronic systolic heart failure (H) 10/01/2019     Diabetes mellitus with proliferative retinopathy (H)      Essential hypertension 06/14/2019     Hyperlipidemia LDL goal <70 06/14/2019     Nephrolithiasis      NSTEMI (non-ST elevated myocardial infarction) (H)      CHALO (obstructive sleep apnea) 06/24/2019 6/21/2019 [place] [facility] Sleep Study (153.0 lbs) - AHI 30.5, RDI 34.7, Supine AHI 31.5, REM AHI 56.5, Low O2 68.7%, Time Spent ?88% 3.7 minutes / Time Spent ?89% 4.5 minutes.     S/P CABG (coronary artery bypass graft) 03/26/2019     Past Surgical History:   Procedure Laterality Date     BYPASS GRAFT ARTERY CORONARY N/A 3/18/2019    Procedure: CORONARY BYPASS GRAFTING X 4 - LIMA TO LAD, SV TO DIAGONAL, PDA, AND OM; ON PUMP WITH NOA; ENDOVEIN HARVEST LEFT LEG;  Surgeon: Adarsh Sanchez MD;  Location:  OR     COMBINED CYSTOSCOPY, INSERT STENT URETER(S) Left 11/16/2022    Procedure: CYSTOSCOPY, WITH LEFT URETERAL STENT INSERTION. LEFT RETROGRADE PYELOGRAM;  Surgeon: Alcides Moreno MD;  Location:  OR     CV CORONARY ANGIOGRAM N/A 3/12/2019    Procedure: Coronary Angiogram;  Surgeon: Remi Rodriguez MD;  Location:  HEART CARDIAC CATH LAB     CV HEART CATHETERIZATION WITH POSSIBLE INTERVENTION N/A 3/12/2019    Procedure: Heart Catheterization with possible Intervention;  Surgeon: Remi Rodriguez MD;  Location:  HEART CARDIAC CATH LAB     CV LEFT VENTRICULOGRAM N/A 3/12/2019    Procedure: Left Ventricular Angiogram;  Surgeon: Remi Rodriguez MD;  Location:  HEART CARDIAC CATH LAB     LITHOTRIPSY       Family History   Problem  Relation Age of Onset     Other - See Comments Mother         giving birth     Other - See Comments Father         old age            Allergies   Patient has no known allergies.    40 minutes spent on the date of the encounter doing chart review, history and exam, documentation and further activities as noted above      MICHELE Hicks [facility]  Pager: [phone]

## 2023-02-07 NOTE — PATIENT INSTRUCTIONS
Call CORE nurse for any questions or concerns:  [phone]   *If you have concerns after hours, please call [phone], option 2 to speak with on call Cardiologist.    1. Medication changes and/or recommendations from today:     - STOP metoprolol   - START Carvedilol 12.5mg 2 x a day (1 tablet 2 x a day)   - START Jardiance 10mg daily (1 tablet daily)    2. Follow up plan:   - Follow up with Viv in 1 month with labs prior      3. Weigh yourself daily and write it down.     4. Call CORE nurse if your weight is up more than 2 pounds in one day or 5 pounds in one week.     5. Call CORE nurse if you feel more short of breath, have more abdominal bloating, or leg swelling.     6. Continue low sodium diet (less than 2000 mg daily). If you eat less salt, you will retain less fluid.     7. Alcohol can weaken your heart further. You should avoid alcohol or limit its use to special times, such as a holiday or birthday.      8. Do NOT take Aleve or ibuprofen without talking to your doctor first.      9. Lab Results:   Component      Latest Ref Rng & Units 2/7/2023   Sodium      136 - 145 mmol/L 141   Potassium      3.4 - 5.3 mmol/L 4.9   Chloride      98 - 107 mmol/L 104   Carbon Dioxide (CO2)      22 - 29 mmol/L 26   Anion Gap      7 - 15 mmol/L 11   Urea Nitrogen      8.0 - 23.0 mg/dL 27.9 (H)   Creatinine      0.67 - 1.17 mg/dL 1.93 (H)   Calcium      8.8 - 10.2 mg/dL 9.7   Glucose      70 - 99 mg/dL 181 (H)   GFR Estimate      >60 mL/min/1.73m2 38 (L)        [facility]: Cardiomyopathy, Optimization, Rehabilitation, Education  The [facility] is a heart failure specialty clinic within the [facility] where you will work with specialized nurse practitioners, physician assistants, doctors, and registered nurses. They are dedicated to helping patients with heart failure to carefully adjust medications, receive education, and learn who and when to call if symptoms develop. They specialize in helping you better  understand your condition, slow the progression of your disease, improve the length and quality of your life, help you detect future heart problems before they become life threatening, and avoid hospitalizations.

## 2023-02-07 NOTE — LETTER
2/7/2023    Mpho Brian Castro MD  [address]  [facility] [address]    RE: Khang Mcelroy       Dear Colleague,     I had the pleasure of seeing Khang Mcelroy in the [facility].  Cardiology Clinic Progress Note  Khang Mcelroy MRN# [id number]   YOB: 1955 Age: 67 year old   Primary Cardiologist: Dr. Hernández  Reason for visit: CORE follow up            Assessment and Plan:   Khang Mcelroy is a very pleasant 67 year old male here for CORE follow up.     1.  Combined chronic systolic heart failure/HFrEF and diastolic heart failure, ischemic cardiomyopathy - LVEF 35-40% 3/12/19 since has had some recovery, stable EF around 45-50% with grade I diastolic dysfunction              - NYHA class III, stage C              - Etiology : ischemic              - Fluid status : euvolemic               - Diuretic regimen : none              - Ischemic evaluation : coronary angiogram 3/12/19, s/p CABG 3/18/19              - Guideline directed medical therapy                          - Betablocker: STOP metoprolol, START carvedilol 12.5mg BID                           - ACEI/ARB/ARNI: lisinopril 10mg daily                           - Aldactone antagonist: none, stopped due to acute on chronic kidney disease 6/2022                          - SGLT2i : START Jardiance 10mg daily  2. Coronary artery disease s/p CABG x 4 (LIMA-LAD, SVG-DIAG, SVG-OM, SVG-PDA) on 3/18/19, stable no signs/symptoms of myocardial ischemia.               - Continue aspirin, statin and betablocker therapy.  3. Hypertension - uncontrolled   4. Hyperlipidemia - stable, LDL goal <70, lipid panel 6/7/22 showed total cholesterol 113, HDL 30, LDL 60.               - Continue atorvastatin  5. Chronic kidney disease - baseline 1.7-2.0, had recent acute on chronic kidney disease secondary to hydronephrosis due to kidney stone, creatinine peak ~3               - Follows with nephrology  6. Diabetes mellitus -  hgbA1c 8.2 11/16/22  7. Obstructive sleep apnea - not compliant with CPAP    I saw patient for CORE follow up he is doing well from a heart failure standpoint, he appears compensated and euvolemic on exam. LE edema has improved since our last visit. Blood pressures are elevated today. He shared that he has been self adjusting his amlodipine based on blood pressures, noting that if BP normal take 1 tablet (10mg) and if elevated takes 2 tablets (20mg). To optimize blood pressure control and his GDMT recommend changing metoprolol to carvedilol and starting Jardiance. Recommend CORE follow up in 1 month at that time recommend consideration for Entresto and decreasing/stopping amlodipine.       Changes today: STOP metoprolol, START carvedilol 12.5mg BID, START jardiance 10mg daily     Follow up plan:    CORE follow up with me in 1 month with labs prior         History of Presenting Illness:    Khang Mcelroy is a very pleasant 67 year old male with a history of HFrEF, ischemic cardiomyopathy, LVEF 35-40% per echocardiogram 3/12/19, coronary artery disease s/p CABG x 4 (LIMA-LAD, SVG-DIAG, SVG-OM, SVG-PDA) on 3/18/19, DM, hypertension, hyperlipidemia, CKD, and anemia.      Hospitalized 3/11/19-3/24/19 presented with acute hypoxemic and hypercapnic respiratory failure secondary to NSTEMI, found to have multivessel coronary artery disease, s/p CABG x 4 (LIMA-LAD, SVG-DIAG, SVG-OM, SVG-PDA) on 3/18/19. Troponin peaked at 53. Echocardiogram 3/12/19 showed an LVEF 35-40%, grade III or advanced diastolic dysfunction, with multiple wall motion abnormalities.      Primary cardiologist Dr. Hernández. Most recent echocardiogram 1/2021 showed EF 45-50%, RV normal size/function, inferior wall hypokinesis, grade I diastolic dysfunction and mildly decreased RV systolic function.      He most recently saw Dr. Hernández 2/7/22 at which time he was doing well, prior to his OV he had stopped his furosemide.      Over the summer he was  hospitalized for an acute kidney injury and critically elevated potassium, creatinine peaked ~ 3, likely pre-renal, his fursoemide, lisinopril and spironolactone were stopped. Evaluated by nephrology.      Most recently he was hospitalized 11/15-11/18/22 secondary to large left UPJ stone with moderate left hydronephrosis, the large 1.1 x 0.9 cm left UPJ stone s/p cystoscopy and left ureteral stent placement by urology on 11/16/2022. He again had an acute kidney injury with creatinine peaking ~3. He is scheduled for a kidney stone removal procedure 12/20.      I last saw patient in December the day prior to his kidney stone removal, at that time he was mildly volume overloaded. I was hesitant to trial adjusting diuretics for LE edema given kidney stone with removal planned for the following day and CKD, he had been off diuretics. Decided to not change any medications and plan for close follow up with consideration of SGLT2i and decreasing/stopping amlodipine.     Patient is here today for CORE follow up. Refused [place]  used for visit.     Patient reports feeling good. Monitoring weights daily a home. Denies shortness of breath at rest. Denies exertional dyspnea. Denies orthopnea or PND. Denies chest pain or chest tightness. Denies dizziness, lightheadedness or other presyncopal symptoms. Denies tachycardia or palpitations. Taking medications daily. Took medications today. He then shares that he has been taking his amlodipine based on blood pressure stating if blood pressure normal takes 1 tablet (10mg) and if elevated take 2 tablets (20mg)    Labs today show overall stable renal function, creatinine 1.93, stable electrolytes. Blood pressure 153/64 and HR 60 in clinic today. Manual recheck in clinic 154/90    Appetite good. Trying to limit salt on foods. Walking in mall for exercise, 3-4 x a week, 1-2 hours with no difficulty. Denies alcohol use. Denies tobacco use.         Social History      Social History      Socioeconomic History     Marital status:      Spouse name: Not on file     Number of children: Not on file     Years of education: Not on file     Highest education level: Not on file   Occupational History     Not on file   Tobacco Use     Smoking status: Never     Smokeless tobacco: Never   Substance and Sexual Activity     Alcohol use: No     Drug use: No     Sexual activity: Yes     Partners: Female   Other Topics Concern     Parent/sibling w/ CABG, MI or angioplasty before 65F 55M? No   Social History Narrative     Not on file     Social Determinants of Health     Financial Resource Strain: Not on file   Food Insecurity: Not on file   Transportation Needs: Not on file   Physical Activity: Not on file   Stress: Not on file   Social Connections: Not on file   Intimate Partner Violence: Not on file   Housing Stability: Not on file            Review of Systems:   10 point ROS neg other than the symptoms noted above in the HPI.         Physical Exam:   Vitals: There were no vitals taken for this visit.   Wt Readings from Last 4 Encounters:   12/19/22 83 kg (183 lb)   11/17/22 74.8 kg (164 lb 14.5 oz)   07/11/22 76.7 kg (169 lb)   06/15/22 74.2 kg (163 lb 9.6 oz)     GEN: well nourished, in no acute distress.  HEENT:  Pupils equal, round. Sclerae nonicteric.   NECK: Supple, no masses appreciated. JVP appears normal  C/V:  Regular rate and rhythm, no murmur, rub or gallop.    RESP: Respirations are unlabored. Clear to auscultation bilaterally without wheezing, rales, or rhonchi.  GI: Abdomen soft, nontender.  EXTREM: No LE edema.  NEURO: Alert and oriented, cooperative.  SKIN: Warm and dry       Data:     LIPID RESULTS:  Lab Results   Component Value Date    CHOL 126 10/17/2019    HDL 47 10/17/2019    LDL 69 10/17/2019    TRIG 48 10/17/2019     LIVER ENZYME RESULTS:  Lab Results   Component Value Date    AST 38 11/15/2022    AST 27 06/14/2019    ALT 41 11/15/2022    ALT 22 10/17/2019     CBC RESULTS:  Lab  Results   Component Value Date    WBC 14.5 (H) 11/18/2022    WBC 9.8 04/06/2021    RBC 3.95 (L) 11/18/2022    RBC 3.70 (L) 04/06/2021    HGB 10.2 (L) 11/18/2022    HGB 10.0 (L) 04/06/2021    HCT 31.7 (L) 11/18/2022    HCT 31.1 (L) 04/06/2021    MCV 80 11/18/2022    MCV 84 04/06/2021    MCH 25.8 (L) 11/18/2022    MCH 27.0 04/06/2021    MCHC 32.2 11/18/2022    MCHC 32.2 04/06/2021    RDW 14.6 11/18/2022    RDW 13.2 04/06/2021     11/18/2022     04/06/2021     BMP RESULTS:  Lab Results   Component Value Date     02/07/2023     04/06/2021    POTASSIUM 4.9 02/07/2023    POTASSIUM 3.8 11/18/2022    POTASSIUM 5.6 (H) 04/06/2021    CHLORIDE 104 02/07/2023    CHLORIDE 110 (H) 11/18/2022    CHLORIDE 110 (H) 04/06/2021    CO2 26 02/07/2023    CO2 24 11/18/2022    CO2 25 04/06/2021    ANIONGAP 11 02/07/2023    ANIONGAP 5 11/18/2022    ANIONGAP 4 04/06/2021     (H) 02/07/2023     (H) 11/18/2022     (H) 11/18/2022     (H) 04/06/2021    BUN 27.9 (H) 02/07/2023    BUN 37 (H) 11/18/2022    BUN 48 (H) 04/06/2021    CR 1.93 (H) 02/07/2023    CR 1.99 (H) 04/06/2021    GFRESTIMATED 38 (L) 02/07/2023    GFRESTIMATED 35 (L) 04/06/2021    GFRESTBLACK 40 (L) 04/06/2021    MYKE 9.7 02/07/2023    MYKE 9.9 04/06/2021      A1C RESULTS:  Lab Results   Component Value Date    A1C 8.2 (H) 11/16/2022    A1C 7.9 (H) 03/11/2019     INR RESULTS:  Lab Results   Component Value Date    INR 1.54 (H) 03/18/2019    INR 1.79 (H) 03/18/2019            Medications     Current Outpatient Medications   Medication Sig Dispense Refill     acetaminophen (TYLENOL) 325 MG tablet Take 650 mg by mouth every 6 hours as needed for mild pain       amLODIPine (NORVASC) 10 MG tablet Take 1 tablet by mouth daily       atorvastatin (LIPITOR) 80 MG tablet Take 1 tablet (80 mg) by mouth every evening 90 tablet 3     ferrous sulfate (FEROSUL) 325 (65 Fe) MG tablet Take 325 mg by mouth daily       glipiZIDE (GLUCOTROL) 5 MG  tablet Take 2.5 mg by mouth 2 times daily       insulin glargine (LANTUS PEN) 100 UNIT/ML pen Inject 10 Units Subcutaneous 2 times daily 15 mL 0     lisinopril (ZESTRIL) 10 MG tablet Take 10 mg by mouth daily       metoprolol succinate ER (TOPROL-XL) 25 MG 24 hr tablet Take 1 tablet (25 mg) by mouth daily 90 tablet 2     tamsulosin (FLOMAX) 0.4 MG capsule Take 1 capsule (0.4 mg) by mouth daily 30 capsule 0          Past Medical History     Past Medical History:   Diagnosis Date     CAD in native artery 03/18/2019     Chronic renal insufficiency     Stage 3b in 2021     Chronic systolic heart failure (H) 10/01/2019     Diabetes mellitus with proliferative retinopathy (H)      Essential hypertension 06/14/2019     Hyperlipidemia LDL goal <70 06/14/2019     Nephrolithiasis      NSTEMI (non-ST elevated myocardial infarction) (H)      CHALO (obstructive sleep apnea) 06/24/2019 6/21/2019 [place] [facility] Sleep Study (153.0 lbs) - AHI 30.5, RDI 34.7, Supine AHI 31.5, REM AHI 56.5, Low O2 68.7%, Time Spent ?88% 3.7 minutes / Time Spent ?89% 4.5 minutes.     S/P CABG (coronary artery bypass graft) 03/26/2019     Past Surgical History:   Procedure Laterality Date     BYPASS GRAFT ARTERY CORONARY N/A 3/18/2019    Procedure: CORONARY BYPASS GRAFTING X 4 - LIMA TO LAD, SV TO DIAGONAL, PDA, AND OM; ON PUMP WITH NOA; ENDOVEIN HARVEST LEFT LEG;  Surgeon: Adarsh Sanchez MD;  Location:  OR     COMBINED CYSTOSCOPY, INSERT STENT URETER(S) Left 11/16/2022    Procedure: CYSTOSCOPY, WITH LEFT URETERAL STENT INSERTION. LEFT RETROGRADE PYELOGRAM;  Surgeon: Alcides Moreno MD;  Location:  OR     CV CORONARY ANGIOGRAM N/A 3/12/2019    Procedure: Coronary Angiogram;  Surgeon: Remi Rodriguez MD;  Location:  HEART CARDIAC CATH LAB     CV HEART CATHETERIZATION WITH POSSIBLE INTERVENTION N/A 3/12/2019    Procedure: Heart Catheterization with possible Intervention;  Surgeon: Remi Rodriguez MD;  Location:   HEART CARDIAC CATH LAB     CV LEFT VENTRICULOGRAM N/A 3/12/2019    Procedure: Left Ventricular Angiogram;  Surgeon: Remi Rodriguez MD;  Location:  HEART CARDIAC CATH LAB     LITHOTRIPSY       Family History   Problem Relation Age of Onset     Other - See Comments Mother         giving birth     Other - See Comments Father         old age            Allergies   Patient has no known allergies.    40 minutes spent on the date of the encounter doing chart review, history and exam, documentation and further activities as noted above      MICHELE Hicks CNP  [facility]  Pager: [phone]    Thank you for allowing me to participate in the care of your patient.      Sincerely,     MICHELE Hicks CNP     [facility]  cc:   MICHELE Luis CNP  [address]  [place] [address]

## 2023-03-30 ENCOUNTER — LAB (OUTPATIENT)
Dept: LAB | Facility: CLINIC | Age: 68
End: 2023-03-30
Payer: MEDICARE

## 2023-03-30 ENCOUNTER — OFFICE VISIT (OUTPATIENT)
Dept: CARDIOLOGY | Facility: CLINIC | Age: 68
End: 2023-03-30
Attending: NURSE PRACTITIONER
Payer: MEDICARE

## 2023-03-30 VITALS
OXYGEN SATURATION: 98 % | SYSTOLIC BLOOD PRESSURE: 154 MMHG | DIASTOLIC BLOOD PRESSURE: 66 MMHG | HEART RATE: 54 BPM | BODY MASS INDEX: 28.69 KG/M2 | WEIGHT: 161.9 LBS | HEIGHT: 63 IN

## 2023-03-30 DIAGNOSIS — I25.5 ISCHEMIC CARDIOMYOPATHY: ICD-10-CM

## 2023-03-30 DIAGNOSIS — I25.10 CAD IN NATIVE ARTERY: ICD-10-CM

## 2023-03-30 DIAGNOSIS — I25.5 ISCHEMIC CARDIOMYOPATHY: Primary | ICD-10-CM

## 2023-03-30 DIAGNOSIS — I10 ESSENTIAL HYPERTENSION: ICD-10-CM

## 2023-03-30 DIAGNOSIS — N18.32 STAGE 3B CHRONIC KIDNEY DISEASE (H): ICD-10-CM

## 2023-03-30 DIAGNOSIS — I50.22 CHRONIC SYSTOLIC HEART FAILURE (H): ICD-10-CM

## 2023-03-30 DIAGNOSIS — E78.5 HYPERLIPIDEMIA LDL GOAL <70: ICD-10-CM

## 2023-03-30 LAB
ANION GAP SERPL CALCULATED.3IONS-SCNC: 12 MMOL/L (ref 7–15)
BUN SERPL-MCNC: 30.9 MG/DL (ref 8–23)
CALCIUM SERPL-MCNC: 9.5 MG/DL (ref 8.8–10.2)
CHLORIDE SERPL-SCNC: 104 MMOL/L (ref 98–107)
CREAT SERPL-MCNC: 1.95 MG/DL (ref 0.67–1.17)
DEPRECATED HCO3 PLAS-SCNC: 20 MMOL/L (ref 22–29)
GFR SERPL CREATININE-BSD FRML MDRD: 37 ML/MIN/1.73M2
GLUCOSE SERPL-MCNC: 312 MG/DL (ref 70–99)
POTASSIUM SERPL-SCNC: 4.2 MMOL/L (ref 3.4–5.3)
SODIUM SERPL-SCNC: 136 MMOL/L (ref 136–145)

## 2023-03-30 PROCEDURE — 80048 BASIC METABOLIC PNL TOTAL CA: CPT | Performed by: NURSE PRACTITIONER

## 2023-03-30 PROCEDURE — 36415 COLL VENOUS BLD VENIPUNCTURE: CPT | Performed by: NURSE PRACTITIONER

## 2023-03-30 PROCEDURE — 99214 OFFICE O/P EST MOD 30 MIN: CPT | Performed by: NURSE PRACTITIONER

## 2023-03-30 RX ORDER — METOPROLOL SUCCINATE 25 MG/1
25 TABLET, EXTENDED RELEASE ORAL DAILY
COMMUNITY
End: 2023-03-30

## 2023-03-30 RX ORDER — LISINOPRIL 10 MG/1
10 TABLET ORAL DAILY
Qty: 90 TABLET | Refills: 1 | Status: SHIPPED | OUTPATIENT
Start: 2023-03-30 | End: 2023-10-03

## 2023-03-30 NOTE — PATIENT INSTRUCTIONS
STOP metoprolol XL  RESUME lisinopril 10mg daily (1 tablet daily) prescription sent to pharmacy.   Check BP and bring readings to clinic  Follow up with Viv in 2 weeks with labs prior.

## 2023-03-30 NOTE — PROGRESS NOTES
Cardiology Clinic Progress Note  Khang Mcelroy MRN# [id number]   YOB: 1955 Age: 67 year old   Primary Cardiologist: Dr. Hernández Reason for visit: CORE follow up             Assessment and Plan:   Khang Mcelroy is a very pleasant 67 year old male here for CORE follow up.      1.  Combined chronic systolic heart failure/HFrEF and diastolic heart failure, ischemic cardiomyopathy - LVEF 35-40% 3/12/19 since has had some recovery, stable EF around 45-50% with grade I diastolic dysfunction              - NYHA class II-III, stage C              - Etiology : ischemic              - Fluid status : euvolemic               - Diuretic regimen : none              - Ischemic evaluation : coronary angiogram 3/12/19, s/p CABG 3/18/19              - Guideline directed medical therapy                          - Betablocker: carvedilol 12.5mg BID                           - ACEI/ARB/ARNI: lisinopril 10mg daily                           - Aldactone antagonist: none, stopped due to acute on chronic kidney disease 6/2022                          - SGLT2i : Jardiance 10mg daily  2. Coronary artery disease s/p CABG x 4 (LIMA-LAD, SVG-DIAG, SVG-OM, SVG-PDA) on 3/18/19, stable no signs/symptoms of myocardial ischemia.               - Continue aspirin, statin and betablocker therapy.  3. Hypertension - uncontrolled   4. Hyperlipidemia - stable, LDL goal <70, lipid panel 6/7/22 showed total cholesterol 113, HDL 30, LDL 60.               - Continue atorvastatin  5. Chronic kidney disease - baseline 1.7-2.0, had recent acute on chronic kidney disease secondary to hydronephrosis due to kidney stone, creatinine peak ~3, remains stable, creatinine today 1.95              - Follows with nephrology  6. Diabetes mellitus - hgbA1c 8.2 11/16/22  7. Obstructive sleep apnea - not compliant with CPAP  8. Noncompliance with medications - continued confusion with medications, today noted that he is taking both carvedilol and metoprolol  and stopped lisinopril. Appears pharmacy filled both his metoprolol XL and carvedilol on the same day. Prior to this he had been self adjusting his amlodipine to up to 20mg daily for elevated blood pressure readings.     I saw patient today for CORE followup. He continues to do well from a heart failure standpoint, compensated/euvolemic today, NYHA class II-III. During our last OV he was hypertensive, his metoprolol XL was changed to carvedilol. Unfortunately confusion with medications noted today, he has been taking both metoprolol XL and carvedilol, appear pharmacy filled both after our last OV. He stopped his lisinopril 10mg daily, unclear why, but appears wasn't refilled. Thankfully his heart rates are stable in the 50s today. His blood pressures remain elevated. Plan to have him stop metoprolol XL, continue carvedilol and resume lisinopril 10mg daily. Hopeful with the discontinuation of metoprolol XL his heart rates will increase allowing further titration of carvedilol. With the resuming lisinopril will need to monitor renal function closely, this may be able to be titrated but will depend on renal function. If blood pressures remain elevated will need to consider hydralazine and imdur or isordil. Given continued confusion with medications referral placed for MTM.       Changes today: STOP metoprolol XL, resume lisinopril 10mg daily with close monitoring of renal function.     Follow up plan:     CORE follow up with me in 2 weeks with labs prior    Referral to MTM, given continued confusion with medications.         History of Presenting Illness:    Khang Mcelroy is a very pleasant 67 year old male with a history of HFrEF, ischemic cardiomyopathy, LVEF 35-40% per echocardiogram 3/12/19, coronary artery disease s/p CABG x 4 (LIMA-LAD, SVG-DIAG, SVG-OM, SVG-PDA) on 3/18/19, DM, hypertension, hyperlipidemia, CKD, and anemia.      Hospitalized 3/11/19-3/24/19 presented with acute hypoxemic and hypercapnic  respiratory failure secondary to NSTEMI, found to have multivessel coronary artery disease, s/p CABG x 4 (LIMA-LAD, SVG-DIAG, SVG-OM, SVG-PDA) on 3/18/19. Troponin peaked at 53. Echocardiogram 3/12/19 showed an LVEF 35-40%, grade III or advanced diastolic dysfunction, with multiple wall motion abnormalities.      Most recent echocardiogram 1/2021 showed EF 45-50%, RV normal size/function, inferior wall hypokinesis, grade I diastolic dysfunction and mildly decreased RV systolic function.      He saw Dr. Hernández 2/7/22 at which time he was doing well, prior to his OV he had stopped his furosemide.      Over the summer he was hospitalized for an acute kidney injury and critically elevated potassium, creatinine peaked ~ 3, likely pre-renal, his fursoemide, lisinopril and spironolactone were stopped. Evaluated by nephrology.      He was hospitalized 11/15-11/18/22 secondary to large left UPJ stone with moderate left hydronephrosis, the large 1.1 x 0.9 cm left UPJ stone s/p cystoscopy and left ureteral stent placement by urology on 11/16/2022. He again had an acute kidney injury with creatinine peaking ~3. He is scheduled for a kidney stone removal procedure 12/20.      I last saw patient in December the day prior to his kidney stone removal, at that time he was mildly volume overloaded. I was hesitant to trial adjusting diuretics for LE edema given kidney stone with removal planned for the following day and CKD, he had been off diuretics. Decided to not change any medications and plan for close follow up with consideration of SGLT2i and decreasing/stopping amlodipine.     I saw patient in February at which time he was doing well, LE edema had improved, but blood pressures were elevated. He shared that he has been self adjusting his amlodipine based on blood pressures, noting that if BP normal take 1 tablet (10mg) and if elevated takes 2 tablets (20mg). To optimize blood pressure control and his GDMT recommend changing  metoprolol to carvedilol and starting Jardiance.    Patient is here today for CORE follow up.     Patient reports feeling good. Monitoring weights daily a home, notes some decrease. Denies LE edema. Denies shortness of breath. Denies exertional dyspnea. Denies orthopnea or PND. Denies chest pain or chest tightness. Denies dizziness, lightheadedness or other presyncopal symptoms. Denies tachycardia or palpitations. Taking medications daily.  Unfortunately confusion with medications noted today, he has been taking both metoprolol XL and carvedilol, appear pharmacy filled both after our last OV. He stopped his lisinopril 10mg daily, unclear why, but appears wasn't refilled. Unfortunately blood pressures remain elevated.     Labs today show overall stable renal function and electrolytes. Creatinine 1.95. Electrolytes stable. Blood pressure 154/66 and HR 54 in clinic today.    Appetite good. Trying to limit salt on foods. Walking in mall for exercise, 3-4 x a week, 1-2 hours with no difficulty. Denies alcohol use. Denies tobacco use.        Social History      Social History     Socioeconomic History     Marital status:      Spouse name: Not on file     Number of children: Not on file     Years of education: Not on file     Highest education level: Not on file   Occupational History     Not on file   Tobacco Use     Smoking status: Never     Smokeless tobacco: Never   Substance and Sexual Activity     Alcohol use: No     Drug use: No     Sexual activity: Yes     Partners: Female   Other Topics Concern     Parent/sibling w/ CABG, MI or angioplasty before 65F 55M? No   Social History Narrative     Not on file     Social Determinants of Health     Financial Resource Strain: Not on file   Food Insecurity: Not on file   Transportation Needs: Not on file   Physical Activity: Not on file   Stress: Not on file   Social Connections: Not on file   Intimate Partner Violence: Not on file   Housing Stability: Not on file           Review of Systems:   10 point ROS neg other than the symptoms noted above in the HPI.         Physical Exam:   Vitals: There were no vitals taken for this visit.   Wt Readings from Last 4 Encounters:   02/07/23 75.3 kg (166 lb)   12/19/22 83 kg (183 lb)   11/17/22 74.8 kg (164 lb 14.5 oz)   07/11/22 76.7 kg (169 lb)     GEN: well nourished, in no acute distress.  HEENT:  Pupils equal, round. Sclerae nonicteric.   NECK: Supple, no masses appreciated.  C/V:  Regular rate and rhythm, no murmur, rub or gallop.    RESP: Respirations are unlabored. Clear to auscultation bilaterally without wheezing, rales, or rhonchi.  GI: Abdomen soft, nontender.  EXTREM: No LE edema.  NEURO: Alert and oriented, cooperative.  SKIN: Warm and dry       Data:     LIPID RESULTS:  Lab Results   Component Value Date    CHOL 126 10/17/2019    HDL 47 10/17/2019    LDL 69 10/17/2019    TRIG 48 10/17/2019     LIVER ENZYME RESULTS:  Lab Results   Component Value Date    AST 38 11/15/2022    AST 27 06/14/2019    ALT 41 11/15/2022    ALT 22 10/17/2019     CBC RESULTS:  Lab Results   Component Value Date    WBC 14.5 (H) 11/18/2022    WBC 9.8 04/06/2021    RBC 3.95 (L) 11/18/2022    RBC 3.70 (L) 04/06/2021    HGB 10.2 (L) 11/18/2022    HGB 10.0 (L) 04/06/2021    HCT 31.7 (L) 11/18/2022    HCT 31.1 (L) 04/06/2021    MCV 80 11/18/2022    MCV 84 04/06/2021    MCH 25.8 (L) 11/18/2022    MCH 27.0 04/06/2021    MCHC 32.2 11/18/2022    MCHC 32.2 04/06/2021    RDW 14.6 11/18/2022    RDW 13.2 04/06/2021     11/18/2022     04/06/2021     BMP RESULTS:  Lab Results   Component Value Date     03/30/2023     04/06/2021    POTASSIUM 4.2 03/30/2023    POTASSIUM 3.8 11/18/2022    POTASSIUM 5.6 (H) 04/06/2021    CHLORIDE 104 03/30/2023    CHLORIDE 110 (H) 11/18/2022    CHLORIDE 110 (H) 04/06/2021    CO2 20 (L) 03/30/2023    CO2 24 11/18/2022    CO2 25 04/06/2021    ANIONGAP 12 03/30/2023    ANIONGAP 5 11/18/2022    ANIONGAP 4 04/06/2021      (H) 03/30/2023     (H) 11/18/2022     (H) 11/18/2022     (H) 04/06/2021    BUN 30.9 (H) 03/30/2023    BUN 37 (H) 11/18/2022    BUN 48 (H) 04/06/2021    CR 1.95 (H) 03/30/2023    CR 1.99 (H) 04/06/2021    GFRESTIMATED 37 (L) 03/30/2023    GFRESTIMATED 35 (L) 04/06/2021    GFRESTBLACK 40 (L) 04/06/2021    MYKE 9.5 03/30/2023    MYKE 9.9 04/06/2021      A1C RESULTS:  Lab Results   Component Value Date    A1C 8.2 (H) 11/16/2022    A1C 7.9 (H) 03/11/2019     INR RESULTS:  Lab Results   Component Value Date    INR 1.54 (H) 03/18/2019    INR 1.79 (H) 03/18/2019          Medications     Current Outpatient Medications   Medication Sig Dispense Refill     acetaminophen (TYLENOL) 325 MG tablet Take 650 mg by mouth every 6 hours as needed for mild pain       amLODIPine (NORVASC) 10 MG tablet Take 1 tablet by mouth daily       atorvastatin (LIPITOR) 80 MG tablet Take 1 tablet (80 mg) by mouth every evening 90 tablet 3     carvedilol (COREG) 12.5 MG tablet Take 1 tablet (12.5 mg) by mouth 2 times daily (with meals) 180 tablet 1     empagliflozin (JARDIANCE) 10 MG TABS tablet Take 1 tablet (10 mg) by mouth daily 90 tablet 1     ferrous sulfate (FEROSUL) 325 (65 Fe) MG tablet Take 325 mg by mouth daily       glipiZIDE (GLUCOTROL) 5 MG tablet Take 2.5 mg by mouth 2 times daily       insulin glargine (LANTUS PEN) 100 UNIT/ML pen Inject 10 Units Subcutaneous 2 times daily 15 mL 0     lisinopril (ZESTRIL) 10 MG tablet Take 10 mg by mouth daily       tamsulosin (FLOMAX) 0.4 MG capsule Take 1 capsule (0.4 mg) by mouth daily (Patient not taking: Reported on 2/7/2023) 30 capsule 0        Past Medical History     Past Medical History:   Diagnosis Date     CAD in native artery 03/18/2019     Chronic renal insufficiency     Stage 3b in 2021     Chronic systolic heart failure (H) 10/01/2019     Diabetes mellitus with proliferative retinopathy (H)      Essential hypertension 06/14/2019     Hyperlipidemia LDL goal <70  06/14/2019     Nephrolithiasis      NSTEMI (non-ST elevated myocardial infarction) (H)      CHALO (obstructive sleep apnea) 06/24/2019 6/21/2019 [facility] Sleep Study (153.0 lbs) - AHI 30.5, RDI 34.7, Supine AHI 31.5, REM AHI 56.5, Low O2 68.7%, Time Spent ?88% 3.7 minutes / Time Spent ?89% 4.5 minutes.     S/P CABG (coronary artery bypass graft) 03/26/2019     Past Surgical History:   Procedure Laterality Date     BYPASS GRAFT ARTERY CORONARY N/A 3/18/2019    Procedure: CORONARY BYPASS GRAFTING X 4 - LIMA TO LAD, SV TO DIAGONAL, PDA, AND OM; ON PUMP WITH NOA; ENDOVEIN HARVEST LEFT LEG;  Surgeon: Adarsh Sanchez MD;  Location:  OR     COMBINED CYSTOSCOPY, INSERT STENT URETER(S) Left 11/16/2022    Procedure: CYSTOSCOPY, WITH LEFT URETERAL STENT INSERTION. LEFT RETROGRADE PYELOGRAM;  Surgeon: Alcides Moreno MD;  Location:  OR     CV CORONARY ANGIOGRAM N/A 3/12/2019    Procedure: Coronary Angiogram;  Surgeon: Remi Rodriguez MD;  Location:  HEART CARDIAC CATH LAB     CV HEART CATHETERIZATION WITH POSSIBLE INTERVENTION N/A 3/12/2019    Procedure: Heart Catheterization with possible Intervention;  Surgeon: Remi Rodriguez MD;  Location:  HEART CARDIAC CATH LAB     CV LEFT VENTRICULOGRAM N/A 3/12/2019    Procedure: Left Ventricular Angiogram;  Surgeon: Remi Rodriguez MD;  Location: [facility] CARDIAC CATH LAB     LITHOTRIPSY       Family History   Problem Relation Age of Onset     Other - See Comments Mother         giving birth     Other - See Comments Father         old age            Allergies   Patient has no known allergies.    40 minutes spent on the date of the encounter doing chart review, history and exam, documentation and further activities as noted above      MICHELE Hicks [facility]  Pager: [phone]

## 2023-03-30 NOTE — LETTER
3/30/2023    Mpho Brian Castro MD  [address]  [facility] [address]    RE: Khang Mcelroy       Dear Colleague,     I had the pleasure of seeing Khang Mcelroy in the [facility].  Cardiology Clinic Progress Note  Khang Mcelroy MRN# [id number]   YOB: 1955 Age: 67 year old   Primary Cardiologist: Dr. Hernández Reason for visit: CORE follow up             Assessment and Plan:   Khang Mcelroy is a very pleasant 67 year old male here for CORE follow up.      1.  Combined chronic systolic heart failure/HFrEF and diastolic heart failure, ischemic cardiomyopathy - LVEF 35-40% 3/12/19 since has had some recovery, stable EF around 45-50% with grade I diastolic dysfunction              - NYHA class II-III, stage C              - Etiology : ischemic              - Fluid status : euvolemic               - Diuretic regimen : none              - Ischemic evaluation : coronary angiogram 3/12/19, s/p CABG 3/18/19              - Guideline directed medical therapy                          - Betablocker: carvedilol 12.5mg BID                           - ACEI/ARB/ARNI: lisinopril 10mg daily                           - Aldactone antagonist: none, stopped due to acute on chronic kidney disease 6/2022                          - SGLT2i : Jardiance 10mg daily  2. Coronary artery disease s/p CABG x 4 (LIMA-LAD, SVG-DIAG, SVG-OM, SVG-PDA) on 3/18/19, stable no signs/symptoms of myocardial ischemia.               - Continue aspirin, statin and betablocker therapy.  3. Hypertension - uncontrolled   4. Hyperlipidemia - stable, LDL goal <70, lipid panel 6/7/22 showed total cholesterol 113, HDL 30, LDL 60.               - Continue atorvastatin  5. Chronic kidney disease - baseline 1.7-2.0, had recent acute on chronic kidney disease secondary to hydronephrosis due to kidney stone, creatinine peak ~3, remains stable, creatinine today 1.95              - Follows with nephrology  6. Diabetes  mellitus - hgbA1c 8.2 11/16/22  7. Obstructive sleep apnea - not compliant with CPAP  8. Noncompliance with medications - continued confusion with medications, today noted that he is taking both carvedilol and metoprolol and stopped lisinopril. Appears pharmacy filled both his metoprolol XL and carvedilol on the same day. Prior to this he had been self adjusting his amlodipine to up to 20mg daily for elevated blood pressure readings.     I saw patient today for CORE followup. He continues to do well from a heart failure standpoint, compensated/euvolemic today, NYHA class II-III. During our last OV he was hypertensive, his metoprolol XL was changed to carvedilol. Unfortunately confusion with medications noted today, he has been taking both metoprolol XL and carvedilol, appear pharmacy filled both after our last OV. He stopped his lisinopril 10mg daily, unclear why, but appears wasn't refilled. Thankfully his heart rates are stable in the 50s today. His blood pressures remain elevated. Plan to have him stop metoprolol XL, continue carvedilol and resume lisinopril 10mg daily. Hopeful with the discontinuation of metoprolol XL his heart rates will increase allowing further titration of carvedilol. With the resuming lisinopril will need to monitor renal function closely, this may be able to be titrated but will depend on renal function. If blood pressures remain elevated will need to consider hydralazine and imdur or isordil. Given continued confusion with medications referral placed for MTM.       Changes today: STOP metoprolol XL, resume lisinopril 10mg daily with close monitoring of renal function.     Follow up plan:     CORE follow up with me in 2 weeks with labs prior    Referral to MTM, given continued confusion with medications.         History of Presenting Illness:    Khang Mcelroy is a very pleasant 67 year old male with a history of HFrEF, ischemic cardiomyopathy, LVEF 35-40% per echocardiogram 3/12/19,  coronary artery disease s/p CABG x 4 (LIMA-LAD, SVG-DIAG, SVG-OM, SVG-PDA) on 3/18/19, DM, hypertension, hyperlipidemia, CKD, and anemia.      Hospitalized 3/11/19-3/24/19 presented with acute hypoxemic and hypercapnic respiratory failure secondary to NSTEMI, found to have multivessel coronary artery disease, s/p CABG x 4 (LIMA-LAD, SVG-DIAG, SVG-OM, SVG-PDA) on 3/18/19. Troponin peaked at 53. Echocardiogram 3/12/19 showed an LVEF 35-40%, grade III or advanced diastolic dysfunction, with multiple wall motion abnormalities.      Most recent echocardiogram 1/2021 showed EF 45-50%, RV normal size/function, inferior wall hypokinesis, grade I diastolic dysfunction and mildly decreased RV systolic function.      He saw Dr. Hernández 2/7/22 at which time he was doing well, prior to his OV he had stopped his furosemide.      Over the summer he was hospitalized for an acute kidney injury and critically elevated potassium, creatinine peaked ~ 3, likely pre-renal, his fursoemide, lisinopril and spironolactone were stopped. Evaluated by nephrology.      He was hospitalized 11/15-11/18/22 secondary to large left UPJ stone with moderate left hydronephrosis, the large 1.1 x 0.9 cm left UPJ stone s/p cystoscopy and left ureteral stent placement by urology on 11/16/2022. He again had an acute kidney injury with creatinine peaking ~3. He is scheduled for a kidney stone removal procedure 12/20.      I last saw patient in December the day prior to his kidney stone removal, at that time he was mildly volume overloaded. I was hesitant to trial adjusting diuretics for LE edema given kidney stone with removal planned for the following day and CKD, he had been off diuretics. Decided to not change any medications and plan for close follow up with consideration of SGLT2i and decreasing/stopping amlodipine.     I saw patient in February at which time he was doing well, LE edema had improved, but blood pressures were elevated. He shared that he  has been self adjusting his amlodipine based on blood pressures, noting that if BP normal take 1 tablet (10mg) and if elevated takes 2 tablets (20mg). To optimize blood pressure control and his GDMT recommend changing metoprolol to carvedilol and starting Jardiance.    Patient is here today for CORE follow up.     Patient reports feeling good. Monitoring weights daily a home, notes some decrease. Denies LE edema. Denies shortness of breath. Denies exertional dyspnea. Denies orthopnea or PND. Denies chest pain or chest tightness. Denies dizziness, lightheadedness or other presyncopal symptoms. Denies tachycardia or palpitations. Taking medications daily.  Unfortunately confusion with medications noted today, he has been taking both metoprolol XL and carvedilol, appear pharmacy filled both after our last OV. He stopped his lisinopril 10mg daily, unclear why, but appears wasn't refilled. Unfortunately blood pressures remain elevated.     Labs today show overall stable renal function and electrolytes. Creatinine 1.95. Electrolytes stable. Blood pressure 154/66 and HR 54 in clinic today.    Appetite good. Trying to limit salt on foods. Walking in mall for exercise, 3-4 x a week, 1-2 hours with no difficulty. Denies alcohol use. Denies tobacco use.        Social History      Social History     Socioeconomic History     Marital status:      Spouse name: Not on file     Number of children: Not on file     Years of education: Not on file     Highest education level: Not on file   Occupational History     Not on file   Tobacco Use     Smoking status: Never     Smokeless tobacco: Never   Substance and Sexual Activity     Alcohol use: No     Drug use: No     Sexual activity: Yes     Partners: Female   Other Topics Concern     Parent/sibling w/ CABG, MI or angioplasty before 65F 55M? No   Social History Narrative     Not on file     Social Determinants of Health     Financial Resource Strain: Not on file   Food  Insecurity: Not on file   Transportation Needs: Not on file   Physical Activity: Not on file   Stress: Not on file   Social Connections: Not on file   Intimate Partner Violence: Not on file   Housing Stability: Not on file          Review of Systems:   10 point ROS neg other than the symptoms noted above in the HPI.         Physical Exam:   Vitals: There were no vitals taken for this visit.   Wt Readings from Last 4 Encounters:   02/07/23 75.3 kg (166 lb)   12/19/22 83 kg (183 lb)   11/17/22 74.8 kg (164 lb 14.5 oz)   07/11/22 76.7 kg (169 lb)     GEN: well nourished, in no acute distress.  HEENT:  Pupils equal, round. Sclerae nonicteric.   NECK: Supple, no masses appreciated.  C/V:  Regular rate and rhythm, no murmur, rub or gallop.    RESP: Respirations are unlabored. Clear to auscultation bilaterally without wheezing, rales, or rhonchi.  GI: Abdomen soft, nontender.  EXTREM: No LE edema.  NEURO: Alert and oriented, cooperative.  SKIN: Warm and dry       Data:     LIPID RESULTS:  Lab Results   Component Value Date    CHOL 126 10/17/2019    HDL 47 10/17/2019    LDL 69 10/17/2019    TRIG 48 10/17/2019     LIVER ENZYME RESULTS:  Lab Results   Component Value Date    AST 38 11/15/2022    AST 27 06/14/2019    ALT 41 11/15/2022    ALT 22 10/17/2019     CBC RESULTS:  Lab Results   Component Value Date    WBC 14.5 (H) 11/18/2022    WBC 9.8 04/06/2021    RBC 3.95 (L) 11/18/2022    RBC 3.70 (L) 04/06/2021    HGB 10.2 (L) 11/18/2022    HGB 10.0 (L) 04/06/2021    HCT 31.7 (L) 11/18/2022    HCT 31.1 (L) 04/06/2021    MCV 80 11/18/2022    MCV 84 04/06/2021    MCH 25.8 (L) 11/18/2022    MCH 27.0 04/06/2021    MCHC 32.2 11/18/2022    MCHC 32.2 04/06/2021    RDW 14.6 11/18/2022    RDW 13.2 04/06/2021     11/18/2022     04/06/2021     BMP RESULTS:  Lab Results   Component Value Date     03/30/2023     04/06/2021    POTASSIUM 4.2 03/30/2023    POTASSIUM 3.8 11/18/2022    POTASSIUM 5.6 (H) 04/06/2021     CHLORIDE 104 03/30/2023    CHLORIDE 110 (H) 11/18/2022    CHLORIDE 110 (H) 04/06/2021    CO2 20 (L) 03/30/2023    CO2 24 11/18/2022    CO2 25 04/06/2021    ANIONGAP 12 03/30/2023    ANIONGAP 5 11/18/2022    ANIONGAP 4 04/06/2021     (H) 03/30/2023     (H) 11/18/2022     (H) 11/18/2022     (H) 04/06/2021    BUN 30.9 (H) 03/30/2023    BUN 37 (H) 11/18/2022    BUN 48 (H) 04/06/2021    CR 1.95 (H) 03/30/2023    CR 1.99 (H) 04/06/2021    GFRESTIMATED 37 (L) 03/30/2023    GFRESTIMATED 35 (L) 04/06/2021    GFRESTBLACK 40 (L) 04/06/2021    MYKE 9.5 03/30/2023    MYKE 9.9 04/06/2021      A1C RESULTS:  Lab Results   Component Value Date    A1C 8.2 (H) 11/16/2022    A1C 7.9 (H) 03/11/2019     INR RESULTS:  Lab Results   Component Value Date    INR 1.54 (H) 03/18/2019    INR 1.79 (H) 03/18/2019          Medications     Current Outpatient Medications   Medication Sig Dispense Refill     acetaminophen (TYLENOL) 325 MG tablet Take 650 mg by mouth every 6 hours as needed for mild pain       amLODIPine (NORVASC) 10 MG tablet Take 1 tablet by mouth daily       atorvastatin (LIPITOR) 80 MG tablet Take 1 tablet (80 mg) by mouth every evening 90 tablet 3     carvedilol (COREG) 12.5 MG tablet Take 1 tablet (12.5 mg) by mouth 2 times daily (with meals) 180 tablet 1     empagliflozin (JARDIANCE) 10 MG TABS tablet Take 1 tablet (10 mg) by mouth daily 90 tablet 1     ferrous sulfate (FEROSUL) 325 (65 Fe) MG tablet Take 325 mg by mouth daily       glipiZIDE (GLUCOTROL) 5 MG tablet Take 2.5 mg by mouth 2 times daily       insulin glargine (LANTUS PEN) 100 UNIT/ML pen Inject 10 Units Subcutaneous 2 times daily 15 mL 0     lisinopril (ZESTRIL) 10 MG tablet Take 10 mg by mouth daily       tamsulosin (FLOMAX) 0.4 MG capsule Take 1 capsule (0.4 mg) by mouth daily (Patient not taking: Reported on 2/7/2023) 30 capsule 0        Past Medical History     Past Medical History:   Diagnosis Date     CAD in native artery 03/18/2019      Chronic renal insufficiency     Stage 3b in 2021     Chronic systolic heart failure (H) 10/01/2019     Diabetes mellitus with proliferative retinopathy (H)      Essential hypertension 06/14/2019     Hyperlipidemia LDL goal <70 06/14/2019     Nephrolithiasis      NSTEMI (non-ST elevated myocardial infarction) (H)      CHALO (obstructive sleep apnea) 06/24/2019 6/21/2019 [place] [facility] Sleep Study (153.0 lbs) - AHI 30.5, RDI 34.7, Supine AHI 31.5, REM AHI 56.5, Low O2 68.7%, Time Spent ?88% 3.7 minutes / Time Spent ?89% 4.5 minutes.     S/P CABG (coronary artery bypass graft) 03/26/2019     Past Surgical History:   Procedure Laterality Date     BYPASS GRAFT ARTERY CORONARY N/A 3/18/2019    Procedure: CORONARY BYPASS GRAFTING X 4 - LIMA TO LAD, SV TO DIAGONAL, PDA, AND OM; ON PUMP WITH NOA; ENDOVEIN HARVEST LEFT LEG;  Surgeon: Adarsh Sanchez MD;  Location:  OR     COMBINED CYSTOSCOPY, INSERT STENT URETER(S) Left 11/16/2022    Procedure: CYSTOSCOPY, WITH LEFT URETERAL STENT INSERTION. LEFT RETROGRADE PYELOGRAM;  Surgeon: Alcides Moreno MD;  Location:  OR     CV CORONARY ANGIOGRAM N/A 3/12/2019    Procedure: Coronary Angiogram;  Surgeon: Remi Rodriguez MD;  Location:  HEART CARDIAC CATH LAB     CV HEART CATHETERIZATION WITH POSSIBLE INTERVENTION N/A 3/12/2019    Procedure: Heart Catheterization with possible Intervention;  Surgeon: Remi Rodriguez MD;  Location:  HEART CARDIAC CATH LAB     CV LEFT VENTRICULOGRAM N/A 3/12/2019    Procedure: Left Ventricular Angiogram;  Surgeon: Remi Rodriguez MD;  Location:  HEART CARDIAC CATH LAB     LITHOTRIPSY       Family History   Problem Relation Age of Onset     Other - See Comments Mother         giving birth     Other - See Comments Father         old age            Allergies   Patient has no known allergies.    40 minutes spent on the date of the encounter doing chart review, history and exam, documentation and further  activities as noted above      MICHELE Hicks CNP  [facility]  Pager: [phone]      Thank you for allowing me to participate in the care of your patient.      Sincerely,     MICHELE Hicks CNP     [facility]  cc:   MICHELE Luis CNP  [address] [place] [id number]

## 2023-03-31 ENCOUNTER — TELEPHONE (OUTPATIENT)
Dept: CARDIOLOGY | Facility: CLINIC | Age: 68
End: 2023-03-31
Payer: MEDICARE

## 2023-03-31 NOTE — TELEPHONE ENCOUNTER
MTM referral from: [facility] visit (referral by provider)    MTM referral outreach attempt #1 on March 31, 2023 at 2:31 PM      Outcome: Patient is not interested at this time, will route to MTM Pharmacist/Provider as an FYI. Thank you for the referral.     Andreina Hudson - [facility]

## 2023-04-13 ENCOUNTER — LAB (OUTPATIENT)
Dept: LAB | Facility: CLINIC | Age: 68
End: 2023-04-13
Payer: COMMERCIAL

## 2023-04-13 ENCOUNTER — OFFICE VISIT (OUTPATIENT)
Dept: CARDIOLOGY | Facility: CLINIC | Age: 68
End: 2023-04-13
Attending: NURSE PRACTITIONER
Payer: COMMERCIAL

## 2023-04-13 VITALS
HEIGHT: 62 IN | DIASTOLIC BLOOD PRESSURE: 62 MMHG | RESPIRATION RATE: 17 BRPM | HEART RATE: 53 BPM | WEIGHT: 160 LBS | BODY MASS INDEX: 29.44 KG/M2 | OXYGEN SATURATION: 97 % | SYSTOLIC BLOOD PRESSURE: 130 MMHG

## 2023-04-13 DIAGNOSIS — I10 ESSENTIAL HYPERTENSION: ICD-10-CM

## 2023-04-13 DIAGNOSIS — E78.5 HYPERLIPIDEMIA LDL GOAL <70: ICD-10-CM

## 2023-04-13 DIAGNOSIS — I50.22 CHRONIC SYSTOLIC HEART FAILURE (H): ICD-10-CM

## 2023-04-13 DIAGNOSIS — I49.9 IRREGULAR HEART RATE: ICD-10-CM

## 2023-04-13 DIAGNOSIS — E11.00 TYPE 2 DIABETES MELLITUS WITH HYPEROSMOLARITY WITHOUT COMA, WITHOUT LONG-TERM CURRENT USE OF INSULIN (H): ICD-10-CM

## 2023-04-13 DIAGNOSIS — I25.10 CAD IN NATIVE ARTERY: ICD-10-CM

## 2023-04-13 DIAGNOSIS — N18.32 STAGE 3B CHRONIC KIDNEY DISEASE (H): ICD-10-CM

## 2023-04-13 DIAGNOSIS — I25.5 ISCHEMIC CARDIOMYOPATHY: Primary | ICD-10-CM

## 2023-04-13 DIAGNOSIS — I25.5 ISCHEMIC CARDIOMYOPATHY: ICD-10-CM

## 2023-04-13 DIAGNOSIS — I44.0 FIRST DEGREE HEART BLOCK: ICD-10-CM

## 2023-04-13 LAB
ANION GAP SERPL CALCULATED.3IONS-SCNC: 10 MMOL/L (ref 7–15)
BUN SERPL-MCNC: 37.7 MG/DL (ref 8–23)
CALCIUM SERPL-MCNC: 9 MG/DL (ref 8.8–10.2)
CHLORIDE SERPL-SCNC: 104 MMOL/L (ref 98–107)
CREAT SERPL-MCNC: 2.06 MG/DL (ref 0.67–1.17)
DEPRECATED HCO3 PLAS-SCNC: 22 MMOL/L (ref 22–29)
GFR SERPL CREATININE-BSD FRML MDRD: 35 ML/MIN/1.73M2
GLUCOSE SERPL-MCNC: 324 MG/DL (ref 70–99)
POTASSIUM SERPL-SCNC: 4.8 MMOL/L (ref 3.4–5.3)
SODIUM SERPL-SCNC: 136 MMOL/L (ref 136–145)

## 2023-04-13 PROCEDURE — 99215 OFFICE O/P EST HI 40 MIN: CPT | Performed by: NURSE PRACTITIONER

## 2023-04-13 PROCEDURE — 36415 COLL VENOUS BLD VENIPUNCTURE: CPT | Performed by: NURSE PRACTITIONER

## 2023-04-13 PROCEDURE — 93000 ELECTROCARDIOGRAM COMPLETE: CPT | Performed by: NURSE PRACTITIONER

## 2023-04-13 PROCEDURE — 80048 BASIC METABOLIC PNL TOTAL CA: CPT | Performed by: NURSE PRACTITIONER

## 2023-04-13 RX ORDER — CARVEDILOL 12.5 MG/1
6.25 TABLET ORAL 2 TIMES DAILY WITH MEALS
Qty: 90 TABLET | Refills: 1 | Status: SHIPPED | OUTPATIENT
Start: 2023-04-13

## 2023-04-13 RX ORDER — HYDRALAZINE HYDROCHLORIDE 25 MG/1
25 TABLET, FILM COATED ORAL 3 TIMES DAILY
Qty: 270 TABLET | Refills: 1 | Status: SHIPPED | OUTPATIENT
Start: 2023-04-13

## 2023-04-13 NOTE — LETTER
4/13/2023    Mpho Brian Castro MD  [address]  [facility] [address]    RE: Khang Mcelroy       Dear Colleague,     I had the pleasure of seeing Khang Mcelroy in the [facility].  Cardiology Clinic Progress Note  Khang Mcelroy MRN# [id number]   YOB: 1955 Age: 67 year old   Primary Cardiologist: Dr. Hernández Reason for visit: CORE follow up             Assessment and Plan:   Khang Mcelroy is a very pleasant 67 year old male here for CORE follow up.      1.  Combined chronic systolic heart failure/HFrEF and diastolic heart failure, ischemic cardiomyopathy - LVEF 35-40% 3/12/19 since has had some recovery, stable EF around 45-50% with grade I diastolic dysfunction              - NYHA class II-III, stage C              - Etiology : ischemic              - Fluid status : euvolemic               - Diuretic regimen : none              - Ischemic evaluation : coronary angiogram 3/12/19, s/p CABG 3/18/19              - Guideline directed medical therapy                          - Betablocker: carvedilol 6.25mg BID                           - ACEI/ARB/ARNI: lisinopril 10mg daily                           - Aldactone antagonist: none, stopped due to acute on chronic kidney disease 6/2022                          - SGLT2i : Jardiance 10mg daily    - Hydralazine 25mg TID  2. Coronary artery disease s/p CABG x 4 (LIMA-LAD, SVG-DIAG, SVG-OM, SVG-PDA) on 3/18/19, stable no signs/symptoms of myocardial ischemia.               - Continue aspirin, statin and betablocker therapy.  3. Hypertension - improved control.    4. Hyperlipidemia - stable, LDL goal <70, lipid panel 6/7/22 showed total cholesterol 113, HDL 30, LDL 60.               - Continue atorvastatin  5. Chronic kidney disease - baseline 1.7-2.0, had  acute on chronic kidney disease secondary to hydronephrosis due to kidney stone, creatinine peak ~3 in 12/2022, remains stable, creatinine today 2.06              -  Follows with nephrology  6. Diabetes mellitus - hgbA1c 8.2 11/16/22  7. Obstructive sleep apnea - not compliant with CPAP  8. Noncompliance with medications - has had issues with inaccurately taking medications, low health literacy appears to be contributing. Today brought his medication bottles and taking more accurately but still noted to only be taking carvedilol once daily.   9. Bradycardia - EKG showed sinus bradycardia with long first degree block (TX interval ~ 0.4) with PACs. Asymptomatic.      I saw patient today for CORE follow up. During our last OV there had been continued confusion with his medications, he had been taking both metoprolol XL and carvedilol, as well as stopped lisinopril. At this time I advised him to stop metoprolol XL, continue carvedilol and resume lisinopril. Today he shares he has only been taking his carvedilol in the morning. Blood pressure control is improved with the addition of lisinopril. BMP showed overall stable renal function but some increase in creatinine noted. This will need to be monitored closely, if creatinine continues to rise lisinopril will need to be stopped.     On exam he had continued bradycardia despite discontinuation of metoprolol XL and noted to be somewhat irregular. EKG completed which showed sinus bradycardia with long first degree block (TX interval ~ 0.4) with PACs. Reviewed EKG with JHONNY aRngel.     At this time recommend decreasing carvedilol given long first degree block. Unfortunately this will impact his blood pressure control. Unable to titrate lisinopril given renal disease. Recommend starting hydralazine 25mg TID and titrating as needed for blood pressure control.     Will repeat an EKG at follow up appointment. Will also consider heart monitor, especially if any symptoms were to develop. Currently asymptomatic.     Lastly he does appear compensated/euvolemic on exam, despite having some LE edema, which I expect is likely secondary to high  dose amlodipine. If tolerates hydralazine will titrate dose and try getting patient on lower dose and/or off amlodipine.     Changes today: DECREASE carvedilol to 6.25mg BID, START hydralazine 25mg TID.     Follow up plan:     CORE follow up with 4 weeks with labs and EKG prior        History of Presenting Illness:    Khang Mcelroy is a very pleasant 67 year old male with a history of  HFrEF, ischemic cardiomyopathy, LVEF 35-40% per echocardiogram 3/12/19, coronary artery disease s/p CABG x 4 (LIMA-LAD, SVG-DIAG, SVG-OM, SVG-PDA) on 3/18/19, DM, hypertension, hyperlipidemia, CKD, and anemia.      Hospitalized 3/11/19-3/24/19 presented with acute hypoxemic and hypercapnic respiratory failure secondary to NSTEMI, found to have multivessel coronary artery disease, s/p CABG x 4 (LIMA-LAD, SVG-DIAG, SVG-OM, SVG-PDA) on 3/18/19. Troponin peaked at 53. Echocardiogram 3/12/19 showed an LVEF 35-40%, grade III or advanced diastolic dysfunction, with multiple wall motion abnormalities.      Most recent echocardiogram 1/2021 showed EF 45-50%, RV normal size/function, inferior wall hypokinesis, grade I diastolic dysfunction and mildly decreased RV systolic function.      He saw Dr. Hernández 2/7/22 at which time he was doing well, prior to his OV he had stopped his furosemide.      Over the summer he was hospitalized for an acute kidney injury and critically elevated potassium, creatinine peaked ~ 3, likely pre-renal, his fursoemide, lisinopril and spironolactone were stopped. Evaluated by nephrology.      He was hospitalized 11/15-11/18/22 secondary to large left UPJ stone with moderate left hydronephrosis, the large 1.1 x 0.9 cm left UPJ stone s/p cystoscopy and left ureteral stent placement by urology on 11/16/2022. He again had an acute kidney injury with creatinine peaking ~3.      In December under went kidney stone removal.     In February at which time he was doing well, LE edema had improved, but blood pressures were  elevated. He shared that he has been self adjusting his amlodipine based on blood pressures, noting that if BP normal take 1 tablet (10mg) and if elevated takes 2 tablets (20mg). To optimize blood pressure control and his GDMT recommend changing metoprolol to carvedilol and starting Jardiance.    During our last OV in March it was discovered he was taking both metoprolol and carvedilol and he stopped his lisinopril, unclear why, but appears wasn't refilled. Thankfully his heart rates were stable in the 50s. His blood pressures remained elevated. At this time I had him stop metoprolol XL, continue carvedilol and resume lisinopril 10mg daily. Hopeful with the discontinuation of metoprolol XL his heart rates will increase allowing further titration of carvedilol. With the resuming lisinopril will need to monitor renal function closely, this may be able to be titrated but will depend on renal function. With plan that if blood pressures remain elevated will need to consider hydralazine and imdur or isordil. Given continued confusion with medications referral placed for MTM. Ultimately refused MTM visit.     Patient is here today for CORE follow up.     Patient reports feeling good. Monitoring weights daily a home. Weight 160# at home, which he notes is down. Denies shortness of breath. Denies exertional dyspnea. Notes LE edema is improved. Denies abdominal distention. Denies chest pain or chest tightness. Denies dizziness, lightheadedness or other presyncopal symptoms. Denies tachycardia or palpitations. Taking medications daily.     Labs today show overall stable renal function, slight increase in creatinine noted. Stable electrolytes. Blood pressure 145/70 and HR 54 in clinic today. Manual recheck 130/62. At home this morning 128/62. EKG today showed sinus bradycardia with long first degree block and PACs.     Appetite good. Trying to limit salt on foods. Walking in mall for exercise, 3-4 x a week, 1-2 hours with no  difficulty. Denies alcohol use. Denies tobacco use.        Social History      Social History     Socioeconomic History    Marital status:      Spouse name: Not on file    Number of children: Not on file    Years of education: Not on file    Highest education level: Not on file   Occupational History    Not on file   Tobacco Use    Smoking status: Never    Smokeless tobacco: Never   Vaping Use    Vaping status: Not on file   Substance and Sexual Activity    Alcohol use: No    Drug use: No    Sexual activity: Yes     Partners: Female   Other Topics Concern    Parent/sibling w/ CABG, MI or angioplasty before 65F 55M? No   Social History Narrative    Not on file     Social Determinants of Health     Financial Resource Strain: Not on file   Food Insecurity: Not on file   Transportation Needs: Not on file   Physical Activity: Not on file   Stress: Not on file   Social Connections: Not on file   Intimate Partner Violence: Not on file   Housing Stability: Not on file          Review of Systems:   10 point ROS neg other than the symptoms noted above in the HPI.         Physical Exam:   Vitals: There were no vitals taken for this visit.   Wt Readings from Last 4 Encounters:   03/30/23 73.4 kg (161 lb 14.4 oz)   02/07/23 75.3 kg (166 lb)   12/19/22 83 kg (183 lb)   11/17/22 74.8 kg (164 lb 14.5 oz)     GEN: well nourished, in no acute distress.  HEENT:  Pupils equal, round. Sclerae nonicteric.   NECK: Supple, no masses appreciated.  C/V: Bradycardic, irregular, no murmur  RESP: Respirations are unlabored. Clear to auscultation bilaterally without wheezing, rales, or rhonchi.  GI: Abdomen soft, nontender.  EXTREM: +1 bilateral LE edema up to mid shin.  NEURO: Alert and oriented, cooperative.  SKIN: Warm and dry       Data:     LIPID RESULTS:  Lab Results   Component Value Date    CHOL 126 10/17/2019    HDL 47 10/17/2019    LDL 69 10/17/2019    TRIG 48 10/17/2019     LIVER ENZYME RESULTS:  Lab Results   Component Value  Date    AST 38 11/15/2022    AST 27 06/14/2019    ALT 41 11/15/2022    ALT 22 10/17/2019     CBC RESULTS:  Lab Results   Component Value Date    WBC 14.5 (H) 11/18/2022    WBC 9.8 04/06/2021    RBC 3.95 (L) 11/18/2022    RBC 3.70 (L) 04/06/2021    HGB 10.2 (L) 11/18/2022    HGB 10.0 (L) 04/06/2021    HCT 31.7 (L) 11/18/2022    HCT 31.1 (L) 04/06/2021    MCV 80 11/18/2022    MCV 84 04/06/2021    MCH 25.8 (L) 11/18/2022    MCH 27.0 04/06/2021    MCHC 32.2 11/18/2022    MCHC 32.2 04/06/2021    RDW 14.6 11/18/2022    RDW 13.2 04/06/2021     11/18/2022     04/06/2021     BMP RESULTS:  Lab Results   Component Value Date     03/30/2023     04/06/2021    POTASSIUM 4.2 03/30/2023    POTASSIUM 3.8 11/18/2022    POTASSIUM 5.6 (H) 04/06/2021    CHLORIDE 104 03/30/2023    CHLORIDE 110 (H) 11/18/2022    CHLORIDE 110 (H) 04/06/2021    CO2 20 (L) 03/30/2023    CO2 24 11/18/2022    CO2 25 04/06/2021    ANIONGAP 12 03/30/2023    ANIONGAP 5 11/18/2022    ANIONGAP 4 04/06/2021     (H) 03/30/2023     (H) 11/18/2022     (H) 11/18/2022     (H) 04/06/2021    BUN 30.9 (H) 03/30/2023    BUN 37 (H) 11/18/2022    BUN 48 (H) 04/06/2021    CR 1.95 (H) 03/30/2023    CR 1.99 (H) 04/06/2021    GFRESTIMATED 37 (L) 03/30/2023    GFRESTIMATED 35 (L) 04/06/2021    GFRESTBLACK 40 (L) 04/06/2021    MYKE 9.5 03/30/2023    MYKE 9.9 04/06/2021      A1C RESULTS:  Lab Results   Component Value Date    A1C 8.2 (H) 11/16/2022    A1C 7.9 (H) 03/11/2019     INR RESULTS:  Lab Results   Component Value Date    INR 1.54 (H) 03/18/2019    INR 1.79 (H) 03/18/2019            Medications     Current Outpatient Medications   Medication Sig Dispense Refill    acetaminophen (TYLENOL) 325 MG tablet Take 650 mg by mouth every 6 hours as needed for mild pain      amLODIPine (NORVASC) 10 MG tablet Take 1 tablet by mouth daily      atorvastatin (LIPITOR) 80 MG tablet Take 1 tablet (80 mg) by mouth every evening 90 tablet 3     carvedilol (COREG) 12.5 MG tablet Take 1 tablet (12.5 mg) by mouth 2 times daily (with meals) 180 tablet 1    empagliflozin (JARDIANCE) 10 MG TABS tablet Take 1 tablet (10 mg) by mouth daily 90 tablet 1    ferrous sulfate (FEROSUL) 325 (65 Fe) MG tablet Take 325 mg by mouth daily (Patient not taking: Reported on 3/30/2023)      glipiZIDE (GLUCOTROL) 5 MG tablet Take 2.5 mg by mouth 2 times daily (Patient not taking: Reported on 3/30/2023)      insulin glargine (LANTUS PEN) 100 UNIT/ML pen Inject 10 Units Subcutaneous 2 times daily (Patient not taking: Reported on 3/30/2023) 15 mL 0    lisinopril (ZESTRIL) 10 MG tablet Take 1 tablet (10 mg) by mouth daily 90 tablet 1    tamsulosin (FLOMAX) 0.4 MG capsule Take 1 capsule (0.4 mg) by mouth daily 30 capsule 0        Past Medical History     Past Medical History:   Diagnosis Date    CAD in native artery 03/18/2019    Chronic renal insufficiency     Stage 3b in 2021    Chronic systolic heart failure (H) 10/01/2019    Diabetes mellitus with proliferative retinopathy (H)     Essential hypertension 06/14/2019    Hyperlipidemia LDL goal <70 06/14/2019    Nephrolithiasis     NSTEMI (non-ST elevated myocardial infarction) (H)     CHALO (obstructive sleep apnea) 06/24/2019 6/21/2019 [facility] Sleep Study (153.0 lbs) - AHI 30.5, RDI 34.7, Supine AHI 31.5, REM AHI 56.5, Low O2 68.7%, Time Spent ?88% 3.7 minutes / Time Spent ?89% 4.5 minutes.    S/P CABG (coronary artery bypass graft) 03/26/2019     Past Surgical History:   Procedure Laterality Date    BYPASS GRAFT ARTERY CORONARY N/A 3/18/2019    Procedure: CORONARY BYPASS GRAFTING X 4 - LIMA TO LAD, SV TO DIAGONAL, PDA, AND OM; ON PUMP WITH NOA; ENDOVEIN HARVEST LEFT LEG;  Surgeon: Adarsh Sanchez MD;  Location: [facility] OR    COMBINED CYSTOSCOPY, INSERT STENT URETER(S) Left 11/16/2022    Procedure: CYSTOSCOPY, WITH LEFT URETERAL STENT INSERTION. LEFT RETROGRADE PYELOGRAM;  Surgeon: Alcides Moreno MD;   Location:  OR    CV CORONARY ANGIOGRAM N/A 3/12/2019    Procedure: Coronary Angiogram;  Surgeon: Remi Rodriguez MD;  Location:  HEART CARDIAC CATH LAB    CV HEART CATHETERIZATION WITH POSSIBLE INTERVENTION N/A 3/12/2019    Procedure: Heart Catheterization with possible Intervention;  Surgeon: Remi Rodriguez MD;  Location:  HEART CARDIAC CATH LAB    CV LEFT VENTRICULOGRAM N/A 3/12/2019    Procedure: Left Ventricular Angiogram;  Surgeon: Remi Rodriguez MD;  Location:  HEART CARDIAC CATH LAB    LITHOTRIPSY       Family History   Problem Relation Age of Onset    Other - See Comments Mother         giving birth    Other - See Comments Father         old age            Allergies   Patient has no known allergies.    40 minutes spent on the date of the encounter doing chart review, history and exam, documentation and further activities as noted above      MICHELE Hicks CNP  [facility]  Pager: [phone]        Thank you for allowing me to participate in the care of your patient.      Sincerely,     MICHELE Hicks CNP     [facility]  cc:   MICHELE Luis CNP  [address]  [place] [address]

## 2023-04-13 NOTE — PATIENT INSTRUCTIONS
DECREASE carvedilol to 6.25mg (1/2 tablet 2 x a day)  START hydralazine 25mg 3 x a day (1 tablet 3 x a day)  Follow up with Viv in 1 month with labs and EKG prior  Please call with any questions/concerns [phone]

## 2023-04-13 NOTE — PROGRESS NOTES
Cardiology Clinic Progress Note  Khang Mcelroy MRN# [id number]   YOB: 1955 Age: 67 year old   Primary Cardiologist: Dr. Hernández Reason for visit: CORE follow up             Assessment and Plan:   Khang Mcelroy is a very pleasant 67 year old male here for CORE follow up.      1.  Combined chronic systolic heart failure/HFrEF and diastolic heart failure, ischemic cardiomyopathy - LVEF 35-40% 3/12/19 since has had some recovery, stable EF around 45-50% with grade I diastolic dysfunction              - NYHA class II-III, stage C              - Etiology : ischemic              - Fluid status : euvolemic               - Diuretic regimen : none              - Ischemic evaluation : coronary angiogram 3/12/19, s/p CABG 3/18/19              - Guideline directed medical therapy                          - Betablocker: carvedilol 6.25mg BID                           - ACEI/ARB/ARNI: lisinopril 10mg daily                           - Aldactone antagonist: none, stopped due to acute on chronic kidney disease 6/2022                          - SGLT2i : Jardiance 10mg daily    - Hydralazine 25mg TID  2. Coronary artery disease s/p CABG x 4 (LIMA-LAD, SVG-DIAG, SVG-OM, SVG-PDA) on 3/18/19, stable no signs/symptoms of myocardial ischemia.               - Continue aspirin, statin and betablocker therapy.  3. Hypertension - improved control.    4. Hyperlipidemia - stable, LDL goal <70, lipid panel 6/7/22 showed total cholesterol 113, HDL 30, LDL 60.               - Continue atorvastatin  5. Chronic kidney disease - baseline 1.7-2.0, had  acute on chronic kidney disease secondary to hydronephrosis due to kidney stone, creatinine peak ~3 in 12/2022, remains stable, creatinine today 2.06              - Follows with nephrology  6. Diabetes mellitus - hgbA1c 8.2 11/16/22  7. Obstructive sleep apnea - not compliant with CPAP  8. Noncompliance with medications - has had issues with inaccurately taking medications, low  health literacy appears to be contributing. Today brought his medication bottles and taking more accurately but still noted to only be taking carvedilol once daily.   9. Bradycardia - EKG showed sinus bradycardia with long first degree block (KS interval ~ 0.4) with PACs. Asymptomatic.      I saw patient today for CORE follow up. During our last OV there had been continued confusion with his medications, he had been taking both metoprolol XL and carvedilol, as well as stopped lisinopril. At this time I advised him to stop metoprolol XL, continue carvedilol and resume lisinopril. Today he shares he has only been taking his carvedilol in the morning. Blood pressure control is improved with the addition of lisinopril. BMP showed overall stable renal function but some increase in creatinine noted. This will need to be monitored closely, if creatinine continues to rise lisinopril will need to be stopped.     On exam he had continued bradycardia despite discontinuation of metoprolol XL and noted to be somewhat irregular. EKG completed which showed sinus bradycardia with long first degree block (KS interval ~ 0.4) with PACs. Reviewed EKG with JHONNY Rangel.     At this time recommend decreasing carvedilol given long first degree block. Unfortunately this will impact his blood pressure control. Unable to titrate lisinopril given renal disease. Recommend starting hydralazine 25mg TID and titrating as needed for blood pressure control.     Will repeat an EKG at follow up appointment. Will also consider heart monitor, especially if any symptoms were to develop. Currently asymptomatic.     Lastly he does appear compensated/euvolemic on exam, despite having some LE edema, which I expect is likely secondary to high dose amlodipine. If tolerates hydralazine will titrate dose and try getting patient on lower dose and/or off amlodipine.     Changes today: DECREASE carvedilol to 6.25mg BID, START hydralazine 25mg TID.     Follow up  plan:     CORE follow up with 4 weeks with labs and EKG prior        History of Presenting Illness:    Khang Mcelroy is a very pleasant 67 year old male with a history of  HFrEF, ischemic cardiomyopathy, LVEF 35-40% per echocardiogram 3/12/19, coronary artery disease s/p CABG x 4 (LIMA-LAD, SVG-DIAG, SVG-OM, SVG-PDA) on 3/18/19, DM, hypertension, hyperlipidemia, CKD, and anemia.      Hospitalized 3/11/19-3/24/19 presented with acute hypoxemic and hypercapnic respiratory failure secondary to NSTEMI, found to have multivessel coronary artery disease, s/p CABG x 4 (LIMA-LAD, SVG-DIAG, SVG-OM, SVG-PDA) on 3/18/19. Troponin peaked at 53. Echocardiogram 3/12/19 showed an LVEF 35-40%, grade III or advanced diastolic dysfunction, with multiple wall motion abnormalities.      Most recent echocardiogram 1/2021 showed EF 45-50%, RV normal size/function, inferior wall hypokinesis, grade I diastolic dysfunction and mildly decreased RV systolic function.      He saw Dr. Hernández 2/7/22 at which time he was doing well, prior to his OV he had stopped his furosemide.      Over the summer he was hospitalized for an acute kidney injury and critically elevated potassium, creatinine peaked ~ 3, likely pre-renal, his fursoemide, lisinopril and spironolactone were stopped. Evaluated by nephrology.      He was hospitalized 11/15-11/18/22 secondary to large left UPJ stone with moderate left hydronephrosis, the large 1.1 x 0.9 cm left UPJ stone s/p cystoscopy and left ureteral stent placement by urology on 11/16/2022. He again had an acute kidney injury with creatinine peaking ~3.      In December under went kidney stone removal.     In February at which time he was doing well, LE edema had improved, but blood pressures were elevated. He shared that he has been self adjusting his amlodipine based on blood pressures, noting that if BP normal take 1 tablet (10mg) and if elevated takes 2 tablets (20mg). To optimize blood pressure control and  his GDMT recommend changing metoprolol to carvedilol and starting Jardiance.    During our last OV in March it was discovered he was taking both metoprolol and carvedilol and he stopped his lisinopril, unclear why, but appears wasn't refilled. Thankfully his heart rates were stable in the 50s. His blood pressures remained elevated. At this time I had him stop metoprolol XL, continue carvedilol and resume lisinopril 10mg daily. Hopeful with the discontinuation of metoprolol XL his heart rates will increase allowing further titration of carvedilol. With the resuming lisinopril will need to monitor renal function closely, this may be able to be titrated but will depend on renal function. With plan that if blood pressures remain elevated will need to consider hydralazine and imdur or isordil. Given continued confusion with medications referral placed for MTM. Ultimately refused MTM visit.     Patient is here today for CORE follow up.     Patient reports feeling good. Monitoring weights daily a home. Weight 160# at home, which he notes is down. Denies shortness of breath. Denies exertional dyspnea. Notes LE edema is improved. Denies abdominal distention. Denies chest pain or chest tightness. Denies dizziness, lightheadedness or other presyncopal symptoms. Denies tachycardia or palpitations. Taking medications daily.     Labs today show overall stable renal function, slight increase in creatinine noted. Stable electrolytes. Blood pressure 145/70 and HR 54 in clinic today. Manual recheck 130/62. At home this morning 128/62. EKG today showed sinus bradycardia with long first degree block and PACs.     Appetite good. Trying to limit salt on foods. Walking in mall for exercise, 3-4 x a week, 1-2 hours with no difficulty. Denies alcohol use. Denies tobacco use.        Social History      Social History     Socioeconomic History     Marital status:      Spouse name: Not on file     Number of children: Not on file      Years of education: Not on file     Highest education level: Not on file   Occupational History     Not on file   Tobacco Use     Smoking status: Never     Smokeless tobacco: Never   Vaping Use     Vaping status: Not on file   Substance and Sexual Activity     Alcohol use: No     Drug use: No     Sexual activity: Yes     Partners: Female   Other Topics Concern     Parent/sibling w/ CABG, MI or angioplasty before 65F 55M? No   Social History Narrative     Not on file     Social Determinants of Health     Financial Resource Strain: Not on file   Food Insecurity: Not on file   Transportation Needs: Not on file   Physical Activity: Not on file   Stress: Not on file   Social Connections: Not on file   Intimate Partner Violence: Not on file   Housing Stability: Not on file          Review of Systems:   10 point ROS neg other than the symptoms noted above in the HPI.         Physical Exam:   Vitals: There were no vitals taken for this visit.   Wt Readings from Last 4 Encounters:   03/30/23 73.4 kg (161 lb 14.4 oz)   02/07/23 75.3 kg (166 lb)   12/19/22 83 kg (183 lb)   11/17/22 74.8 kg (164 lb 14.5 oz)     GEN: well nourished, in no acute distress.  HEENT:  Pupils equal, round. Sclerae nonicteric.   NECK: Supple, no masses appreciated.  C/V: Bradycardic, irregular, no murmur  RESP: Respirations are unlabored. Clear to auscultation bilaterally without wheezing, rales, or rhonchi.  GI: Abdomen soft, nontender.  EXTREM: +1 bilateral LE edema up to mid shin.  NEURO: Alert and oriented, cooperative.  SKIN: Warm and dry       Data:     LIPID RESULTS:  Lab Results   Component Value Date    CHOL 126 10/17/2019    HDL 47 10/17/2019    LDL 69 10/17/2019    TRIG 48 10/17/2019     LIVER ENZYME RESULTS:  Lab Results   Component Value Date    AST 38 11/15/2022    AST 27 06/14/2019    ALT 41 11/15/2022    ALT 22 10/17/2019     CBC RESULTS:  Lab Results   Component Value Date    WBC 14.5 (H) 11/18/2022    WBC 9.8 04/06/2021    RBC 3.95  (L) 11/18/2022    RBC 3.70 (L) 04/06/2021    HGB 10.2 (L) 11/18/2022    HGB 10.0 (L) 04/06/2021    HCT 31.7 (L) 11/18/2022    HCT 31.1 (L) 04/06/2021    MCV 80 11/18/2022    MCV 84 04/06/2021    MCH 25.8 (L) 11/18/2022    MCH 27.0 04/06/2021    MCHC 32.2 11/18/2022    MCHC 32.2 04/06/2021    RDW 14.6 11/18/2022    RDW 13.2 04/06/2021     11/18/2022     04/06/2021     BMP RESULTS:  Lab Results   Component Value Date     03/30/2023     04/06/2021    POTASSIUM 4.2 03/30/2023    POTASSIUM 3.8 11/18/2022    POTASSIUM 5.6 (H) 04/06/2021    CHLORIDE 104 03/30/2023    CHLORIDE 110 (H) 11/18/2022    CHLORIDE 110 (H) 04/06/2021    CO2 20 (L) 03/30/2023    CO2 24 11/18/2022    CO2 25 04/06/2021    ANIONGAP 12 03/30/2023    ANIONGAP 5 11/18/2022    ANIONGAP 4 04/06/2021     (H) 03/30/2023     (H) 11/18/2022     (H) 11/18/2022     (H) 04/06/2021    BUN 30.9 (H) 03/30/2023    BUN 37 (H) 11/18/2022    BUN 48 (H) 04/06/2021    CR 1.95 (H) 03/30/2023    CR 1.99 (H) 04/06/2021    GFRESTIMATED 37 (L) 03/30/2023    GFRESTIMATED 35 (L) 04/06/2021    GFRESTBLACK 40 (L) 04/06/2021    MYKE 9.5 03/30/2023    MYKE 9.9 04/06/2021      A1C RESULTS:  Lab Results   Component Value Date    A1C 8.2 (H) 11/16/2022    A1C 7.9 (H) 03/11/2019     INR RESULTS:  Lab Results   Component Value Date    INR 1.54 (H) 03/18/2019    INR 1.79 (H) 03/18/2019            Medications     Current Outpatient Medications   Medication Sig Dispense Refill     acetaminophen (TYLENOL) 325 MG tablet Take 650 mg by mouth every 6 hours as needed for mild pain       amLODIPine (NORVASC) 10 MG tablet Take 1 tablet by mouth daily       atorvastatin (LIPITOR) 80 MG tablet Take 1 tablet (80 mg) by mouth every evening 90 tablet 3     carvedilol (COREG) 12.5 MG tablet Take 1 tablet (12.5 mg) by mouth 2 times daily (with meals) 180 tablet 1     empagliflozin (JARDIANCE) 10 MG TABS tablet Take 1 tablet (10 mg) by mouth daily 90  tablet 1     ferrous sulfate (FEROSUL) 325 (65 Fe) MG tablet Take 325 mg by mouth daily (Patient not taking: Reported on 3/30/2023)       glipiZIDE (GLUCOTROL) 5 MG tablet Take 2.5 mg by mouth 2 times daily (Patient not taking: Reported on 3/30/2023)       insulin glargine (LANTUS PEN) 100 UNIT/ML pen Inject 10 Units Subcutaneous 2 times daily (Patient not taking: Reported on 3/30/2023) 15 mL 0     lisinopril (ZESTRIL) 10 MG tablet Take 1 tablet (10 mg) by mouth daily 90 tablet 1     tamsulosin (FLOMAX) 0.4 MG capsule Take 1 capsule (0.4 mg) by mouth daily 30 capsule 0        Past Medical History     Past Medical History:   Diagnosis Date     CAD in native artery 03/18/2019     Chronic renal insufficiency     Stage 3b in 2021     Chronic systolic heart failure (H) 10/01/2019     Diabetes mellitus with proliferative retinopathy (H)      Essential hypertension 06/14/2019     Hyperlipidemia LDL goal <70 06/14/2019     Nephrolithiasis      NSTEMI (non-ST elevated myocardial infarction) (H)      CHALO (obstructive sleep apnea) 06/24/2019 6/21/2019 [facility] Sleep Study (153.0 lbs) - AHI 30.5, RDI 34.7, Supine AHI 31.5, REM AHI 56.5, Low O2 68.7%, Time Spent ?88% 3.7 minutes / Time Spent ?89% 4.5 minutes.     S/P CABG (coronary artery bypass graft) 03/26/2019     Past Surgical History:   Procedure Laterality Date     BYPASS GRAFT ARTERY CORONARY N/A 3/18/2019    Procedure: CORONARY BYPASS GRAFTING X 4 - LIMA TO LAD, SV TO DIAGONAL, PDA, AND OM; ON PUMP WITH NOA; ENDOVEIN HARVEST LEFT LEG;  Surgeon: Adarsh Sanchez MD;  Location:  OR     COMBINED CYSTOSCOPY, INSERT STENT URETER(S) Left 11/16/2022    Procedure: CYSTOSCOPY, WITH LEFT URETERAL STENT INSERTION. LEFT RETROGRADE PYELOGRAM;  Surgeon: Alcides Moreno MD;  Location:  OR      CORONARY ANGIOGRAM N/A 3/12/2019    Procedure: Coronary Angiogram;  Surgeon: Remi Rodriguez MD;  Location:  HE[facility]ARD[facility] CATH LAB      HEART  CATHETERIZATION WITH POSSIBLE INTERVENTION N/A 3/12/2019    Procedure: Heart Catheterization with possible Intervention;  Surgeon: Remi Rodriguez MD;  Location:  HEART [facility]ARD[facility] CATH LAB     CV LEFT VENTRICULOGRAM N/A 3/12/2019    Procedure: Left Ventricular Angiogram;  Surgeon: Remi Rodriguez MD;  Location:  HEART CARDI[facility] CATH LAB     LITHOTRIPSY       Family History   Problem Relation Age of Onset     Other - See Comments Mother         giving birth     Other - See Comments Father         old age            Allergies   Patient has no known allergies.    40 minutes spent on the date of the encounter doing chart review, history and exam, documentation and further activities as noted above      MICHELE Hicks [facility]  Pager: [phone]

## 2023-05-09 DIAGNOSIS — I25.5 ISCHEMIC CARDIOMYOPATHY: ICD-10-CM

## 2023-05-30 ENCOUNTER — LAB (OUTPATIENT)
Dept: LAB | Facility: CLINIC | Age: 68
End: 2023-05-30
Payer: COMMERCIAL

## 2023-05-30 ENCOUNTER — OFFICE VISIT (OUTPATIENT)
Dept: CARDIOLOGY | Facility: CLINIC | Age: 68
End: 2023-05-30
Attending: NURSE PRACTITIONER
Payer: COMMERCIAL

## 2023-05-30 VITALS
WEIGHT: 158 LBS | HEART RATE: 60 BPM | BODY MASS INDEX: 29.08 KG/M2 | OXYGEN SATURATION: 97 % | HEIGHT: 62 IN | SYSTOLIC BLOOD PRESSURE: 128 MMHG | DIASTOLIC BLOOD PRESSURE: 64 MMHG

## 2023-05-30 DIAGNOSIS — I25.5 ISCHEMIC CARDIOMYOPATHY: ICD-10-CM

## 2023-05-30 DIAGNOSIS — N18.32 STAGE 3B CHRONIC KIDNEY DISEASE (H): ICD-10-CM

## 2023-05-30 DIAGNOSIS — E78.5 HYPERLIPIDEMIA LDL GOAL <70: ICD-10-CM

## 2023-05-30 DIAGNOSIS — I10 ESSENTIAL HYPERTENSION: ICD-10-CM

## 2023-05-30 DIAGNOSIS — I25.5 ISCHEMIC CARDIOMYOPATHY: Primary | ICD-10-CM

## 2023-05-30 DIAGNOSIS — I50.22 CHRONIC SYSTOLIC HEART FAILURE (H): ICD-10-CM

## 2023-05-30 LAB
ANION GAP SERPL CALCULATED.3IONS-SCNC: 11 MMOL/L (ref 7–15)
BUN SERPL-MCNC: 38.1 MG/DL (ref 8–23)
CALCIUM SERPL-MCNC: 9.1 MG/DL (ref 8.8–10.2)
CHLORIDE SERPL-SCNC: 100 MMOL/L (ref 98–107)
CREAT SERPL-MCNC: 2.14 MG/DL (ref 0.67–1.17)
DEPRECATED HCO3 PLAS-SCNC: 23 MMOL/L (ref 22–29)
GFR SERPL CREATININE-BSD FRML MDRD: 34 ML/MIN/1.73M2
GLUCOSE SERPL-MCNC: 368 MG/DL (ref 70–99)
POTASSIUM SERPL-SCNC: 4.6 MMOL/L (ref 3.4–5.3)
SODIUM SERPL-SCNC: 134 MMOL/L (ref 136–145)

## 2023-05-30 PROCEDURE — 93000 ELECTROCARDIOGRAM COMPLETE: CPT | Performed by: NURSE PRACTITIONER

## 2023-05-30 PROCEDURE — 99214 OFFICE O/P EST MOD 30 MIN: CPT | Performed by: NURSE PRACTITIONER

## 2023-05-30 PROCEDURE — 80048 BASIC METABOLIC PNL TOTAL CA: CPT | Performed by: NURSE PRACTITIONER

## 2023-05-30 PROCEDURE — 36415 COLL VENOUS BLD VENIPUNCTURE: CPT | Performed by: NURSE PRACTITIONER

## 2023-05-30 NOTE — LETTER
5/30/2023    Mpho Brian Castro MD  [address]  [facility] [address]    RE: Khang Mcelroy       Dear Colleague,     I had the pleasure of seeing Khang Mcelroy in the [facility].  Cardiology Clinic Progress Note  Khang Mcelroy MRN# [id number]   YOB: 1955 Age: 67 year old   Primary Cardiologist: Dr. Hernández Reason for visit: CORE follow up             Assessment and Plan:   Khang Mcelroy is a very pleasant 67 year old male here for CORE follow up.      1.  Combined chronic systolic heart failure/HFrEF and diastolic heart failure, ischemic cardiomyopathy - LVEF 35-40% 3/12/19 since has had some recovery, stable EF around 45-50% with grade I diastolic dysfunction              - NYHA class II-III, stage C              - Etiology : ischemic              - Fluid status : euvolemic               - Diuretic regimen : none              - Ischemic evaluation : coronary angiogram 3/12/19, s/p CABG 3/18/19              - Guideline directed medical therapy                          - Betablocker: carvedilol 6.25mg BID                           - ACEI/ARB/ARNI: lisinopril 10mg daily                           - Aldactone antagonist: none, stopped due to acute on chronic kidney disease 6/2022                          - SGLT2i : Jardiance 10mg daily                          - Hydralazine 25mg TID  2. Coronary artery disease s/p CABG x 4 (LIMA-LAD, SVG-DIAG, SVG-OM, SVG-PDA) on 3/18/19, stable no signs/symptoms of myocardial ischemia.               - Continue aspirin, statin and betablocker therapy.  3. Hypertension - controlled  4. Hyperlipidemia - stable, LDL goal <70, lipid panel 6/7/22 showed total cholesterol 113, HDL 30, LDL 60.               - Continue atorvastatin  5. Chronic kidney disease - baseline 1.7-2.0, had  acute on chronic kidney disease secondary to hydronephrosis due to kidney stone, creatinine peak ~3 in 12/2022, remains stable, creatinine today 2.1               - Follows with nephrology  6. Diabetes mellitus - hgbA1c 8.2 11/16/22  7. Obstructive sleep apnea - not compliant with CPAP  8. Noncompliance with medications - has had issues with inaccurately taking medications, low health literacy appears to be contributing.    9. Bradycardia - EKG showed sinus bradycardia with long first degree block (VT interval ~ 0.3), improved with decreased carvedilol. Asymptomatic.     I saw patient today for CORE follow up. During last OV he continued to be bradycardic despite discontinuation of metoprolol XL. EKG showed sinus bradycardia with long first degree block (VT interval ~ 0.4) with PACs. Carvedilol was decreased to 6.25mg BID. He was started on hydralazine 25mg TID for increased blood pressure control. He has tolerated these medication changes well. He is compensated/euvolemic from a heart failure standpoint. Blood pressures controlled. Heart rates have increased to ~ 60bpm with VT interval shorter. No further medication changes at this time.       Changes today: none    Follow up plan:     Follow up with Dr. Hernández in 6 months with labs prior         History of Presenting Illness:    Khang Mcelroy is a very pleasant 67 year old male with a history of HFrEF, ischemic cardiomyopathy, LVEF 35-40% per echocardiogram 3/12/19, coronary artery disease s/p CABG x 4 (LIMA-LAD, SVG-DIAG, SVG-OM, SVG-PDA) on 3/18/19, DM, hypertension, hyperlipidemia, CKD, and anemia.      Hospitalized 3/11/19-3/24/19 presented with acute hypoxemic and hypercapnic respiratory failure secondary to NSTEMI, found to have multivessel coronary artery disease, s/p CABG x 4 (LIMA-LAD, SVG-DIAG, SVG-OM, SVG-PDA) on 3/18/19. Troponin peaked at 53. Echocardiogram 3/12/19 showed an LVEF 35-40%, grade III or advanced diastolic dysfunction, with multiple wall motion abnormalities.      Most recent echocardiogram 1/2021 showed EF 45-50%, RV normal size/function, inferior wall hypokinesis, grade I diastolic  dysfunction and mildly decreased RV systolic function.      Over the summer he was hospitalized for an acute kidney injury and critically elevated potassium, creatinine peaked ~ 3, likely pre-renal, his fursoemide, lisinopril and spironolactone were stopped. Evaluated by nephrology.      He was hospitalized 11/15-11/18/22 secondary to large left UPJ stone with moderate left hydronephrosis, the large 1.1 x 0.9 cm left UPJ stone s/p cystoscopy and left ureteral stent placement by urology on 11/16/2022. He again had an acute kidney injury with creatinine peaking ~3. In December under went kidney stone removal.    We have continued to have some challenges with medication compliance due to confusion with medications. At one point he was taking both carvedilol and metoprolol XL. During our last OV he continued to be bradycardic despite discontinuation of metoprolol XL. EKG showed sinus bradycardia with long first degree block (GA interval ~ 0.4) with PACs. Carvedilol was decreased to 6.25mg BID. He was started on hydralazine 25mg TID for increased blood pressure control.     Patient is here today for CORE follow up.     Patient reports feeling good. Monitoring weights daily a home. Clinic weight stable at 158# today. Denies shortness of breath at rest. Denies exertional dyspnea. Denies chest pain or chest tightness. Denies dizziness, lightheadedness or other presyncopal symptoms. Denies tachycardia or palpitations. Taking medications daily.      Labs today show overall stable renal function, there has been a slight decline in renal function, creatinine 2.14. Stable electrolytes. Blood pressure 144/69 and HR 60 in clinic today. Reports blood pressures at home 110 systolically. Repeat blood pressure in clinic 128/64. EKG showed sinus bradycardia, HR 60bpm, prolonged GA interval.     Appetite good. Trying to limit salt on foods. Walking outside, 3-4 x a week, 1-2 hours with no difficulty. Also enjoys bicycling now that the  weather is nicer Denies alcohol use. Denies tobacco use.        Social History      Social History     Socioeconomic History    Marital status:      Spouse name: Not on file    Number of children: Not on file    Years of education: Not on file    Highest education level: Not on file   Occupational History    Not on file   Tobacco Use    Smoking status: Never    Smokeless tobacco: Never   Vaping Use    Vaping status: Not on file   Substance and Sexual Activity    Alcohol use: No    Drug use: No    Sexual activity: Yes     Partners: Female   Other Topics Concern    Parent/sibling w/ CABG, MI or angioplasty before 65F 55M? No   Social History Narrative    Not on file     Social Determinants of Health     Financial Resource Strain: Not on file   Food Insecurity: Not on file   Transportation Needs: Not on file   Physical Activity: Not on file   Stress: Not on file   Social Connections: Not on file   Intimate Partner Violence: Not on file   Housing Stability: Not on file          Review of Systems:   10 point ROS neg other than the symptoms noted above in the HPI.         Physical Exam:   Vitals: There were no vitals taken for this visit.   Wt Readings from Last 4 Encounters:   04/13/23 72.6 kg (160 lb)   03/30/23 73.4 kg (161 lb 14.4 oz)   02/07/23 75.3 kg (166 lb)   12/19/22 83 kg (183 lb)     GEN: well nourished, in no acute distress.  HEENT:  Pupils equal, round. Sclerae nonicteric.   NECK: Supple, no masses appreciated. JVP appears normal.  C/V:  Regular rate and rhythm, no murmur, rub or gallop.    RESP: Respirations are unlabored. Clear to auscultation bilaterally without wheezing, rales, or rhonchi.  GI: Abdomen soft, nontender.  EXTREM: No LE edema.  NEURO: Alert and oriented, cooperative.  SKIN: Warm and dry       Data:     LIPID RESULTS:  Lab Results   Component Value Date    CHOL 126 10/17/2019    HDL 47 10/17/2019    LDL 69 10/17/2019    TRIG 48 10/17/2019     LIVER ENZYME RESULTS:  Lab Results    Component Value Date    AST 38 11/15/2022    AST 27 06/14/2019    ALT 41 11/15/2022    ALT 22 10/17/2019     CBC RESULTS:  Lab Results   Component Value Date    WBC 14.5 (H) 11/18/2022    WBC 9.8 04/06/2021    RBC 3.95 (L) 11/18/2022    RBC 3.70 (L) 04/06/2021    HGB 10.2 (L) 11/18/2022    HGB 10.0 (L) 04/06/2021    HCT 31.7 (L) 11/18/2022    HCT 31.1 (L) 04/06/2021    MCV 80 11/18/2022    MCV 84 04/06/2021    MCH 25.8 (L) 11/18/2022    MCH 27.0 04/06/2021    MCHC 32.2 11/18/2022    MCHC 32.2 04/06/2021    RDW 14.6 11/18/2022    RDW 13.2 04/06/2021     11/18/2022     04/06/2021     BMP RESULTS:  Lab Results   Component Value Date     04/13/2023     04/06/2021    POTASSIUM 4.8 04/13/2023    POTASSIUM 3.8 11/18/2022    POTASSIUM 5.6 (H) 04/06/2021    CHLORIDE 104 04/13/2023    CHLORIDE 110 (H) 11/18/2022    CHLORIDE 110 (H) 04/06/2021    CO2 22 04/13/2023    CO2 24 11/18/2022    CO2 25 04/06/2021    ANIONGAP 10 04/13/2023    ANIONGAP 5 11/18/2022    ANIONGAP 4 04/06/2021     (H) 04/13/2023     (H) 11/18/2022     (H) 11/18/2022     (H) 04/06/2021    BUN 37.7 (H) 04/13/2023    BUN 37 (H) 11/18/2022    BUN 48 (H) 04/06/2021    CR 2.06 (H) 04/13/2023    CR 1.99 (H) 04/06/2021    GFRESTIMATED 35 (L) 04/13/2023    GFRESTIMATED 35 (L) 04/06/2021    GFRESTBLACK 40 (L) 04/06/2021    MYKE 9.0 04/13/2023    MYKE 9.9 04/06/2021      A1C RESULTS:  Lab Results   Component Value Date    A1C 8.2 (H) 11/16/2022    A1C 7.9 (H) 03/11/2019     INR RESULTS:  Lab Results   Component Value Date    INR 1.54 (H) 03/18/2019    INR 1.79 (H) 03/18/2019          Medications     Current Outpatient Medications   Medication Sig Dispense Refill    acetaminophen (TYLENOL) 325 MG tablet Take 650 mg by mouth every 6 hours as needed for mild pain      amLODIPine (NORVASC) 10 MG tablet Take 1 tablet by mouth daily      atorvastatin (LIPITOR) 80 MG tablet Take 1 tablet (80 mg) by mouth every evening 90  tablet 3    carvedilol (COREG) 12.5 MG tablet Take 0.5 tablets (6.25 mg) by mouth 2 times daily (with meals) 90 tablet 1    empagliflozin (JARDIANCE) 10 MG TABS tablet Take 1 tablet (10 mg) by mouth daily 90 tablet 1    ferrous sulfate (FEROSUL) 325 (65 Fe) MG tablet Take 325 mg by mouth daily (Patient not taking: Reported on 3/30/2023)      glipiZIDE (GLUCOTROL) 5 MG tablet Take 2.5 mg by mouth 2 times daily (Patient not taking: Reported on 3/30/2023)      hydrALAZINE (APRESOLINE) 25 MG tablet Take 1 tablet (25 mg) by mouth 3 times daily 270 tablet 1    insulin glargine (LANTUS PEN) 100 UNIT/ML pen Inject 10 Units Subcutaneous 2 times daily (Patient not taking: Reported on 3/30/2023) 15 mL 0    lisinopril (ZESTRIL) 10 MG tablet Take 1 tablet (10 mg) by mouth daily 90 tablet 1    tamsulosin (FLOMAX) 0.4 MG capsule Take 1 capsule (0.4 mg) by mouth daily (Patient not taking: Reported on 4/13/2023) 30 capsule 0        Past Medical History     Past Medical History:   Diagnosis Date    CAD in native artery 03/18/2019    Chronic renal insufficiency     Stage 3b in 2021    Chronic systolic heart failure (H) 10/01/2019    Diabetes mellitus with proliferative retinopathy (H)     Essential hypertension 06/14/2019    Hyperlipidemia LDL goal <70 06/14/2019    Nephrolithiasis     NSTEMI (non-ST elevated myocardial infarction) (H)     CHALO (obstructive sleep apnea) 06/24/2019 6/21/2019 [place] Sleep Study (153.0 lbs) - AHI 30.5, RDI 34.7, Supine AHI 31.5, REM AHI 56.5, Low O2 68.7%, Time Spent ?88% 3.7 minutes / Time Spent ?89% 4.5 minutes.    S/P CABG (coronary artery bypass graft) 03/26/2019     Past Surgical History:   Procedure Laterality Date    BYPASS GRAFT ARTERY CORONARY N/A 3/18/2019    Procedure: CORONARY BYPASS GRAFTING X 4 - LIMA TO LAD, SV TO DIAGONAL, PDA, AND OM; ON PUMP WITH NOA; ENDOVEIN HARVEST LEFT LEG;  Surgeon: Adarsh Sanchez MD;  Location: [facility] OR    COMBINED CYSTOSCOPY, INSERT STENT  URETER(S) Left 11/16/2022    Procedure: CYSTOSCOPY, WITH LEFT URETERAL STENT INSERTION. LEFT RETROGRADE PYELOGRAM;  Surgeon: Alcides Moreno MD;  Location:  OR    CV CORONARY ANGIOGRAM N/A 3/12/2019    Procedure: Coronary Angiogram;  Surgeon: Remi Rodriguez MD;  Location:  HEART CARDIAC CATH LAB    CV HEART CATHETERIZATION WITH POSSIBLE INTERVENTION N/A 3/12/2019    Procedure: Heart Catheterization with possible Intervention;  Surgeon: Remi Rodriguez MD;  Location:  HEART CARDIAC CATH LAB    CV LEFT VENTRICULOGRAM N/A 3/12/2019    Procedure: Left Ventricular Angiogram;  Surgeon: Remi Rodriguez MD;  Location:  HEART CARDIAC CATH LAB    LITHOTRIPSY       Family History   Problem Relation Age of Onset    Other - See Comments Mother         giving birth    Other - See Comments Father         old age            Allergies   Patient has no known allergies.    30 minutes spent on the date of the encounter doing chart review, history and exam, documentation and further activities as noted above      MICHELE Hicks CNP  [facility]  Pager: [phone]        Thank you for allowing me to participate in the care of your patient.      Sincerely,     MICHELE Hicks CNP     [facility]  cc:   MICHELE Luis CNP  [address]  [place] [id number]

## 2023-05-30 NOTE — PATIENT INSTRUCTIONS
Call CORE nurse for any questions or concerns:  [phone]   *If you have concerns after hours, please call [phone], option 2 to speak with on call Cardiologist.    1. Medication changes and/or recommendations from today: none    2. Follow up plan:       - Follow up with Dr. Hernández in 6 months with labs prior     3. Weigh yourself daily and write it down.     4. Call CORE nurse if your weight is up more than 2 pounds in one day or 5 pounds in one week.     5. Call CORE nurse if you feel more short of breath, have more abdominal bloating, or leg swelling.     6. Continue low sodium diet (less than 2000 mg daily). If you eat less salt, you will retain less fluid.     7. Alcohol can weaken your heart further. You should avoid alcohol or limit its use to special times, such as a holiday or birthday.      8. Do NOT take Aleve or ibuprofen without talking to your doctor first.      9. Lab Results:   Component      Latest Ref Rng 5/30/2023  2:21 PM   Sodium      136 - 145 mmol/L 134 (L)    Potassium      3.4 - 5.3 mmol/L 4.6    Chloride      98 - 107 mmol/L 100    Carbon Dioxide (CO2)      22 - 29 mmol/L 23    Anion Gap      7 - 15 mmol/L 11    Urea Nitrogen      8.0 - 23.0 mg/dL 38.1 (H)    Creatinine      0.67 - 1.17 mg/dL 2.14 (H)    Calcium      8.8 - 10.2 mg/dL 9.1    Glucose      70 - 99 mg/dL 368 (H)    GFR Estimate      >60 mL/min/1.73m2 34 (L)       Legend:  (L) Low  (H) High     CORE Clinic: Cardiomyopathy, Optimization, Rehabilitation, Education  The CORE Clinic is a heart failure specialty clinic within the [facility] where you will work with specialized nurse practitioners, physician assistants, doctors, and registered nurses. They are dedicated to helping patients with heart failure to carefully adjust medications, receive education, and learn who and when to call if symptoms develop. They specialize in helping you better understand your condition, slow the progression of your disease,  improve the length and quality of your life, help you detect future heart problems before they become life threatening, and avoid hospitalizations.

## 2023-05-30 NOTE — PROGRESS NOTES
Cardiology Clinic Progress Note  Khang Mcelroy MRN# [id number]   YOB: 1955 Age: 67 year old   Primary Cardiologist: Dr. Hernández Reason for visit: CORE follow up             Assessment and Plan:   Khang Mcelroy is a very pleasant 67 year old male here for CORE follow up.      1.  Combined chronic systolic heart failure/HFrEF and diastolic heart failure, ischemic cardiomyopathy - LVEF 35-40% 3/12/19 since has had some recovery, stable EF around 45-50% with grade I diastolic dysfunction              - NYHA class II-III, stage C              - Etiology : ischemic              - Fluid status : euvolemic               - Diuretic regimen : none              - Ischemic evaluation : coronary angiogram 3/12/19, s/p CABG 3/18/19              - Guideline directed medical therapy                          - Betablocker: carvedilol 6.25mg BID                           - ACEI/ARB/ARNI: lisinopril 10mg daily                           - Aldactone antagonist: none, stopped due to acute on chronic kidney disease 6/2022                          - SGLT2i : Jardiance 10mg daily                          - Hydralazine 25mg TID  2. Coronary artery disease s/p CABG x 4 (LIMA-LAD, SVG-DIAG, SVG-OM, SVG-PDA) on 3/18/19, stable no signs/symptoms of myocardial ischemia.               - Continue aspirin, statin and betablocker therapy.  3. Hypertension - controlled  4. Hyperlipidemia - stable, LDL goal <70, lipid panel 6/7/22 showed total cholesterol 113, HDL 30, LDL 60.               - Continue atorvastatin  5. Chronic kidney disease - baseline 1.7-2.0, had  acute on chronic kidney disease secondary to hydronephrosis due to kidney stone, creatinine peak ~3 in 12/2022, remains stable, creatinine today 2.1              - Follows with nephrology  6. Diabetes mellitus - hgbA1c 8.2 11/16/22  7. Obstructive sleep apnea - not compliant with CPAP  8. Noncompliance with medications - has had issues with inaccurately taking  medications, low health literacy appears to be contributing.    9. Bradycardia - EKG showed sinus bradycardia with long first degree block (TX interval ~ 0.3), improved with decreased carvedilol. Asymptomatic.     I saw patient today for CORE follow up. During last OV he continued to be bradycardic despite discontinuation of metoprolol XL. EKG showed sinus bradycardia with long first degree block (TX interval ~ 0.4) with PACs. Carvedilol was decreased to 6.25mg BID. He was started on hydralazine 25mg TID for increased blood pressure control. He has tolerated these medication changes well. He is compensated/euvolemic from a heart failure standpoint. Blood pressures controlled. Heart rates have increased to ~ 60bpm with TX interval shorter. No further medication changes at this time.       Changes today: none    Follow up plan:     Follow up with Dr. Hernández in 6 months with labs prior         History of Presenting Illness:    Khang Mcelroy is a very pleasant 67 year old male with a history of HFrEF, ischemic cardiomyopathy, LVEF 35-40% per echocardiogram 3/12/19, coronary artery disease s/p CABG x 4 (LIMA-LAD, SVG-DIAG, SVG-OM, SVG-PDA) on 3/18/19, DM, hypertension, hyperlipidemia, CKD, and anemia.      Hospitalized 3/11/19-3/24/19 presented with acute hypoxemic and hypercapnic respiratory failure secondary to NSTEMI, found to have multivessel coronary artery disease, s/p CABG x 4 (LIMA-LAD, SVG-DIAG, SVG-OM, SVG-PDA) on 3/18/19. Troponin peaked at 53. Echocardiogram 3/12/19 showed an LVEF 35-40%, grade III or advanced diastolic dysfunction, with multiple wall motion abnormalities.      Most recent echocardiogram 1/2021 showed EF 45-50%, RV normal size/function, inferior wall hypokinesis, grade I diastolic dysfunction and mildly decreased RV systolic function.      Over the summer he was hospitalized for an acute kidney injury and critically elevated potassium, creatinine peaked ~ 3, likely pre-renal,  his fursoemide, lisinopril and spironolactone were stopped. Evaluated by nephrology.      He was hospitalized 11/15-11/18/22 secondary to large left UPJ stone with moderate left hydronephrosis, the large 1.1 x 0.9 cm left UPJ stone s/p cystoscopy and left ureteral stent placement by urology on 11/16/2022. He again had an acute kidney injury with creatinine peaking ~3. In December under went kidney stone removal.    We have continued to have some challenges with medication compliance due to confusion with medications. At one point he was taking both carvedilol and metoprolol XL. During our last OV he continued to be bradycardic despite discontinuation of metoprolol XL. EKG showed sinus bradycardia with long first degree block (OH interval ~ 0.4) with PACs. Carvedilol was decreased to 6.25mg BID. He was started on hydralazine 25mg TID for increased blood pressure control.     Patient is here today for CORE follow up.     Patient reports feeling good. Monitoring weights daily a home. Clinic weight stable at 158# today. Denies shortness of breath at rest. Denies exertional dyspnea. Denies chest pain or chest tightness. Denies dizziness, lightheadedness or other presyncopal symptoms. Denies tachycardia or palpitations. Taking medications daily.      Labs today show overall stable renal function, there has been a slight decline in renal function, creatinine 2.14. Stable electrolytes. Blood pressure 144/69 and HR 60 in clinic today. Reports blood pressures at home 110 systolically. Repeat blood pressure in clinic 128/64. EKG showed sinus bradycardia, HR 60bpm, prolonged OH interval.     Appetite good. Trying to limit salt on foods. Walking outside, 3-4 x a week, 1-2 hours with no difficulty. Also enjoys bicycling now that the weather is nicer Denies alcohol use. Denies tobacco use.        Social History      Social History     Socioeconomic History     Marital status:      Spouse name: Not on file     Number of  children: Not on file     Years of education: Not on file     Highest education level: Not on file   Occupational History     Not on file   Tobacco Use     Smoking status: Never     Smokeless tobacco: Never   Vaping Use     Vaping status: Not on file   Substance and Sexual Activity     Alcohol use: No     Drug use: No     Sexual activity: Yes     Partners: Female   Other Topics Concern     Parent/sibling w/ CABG, MI or angioplasty before 65F 55M? No   Social History Narrative     Not on file     Social Determinants of Health     Financial Resource Strain: Not on file   Food Insecurity: Not on file   Transportation Needs: Not on file   Physical Activity: Not on file   Stress: Not on file   Social Connections: Not on file   Intimate Partner Violence: Not on file   Housing Stability: Not on file          Review of Systems:   10 point ROS neg other than the symptoms noted above in the HPI.         Physical Exam:   Vitals: There were no vitals taken for this visit.   Wt Readings from Last 4 Encounters:   04/13/23 72.6 kg (160 lb)   03/30/23 73.4 kg (161 lb 14.4 oz)   02/07/23 75.3 kg (166 lb)   12/19/22 83 kg (183 lb)     GEN: well nourished, in no acute distress.  HEENT:  Pupils equal, round. Sclerae nonicteric.   NECK: Supple, no masses appreciated. JVP appears normal.  C/V:  Regular rate and rhythm, no murmur, rub or gallop.    RESP: Respirations are unlabored. Clear to auscultation bilaterally without wheezing, rales, or rhonchi.  GI: Abdomen soft, nontender.  EXTREM: No LE edema.  NEURO: Alert and oriented, cooperative.  SKIN: Warm and dry       Data:     LIPID RESULTS:  Lab Results   Component Value Date    CHOL 126 10/17/2019    HDL 47 10/17/2019    LDL 69 10/17/2019    TRIG 48 10/17/2019     LIVER ENZYME RESULTS:  Lab Results   Component Value Date    AST 38 11/15/2022    AST 27 06/14/2019    ALT 41 11/15/2022    ALT 22 10/17/2019     CBC RESULTS:  Lab Results   Component Value Date    WBC 14.5 (H) 11/18/2022     WBC 9.8 04/06/2021    RBC 3.95 (L) 11/18/2022    RBC 3.70 (L) 04/06/2021    HGB 10.2 (L) 11/18/2022    HGB 10.0 (L) 04/06/2021    HCT 31.7 (L) 11/18/2022    HCT 31.1 (L) 04/06/2021    MCV 80 11/18/2022    MCV 84 04/06/2021    MCH 25.8 (L) 11/18/2022    MCH 27.0 04/06/2021    MCHC 32.2 11/18/2022    MCHC 32.2 04/06/2021    RDW 14.6 11/18/2022    RDW 13.2 04/06/2021     11/18/2022     04/06/2021     BMP RESULTS:  Lab Results   Component Value Date     04/13/2023     04/06/2021    POTASSIUM 4.8 04/13/2023    POTASSIUM 3.8 11/18/2022    POTASSIUM 5.6 (H) 04/06/2021    CHLORIDE 104 04/13/2023    CHLORIDE 110 (H) 11/18/2022    CHLORIDE 110 (H) 04/06/2021    CO2 22 04/13/2023    CO2 24 11/18/2022    CO2 25 04/06/2021    ANIONGAP 10 04/13/2023    ANIONGAP 5 11/18/2022    ANIONGAP 4 04/06/2021     (H) 04/13/2023     (H) 11/18/2022     (H) 11/18/2022     (H) 04/06/2021    BUN 37.7 (H) 04/13/2023    BUN 37 (H) 11/18/2022    BUN 48 (H) 04/06/2021    CR 2.06 (H) 04/13/2023    CR 1.99 (H) 04/06/2021    GFRESTIMATED 35 (L) 04/13/2023    GFRESTIMATED 35 (L) 04/06/2021    GFRESTBLACK 40 (L) 04/06/2021    MYKE 9.0 04/13/2023    MYKE 9.9 04/06/2021      A1C RESULTS:  Lab Results   Component Value Date    A1C 8.2 (H) 11/16/2022    A1C 7.9 (H) 03/11/2019     INR RESULTS:  Lab Results   Component Value Date    INR 1.54 (H) 03/18/2019    INR 1.79 (H) 03/18/2019          Medications     Current Outpatient Medications   Medication Sig Dispense Refill     acetaminophen (TYLENOL) 325 MG tablet Take 650 mg by mouth every 6 hours as needed for mild pain       amLODIPine (NORVASC) 10 MG tablet Take 1 tablet by mouth daily       atorvastatin (LIPITOR) 80 MG tablet Take 1 tablet (80 mg) by mouth every evening 90 tablet 3     carvedilol (COREG) 12.5 MG tablet Take 0.5 tablets (6.25 mg) by mouth 2 times daily (with meals) 90 tablet 1     empagliflozin (JARDIANCE) 10 MG TABS tablet Take 1 tablet (10  mg) by mouth daily 90 tablet 1     ferrous sulfate (FEROSUL) 325 (65 Fe) MG tablet Take 325 mg by mouth daily (Patient not taking: Reported on 3/30/2023)       glipiZIDE (GLUCOTROL) 5 MG tablet Take 2.5 mg by mouth 2 times daily (Patient not taking: Reported on 3/30/2023)       hydrALAZINE (APRESOLINE) 25 MG tablet Take 1 tablet (25 mg) by mouth 3 times daily 270 tablet 1     insulin glargine (LANTUS PEN) 100 UNIT/ML pen Inject 10 Units Subcutaneous 2 times daily (Patient not taking: Reported on 3/30/2023) 15 mL 0     lisinopril (ZESTRIL) 10 MG tablet Take 1 tablet (10 mg) by mouth daily 90 tablet 1     tamsulosin (FLOMAX) 0.4 MG capsule Take 1 capsule (0.4 mg) by mouth daily (Patient not taking: Reported on 4/13/2023) 30 capsule 0        Past Medical History     Past Medical History:   Diagnosis Date     CAD in native artery 03/18/2019     Chronic renal insufficiency     Stage 3b in 2021     Chronic systolic heart failure (H) 10/01/2019     Diabetes mellitus with proliferative retinopathy (H)      Essential hypertension 06/14/2019     Hyperlipidemia LDL goal <70 06/14/2019     Nephrolithiasis      NSTEMI (non-ST elevated myocardial infarction) (H)      CHALO (obstructive sleep apnea) 06/24/2019 6/21/2019 [place] [facility] Sleep Study (153.0 lbs) - AHI 30.5, RDI 34.7, Supine AHI 31.5, REM AHI 56.5, Low O2 68.7%, Time Spent ?88% 3.7 minutes / Time Spent ?89% 4.5 minutes.     S/P CABG (coronary artery bypass graft) 03/26/2019     Past Surgical History:   Procedure Laterality Date     BYPASS GRAFT ARTERY CORONARY N/A 3/18/2019    Procedure: CORONARY BYPASS GRAFTING X 4 - LIMA TO LAD, SV TO DIAGONAL, PDA, AND OM; ON PUMP WITH NOA; ENDOVEIN HARVEST LEFT LEG;  Surgeon: Adarsh Sanchez MD;  Location:  OR     COMBINED CYSTOSCOPY, INSERT STENT URETER(S) Left 11/16/2022    Procedure: CYSTOSCOPY, WITH LEFT URETERAL STENT INSERTION. LEFT RETROGRADE PYELOGRAM;  Surgeon: Alcides Moreno MD;  Location:  OR      CV CORONARY ANGIOGRAM N/A 3/12/2019    Procedure: Coronary Angiogram;  Surgeon: Remi Rodriguez MD;  Location:  HEART CARDIAC CATH LAB     CV HEART CATHETERIZATION WITH POSSIBLE INTERVENTION N/A 3/12/2019    Procedure: Heart Catheterization with possible Intervention;  Surgeon: Remi Rodriguez MD;  Location:  HEART CARDIAC CATH LAB     CV LEFT VENTRICULOGRAM N/A 3/12/2019    Procedure: Left Ventricular Angiogram;  Surgeon: Remi Rodriguez MD;  Location:  HEART CARDIAC CATH LAB     LITHOTRIPSY       Family History   Problem Relation Age of Onset     Other - See Comments Mother         giving birth     Other - See Comments Father         old age            Allergies   Patient has no known allergies.    30 minutes spent on the date of the encounter doing chart review, history and exam, documentation and further activities as noted above      MICHELE Hicks [facility] C[facility]  Pager: [phone]

## 2023-10-03 DIAGNOSIS — I25.5 ISCHEMIC CARDIOMYOPATHY: ICD-10-CM

## 2023-10-03 DIAGNOSIS — I10 ESSENTIAL HYPERTENSION: ICD-10-CM

## 2023-10-03 RX ORDER — LISINOPRIL 10 MG/1
10 TABLET ORAL DAILY
Qty: 90 TABLET | Refills: 0 | Status: SHIPPED | OUTPATIENT
Start: 2023-10-03

## 2024-02-05 ENCOUNTER — TELEPHONE (OUTPATIENT)
Dept: CARDIOLOGY | Facility: CLINIC | Age: 69
End: 2024-02-05
Payer: COMMERCIAL

## 2024-02-05 NOTE — TELEPHONE ENCOUNTER
----- Message from MICHELE Luis CNP sent at 2/5/2024  1:23 PM CST -----  Regarding: Overdue for follow up, spoke with [facility] provider  Hi,    I spoke with an [facility] provider today regarding his medications, of which patient is still having multiple errors in how he is taking. He is overdue for follow up. Would you be able to reach out and help coordinate follow up.    CORE - FYI, just circling you in the loop    Thanks,  Viv

## 2024-02-06 NOTE — TELEPHONE ENCOUNTER
[facility]     Follow-up has been scheduled.    Future Appointments   Date Time Provider Department Center   2/16/2024 12:45 PM Bib Hernández MD [facility] Oropeza RN BSN   8:15 AM 02/06/24  Nurse line M-F 8a-4p: [phone]

## 2024-02-16 ENCOUNTER — OFFICE VISIT (OUTPATIENT)
Dept: CARDIOLOGY | Facility: CLINIC | Age: 69
End: 2024-02-16
Payer: COMMERCIAL

## 2024-02-16 VITALS
DIASTOLIC BLOOD PRESSURE: 70 MMHG | HEIGHT: 62 IN | SYSTOLIC BLOOD PRESSURE: 130 MMHG | HEART RATE: 54 BPM | WEIGHT: 156.2 LBS | BODY MASS INDEX: 28.74 KG/M2 | OXYGEN SATURATION: 98 %

## 2024-02-16 DIAGNOSIS — I10 ESSENTIAL HYPERTENSION: ICD-10-CM

## 2024-02-16 DIAGNOSIS — E78.5 HYPERLIPIDEMIA LDL GOAL <70: ICD-10-CM

## 2024-02-16 DIAGNOSIS — I25.5 ISCHEMIC CARDIOMYOPATHY: ICD-10-CM

## 2024-02-16 DIAGNOSIS — I50.22 CHRONIC SYSTOLIC HEART FAILURE (H): Primary | ICD-10-CM

## 2024-02-16 PROCEDURE — 99214 OFFICE O/P EST MOD 30 MIN: CPT | Performed by: INTERNAL MEDICINE

## 2024-02-16 NOTE — PROGRESS NOTES
HPI and Plan:   Today, I had the pleasure of seeing Khang Mcelroy at [facility]. He is a pleasant 66 year old patient with a history of HFrEF 2/2 ischemic cardiomyopathy and EF of 35 to 40%, CAD status post coronary artery bypass grafting (LIMA-LAD, SVG-DIAG, SVG-OM, SVG-PDA) in 2019, DM, hypertension, hyperlipidemia, sleep apnea, CKD, and anemia who presents to the clinic for a follow-up visit.  The history was obtained with the help of an .     He is today in the clinic for a follow-up visit.  He reports doing well and is able to exercise regularly without difficulty.  He also reports that the lower extremity edema has resolved and he has stopped taking Lasix.  He requests that Lasix be discontinued from his list of medications.  His most recent echocardiogram showed mildly reduced ejection fraction of 45-50% which was an improvement from his prior echocardiogram.  He had grade 1 diastolic dysfunction which is an improvement from his past echocardiogram which showed grade 3 diastolic dysfunction.  He has no significant valve disease.     He continues to be on optimal goal directed medical therapy.  His weight has been stable at around 165-170 pounds. He denies orthopnea, PND, dyspnea on exertion.  He also denies chest pain or chest pressure.  He takes all his medications as prescribed.  I reviewed his prior to echocardiograms, blood work, catheterization results.     Assessment and plan:  1.  Ischemic cardiomyopathy with ejection fraction of 45-50%  2.  Heart failure with reduced ejection fraction, NYHA class II  3.  Grade 3 diastolic dysfunction--> Grade I on most recent study  4.  CAD s/p (LIMA-LAD, SVG-DIAG, SVG-OM, SVG-PDA) on 3/18/19   5.  Hypertension  6.  Hyperlipidemia  7.  Type 2 diabetes  7.  CKD-1.98  8.  Sleep apnea  9.  Anemia    The patient's condition is overall improved since his last clinic visit.  He has been followed very closely in the core clinic by Viv  and I am really grateful for this.  His lower extremity edema has near completely resolved, weight is stable and he is asymptomatic.  Additionally, he is able to exercise without any limitations.  I am going to continue him on the current set of medications he is on.  He has done well on these.  I am going to repeat an echocardiogram prior to next visit.  Also since it has been sometime since we checked his labs I am going to order those today.  He is currently euvolemic and not on Lasix and I do not feel it needs to be started today.   I also reinforced that he continues to use CPAP.  I will have him come back and see us in 12 months.    Plan  1.  Take all your medical medication as prescribed.  2.  Echocardiogram in 1 year  3.  Check BMP and lipid profile  4.. See us back in 12 months.     Thank you for allowing me to participate in the care of Khang Mcelroy    This note was completed in part using Dragon voice recognition software. Although reviewed after completion, some word and grammatical errors may occur.    Bib Hernández MD  Cardiology    Orders Placed This Encounter   Procedures    Basic metabolic panel    Lipid Profile    ALT    Follow-Up with Cardiology    Echocardiogram Complete       No orders of the defined types were placed in this encounter.      There are no discontinued medications.      Encounter Diagnoses   Name Primary?    Chronic systolic heart failure (H) Yes    Essential hypertension     Hyperlipidemia LDL goal <70     Ischemic cardiomyopathy        CURRENT MEDICATIONS:  Current Outpatient Medications   Medication Sig Dispense Refill    acetaminophen (TYLENOL) 325 MG tablet Take 650 mg by mouth every 6 hours as needed for mild pain      amLODIPine (NORVASC) 10 MG tablet Take 1 tablet by mouth daily      atorvastatin (LIPITOR) 80 MG tablet Take 1 tablet (80 mg) by mouth every evening 90 tablet 3    carvedilol (COREG) 12.5 MG tablet Take 0.5 tablets (6.25 mg) by mouth 2 times daily (with  meals) 90 tablet 1    empagliflozin (JARDIANCE) 10 MG TABS tablet Take 1 tablet (10 mg) by mouth daily 90 tablet 1    ferrous sulfate (FEROSUL) 325 (65 Fe) MG tablet Take 325 mg by mouth daily      glipiZIDE (GLUCOTROL) 5 MG tablet Take 2.5 mg by mouth 2 times daily (Patient not taking: Reported on 3/30/2023)      hydrALAZINE (APRESOLINE) 25 MG tablet Take 1 tablet (25 mg) by mouth 3 times daily (Patient not taking: Reported on 2/16/2024) 270 tablet 1    insulin glargine (LANTUS PEN) 100 UNIT/ML pen Inject 10 Units Subcutaneous 2 times daily (Patient not taking: Reported on 2/16/2024) 15 mL 0    lisinopril (ZESTRIL) 10 MG tablet Take 1 tablet (10 mg) by mouth daily (Patient not taking: Reported on 2/16/2024) 90 tablet 0    tamsulosin (FLOMAX) 0.4 MG capsule Take 1 capsule (0.4 mg) by mouth daily (Patient not taking: Reported on 4/13/2023) 30 capsule 0       ALLERGIES   No Known Allergies    PAST MEDICAL HISTORY:  Past Medical History:   Diagnosis Date    CAD in native artery 03/18/2019    Chronic renal insufficiency     Stage 3b in 2021    Chronic systolic heart failure (H) 10/01/2019    Diabetes mellitus with proliferative retinopathy (H)     Essential hypertension 06/14/2019    Hyperlipidemia LDL goal <70 06/14/2019    Nephrolithiasis     NSTEMI (non-ST elevated myocardial infarction) (H)     CHALO (obstructive sleep apnea) 06/24/2019 6/21/2019 [place] [facility] Sleep Study (153.0 lbs) - AHI 30.5, RDI 34.7, Supine AHI 31.5, REM AHI 56.5, Low O2 68.7%, Time Spent ?88% 3.7 minutes / Time Spent ?89% 4.5 minutes.    S/P CABG (coronary artery bypass graft) 03/26/2019       PAST SURGICAL HISTORY:  Past Surgical History:   Procedure Laterality Date    BYPASS GRAFT ARTERY CORONARY N/A 3/18/2019    Procedure: CORONARY BYPASS GRAFTING X 4 - LIMA TO LAD, SV TO DIAGONAL, PDA, AND OM; ON PUMP WITH NOA; ENDOVEIN HARVEST LEFT LEG;  Surgeon: Adarsh Sanchez MD;  Location: [facility] OR    COMBINED CYSTOSCOPY, INSERT STENT  "URETER(S) Left 11/16/2022    Procedure: CYSTOSCOPY, WITH LEFT URETERAL STENT INSERTION. LEFT RETROGRADE PYELOGRAM;  Surgeon: Alcides Moreno MD;  Location:  OR    CV CORONARY ANGIOGRAM N/A 3/12/2019    Procedure: Coronary Angiogram;  Surgeon: Remi Rodriguez MD;  Location:  HEART CARDIAC CATH LAB    CV HEART CATHETERIZATION WITH POSSIBLE INTERVENTION N/A 3/12/2019    Procedure: Heart Catheterization with possible Intervention;  Surgeon: Remi Rodriguez MD;  Location:  HEART CARDIAC CATH LAB    CV LEFT VENTRICULOGRAM N/A 3/12/2019    Procedure: Left Ventricular Angiogram;  Surgeon: Remi Rodriguez MD;  Location:  HEART CARDIAC CATH LAB    LITHOTRIPSY         FAMILY HISTORY:  Family History   Problem Relation Age of Onset    Other - See Comments Mother         giving birth    Other - See Comments Father         old age       SOCIAL HISTORY:  Social History     Socioeconomic History    Marital status:      Spouse name: None    Number of children: None    Years of education: None    Highest education level: None   Tobacco Use    Smoking status: Never    Smokeless tobacco: Never   Substance and Sexual Activity    Alcohol use: No    Drug use: No    Sexual activity: Yes     Partners: Female   Other Topics Concern    Parent/sibling w/ CABG, MI or angioplasty before 65F 55M? No       Review of Systems:  Skin:          Eyes:         ENT:         Respiratory:          Cardiovascular:         Gastroenterology:        Genitourinary:         Musculoskeletal:         Neurologic:         Psychiatric:         Heme/Lymph/Imm:         Endocrine:           Physical Exam:  Vitals: /70   Pulse 54   Ht 1.575 m (5' 2\")   Wt 70.9 kg (156 lb 3.2 oz)   SpO2 98%   BMI 28.57 kg/m    Constitutional: awake, alert, no distress  Skin: Warm and dry to touch  Head: Normocephalic, atraumatic  Eyes: Conjunctivae and lids unremarkable, sclera white  ENT: No pallor or cyanosis  Respiratory: Normal breath " sounds, clear to auscultation  Cardiac: Regular rate and rhythm, S1-S2 normal.  No murmurs gallops or rubs.  Trace b/l pedal edema.   Extremities and musculoskeletal: No gross motor deficit  Neurological.  Affect normal  Psych: Alert and oriented x3    Recent Lab Results:  LIPID RESULTS:  Lab Results   Component Value Date    CHOL 126 10/17/2019    HDL 47 10/17/2019    LDL 69 10/17/2019    TRIG 48 10/17/2019       LIVER ENZYME RESULTS:  Lab Results   Component Value Date    AST 38 11/15/2022    AST 27 06/14/2019    ALT 41 11/15/2022    ALT 22 10/17/2019       CBC RESULTS:  Lab Results   Component Value Date    WBC 14.5 (H) 11/18/2022    WBC 9.8 04/06/2021    RBC 3.95 (L) 11/18/2022    RBC 3.70 (L) 04/06/2021    HGB 10.2 (L) 11/18/2022    HGB 10.0 (L) 04/06/2021    HCT 31.7 (L) 11/18/2022    HCT 31.1 (L) 04/06/2021    MCV 80 11/18/2022    MCV 84 04/06/2021    MCH 25.8 (L) 11/18/2022    MCH 27.0 04/06/2021    MCHC 32.2 11/18/2022    MCHC 32.2 04/06/2021    RDW 14.6 11/18/2022    RDW 13.2 04/06/2021     11/18/2022     04/06/2021       BMP RESULTS:  Lab Results   Component Value Date     (L) 05/30/2023     04/06/2021    POTASSIUM 4.6 05/30/2023    POTASSIUM 3.8 11/18/2022    POTASSIUM 5.6 (H) 04/06/2021    CHLORIDE 100 05/30/2023    CHLORIDE 110 (H) 11/18/2022    CHLORIDE 110 (H) 04/06/2021    CO2 23 05/30/2023    CO2 24 11/18/2022    CO2 25 04/06/2021    ANIONGAP 11 05/30/2023    ANIONGAP 5 11/18/2022    ANIONGAP 4 04/06/2021     (H) 05/30/2023     (H) 11/18/2022     (H) 11/18/2022     (H) 04/06/2021    BUN 38.1 (H) 05/30/2023    BUN 37 (H) 11/18/2022    BUN 48 (H) 04/06/2021    CR 2.14 (H) 05/30/2023    CR 1.99 (H) 04/06/2021    GFRESTIMATED 34 (L) 05/30/2023    GFRESTIMATED 35 (L) 04/06/2021    GFRESTBLACK 40 (L) 04/06/2021    MYKE 9.1 05/30/2023    MYKE 9.9 04/06/2021        A1C RESULTS:  Lab Results   Component Value Date    A1C 8.2 (H) 11/16/2022    A1C 7.9 (H)  03/11/2019       INR RESULTS:  Lab Results   Component Value Date    INR 1.54 (H) 03/18/2019    INR 1.79 (H) 03/18/2019       CC  MICHELE Luis CNP  [address]  [place] [id number]    All medical records were reviewed in detail and discussed with the patient. Greater than 30 mins were spent with the patient, 50% of this time was spent on counseling and coordination of care.  After visit summary was printed and given to the patient.

## 2024-02-16 NOTE — LETTER
2/16/2024    Mpho Brian Castro MD  [address]  [facility] [address]    RE: Khang Mcelroy       Dear Colleague,     I had the pleasure of seeing Khang Mcelroy in the [facility].  HPI and Plan:   Today, I had the pleasure of seeing Khang Mcelroy at [facility]. He is a pleasant 66 year old patient with a history of HFrEF 2/2 ischemic cardiomyopathy and EF of 35 to 40%, CAD status post coronary artery bypass grafting (LIMA-LAD, SVG-DIAG, SVG-OM, SVG-PDA) in 2019, DM, hypertension, hyperlipidemia, sleep apnea, CKD, and anemia who presents to the clinic for a follow-up visit.  The history was obtained with the help of an .     He is today in the clinic for a follow-up visit.  He reports doing well and is able to exercise regularly without difficulty.  He also reports that the lower extremity edema has resolved and he has stopped taking Lasix.  He requests that Lasix be discontinued from his list of medications.  His most recent echocardiogram showed mildly reduced ejection fraction of 45-50% which was an improvement from his prior echocardiogram.  He had grade 1 diastolic dysfunction which is an improvement from his past echocardiogram which showed grade 3 diastolic dysfunction.  He has no significant valve disease.     He continues to be on optimal goal directed medical therapy.  His weight has been stable at around 165-170 pounds. He denies orthopnea, PND, dyspnea on exertion.  He also denies chest pain or chest pressure.  He takes all his medications as prescribed.  I reviewed his prior to echocardiograms, blood work, catheterization results.     Assessment and plan:  1.  Ischemic cardiomyopathy with ejection fraction of 45-50%  2.  Heart failure with reduced ejection fraction, NYHA class II  3.  Grade 3 diastolic dysfunction--> Grade I on most recent study  4.  CAD s/p (LIMA-LAD, SVG-DIAG, SVG-OM, SVG-PDA) on 3/18/19   5.  Hypertension  6.   Hyperlipidemia  7.  Type 2 diabetes  7.  CKD-1.98  8.  Sleep apnea  9.  Anemia    The patient's condition is overall improved since his last clinic visit.  He has been followed very closely in the core clinic by Viv and I am really grateful for this.  His lower extremity edema has near completely resolved, weight is stable and he is asymptomatic.  Additionally, he is able to exercise without any limitations.  I am going to continue him on the current set of medications he is on.  He has done well on these.  I am going to repeat an echocardiogram prior to next visit.  Also since it has been sometime since we checked his labs I am going to order those today.  He is currently euvolemic and not on Lasix and I do not feel it needs to be started today.   I also reinforced that he continues to use CPAP.  I will have him come back and see us in 12 months.    Plan  1.  Take all your medical medication as prescribed.  2.  Echocardiogram in 1 year  3.  Check BMP and lipid profile  4.. See us back in 12 months.     Thank you for allowing me to participate in the care of Khang Mcelroy    This note was completed in part using Dragon voice recognition software. Although reviewed after completion, some word and grammatical errors may occur.    Bib Hernández MD  Cardiology    Orders Placed This Encounter   Procedures    Basic metabolic panel    Lipid Profile    ALT    Follow-Up with Cardiology    Echocardiogram Complete       No orders of the defined types were placed in this encounter.      There are no discontinued medications.      Encounter Diagnoses   Name Primary?    Chronic systolic heart failure (H) Yes    Essential hypertension     Hyperlipidemia LDL goal <70     Ischemic cardiomyopathy        CURRENT MEDICATIONS:  Current Outpatient Medications   Medication Sig Dispense Refill    acetaminophen (TYLENOL) 325 MG tablet Take 650 mg by mouth every 6 hours as needed for mild pain      amLODIPine (NORVASC) 10 MG tablet  Take 1 tablet by mouth daily      atorvastatin (LIPITOR) 80 MG tablet Take 1 tablet (80 mg) by mouth every evening 90 tablet 3    carvedilol (COREG) 12.5 MG tablet Take 0.5 tablets (6.25 mg) by mouth 2 times daily (with meals) 90 tablet 1    empagliflozin (JARDIANCE) 10 MG TABS tablet Take 1 tablet (10 mg) by mouth daily 90 tablet 1    ferrous sulfate (FEROSUL) 325 (65 Fe) MG tablet Take 325 mg by mouth daily      glipiZIDE (GLUCOTROL) 5 MG tablet Take 2.5 mg by mouth 2 times daily (Patient not taking: Reported on 3/30/2023)      hydrALAZINE (APRESOLINE) 25 MG tablet Take 1 tablet (25 mg) by mouth 3 times daily (Patient not taking: Reported on 2/16/2024) 270 tablet 1    insulin glargine (LANTUS PEN) 100 UNIT/ML pen Inject 10 Units Subcutaneous 2 times daily (Patient not taking: Reported on 2/16/2024) 15 mL 0    lisinopril (ZESTRIL) 10 MG tablet Take 1 tablet (10 mg) by mouth daily (Patient not taking: Reported on 2/16/2024) 90 tablet 0    tamsulosin (FLOMAX) 0.4 MG capsule Take 1 capsule (0.4 mg) by mouth daily (Patient not taking: Reported on 4/13/2023) 30 capsule 0       ALLERGIES   No Known Allergies    PAST MEDICAL HISTORY:  Past Medical History:   Diagnosis Date    CAD in native artery 03/18/2019    Chronic renal insufficiency     Stage 3b in 2021    Chronic systolic heart failure (H) 10/01/2019    Diabetes mellitus with proliferative retinopathy (H)     Essential hypertension 06/14/2019    Hyperlipidemia LDL goal <70 06/14/2019    Nephrolithiasis     NSTEMI (non-ST elevated myocardial infarction) (H)     CHALO (obstructive sleep apnea) 06/24/2019 6/21/2019 [facility] Study (153.0 lbs) - AHI 30.5, RDI 34.7, Supine AHI 31.5, REM AHI 56.5, Low O2 68.7%, Time Spent ?88% 3.7 minutes / Time Spent ?89% 4.5 minutes.    S/P CABG (coronary artery bypass graft) 03/26/2019       PAST SURGICAL HISTORY:  Past Surgical History:   Procedure Laterality Date    BYPASS GRAFT ARTERY CORONARY N/A 3/18/2019     "Procedure: CORONARY BYPASS GRAFTING X 4 - LIMA TO LAD, SV TO DIAGONAL, PDA, AND OM; ON PUMP WITH NOA; ENDOVEIN HARVEST LEFT LEG;  Surgeon: Adarsh Sanchez MD;  Location:  OR    COMBINED CYSTOSCOPY, INSERT STENT URETER(S) Left 11/16/2022    Procedure: CYSTOSCOPY, WITH LEFT URETERAL STENT INSERTION. LEFT RETROGRADE PYELOGRAM;  Surgeon: Alcides Moreno MD;  Location:  OR    CV CORONARY ANGIOGRAM N/A 3/12/2019    Procedure: Coronary Angiogram;  Surgeon: Remi Rodriguez MD;  Location:  HEART CARDIAC CATH LAB    CV HEART CATHETERIZATION WITH POSSIBLE INTERVENTION N/A 3/12/2019    Procedure: Heart Catheterization with possible Intervention;  Surgeon: Remi Rodriguez MD;  Location:  HEART CARDIAC CATH LAB    CV LEFT VENTRICULOGRAM N/A 3/12/2019    Procedure: Left Ventricular Angiogram;  Surgeon: Remi Rodriguez MD;  Location:  HEART CARDIAC CATH LAB    LITHOTRIPSY         FAMILY HISTORY:  Family History   Problem Relation Age of Onset    Other - See Comments Mother         giving birth    Other - See Comments Father         old age       SOCIAL HISTORY:  Social History     Socioeconomic History    Marital status:      Spouse name: None    Number of children: None    Years of education: None    Highest education level: None   Tobacco Use    Smoking status: Never    Smokeless tobacco: Never   Substance and Sexual Activity    Alcohol use: No    Drug use: No    Sexual activity: Yes     Partners: Female   Other Topics Concern    Parent/sibling w/ CABG, MI or angioplasty before 65F 55M? No       Review of Systems:  Skin:          Eyes:         ENT:         Respiratory:          Cardiovascular:         Gastroenterology:        Genitourinary:         Musculoskeletal:         Neurologic:         Psychiatric:         Heme/Lymph/Imm:         Endocrine:           Physical Exam:  Vitals: /70   Pulse 54   Ht 1.575 m (5' 2\")   Wt 70.9 kg (156 lb 3.2 oz)   SpO2 98%   BMI 28.57 kg/m  "   Constitutional: awake, alert, no distress  Skin: Warm and dry to touch  Head: Normocephalic, atraumatic  Eyes: Conjunctivae and lids unremarkable, sclera white  ENT: No pallor or cyanosis  Respiratory: Normal breath sounds, clear to auscultation  Cardiac: Regular rate and rhythm, S1-S2 normal.  No murmurs gallops or rubs.  Trace b/l pedal edema.   Extremities and musculoskeletal: No gross motor deficit  Neurological.  Affect normal  Psych: Alert and oriented x3    Recent Lab Results:  LIPID RESULTS:  Lab Results   Component Value Date    CHOL 126 10/17/2019    HDL 47 10/17/2019    LDL 69 10/17/2019    TRIG 48 10/17/2019       LIVER ENZYME RESULTS:  Lab Results   Component Value Date    AST 38 11/15/2022    AST 27 06/14/2019    ALT 41 11/15/2022    ALT 22 10/17/2019       CBC RESULTS:  Lab Results   Component Value Date    WBC 14.5 (H) 11/18/2022    WBC 9.8 04/06/2021    RBC 3.95 (L) 11/18/2022    RBC 3.70 (L) 04/06/2021    HGB 10.2 (L) 11/18/2022    HGB 10.0 (L) 04/06/2021    HCT 31.7 (L) 11/18/2022    HCT 31.1 (L) 04/06/2021    MCV 80 11/18/2022    MCV 84 04/06/2021    MCH 25.8 (L) 11/18/2022    MCH 27.0 04/06/2021    MCHC 32.2 11/18/2022    MCHC 32.2 04/06/2021    RDW 14.6 11/18/2022    RDW 13.2 04/06/2021     11/18/2022     04/06/2021       BMP RESULTS:  Lab Results   Component Value Date     (L) 05/30/2023     04/06/2021    POTASSIUM 4.6 05/30/2023    POTASSIUM 3.8 11/18/2022    POTASSIUM 5.6 (H) 04/06/2021    CHLORIDE 100 05/30/2023    CHLORIDE 110 (H) 11/18/2022    CHLORIDE 110 (H) 04/06/2021    CO2 23 05/30/2023    CO2 24 11/18/2022    CO2 25 04/06/2021    ANIONGAP 11 05/30/2023    ANIONGAP 5 11/18/2022    ANIONGAP 4 04/06/2021     (H) 05/30/2023     (H) 11/18/2022     (H) 11/18/2022     (H) 04/06/2021    BUN 38.1 (H) 05/30/2023    BUN 37 (H) 11/18/2022    BUN 48 (H) 04/06/2021    CR 2.14 (H) 05/30/2023    CR 1.99 (H) 04/06/2021    GFRESTIMATED 34 (L)  05/30/2023    GFRESTIMATED 35 (L) 04/06/2021    GFRESTBLACK 40 (L) 04/06/2021    MYKE 9.1 05/30/2023    MYKE 9.9 04/06/2021        A1C RESULTS:  Lab Results   Component Value Date    A1C 8.2 (H) 11/16/2022    A1C 7.9 (H) 03/11/2019       INR RESULTS:  Lab Results   Component Value Date    INR 1.54 (H) 03/18/2019    INR 1.79 (H) 03/18/2019       CC  IMCHELE Luis CNP  [address]  [place] [address]    All medical records were reviewed in detail and discussed with the patient. Greater than 30 mins were spent with the patient, 50% of this time was spent on counseling and coordination of care.  After visit summary was printed and given to the patient.      Thank you for allowing me to participate in the care of your patient.      Sincerely,     Bib Hernández MD     [facility]  cc:   No referring provider defined for this encounter.

## 2024-03-15 ENCOUNTER — LAB (OUTPATIENT)
Dept: LAB | Facility: CLINIC | Age: 69
End: 2024-03-15
Payer: COMMERCIAL

## 2024-03-15 DIAGNOSIS — E78.5 HYPERLIPIDEMIA LDL GOAL <70: ICD-10-CM

## 2024-03-15 DIAGNOSIS — I10 ESSENTIAL HYPERTENSION: ICD-10-CM

## 2024-03-15 DIAGNOSIS — I50.22 CHRONIC SYSTOLIC HEART FAILURE (H): ICD-10-CM

## 2024-03-15 LAB
ALT SERPL W P-5'-P-CCNC: 41 U/L (ref 0–70)
ANION GAP SERPL CALCULATED.3IONS-SCNC: 12 MMOL/L (ref 7–15)
BUN SERPL-MCNC: 28.3 MG/DL (ref 8–23)
CALCIUM SERPL-MCNC: 9.2 MG/DL (ref 8.8–10.2)
CHLORIDE SERPL-SCNC: 101 MMOL/L (ref 98–107)
CREAT SERPL-MCNC: 1.65 MG/DL (ref 0.67–1.17)
DEPRECATED HCO3 PLAS-SCNC: 23 MMOL/L (ref 22–29)
EGFRCR SERPLBLD CKD-EPI 2021: 46 ML/MIN/1.73M2
GLUCOSE SERPL-MCNC: 253 MG/DL (ref 70–99)
POTASSIUM SERPL-SCNC: 4.3 MMOL/L (ref 3.4–5.3)
SODIUM SERPL-SCNC: 136 MMOL/L (ref 135–145)

## 2024-03-15 PROCEDURE — 80048 BASIC METABOLIC PNL TOTAL CA: CPT | Performed by: INTERNAL MEDICINE

## 2024-03-15 PROCEDURE — 36415 COLL VENOUS BLD VENIPUNCTURE: CPT | Performed by: INTERNAL MEDICINE

## 2024-03-15 PROCEDURE — 80061 LIPID PANEL: CPT | Performed by: INTERNAL MEDICINE

## 2024-03-15 PROCEDURE — 84460 ALANINE AMINO (ALT) (SGPT): CPT | Performed by: INTERNAL MEDICINE

## 2024-03-16 LAB
CHOLEST SERPL-MCNC: 164 MG/DL
FASTING STATUS PATIENT QL REPORTED: YES
HDLC SERPL-MCNC: 50 MG/DL
LDLC SERPL CALC-MCNC: 98 MG/DL
NONHDLC SERPL-MCNC: 114 MG/DL
TRIGL SERPL-MCNC: 79 MG/DL

## 2024-04-02 ENCOUNTER — TELEPHONE (OUTPATIENT)
Dept: CARDIOLOGY | Facility: CLINIC | Age: 69
End: 2024-04-02
Payer: COMMERCIAL

## 2024-04-02 DIAGNOSIS — E78.5 HYPERLIPIDEMIA LDL GOAL <70: ICD-10-CM

## 2024-04-02 DIAGNOSIS — I50.22 CHRONIC SYSTOLIC HEART FAILURE (H): Primary | ICD-10-CM

## 2024-04-02 DIAGNOSIS — I25.5 ISCHEMIC CARDIOMYOPATHY: ICD-10-CM

## 2024-04-02 NOTE — TELEPHONE ENCOUNTER
Team received recommendation from Dr. Hernández that we repeat the BMP labs in 3 months.    Writer reviewed the results and note that kidney function is a little worse.       Writer also noted there is an order from last year by Viv Taylor in the [facility] clinic.  This says patient is supposed to return to see them by May 2024.   Writer has sent request to [facility] team to clarify.  Dr. Hernández's OV note dated  2\16\24 says for patient to return in a year.    Writer called to the patient's daughter, Hannah, because she speaks English.  I had to [facility], instructed her that Khang will need repeat labs in 3 months or so.   He can go to any [place] location for the labs.    Invited her to call us with questions.       Christelle Phipps RN on 4/2/2024 at 2:16 PM

## 2024-04-02 NOTE — TELEPHONE ENCOUNTER
In collaboration with [facility] team, patient has improved EF such that he really no longer qualifies to be enrolled in [facility].     As such, he would follow up with Dr. Hernández a year after last appointment, as per Dr. Hernández's orders.      Writer called to Hannah, daughter, and LVM with above information and our call back number.    Christelle Phipps RN on 4/2/2024 at 3:44 PM

## 2024-05-21 DIAGNOSIS — I25.5 ISCHEMIC CARDIOMYOPATHY: ICD-10-CM

## 2024-05-21 NOTE — TELEPHONE ENCOUNTER
Received refill request from [facility] for Jardiance 10mg tablet daily.    Last OV   2\16\24 with Dr. Hernández    [facility] Refill Guideline reviewed.  Medication meets criteria for refill.    Christelle Phipps RN on 5/21/2024 at 4:27 PM

## 2024-12-10 NOTE — ANESTHESIA CARE TRANSFER NOTE
Patient: Khang Mcelroy    Procedure(s):  CORONARY BYPASS GRAFTING X 4 - LIMA TO LAD, SV TO DIAGONAL, PDA, AND OM; ON PUMP WITH NOA; ENDOVEIN HARVEST LEFT LEG    Diagnosis: CORONARY ARTERY DISEASE  Diagnosis Additional Information: No value filed.    Anesthesia Type:   General, ETT     Note:    Patient transferred to:ICU (348)  Comments: To icu bed 348. vss on gtts as noted. Report given to RN assuming care of pt.ICU Handoff: Call for PAUSE to initiate/utilize ICU HANDOFF, Identified Patient, Identified Responsible Provider, Reviewed the Pertinent Medical History, Discussed Surgical Course, Reviewed Intra-OP Anesthesia Management and Issues during Anesthesia, Set Expectations for Post Procedure Period and Allowed Opportunity for Questions and Acknowledgement of Understanding      Vitals: (Last set prior to Anesthesia Care Transfer)    CRNA VITALS  3/18/2019 1332 - 3/18/2019 1419      3/18/2019             Resp Rate (set):  10                Electronically Signed By: MICHELE Fernandez CRNA  March 18, 2019  2:19 PM   DISCHARGE

## (undated) DEVICE — SU ETHIBOND 2-0 SHDA 30" X563H

## (undated) DEVICE — SU SILK 1 TIE 10X30" SA87G

## (undated) DEVICE — PROTECTOR ARM ONE-STEP TRENDELENBURG 40418

## (undated) DEVICE — SU PROLENE 7-0 BV-1DA 4X24" M8702

## (undated) DEVICE — ADPT PERFUSION MULTIPLE

## (undated) DEVICE — BLADE KNIFE BEAVER 6900

## (undated) DEVICE — GLOVE DERMASSURE GREEN PF 7.5 LATEX FREE MSG6575

## (undated) DEVICE — PACK OPEN HEART PV12OH524

## (undated) DEVICE — SUCTION CATH AIRLIFE TRI-FLO W/CONTROL PORT 14FR  T60C

## (undated) DEVICE — PAD CHUX UNDERPAD 23X24" 7136

## (undated) DEVICE — SURGICEL HEMOSTAT 2X14" 1951

## (undated) DEVICE — CANNULA PERFUSION AORTIC ROOT 22FR 20012

## (undated) DEVICE — CONNECTOR PERFUSION 3/8X3/8" W/O LL 6023

## (undated) DEVICE — COVER TABLE POLY 65X90" 8186

## (undated) DEVICE — DEVICE ASSEMBLY SUCTION/ANTI COAG BTC93

## (undated) DEVICE — DECANTER BAG 2002S

## (undated) DEVICE — CLIP HORIZON MULTI SM YELLOW 001204

## (undated) DEVICE — PACK MINI VAC CUSTOM CARDOPULMONARY BB5Z97R15

## (undated) DEVICE — DRAIN CHEST TUBE 32FR STR 8032

## (undated) DEVICE — LINEN TOWEL PACK X30 5481

## (undated) DEVICE — SU PROLENE 4-0 RB-1DA 36" 8557H

## (undated) DEVICE — SOL NACL 0.9% IRRIG 1000ML BOTTLE 07138-09

## (undated) DEVICE — RESERVOIR CELL SAVING BLOOD COLLECTION EL2120

## (undated) DEVICE — SU VICRYL 3-0 FS-1 27" J442H

## (undated) DEVICE — SOMASENSOR CEREBRAL OXIMETER ADULT SAFB-SM

## (undated) DEVICE — PUNCH AORTIC 4.0MMX8" RCB40

## (undated) DEVICE — PREP CHLORAPREP W/ORANGE TINT 10.5ML 260715

## (undated) DEVICE — CATH ANGIO JUDKINS JL4 6FRX100CM INFINITI 534620T

## (undated) DEVICE — SU ETHIBOND 3-0 BBDA 36" X588H

## (undated) DEVICE — BAG DRAIN URO FOR SIEMENS 8MM ADAPTER NS CC164NS-A

## (undated) DEVICE — LINEN LEG ROLL 5489

## (undated) DEVICE — SU PROLENE 7-0 BV175-6 24' M8737

## (undated) DEVICE — INTRO GLIDESHEATH SLENDER 6FR 10X45CM 60-1060

## (undated) DEVICE — TOTE ANGIO CORP PC15AT SAN32CC83O

## (undated) DEVICE — SU ETHIBOND 0 CT-1 CR 8X18" CX21D

## (undated) DEVICE — SU PROLENE 6-0 C-1DA 30" M8706

## (undated) DEVICE — LINEN TOWEL PACK X6 WHITE 5487

## (undated) DEVICE — SU PROLENE 4-0 SHDA 36" 8521H

## (undated) DEVICE — SOL WATER IRRIG 3000ML BAG 2B7117

## (undated) DEVICE — DRAIN CHEST TUBE RIGHT ANGLED 32FR 8132

## (undated) DEVICE — SOL WATER IRRIG 1000ML BOTTLE 2F7114

## (undated) DEVICE — PACK CYSTOSCOPY SBA15CYFSI

## (undated) DEVICE — PACK TUBING MINI VAC CUSTOM 3/8X3/8T BB7V94R1

## (undated) DEVICE — SU PROLENE 6-0 C-1DA 30" 8706H

## (undated) DEVICE — DEFIB PRO-PADZ LVP LQD GEL ADULT 8900-2105-01

## (undated) DEVICE — ESU ELEC BLADE 2.75" COATED/INSULATED E1455

## (undated) DEVICE — SYR EAR BULB 3OZ 0035830

## (undated) DEVICE — SU VICRYL 2-0 CT-1 27" UND J259H

## (undated) DEVICE — KIT WASH CELL SAVING ATL2001

## (undated) DEVICE — SLEEVE TR BAND RADIAL COMPRESSION DEVICE 24CM TRB24-REG

## (undated) DEVICE — Device

## (undated) DEVICE — RAD RX ISOVUE 300 (50ML) 61% IOPAMIDOL CHARGE PER ML

## (undated) DEVICE — GOWN XLG DISP 9545

## (undated) DEVICE — SUCTION WAND 6FR 16.5CM

## (undated) DEVICE — RX SURGIFLO HEMOSTATIC MATRIX W/THROMBIN 8ML 2994

## (undated) DEVICE — CANNULA PERFUSION ARTERIAL EOPA 18FR 12" 77418

## (undated) DEVICE — CONNECTOR DRAIN CHEST Y EXTENSION SET 19909

## (undated) DEVICE — LINEN TOWEL PACK X5 5464

## (undated) DEVICE — SU PLEDGET SOFT TFE 3/8"X3/26"X1/16" PCP40

## (undated) DEVICE — SOL RINGERS LACTATED 1000ML BAG 2B2324X

## (undated) DEVICE — KIT HAND CONTROL ANGIOTOUCH ACIST 65CM AT-P65

## (undated) DEVICE — SU VICRYL 0 CTX 36" J370H

## (undated) DEVICE — CATH URETERAL OPEN END 6FR AXXCESS

## (undated) DEVICE — KIT ENDO VASOVIEW HEMOPRO 2 VH-4000

## (undated) DEVICE — CABLE MYO/LEAD PACING WHITE DISP 019-530

## (undated) DEVICE — MANIFOLD KIT ANGIO AUTOMATED 014613

## (undated) DEVICE — CATH ANGIO INFINITI PIGTAIL 145 6 SH 6FRX110CM  534-652S

## (undated) DEVICE — CATH ANGIO JUDKINS R4 6FRX100CM INFINITI 534621T

## (undated) DEVICE — SUCTION CANISTER MEDIVAC LINER 3000ML W/LID 65651-530

## (undated) DEVICE — SU PROLENE 7-0 BV-1DA 4X30" M8703

## (undated) DEVICE — DRSG KERLIX 4 1/2"X4YDS ROLL 6715

## (undated) DEVICE — SU STEEL 6 CCS 4X18" M654G

## (undated) RX ORDER — VERAPAMIL HYDROCHLORIDE 2.5 MG/ML
INJECTION, SOLUTION INTRAVENOUS
Status: DISPENSED
Start: 2019-03-11

## (undated) RX ORDER — FENTANYL CITRATE 50 UG/ML
INJECTION, SOLUTION INTRAMUSCULAR; INTRAVENOUS
Status: DISPENSED
Start: 2019-03-18

## (undated) RX ORDER — NITROGLYCERIN 5 MG/ML
VIAL (ML) INTRAVENOUS
Status: DISPENSED
Start: 2019-03-12

## (undated) RX ORDER — LIDOCAINE HYDROCHLORIDE 10 MG/ML
INJECTION, SOLUTION EPIDURAL; INFILTRATION; INTRACAUDAL; PERINEURAL
Status: DISPENSED
Start: 2019-03-11

## (undated) RX ORDER — PROTAMINE SULFATE 10 MG/ML
INJECTION, SOLUTION INTRAVENOUS
Status: DISPENSED
Start: 2019-03-18

## (undated) RX ORDER — PAPAVERINE HYDROCHLORIDE 30 MG/ML
INJECTION INTRAMUSCULAR; INTRAVENOUS
Status: DISPENSED
Start: 2019-03-18

## (undated) RX ORDER — VECURONIUM BROMIDE 1 MG/ML
INJECTION, POWDER, LYOPHILIZED, FOR SOLUTION INTRAVENOUS
Status: DISPENSED
Start: 2019-03-18

## (undated) RX ORDER — HEPARIN SODIUM 1000 [USP'U]/ML
INJECTION, SOLUTION INTRAVENOUS; SUBCUTANEOUS
Status: DISPENSED
Start: 2019-03-11

## (undated) RX ORDER — FENTANYL CITRATE 50 UG/ML
INJECTION, SOLUTION INTRAMUSCULAR; INTRAVENOUS
Status: DISPENSED
Start: 2019-03-12

## (undated) RX ORDER — CEFAZOLIN SODIUM 1 G/3ML
INJECTION, POWDER, FOR SOLUTION INTRAMUSCULAR; INTRAVENOUS
Status: DISPENSED
Start: 2019-03-18

## (undated) RX ORDER — HEPARIN SODIUM 1000 [USP'U]/ML
INJECTION, SOLUTION INTRAVENOUS; SUBCUTANEOUS
Status: DISPENSED
Start: 2019-03-12

## (undated) RX ORDER — HEPARIN SODIUM 1000 [USP'U]/ML
INJECTION, SOLUTION INTRAVENOUS; SUBCUTANEOUS
Status: DISPENSED
Start: 2019-03-18

## (undated) RX ORDER — VERAPAMIL HYDROCHLORIDE 2.5 MG/ML
INJECTION, SOLUTION INTRAVENOUS
Status: DISPENSED
Start: 2019-03-12

## (undated) RX ORDER — FENTANYL CITRATE 0.05 MG/ML
INJECTION, SOLUTION INTRAMUSCULAR; INTRAVENOUS
Status: DISPENSED
Start: 2019-03-18

## (undated) RX ORDER — LIDOCAINE HYDROCHLORIDE 20 MG/ML
INJECTION, SOLUTION EPIDURAL; INFILTRATION; INTRACAUDAL; PERINEURAL
Status: DISPENSED
Start: 2019-03-18

## (undated) RX ORDER — NITROGLYCERIN 5 MG/ML
VIAL (ML) INTRAVENOUS
Status: DISPENSED
Start: 2019-03-11

## (undated) RX ORDER — FENTANYL CITRATE 50 UG/ML
INJECTION, SOLUTION INTRAMUSCULAR; INTRAVENOUS
Status: DISPENSED
Start: 2022-11-16

## (undated) RX ORDER — ALBUMIN, HUMAN INJ 5% 5 %
SOLUTION INTRAVENOUS
Status: DISPENSED
Start: 2019-03-18

## (undated) RX ORDER — ONDANSETRON 2 MG/ML
INJECTION INTRAMUSCULAR; INTRAVENOUS
Status: DISPENSED
Start: 2022-11-16

## (undated) RX ORDER — EPHEDRINE SULFATE 50 MG/ML
INJECTION, SOLUTION INTRAMUSCULAR; INTRAVENOUS; SUBCUTANEOUS
Status: DISPENSED
Start: 2022-11-16

## (undated) RX ORDER — LIDOCAINE HYDROCHLORIDE 10 MG/ML
INJECTION, SOLUTION EPIDURAL; INFILTRATION; INTRACAUDAL; PERINEURAL
Status: DISPENSED
Start: 2019-03-12

## (undated) RX ORDER — CEFAZOLIN SODIUM 2 G/100ML
INJECTION, SOLUTION INTRAVENOUS
Status: DISPENSED
Start: 2019-03-18

## (undated) RX ORDER — LIDOCAINE HYDROCHLORIDE 20 MG/ML
INJECTION, SOLUTION EPIDURAL; INFILTRATION; INTRACAUDAL; PERINEURAL
Status: DISPENSED
Start: 2022-11-16